# Patient Record
Sex: FEMALE | Race: WHITE | NOT HISPANIC OR LATINO | Employment: OTHER | URBAN - METROPOLITAN AREA
[De-identification: names, ages, dates, MRNs, and addresses within clinical notes are randomized per-mention and may not be internally consistent; named-entity substitution may affect disease eponyms.]

---

## 2017-01-03 ENCOUNTER — APPOINTMENT (OUTPATIENT)
Dept: GENERAL RADIOLOGY | Facility: HOSPITAL | Age: 77
End: 2017-01-03

## 2017-01-03 ENCOUNTER — TELEPHONE (OUTPATIENT)
Dept: ONCOLOGY | Facility: CLINIC | Age: 77
End: 2017-01-03

## 2017-01-03 ENCOUNTER — HOSPITAL ENCOUNTER (OUTPATIENT)
Facility: HOSPITAL | Age: 77
Setting detail: OBSERVATION
LOS: 1 days | Discharge: HOME HEALTH | End: 2017-01-09
Attending: EMERGENCY MEDICINE | Admitting: INTERNAL MEDICINE

## 2017-01-03 DIAGNOSIS — I48.91 ATRIAL FIBRILLATION AND FLUTTER (HCC): ICD-10-CM

## 2017-01-03 DIAGNOSIS — R62.7 FAILURE TO THRIVE IN ADULT: Primary | ICD-10-CM

## 2017-01-03 DIAGNOSIS — D47.2 SMOLDERING MULTIPLE MYELOMA (SMM): ICD-10-CM

## 2017-01-03 DIAGNOSIS — Z74.09 IMPAIRED MOBILITY AND ADLS: ICD-10-CM

## 2017-01-03 DIAGNOSIS — D47.2 SMOLDERING MULTIPLE MYELOMA (SMM): Primary | ICD-10-CM

## 2017-01-03 DIAGNOSIS — I48.92 ATRIAL FIBRILLATION AND FLUTTER (HCC): ICD-10-CM

## 2017-01-03 DIAGNOSIS — Z74.09 IMPAIRED FUNCTIONAL MOBILITY, BALANCE, GAIT, AND ENDURANCE: ICD-10-CM

## 2017-01-03 DIAGNOSIS — Z78.9 IMPAIRED MOBILITY AND ADLS: ICD-10-CM

## 2017-01-03 DIAGNOSIS — D47.2 MONOCLONAL GAMMOPATHY OF UNDETERMINED SIGNIFICANCE: ICD-10-CM

## 2017-01-03 PROBLEM — R73.9 HYPERGLYCEMIA: Status: ACTIVE | Noted: 2017-01-03

## 2017-01-03 PROBLEM — E86.0 DEHYDRATION: Status: ACTIVE | Noted: 2017-01-03

## 2017-01-03 LAB
ALBUMIN SERPL-MCNC: 3.8 G/DL (ref 3.2–4.8)
ALBUMIN/GLOB SERPL: 1 G/DL (ref 1.5–2.5)
ALP SERPL-CCNC: 76 U/L (ref 25–100)
ALT SERPL W P-5'-P-CCNC: 19 U/L (ref 7–40)
ANION GAP SERPL CALCULATED.3IONS-SCNC: 6 MMOL/L (ref 3–11)
AST SERPL-CCNC: 24 U/L (ref 0–33)
BACTERIA UR QL AUTO: ABNORMAL /HPF
BASOPHILS # BLD AUTO: 0 10*3/MM3 (ref 0–0.2)
BASOPHILS NFR BLD AUTO: 0 % (ref 0–1)
BILIRUB SERPL-MCNC: 1 MG/DL (ref 0.3–1.2)
BILIRUB UR QL STRIP: ABNORMAL
BUN BLD-MCNC: 22 MG/DL (ref 9–23)
BUN/CREAT SERPL: 31.4 (ref 7–25)
CALCIUM SPEC-SCNC: 10 MG/DL (ref 8.7–10.4)
CHLORIDE SERPL-SCNC: 96 MMOL/L (ref 99–109)
CLARITY UR: ABNORMAL
CO2 SERPL-SCNC: 32 MMOL/L (ref 20–31)
COLOR UR: YELLOW
CREAT BLD-MCNC: 0.7 MG/DL (ref 0.6–1.3)
DEPRECATED RDW RBC AUTO: 47.9 FL (ref 37–54)
EOSINOPHIL # BLD AUTO: 0 10*3/MM3 (ref 0.1–0.3)
EOSINOPHIL NFR BLD AUTO: 0 % (ref 0–3)
ERYTHROCYTE [DISTWIDTH] IN BLOOD BY AUTOMATED COUNT: 13.3 % (ref 11.3–14.5)
GFR SERPL CREATININE-BSD FRML MDRD: 81 ML/MIN/1.73
GLOBULIN UR ELPH-MCNC: 3.8 GM/DL
GLUCOSE BLD-MCNC: 148 MG/DL (ref 70–100)
GLUCOSE BLDC GLUCOMTR-MCNC: 149 MG/DL (ref 70–130)
GLUCOSE UR STRIP-MCNC: NEGATIVE MG/DL
HCT VFR BLD AUTO: 42.9 % (ref 34.5–44)
HGB BLD-MCNC: 15.1 G/DL (ref 11.5–15.5)
HGB UR QL STRIP.AUTO: NEGATIVE
HOLD SPECIMEN: NORMAL
HOLD SPECIMEN: NORMAL
HYALINE CASTS UR QL AUTO: ABNORMAL /LPF
IMM GRANULOCYTES # BLD: 0.01 10*3/MM3 (ref 0–0.03)
IMM GRANULOCYTES NFR BLD: 0.1 % (ref 0–0.6)
KETONES UR QL STRIP: NEGATIVE
LEUKOCYTE ESTERASE UR QL STRIP.AUTO: NEGATIVE
LYMPHOCYTES # BLD AUTO: 0.77 10*3/MM3 (ref 0.6–4.8)
LYMPHOCYTES NFR BLD AUTO: 10.3 % (ref 24–44)
MAGNESIUM SERPL-MCNC: 1.8 MG/DL (ref 1.3–2.7)
MCH RBC QN AUTO: 34.7 PG (ref 27–31)
MCHC RBC AUTO-ENTMCNC: 35.2 G/DL (ref 32–36)
MCV RBC AUTO: 98.6 FL (ref 80–99)
MONOCYTES # BLD AUTO: 0.09 10*3/MM3 (ref 0–1)
MONOCYTES NFR BLD AUTO: 1.2 % (ref 0–12)
NEUTROPHILS # BLD AUTO: 6.6 10*3/MM3 (ref 1.5–8.3)
NEUTROPHILS NFR BLD AUTO: 88.4 % (ref 41–71)
NITRITE UR QL STRIP: NEGATIVE
PH UR STRIP.AUTO: 6.5 [PH] (ref 5–8)
PLATELET # BLD AUTO: 267 10*3/MM3 (ref 150–450)
PMV BLD AUTO: 10.9 FL (ref 6–12)
POTASSIUM BLD-SCNC: 3.9 MMOL/L (ref 3.5–5.5)
PROT SERPL-MCNC: 7.6 G/DL (ref 5.7–8.2)
PROT UR QL STRIP: ABNORMAL
RBC # BLD AUTO: 4.35 10*6/MM3 (ref 3.89–5.14)
RBC # UR: ABNORMAL /HPF
REF LAB TEST METHOD: ABNORMAL
SODIUM BLD-SCNC: 134 MMOL/L (ref 132–146)
SP GR UR STRIP: 1.02 (ref 1–1.03)
SQUAMOUS #/AREA URNS HPF: ABNORMAL /HPF
TROPONIN I SERPL-MCNC: 0.02 NG/ML (ref 0–0.07)
TROPONIN I SERPL-MCNC: 0.02 NG/ML (ref 0–0.07)
UROBILINOGEN UR QL STRIP: ABNORMAL
WBC NRBC COR # BLD: 7.47 10*3/MM3 (ref 3.5–10.8)
WBC UR QL AUTO: ABNORMAL /HPF
WHOLE BLOOD HOLD SPECIMEN: NORMAL
WHOLE BLOOD HOLD SPECIMEN: NORMAL

## 2017-01-03 PROCEDURE — 96360 HYDRATION IV INFUSION INIT: CPT

## 2017-01-03 PROCEDURE — 84484 ASSAY OF TROPONIN QUANT: CPT

## 2017-01-03 PROCEDURE — 93005 ELECTROCARDIOGRAM TRACING: CPT

## 2017-01-03 PROCEDURE — G0378 HOSPITAL OBSERVATION PER HR: HCPCS

## 2017-01-03 PROCEDURE — 99220 PR INITIAL OBSERVATION CARE/DAY 70 MINUTES: CPT | Performed by: INTERNAL MEDICINE

## 2017-01-03 PROCEDURE — 83735 ASSAY OF MAGNESIUM: CPT | Performed by: EMERGENCY MEDICINE

## 2017-01-03 PROCEDURE — 85025 COMPLETE CBC W/AUTO DIFF WBC: CPT | Performed by: EMERGENCY MEDICINE

## 2017-01-03 PROCEDURE — 82962 GLUCOSE BLOOD TEST: CPT

## 2017-01-03 PROCEDURE — 80053 COMPREHEN METABOLIC PANEL: CPT | Performed by: EMERGENCY MEDICINE

## 2017-01-03 PROCEDURE — 81001 URINALYSIS AUTO W/SCOPE: CPT | Performed by: EMERGENCY MEDICINE

## 2017-01-03 PROCEDURE — 99284 EMERGENCY DEPT VISIT MOD MDM: CPT

## 2017-01-03 PROCEDURE — 96361 HYDRATE IV INFUSION ADD-ON: CPT

## 2017-01-03 PROCEDURE — 71010 HC CHEST PA OR AP: CPT

## 2017-01-03 RX ORDER — SODIUM CHLORIDE 9 MG/ML
100 INJECTION, SOLUTION INTRAVENOUS ONCE
Status: COMPLETED | OUTPATIENT
Start: 2017-01-03 | End: 2017-01-03

## 2017-01-03 RX ORDER — SODIUM CHLORIDE 0.9 % (FLUSH) 0.9 %
10 SYRINGE (ML) INJECTION AS NEEDED
Status: DISCONTINUED | OUTPATIENT
Start: 2017-01-03 | End: 2017-01-09 | Stop reason: HOSPADM

## 2017-01-03 RX ORDER — DIPHENHYDRAMINE HCL 50 MG
50 CAPSULE ORAL NIGHTLY PRN
COMMUNITY
End: 2017-03-24 | Stop reason: HOSPADM

## 2017-01-03 RX ORDER — DIPHENHYDRAMINE HCL 50 MG
50 CAPSULE ORAL ONCE
Status: COMPLETED | OUTPATIENT
Start: 2017-01-03 | End: 2017-01-04

## 2017-01-03 RX ORDER — DULOXETIN HYDROCHLORIDE 30 MG/1
30 CAPSULE, DELAYED RELEASE ORAL DAILY
Status: DISCONTINUED | OUTPATIENT
Start: 2017-01-04 | End: 2017-01-04 | Stop reason: SDUPTHER

## 2017-01-03 RX ADMIN — SODIUM CHLORIDE 100 ML/HR: 9 INJECTION, SOLUTION INTRAVENOUS at 20:49

## 2017-01-03 NOTE — IP AVS SNAPSHOT
AFTER VISIT SUMMARY             Sowmya Beckett           About your hospitalization     You were admitted on:  January 3, 2017 You last received care in the:  [address] GYN       Procedures & Surgeries         Medications    If you or your caregiver advised us that you are currently taking a medication and that medication is marked below as “Resume”, this simply indicates that we have reviewed those medications to make sure our new therapy recommendations do not interfere.  If you have concerns about medications other than those new ones which we are prescribing today, please consult the physician who prescribed them (or your primary physician).  Our review of your home medications is not meant to indicate that we are directing their use.             Your Medications      START taking these medications     ondansetron 4 MG tablet   Take 1 tablet by mouth Every 6 (Six) Hours As Needed for nausea or vomiting.   Commonly known as:  LOYD             CHANGE how you take these medications     fish oil 1000 MG capsule capsule   Take  by mouth daily with breakfast.   What changed:  Another medication with the same name was removed. Continue taking this medication, and follow the directions you see here.           polyethylene glycol packet   Take 17 g by mouth 2 (Two) Times a Day.   According to our records, you may have been taking this medication differently.   Commonly known as:  MIRALAX   What changed:    - when to take this  - reasons to take this             CONTINUE taking these medications     aspirin 325 MG tablet   Take 325 mg by mouth daily.   Last time this was given:  1/9/2017  9:21 AM           BIOTIN PO   Take  by mouth daily.           cholecalciferol 1000 UNITS tablet   Take 2,000 Units by mouth daily.   Commonly known as:  VITAMIN D3           diphenhydrAMINE 50 MG capsule   Take 50 mg by mouth Every Night.   Last time this was given:  1/9/2017  1:10 AM   Commonly known as:   BENADRYL           DULoxetine 30 MG capsule   Take 30 mg by mouth Daily.   Last time this was given:  1/9/2017  9:21 AM   Commonly known as:  CYMBALTA           gabapentin 300 MG capsule   Take 300 mg by mouth 3 (three) times a day.   Last time this was given:  1/9/2017  5:53 AM   Commonly known as:  NEURONTIN           metoclopramide 10 MG tablet   Take 1 tablet by mouth 4 (Four) Times a Day Before Meals & at Bedtime for 30 days.   Last time this was given:  1/9/2017 11:59 AM   Commonly known as:  REGLAN           metoprolol tartrate 25 MG tablet   TAKE ONE-HALF TABLET BY MOUTH ONCE DAILY   Last time this was given:  1/9/2017  9:21 AM   Commonly known as:  LOPRESSOR           Red Yeast Rice 600 MG capsule   Take  by mouth daily.           Zinc 50 MG capsule   Take  by mouth daily.             STOP taking these medications     dexamethasone 4 MG tablet   Commonly known as:  DECADRON                Where to Get Your Medications      These medications were sent to [facility] [address] [place] [address] [phone] [place] [id number] [address] [address]     Phone:  [phone]     ondansetron 4 MG tablet                  Your Medications      Your Medication List           Morning Noon Evening Bedtime As Needed    aspirin 325 MG tablet   Take 325 mg by mouth daily.                                   BIOTIN PO   Take  by mouth daily.                                   cholecalciferol 1000 UNITS tablet   Take 2,000 Units by mouth daily.   Commonly known as:  VITAMIN D3                                   diphenhydrAMINE 50 MG capsule   Take 50 mg by mouth Every Night.   Commonly known as:  BENADRYL                                   DULoxetine 30 MG capsule   Take 30 mg by mouth Daily.   Commonly known as:  CYMBALTA                                   fish oil 1000 MG capsule capsule   Take  by mouth daily with breakfast.                                   gabapentin 300 MG capsule    Take 300 mg by mouth 3 (three) times a day.   Commonly known as:  NEURONTIN                                         metoclopramide 10 MG tablet   Take 1 tablet by mouth 4 (Four) Times a Day Before Meals & at Bedtime for 30 days.   Commonly known as:  REGLAN                                            metoprolol tartrate 25 MG tablet   TAKE ONE-HALF TABLET BY MOUTH ONCE DAILY   Commonly known as:  LOPRESSOR                                   ondansetron 4 MG tablet   Take 1 tablet by mouth Every 6 (Six) Hours As Needed for nausea or vomiting.   Commonly known as:  ZOFRAN                                   polyethylene glycol packet   Take 17 g by mouth 2 (Two) Times a Day.   Commonly known as:  MIRALAX                                      Red Yeast Rice 600 MG capsule   Take  by mouth daily.                                   Zinc 50 MG capsule   Take  by mouth daily.                                            Instructions for After Discharge        Activity Instructions     Activity as Tolerated                 Diet Instructions     Advance Diet As Tolerated                 Discharge References/Attachments     FAILURE TO THRIVE, ADULT (ENGLISH)    FALL PREVENTION AND HOME SAFETY, EASY-TO-READ (ENGLISH)    ONDANSETRON TABLETS (ENGLISH)       Follow-ups for After Discharge        Follow-up Information     Follow up with Velia Vogel MD .    Specialty:  Family Medicine    Contact information:    [address] [facility]  [address]  [place] [id number] [phone]          Follow up with Messi Pantoja MD .    Specialty:  Hematology and Oncology    Contact information:    [address] [facility] [address]  [facility] [id number] [phone]          Follow up with [facility] .    Specialty:  Home Health Services    Contact information:    [address] [facility] [address] [phone]      Referrals and Follow-ups to Schedule     Additional Follow-Up    As directed    pcp   Follow Up  Details:  1 week       Follow-Up    As directed    1/11/17- PET scan  1/12/17- Dr. Pantoja   Follow Up Details:  1/11/17 and 1/12/17             Scheduled Appointments     Jan 11, 2017 12:45 PM EST   [place] pet injection with A[facility]LEY [facility] PET ADMIN RM1   [facility]    [address] [facility] [address]   [phone]            Jan 11, 2017  1:45 PM EST   NM ash[place] pet whole body with ASHLEY [facility] PETCT 1   [facility] PET ([place])    [address] [facility] [address] [facility] [address]   [phone]            Jan 12, 2017  9:45 AM EST   FOLLOW UP with Messi Pantoja MD   [facility] HEMATOLOGY  AND ONCOLOGY ([place])    [address]  [facility] [address] [phone]            Mar 21, 2017 11:00 AM EDT   New Patient with Dave Swenson MD, FAAN   [facility] NEUROLOGY (--)    [address]  [facility] [address] [phone]           Bring insurance card and photo ID to appointment.            Aug 14, 2017  1:30 PM EDT   Follow Up with Alvarado Willoughby MD   [facility] CARDIOLOGY (--)    [address]  [facility] [address] [phone]           Arrive 15 minutes prior to appointment.            Additional instructions:      [facility] for therapy              Pathable Signup     Our records indicate that you have an active [facility] Pathable account.    You can view your After Visit Summary by going to [employer]Xand[employer] and logging in with your Pathable username and password.  If you don't have a Pathable username and password but a parent or guardian has access to your record, the parent or guardian should login with their own Pathable username and password and access your record to view the After Visit Summary.    If you have questions, you can email [email or web address] or call [phone] to talk to our Pathable staff.   Remember, MyChart is NOT to be used for urgent needs.  For medical emergencies, dial 911.           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    Your diagnoses also included:  Monoclonal Gammopathy Of Undetermined Significance, Smoldering Multiple Myeloma (Smm), Abnormal Heart Rhythm, Dehydration, Hyperglycemia      Care Providers     Provider Service Role Specialty    Damaris Zabala II, DO Medicine Attending Provider Hospitalist    Messi Pantoja MD Oncology Consulting Physician  Hematology and Oncology    Alvarado [place] MD Gastroenterology Consulting Physician  Gastroenterology     Kathy Grajeda MD Oncology Consulting Physician  Hematology and Oncology       Your Allergies  Date Reviewed: 1/3/2017    Allergen Reactions    Ambien (Zolpidem Tartrate) Not Noted         Morphine And Related Not Noted      Pending Labs     Order Current Status    Green Top (No Gel) Collected (01/03/17 1806)    [place] Draw In process      Patient Belongings Returned     Document Return of Belongings Flowsheet     Were the patient bedside belongings sent home?   Yes   Belongings Retrieved from Security & Sent Home   N/A    Belongings Sent to Safe   --   Medications Retrieved from Pharmacy & Sent Home   N/A              More Information      Failure to Thrive, Adult  Failure to thrive is a group of symptoms that affect elderly adults. These symptoms include loss of appetite and weight loss. People who have this condition may do fewer and fewer activities over time. They may lose interest in being with friends or they may not want to eat or drink. This condition is not a normal part of aging.  CAUSES  This condition may be caused by:  · A disease, such dementia, diabetes, cancer, or lung disease.  · A health problem, such as a vitamin deficiency or a heart problem.  · A disorder, such as depression.  · A disability.  · Medicines.  · Mistreatment or  neglect.  In some cases, the cause may not be known.  SYMPTOMS  Symptoms of this condition include:  · Loss of more than 5% of your body weight.  · Being more tired than normal after an activity.  · Having trouble getting up after sitting.  · Loss of appetite.  · Not getting out of bed.  · Not wanting to do usual activities.  · Depression.  · Getting infections often.  · Bedsores.  · Taking a long time to recover after an injury or a surgery.  · Weakness.  DIAGNOSIS  This condition may be diagnosed with a physical exam. Your health care provider will ask questions about your health, behavior, and mood, such as:  · Has your activity changed?  · Do you seem sad?  · Are your eating habits different?  Tests may also be done. They may include:  · Blood tests.  · Urine tests.  · Imaging tests, such as X-rays, a CT scan, or MRI.  · Hearing tests.  · Vision tests.  · Tests to check thinking ability (cognitive tests).  · Activity tests to see if you can do tasks such as bathing and dressing and to see if you can move around safely.  You may be referred to a specialist.  TREATMENT  Treatment for this condition depends on the cause. It may involve:  · Treating the cause.  · Talk therapy or medicine to treat depression.  · Improving diet, such as by eating more often or taking nutritional supplements.  · Changing or stopping a medicine.  · Physical therapy.  It often takes a team of health care providers to find the right treatment.  HOME CARE INSTRUCTIONS  · Take over-the-counter and prescription medicines only as told by your health care provider.  · Eat a healthy, well-balanced diet. Make sure to get enough calories in each meal.  · Be physically active. Include strength training as part of your exercise routine. A physical therapist can help to set up an exercise program that fits you.  · Make sure that you are safe at home.  · Make sure that you have a plan for what to do if you become unable to make decisions for  yourself.  SEEK MEDICAL CARE IF:  · You are not able to eat well.  · You are not able to move around.  · You feel very sad or hopeless.  SEEK IMMEDIATE MEDICAL CARE IF:  · You have thoughts of ending your life.  · You cannot eat or drink.  · You do not get out of bed.  · Staying at home is no longer safe.  · You have a fever.     This information is not intended to replace advice given to you by your health care provider. Make sure you discuss any questions you have with your health care provider.     Document Released: 03/11/2013 Document Revised: 09/07/2016 Document Reviewed: 03/14/2016  [employer] Interactive Patient Education ©2016 [employer]          Fall Prevention in the Home   Falls can cause injuries. They can happen to people of all ages. There are many things you can do to make your home safe and to help prevent falls.   WHAT CAN I DO ON THE OUTSIDE OF MY HOME?  · Regularly fix the edges of walkways and driveways and fix any cracks.  · Remove anything that might make you trip as you walk through a door, such as a raised step or threshold.  · Trim any bushes or trees on the path to your home.  · Use bright outdoor lighting.  · Clear any walking paths of anything that might make someone trip, such as rocks or tools.  · Regularly check to see if handrails are loose or broken. Make sure that both sides of any steps have handrails.  · Any raised decks and porches should have guardrails on the edges.  · Have any leaves, snow, or ice cleared regularly.  · Use sand or salt on walking paths during winter.  · Clean up any spills in your garage right away. This includes oil or grease spills.  WHAT CAN I DO IN THE BATHROOM?   · Use night lights.  · Install grab bars by the toilet and in the tub and shower. Do not use towel bars as grab bars.  · Use non-skid mats or decals in the tub or shower.  · If you need to sit down in the shower, use a plastic, non-slip stool.  · Keep the floor dry. Clean up any water that  spills on the floor as soon as it happens.  · Remove soap buildup in the tub or shower regularly.  · Attach bath mats securely with double-sided non-slip rug tape.  · Do not have throw rugs and other things on the floor that can make you trip.  WHAT CAN I DO IN THE BEDROOM?  · Use night lights.  · Make sure that you have a light by your bed that is easy to reach.  · Do not use any sheets or blankets that are too big for your bed. They should not hang down onto the floor.  · Have a firm chair that has side arms. You can use this for support while you get dressed.  · Do not have throw rugs and other things on the floor that can make you trip.  WHAT CAN I DO IN THE KITCHEN?  · Clean up any spills right away.  · Avoid walking on wet floors.  · Keep items that you use a lot in easy-to-reach places.  · If you need to reach something above you, use a strong step stool that has a grab bar.  · Keep electrical cords out of the way.  · Do not use floor polish or wax that makes floors slippery. If you must use wax, use non-skid floor wax.  · Do not have throw rugs and other things on the floor that can make you trip.  WHAT CAN I DO WITH MY STAIRS?  · Do not leave any items on the stairs.  · Make sure that there are handrails on both sides of the stairs and use them. Fix handrails that are broken or loose. Make sure that handrails are as long as the stairways.  · Check any carpeting to make sure that it is firmly attached to the stairs. Fix any carpet that is loose or worn.  · Avoid having throw rugs at the top or bottom of the stairs. If you do have throw rugs, attach them to the floor with carpet tape.  · Make sure that you have a light switch at the top of the stairs and the bottom of the stairs. If you do not have them, ask someone to add them for you.  WHAT ELSE CAN I DO TO HELP PREVENT FALLS?  · Wear shoes that:    Do not have high heels.    Have rubber bottoms.    Are comfortable and fit you well.    Are closed at the  toe. Do not wear sandals.  · If you use a stepladder:    Make sure that it is fully opened. Do not climb a closed stepladder.    Make sure that both sides of the stepladder are locked into place.    Ask someone to hold it for you, if possible.  · Clearly rivera and make sure that you can see:    Any grab bars or handrails.    First and last steps.    Where the edge of each step is.  · Use tools that help you move around (mobility aids) if they are needed. These include:    Canes.    Walkers.    Scooters.    Crutches.  · Turn on the lights when you go into a dark area. Replace any light bulbs as soon as they burn out.  · Set up your furniture so you have a clear path. Avoid moving your furniture around.  · If any of your floors are uneven, fix them.  · If there are any pets around you, be aware of where they are.  · Review your medicines with your doctor. Some medicines can make you feel dizzy. This can increase your chance of falling.  Ask your doctor what other things that you can do to help prevent falls.     This information is not intended to replace advice given to you by your health care provider. Make sure you discuss any questions you have with your health care provider.     Document Released: 10/14/2010 Document Revised: 05/03/2016 Document Reviewed: 01/22/2016  [employer] Interactive Patient Education ©2016 [employer]          Ondansetron tablets  What is this medicine?  ONDANSETRON (on DAN se brissa) is used to treat nausea and vomiting caused by chemotherapy. It is also used to prevent or treat nausea and vomiting after surgery.  This medicine may be used for other purposes; ask your health care provider or pharmacist if you have questions.  What should I tell my health care provider before I take this medicine?  They need to know if you have any of these conditions:  -heart disease  -history of irregular heartbeat  -liver disease  -low levels of magnesium or potassium in the blood  -an unusual or allergic  reaction to ondansetron, granisetron, other medicines, foods, dyes, or preservatives  -pregnant or trying to get pregnant  -breast-feeding  How should I use this medicine?  Take this medicine by mouth with a glass of water. Follow the directions on your prescription label. Take your doses at regular intervals. Do not take your medicine more often than directed.  Talk to your pediatrician regarding the use of this medicine in children. Special care may be needed.  Overdosage: If you think you have taken too much of this medicine contact a poison control center or emergency room at once.  NOTE: This medicine is only for you. Do not share this medicine with others.  What if I miss a dose?  If you miss a dose, take it as soon as you can. If it is almost time for your next dose, take only that dose. Do not take double or extra doses.  What may interact with this medicine?  Do not take this medicine with any of the following medications:  -apomorphine  -certain medicines for fungal infections like fluconazole, itraconazole, ketoconazole, posaconazole, voriconazole  -cisapride  -dofetilide  -dronedarone  -pimozide  -thioridazine  -ziprasidone  This medicine may also interact with the following medications:  -carbamazepine  -certain medicines for depression, anxiety, or psychotic disturbances  -fentanyl  -linezolid  -MAOIs like Carbex, Eldepryl, Marplan, Nardil, and Parnate  -methylene blue (injected into a vein)  -other medicines that prolong the QT interval (cause an abnormal heart rhythm)  -phenytoin  -rifampicin  -tramadol  This list may not describe all possible interactions. Give your health care provider a list of all the medicines, herbs, non-prescription drugs, or dietary supplements you use. Also tell them if you smoke, drink alcohol, or use illegal drugs. Some items may interact with your medicine.  What should I watch for while using this medicine?  Check with your doctor or health care professional right away  if you have any sign of an allergic reaction.  What side effects may I notice from receiving this medicine?  Side effects that you should report to your doctor or health care professional as soon as possible:  -allergic reactions like skin rash, itching or hives, swelling of the face, lips or tongue  -breathing problems  -confusion  -dizziness  -fast or irregular heartbeat  -feeling faint or lightheaded, falls  -fever and chills  -loss of balance or coordination  -seizures  -sweating  -swelling of the hands or feet  -tightness in the chest  -tremors  -unusually weak or tired  Side effects that usually do not require medical attention (report to your doctor or health care professional if they continue or are bothersome):  -constipation or diarrhea  -headache  This list may not describe all possible side effects. Call your doctor for medical advice about side effects. You may report side effects to FDA at 0-183-FDA-7727.  Where should I keep my medicine?  Keep out of the reach of children.  Store between 2 and 30 degrees C (36 and 86 degrees F). Throw away any unused medicine after the expiration date.  NOTE: This sheet is a summary. It may not cover all possible information. If you have questions about this medicine, talk to your doctor, pharmacist, or health care provider.     © 2016, Elsevier/Gold Standard. (2014-09-24 16:27:45)            SYMPTOMS OF A STROKE    Call 911 or have someone take you to the Emergency Department if you have any of the following:    · Sudden numbness or weakness of your face, arm or leg especially on one side of the body  · Sudden confusion, diffiiculty speaking or trouble understanding   · Changes in your vision or loss of sight in one eye  · Sudden severe headache with no known cause  · sudden dizziness, trouble walking, loss of balance or coordination    It is important to seek emergency care right away if you have further stroke symptoms. If you get emergency help quickly, the  powerful clot-dissolving medicines can reduce the disabilities caused by a stroke.     For more information:    American Stroke Association  6-824-7-STROKE  www.strokeassociation.org           IF YOU SMOKE OR USE TOBACCO PLEASE READ THE FOLLOWING:    Why is smoking bad for me?  Smoking increases the risk of heart disease, lung disease, vascular disease, stroke, and cancer.     If you smoke, STOP!    If you would like more information on quitting smoking, please visit the [employer] website: [email or web address]   This link will provide additional resources including the QUIT line and the Beat the Pack support groups.     For more information:    American Cancer Society  (623) 676-8463    American Heart Association  1-409.363.4878               YOU ARE THE MOST IMPORTANT FACTOR IN YOUR RECOVERY.     Follow all instructions carefully.     I have reviewed my discharge instructions with my nurse, including the following information, if applicable:     Information about my illness and diagnosis   Follow up appointments (including lab draws)   Wound Care   Equipment Needs   Medications (new and continuing) along with side effects   Preventative information such as vaccines and smoking cessations   Diet   Pain   I know when to contact my Doctor's office or seek emergency care      I want my nurse to describe the side effects of my medications: YES NO   If the answer is no, I understand the side effects of my medications: YES NO   My nurse described the side effects of my medications in a way that I could understand: YES NO   I have taken my personal belongings and my own medications with me at discharge: YES NO            I have received this information and my questions have been answered. I have discussed any concerns I see with this plan with the nurse or physician. I understand these instructions.    Signature of Patient or Responsible Person:  _____________________________________    Date: _________________  Time: __________________    Signature of Healthcare Provider: _______________________________________  Date: _________________  Time: __________________

## 2017-01-03 NOTE — TELEPHONE ENCOUNTER
Patient is in bed most of the day.  Had defecation in the hospital that made her feel much better.  Bone marrow biopsy shows smoldering myeloma.  Prior bone survey was negative and she's not anemic or hypercalcemic or having elevated creatinine.  Called patient and told her that she does not have an indication for treatment but has already started dexamethasone 20 mg daily to try to stimulate her appetite and perhaps decrease the clone of immunoglobulin monoclonal protein that was actually lower this month than it was 2 months ago.  Whether this is causing some neuropathy and delayed gastric emptying is my hypothesis.  Nonetheless she still failing to thrive and will probably come back to the emergency room.  In the meantime I'm contacting my  in [place] and I have ordered a PET scan to finish staging for myeloma thoroughly as well as checking an ionized calcium as her total calcium was at the upper end of normal at 10.4.

## 2017-01-04 LAB
ALBUMIN SERPL-MCNC: 3.2 G/DL (ref 3.2–4.8)
ALBUMIN/GLOB SERPL: 0.9 G/DL (ref 1.5–2.5)
ALP SERPL-CCNC: 57 U/L (ref 25–100)
ALT SERPL W P-5'-P-CCNC: 15 U/L (ref 7–40)
ANION GAP SERPL CALCULATED.3IONS-SCNC: 9 MMOL/L (ref 3–11)
AST SERPL-CCNC: 18 U/L (ref 0–33)
BASOPHILS # BLD AUTO: 0 10*3/MM3 (ref 0–0.2)
BASOPHILS NFR BLD AUTO: 0 % (ref 0–1)
BILIRUB SERPL-MCNC: 1.3 MG/DL (ref 0.3–1.2)
BUN BLD-MCNC: 23 MG/DL (ref 9–23)
BUN/CREAT SERPL: 38.3 (ref 7–25)
CA-I SERPL ISE-MCNC: 1.18 MMOL/L (ref 1.12–1.32)
CALCIUM SPEC-SCNC: 8.9 MG/DL (ref 8.7–10.4)
CHLORIDE SERPL-SCNC: 98 MMOL/L (ref 99–109)
CO2 SERPL-SCNC: 26 MMOL/L (ref 20–31)
CREAT BLD-MCNC: 0.6 MG/DL (ref 0.6–1.3)
DEPRECATED RDW RBC AUTO: 48.6 FL (ref 37–54)
EOSINOPHIL # BLD AUTO: 0 10*3/MM3 (ref 0.1–0.3)
EOSINOPHIL NFR BLD AUTO: 0 % (ref 0–3)
ERYTHROCYTE [DISTWIDTH] IN BLOOD BY AUTOMATED COUNT: 13.5 % (ref 11.3–14.5)
GFR SERPL CREATININE-BSD FRML MDRD: 97 ML/MIN/1.73
GLOBULIN UR ELPH-MCNC: 3.4 GM/DL
GLUCOSE BLD-MCNC: 111 MG/DL (ref 70–100)
GLUCOSE BLDC GLUCOMTR-MCNC: 100 MG/DL (ref 70–130)
GLUCOSE BLDC GLUCOMTR-MCNC: 128 MG/DL (ref 70–130)
GLUCOSE BLDC GLUCOMTR-MCNC: 169 MG/DL (ref 70–130)
GLUCOSE BLDC GLUCOMTR-MCNC: 97 MG/DL (ref 70–130)
HCT VFR BLD AUTO: 37.9 % (ref 34.5–44)
HGB BLD-MCNC: 13.5 G/DL (ref 11.5–15.5)
IMM GRANULOCYTES # BLD: 0.02 10*3/MM3 (ref 0–0.03)
IMM GRANULOCYTES NFR BLD: 0.2 % (ref 0–0.6)
LYMPHOCYTES # BLD AUTO: 1.82 10*3/MM3 (ref 0.6–4.8)
LYMPHOCYTES NFR BLD AUTO: 22.7 % (ref 24–44)
MCH RBC QN AUTO: 35.1 PG (ref 27–31)
MCHC RBC AUTO-ENTMCNC: 35.6 G/DL (ref 32–36)
MCV RBC AUTO: 98.4 FL (ref 80–99)
MONOCYTES # BLD AUTO: 0.77 10*3/MM3 (ref 0–1)
MONOCYTES NFR BLD AUTO: 9.6 % (ref 0–12)
NEUTROPHILS # BLD AUTO: 5.42 10*3/MM3 (ref 1.5–8.3)
NEUTROPHILS NFR BLD AUTO: 67.5 % (ref 41–71)
PLATELET # BLD AUTO: 245 10*3/MM3 (ref 150–450)
PMV BLD AUTO: 11.4 FL (ref 6–12)
POTASSIUM BLD-SCNC: 3.8 MMOL/L (ref 3.5–5.5)
PROT SERPL-MCNC: 6.6 G/DL (ref 5.7–8.2)
RBC # BLD AUTO: 3.85 10*6/MM3 (ref 3.89–5.14)
SODIUM BLD-SCNC: 133 MMOL/L (ref 132–146)
WBC NRBC COR # BLD: 8.03 10*3/MM3 (ref 3.5–10.8)

## 2017-01-04 PROCEDURE — 99233 SBSQ HOSP IP/OBS HIGH 50: CPT | Performed by: INTERNAL MEDICINE

## 2017-01-04 PROCEDURE — 82330 ASSAY OF CALCIUM: CPT | Performed by: NURSE PRACTITIONER

## 2017-01-04 PROCEDURE — 99225 PR SBSQ OBSERVATION CARE/DAY 25 MINUTES: CPT | Performed by: INTERNAL MEDICINE

## 2017-01-04 PROCEDURE — 96361 HYDRATE IV INFUSION ADD-ON: CPT

## 2017-01-04 PROCEDURE — G0378 HOSPITAL OBSERVATION PER HR: HCPCS

## 2017-01-04 PROCEDURE — 97162 PT EVAL MOD COMPLEX 30 MIN: CPT

## 2017-01-04 PROCEDURE — 25010000002 ENOXAPARIN PER 10 MG: Performed by: NURSE PRACTITIONER

## 2017-01-04 PROCEDURE — G8978 MOBILITY CURRENT STATUS: HCPCS

## 2017-01-04 PROCEDURE — 83036 HEMOGLOBIN GLYCOSYLATED A1C: CPT | Performed by: NURSE PRACTITIONER

## 2017-01-04 PROCEDURE — 63710000001 DIPHENHYDRAMINE PER 50 MG: Performed by: PHYSICIAN ASSISTANT

## 2017-01-04 PROCEDURE — G8979 MOBILITY GOAL STATUS: HCPCS

## 2017-01-04 PROCEDURE — 63710000001 DEXAMETHASONE PER 0.25 MG: Performed by: NURSE PRACTITIONER

## 2017-01-04 PROCEDURE — 96372 THER/PROPH/DIAG INJ SC/IM: CPT

## 2017-01-04 PROCEDURE — 82962 GLUCOSE BLOOD TEST: CPT

## 2017-01-04 PROCEDURE — 80053 COMPREHEN METABOLIC PANEL: CPT | Performed by: NURSE PRACTITIONER

## 2017-01-04 PROCEDURE — 85025 COMPLETE CBC W/AUTO DIFF WBC: CPT | Performed by: NURSE PRACTITIONER

## 2017-01-04 RX ORDER — GABAPENTIN 300 MG/1
300 CAPSULE ORAL EVERY 8 HOURS SCHEDULED
Status: DISCONTINUED | OUTPATIENT
Start: 2017-01-04 | End: 2017-01-09 | Stop reason: HOSPADM

## 2017-01-04 RX ORDER — ONDANSETRON 2 MG/ML
4 INJECTION INTRAMUSCULAR; INTRAVENOUS EVERY 6 HOURS PRN
Status: DISCONTINUED | OUTPATIENT
Start: 2017-01-04 | End: 2017-01-09 | Stop reason: HOSPADM

## 2017-01-04 RX ORDER — ASPIRIN 325 MG
325 TABLET ORAL DAILY
Status: DISCONTINUED | OUTPATIENT
Start: 2017-01-04 | End: 2017-01-09 | Stop reason: HOSPADM

## 2017-01-04 RX ORDER — ACETAMINOPHEN 325 MG/1
650 TABLET ORAL EVERY 4 HOURS PRN
Status: DISCONTINUED | OUTPATIENT
Start: 2017-01-04 | End: 2017-01-09 | Stop reason: HOSPADM

## 2017-01-04 RX ORDER — SODIUM CHLORIDE 9 MG/ML
100 INJECTION, SOLUTION INTRAVENOUS CONTINUOUS
Status: DISCONTINUED | OUTPATIENT
Start: 2017-01-04 | End: 2017-01-09 | Stop reason: HOSPADM

## 2017-01-04 RX ORDER — DEXTROSE MONOHYDRATE 25 G/50ML
25 INJECTION, SOLUTION INTRAVENOUS AS NEEDED
Status: DISCONTINUED | OUTPATIENT
Start: 2017-01-04 | End: 2017-01-09 | Stop reason: HOSPADM

## 2017-01-04 RX ORDER — NICOTINE POLACRILEX 4 MG
15 LOZENGE BUCCAL AS NEEDED
Status: DISCONTINUED | OUTPATIENT
Start: 2017-01-04 | End: 2017-01-09 | Stop reason: HOSPADM

## 2017-01-04 RX ORDER — SODIUM CHLORIDE 0.9 % (FLUSH) 0.9 %
1-10 SYRINGE (ML) INJECTION AS NEEDED
Status: DISCONTINUED | OUTPATIENT
Start: 2017-01-04 | End: 2017-01-09 | Stop reason: HOSPADM

## 2017-01-04 RX ORDER — DULOXETIN HYDROCHLORIDE 30 MG/1
30 CAPSULE, DELAYED RELEASE ORAL DAILY
Status: DISCONTINUED | OUTPATIENT
Start: 2017-01-04 | End: 2017-01-09 | Stop reason: HOSPADM

## 2017-01-04 RX ORDER — METOCLOPRAMIDE 10 MG/1
10 TABLET ORAL
Status: DISCONTINUED | OUTPATIENT
Start: 2017-01-04 | End: 2017-01-09 | Stop reason: HOSPADM

## 2017-01-04 RX ORDER — DEXAMETHASONE 4 MG/1
20 TABLET ORAL
Status: COMPLETED | OUTPATIENT
Start: 2017-01-04 | End: 2017-01-05

## 2017-01-04 RX ORDER — ONDANSETRON 4 MG/1
4 TABLET, FILM COATED ORAL EVERY 6 HOURS PRN
Status: DISCONTINUED | OUTPATIENT
Start: 2017-01-04 | End: 2017-01-09 | Stop reason: HOSPADM

## 2017-01-04 RX ADMIN — ASPIRIN 325 MG ORAL TABLET 325 MG: 325 PILL ORAL at 08:50

## 2017-01-04 RX ADMIN — ENOXAPARIN SODIUM 40 MG: 40 INJECTION SUBCUTANEOUS at 04:40

## 2017-01-04 RX ADMIN — METOCLOPRAMIDE HYDROCHLORIDE 10 MG: 10 TABLET ORAL at 21:29

## 2017-01-04 RX ADMIN — DIPHENHYDRAMINE HYDROCHLORIDE 50 MG: 50 CAPSULE ORAL at 00:10

## 2017-01-04 RX ADMIN — GABAPENTIN 300 MG: 300 CAPSULE ORAL at 21:29

## 2017-01-04 RX ADMIN — METOCLOPRAMIDE HYDROCHLORIDE 10 MG: 10 TABLET ORAL at 08:50

## 2017-01-04 RX ADMIN — GABAPENTIN 300 MG: 300 CAPSULE ORAL at 13:15

## 2017-01-04 RX ADMIN — DULOXETINE HYDROCHLORIDE 30 MG: 30 CAPSULE, DELAYED RELEASE ORAL at 00:10

## 2017-01-04 RX ADMIN — GABAPENTIN 300 MG: 300 CAPSULE ORAL at 04:41

## 2017-01-04 RX ADMIN — SODIUM CHLORIDE 100 ML/HR: 9 INJECTION, SOLUTION INTRAVENOUS at 20:25

## 2017-01-04 RX ADMIN — SODIUM CHLORIDE 100 ML/HR: 9 INJECTION, SOLUTION INTRAVENOUS at 00:09

## 2017-01-04 RX ADMIN — METOPROLOL TARTRATE 25 MG: 25 TABLET ORAL at 08:50

## 2017-01-04 RX ADMIN — METOCLOPRAMIDE HYDROCHLORIDE 10 MG: 10 TABLET ORAL at 13:15

## 2017-01-04 RX ADMIN — METOCLOPRAMIDE HYDROCHLORIDE 10 MG: 10 TABLET ORAL at 16:52

## 2017-01-04 RX ADMIN — SODIUM CHLORIDE 100 ML/HR: 9 INJECTION, SOLUTION INTRAVENOUS at 08:51

## 2017-01-04 RX ADMIN — DEXAMETHASONE 20 MG: 4 TABLET ORAL at 08:49

## 2017-01-04 RX ADMIN — Medication 5 MG: at 21:30

## 2017-01-04 RX ADMIN — DULOXETINE HYDROCHLORIDE 30 MG: 30 CAPSULE, DELAYED RELEASE ORAL at 08:50

## 2017-01-04 NOTE — CONSULTS
Gastroenterology Consult Note    Referring Provider: Lela  Reason for Consultation: Anorexia    Patient Care Team:  Velia Vogel MD as PCP - General  Velia Vogel MD as PCP - Family Medicine  Dave Swenson MD, FAAN as Consulting Physician (Sleep Medicine)    Chief complaint poor appetite      History of present illness:   The patient is a 76-year-old female with a past medical history of atrial fibrillation postablation, melanoma and monoclonal gammopathy of unclear significance who presents to [facility] due to failure to thrive. She tells me that for 6 months she has had some nausea and anorexia and has had about a 38-pound weight loss. She has had a colonoscopy and upper endoscopy in the not too distant past. She has had gastric emptying study which showed at least an element of gastroparesis. She is here for evaluation and we are asked to evaluate in case alternative feeding is needed.     ALLERGIES:   1. MORPHINE.  2. AMBIEN.            Allergies   Allergen Reactions   • Ambien [Zolpidem Tartrate]    • Morphine And Related      [unfilled]  Past Medical History   Diagnosis Date   • Arrhythmia    • Atrial fibrillation and flutter 8/8/2016   • Cancer      melanoma in 1971   • CHF (congestive heart failure)      Past Surgical History   Procedure Laterality Date   • Colon surgery     • Hysterectomy     • Appendectomy     • Hemorrhoidectomy     • Cholecystectomy       Family History   Problem Relation Age of Onset   • Cancer Mother    • Cancer Father    • Cancer Brother      Social History     Social History   • Marital status:      Spouse name: N/A   • Number of children: N/A   • Years of education: N/A     Occupational History   • Not on file.     Social History Main Topics   • Smoking status: Former Smoker   • Smokeless tobacco: Not on file   • Alcohol use Yes   • Drug use: No   • Sexual activity: Defer     Other Topics Concern   • Not on file     Social History Narrative      ------    Vital Signs   Temp:  [98 °F (36.7 °C)-98.7 °F (37.1 °C)] 98.3 °F (36.8 °C)  Heart Rate:  [74-99] 82  Resp:  [16] 16  BP: (115-168)/(70-92) 129/78    Physical Examination:  General Appearance:  Alert, cooperative, in no acute distress  Head: Normocephalic, without obvious abnormality, atraumatic  Eyes: Conjunctivae and sclerae normal, no icterus, no pallor, corneas clear, PERRLA  Ears: Ears appear intact with no abnormalities noted  Throat: No oral lesions, no thrush, oral mucosa moist  Neck: No adenopathy, supple, trachea midline, no thyromegaly, no carotid bruit, no JVD  Lungs: Clear to auscultation,respirations regular, even and unlabored  Heart: Regular rhythm and normal rate, normal S1 and S2, no murmur, no gallop, no rub, no click  Chest Wall: No abnormalities observed  Abdomen:  Normal bowel sounds, no masses, no organomegaly, soft non-tender, non-distended, no guarding, no rebound tenderness  Extremities: Moves all extremities well, no edema, no cyanosis, no redness  Skin: No bleeding, bruising or rash  Lymph nodes: No palpable adenopathy  Neurologic: Cranial nerves 2 - 12 grossly intact, no focal neurological deficits    Review of Systems:  Per H an P    Results for orders placed or performed during the hospital encounter of 01/03/17   Comprehensive Metabolic Panel   Result Value Ref Range    Glucose 148 (H) 70 - 100 mg/dL    BUN 22 9 - 23 mg/dL    Creatinine 0.70 0.60 - 1.30 mg/dL    Sodium 134 132 - 146 mmol/L    Potassium 3.9 3.5 - 5.5 mmol/L    Chloride 96 (L) 99 - 109 mmol/L    CO2 32.0 (H) 20.0 - 31.0 mmol/L    Calcium 10.0 8.7 - 10.4 mg/dL    Total Protein 7.6 5.7 - 8.2 g/dL    Albumin 3.80 3.20 - 4.80 g/dL    ALT (SGPT) 19 7 - 40 U/L    AST (SGOT) 24 0 - 33 U/L    Alkaline Phosphatase 76 25 - 100 U/L    Total Bilirubin 1.0 0.3 - 1.2 mg/dL    eGFR Non African Amer 81 >60 mL/min/1.73    Globulin 3.8 gm/dL    A/G Ratio 1.0 (L) 1.5 - 2.5 g/dL    BUN/Creatinine Ratio 31.4 (H) 7.0 - 25.0     Anion Gap 6.0 3.0 - 11.0 mmol/L   Magnesium   Result Value Ref Range    Magnesium 1.8 1.3 - 2.7 mg/dL   Urinalysis With / Culture If Indicated   Result Value Ref Range    Color, UA Yellow Yellow, Straw    Appearance, UA Cloudy (A) Clear    pH, UA 6.5 5.0 - 8.0    Specific Gravity, UA 1.022 1.001 - 1.030    Glucose, UA Negative Negative    Ketones, UA Negative Negative    Bilirubin, UA Small (1+) (A) Negative    Blood, UA Negative Negative    Protein,  mg/dL (2+) (A) Negative    Leuk Esterase, UA Negative Negative    Nitrite, UA Negative Negative    Urobilinogen, UA 1.0 E.U./dL 0.2 - 1.0 E.U./dL   CBC Auto Differential   Result Value Ref Range    WBC 7.47 3.50 - 10.80 10*3/mm3    RBC 4.35 3.89 - 5.14 10*6/mm3    Hemoglobin 15.1 11.5 - 15.5 g/dL    Hematocrit 42.9 34.5 - 44.0 %    MCV 98.6 80.0 - 99.0 fL    MCH 34.7 (H) 27.0 - 31.0 pg    MCHC 35.2 32.0 - 36.0 g/dL    RDW 13.3 11.3 - 14.5 %    RDW-SD 47.9 37.0 - 54.0 fl    MPV 10.9 6.0 - 12.0 fL    Platelets 267 150 - 450 10*3/mm3    Neutrophil % 88.4 (H) 41.0 - 71.0 %    Lymphocyte % 10.3 (L) 24.0 - 44.0 %    Monocyte % 1.2 0.0 - 12.0 %    Eosinophil % 0.0 0.0 - 3.0 %    Basophil % 0.0 0.0 - 1.0 %    Immature Grans % 0.1 0.0 - 0.6 %    Neutrophils, Absolute 6.60 1.50 - 8.30 10*3/mm3    Lymphocytes, Absolute 0.77 0.60 - 4.80 10*3/mm3    Monocytes, Absolute 0.09 0.00 - 1.00 10*3/mm3    Eosinophils, Absolute 0.00 (L) 0.10 - 0.30 10*3/mm3    Basophils, Absolute 0.00 0.00 - 0.20 10*3/mm3    Immature Grans, Absolute 0.01 0.00 - 0.03 10*3/mm3   Urinalysis, Microscopic Only   Result Value Ref Range    RBC, UA 0-2 None Seen, 0-2 /HPF    WBC, UA 0-2 (A) None Seen /HPF    Bacteria, UA None Seen None Seen, Trace /HPF    Squamous Epithelial Cells, UA 0-2 None Seen, 0-2 /HPF    Hyaline Casts, UA 0-6 0 - 6 /LPF    Methodology Automated Microscopy    Calcium, Ionized   Result Value Ref Range    Ionized Calcium 1.18 1.12 - 1.32 mmol/L   Comprehensive Metabolic Panel   Result  Value Ref Range    Glucose 111 (H) 70 - 100 mg/dL    BUN 23 9 - 23 mg/dL    Creatinine 0.60 0.60 - 1.30 mg/dL    Sodium 133 132 - 146 mmol/L    Potassium 3.8 3.5 - 5.5 mmol/L    Chloride 98 (L) 99 - 109 mmol/L    CO2 26.0 20.0 - 31.0 mmol/L    Calcium 8.9 8.7 - 10.4 mg/dL    Total Protein 6.6 5.7 - 8.2 g/dL    Albumin 3.20 3.20 - 4.80 g/dL    ALT (SGPT) 15 7 - 40 U/L    AST (SGOT) 18 0 - 33 U/L    Alkaline Phosphatase 57 25 - 100 U/L    Total Bilirubin 1.3 (H) 0.3 - 1.2 mg/dL    eGFR Non African Amer 97 >60 mL/min/1.73    Globulin 3.4 gm/dL    A/G Ratio 0.9 (L) 1.5 - 2.5 g/dL    BUN/Creatinine Ratio 38.3 (H) 7.0 - 25.0    Anion Gap 9.0 3.0 - 11.0 mmol/L   CBC Auto Differential   Result Value Ref Range    WBC 8.03 3.50 - 10.80 10*3/mm3    RBC 3.85 (L) 3.89 - 5.14 10*6/mm3    Hemoglobin 13.5 11.5 - 15.5 g/dL    Hematocrit 37.9 34.5 - 44.0 %    MCV 98.4 80.0 - 99.0 fL    MCH 35.1 (H) 27.0 - 31.0 pg    MCHC 35.6 32.0 - 36.0 g/dL    RDW 13.5 11.3 - 14.5 %    RDW-SD 48.6 37.0 - 54.0 fl    MPV 11.4 6.0 - 12.0 fL    Platelets 245 150 - 450 10*3/mm3    Neutrophil % 67.5 41.0 - 71.0 %    Lymphocyte % 22.7 (L) 24.0 - 44.0 %    Monocyte % 9.6 0.0 - 12.0 %    Eosinophil % 0.0 0.0 - 3.0 %    Basophil % 0.0 0.0 - 1.0 %    Immature Grans % 0.2 0.0 - 0.6 %    Neutrophils, Absolute 5.42 1.50 - 8.30 10*3/mm3    Lymphocytes, Absolute 1.82 0.60 - 4.80 10*3/mm3    Monocytes, Absolute 0.77 0.00 - 1.00 10*3/mm3    Eosinophils, Absolute 0.00 (L) 0.10 - 0.30 10*3/mm3    Basophils, Absolute 0.00 0.00 - 0.20 10*3/mm3    Immature Grans, Absolute 0.02 0.00 - 0.03 10*3/mm3   POC Troponin, Rapid   Result Value Ref Range    Troponin I 0.02 0.00 - 0.07 ng/mL   POC Glucose Fingerstick   Result Value Ref Range    Glucose 149 (H) 70 - 130 mg/dL   POC Troponin, Rapid   Result Value Ref Range    Troponin I 0.02 0.00 - 0.07 ng/mL   POC Glucose Fingerstick   Result Value Ref Range    Glucose 97 70 - 130 mg/dL   POC Glucose Fingerstick   Result Value Ref  Range    Glucose 100 70 - 130 mg/dL   Light Blue Top   Result Value Ref Range    Extra Tube hold for add-on    Green Top (Gel)   Result Value Ref Range    Extra Tube Hold for add-ons.    Lavender Top   Result Value Ref Range    Extra Tube hold for add-on    Gold Top - SST   Result Value Ref Range    Extra Tube Hold for add-ons.        Results Review:        Assessment/Plan     ASSESSMENT AND PLAN:   1. Anorexia, poor p.o. intake and weight loss. Presently she has a large amount of food in front of her and seems to be eating reasonably well. I would suggest calorie counts. She would not like to have a feeding tube at this point in time. In addition, her gastroparesis makes placement of a PEG tube a little bit more complicated in that if she does not empty her stomach well it may be problematic and she might need a jejunal extension or a jejunal feeding tube. Again, at this point she prefers not to have anything from that standpoint. She has had some abdominal surgery in the past too, which always brings about the possibility of some adhesions. We will be around in case things deteriorate and it is thought that she would definitively need a feeding tube and she would agree to that at that point. I talked to her about eating more frequent smaller meals. She is eating a salad at the moment, and roughage may not empty quite as quickly if she has an element of gastroparesis. Liquids will empty quicker if she is able to tolerate those. She has had multiple surgeries including a hysterectomy for benign disease, appendectomy, cholecystectomy and what sounds like a sigmoid resection in the past. In addition, she had a lobectomy in the 1970s for benign disease as well. We will treat conservatively at the moment.  2. Family history of colon cancer. She apparently has 2 brothers who had colon cancer, but she has had regular colonoscopies which were negative.        Principal Problem:    Failure to thrive in adult  Active  Problems:    Atrial fibrillation and flutter    Monoclonal gammopathy of undetermined significance    Smoldering multiple myeloma (SMM)    Dehydration    Hyperglycemia          I discussed the patients findings and my recommendations with the patient    Alvarado Jefferson MD  01/04/17  12:43 PM

## 2017-01-04 NOTE — ED PROVIDER NOTES
Subjective   Patient is a 76 y.o. female presenting with fatigue.   History provided by:  Patient   used: No    Fatigue   Location:  Generalized  Quality:  Failure to thrive, dehydrated weak fatigue  Severity:  Severe  Onset quality:  Gradual  Duration:  4 months  Timing:  Constant  Progression:  Worsening  Chronicity:  Chronic  Context:  Gradual decline and failure to thrive over the past several months, unintentional 40 pound weight loss.  Being evaluated by Dr. Pantoja for monoclonal gammopathy and failure to thrive  Associated symptoms: fatigue and nausea    Associated symptoms: no abdominal pain, no chest pain, no congestion, no cough, no diarrhea, no ear pain, no fever, no headaches, no myalgias, no rash, no rhinorrhea, no shortness of breath, no sore throat, no vomiting and no wheezing     Review of Systems   Constitutional: Positive for fatigue. Negative for fever.   HENT: Negative for congestion, ear pain, rhinorrhea and sore throat.    Respiratory: Negative for cough, shortness of breath and wheezing.    Cardiovascular: Negative for chest pain.   Gastrointestinal: Positive for nausea. Negative for abdominal pain, diarrhea and vomiting.   Musculoskeletal: Negative for myalgias.   Skin: Negative for rash.   Neurological: Negative for headaches.   All other systems reviewed and are negative.    76-year-old female presents to emergency department for evaluation of dehydration weakness fatigue, failure to thrive and unintentional weight loss.  Referred to the emergency department by Dr. Pantoja.  Currently being evaluated by Dr. Pantoja for monoclonal gammopathy.  She was hospitalized here approximately 2 weeks ago, treated for dehydration and failure to thrive, with mild gastroparesis noted per recent endoscopy.  Patient's daughter states she has been taking Decadron 20 mg daily for the past 5 days since being discharged, but this has not helped her appetite or her energy level.    She denies  headache vision changes photophobia unilateral weakness paresis paresthesias no cough chest congestion sputum or hemoptysis.  No back pain chest pain arm neck jaw shoulder pain.  She does have a history of atrial fibrillation that is rate controlled.  She has a history of melanoma, prior CHF, has currently being evaluated for a monoclonal gammopathy per Dr. Pantoja  Past Medical History   Diagnosis Date   • Arrhythmia    • Atrial fibrillation and flutter 8/8/2016   • Cancer      melanoma in 1971   • CHF (congestive heart failure)        Allergies   Allergen Reactions   • Ambien [Zolpidem Tartrate]    • Morphine And Related        Past Surgical History   Procedure Laterality Date   • Colon surgery     • Hysterectomy     • Appendectomy     • Hemorrhoidectomy     • Cholecystectomy         Family History   Problem Relation Age of Onset   • Cancer Mother    • Cancer Father    • Cancer Brother        Social History     Social History   • Marital status:      Spouse name: N/A   • Number of children: N/A   • Years of education: N/A     Social History Main Topics   • Smoking status: Former Smoker   • Smokeless tobacco: None   • Alcohol use Yes   • Drug use: No   • Sexual activity: Defer     Other Topics Concern   • None     Social History Narrative           Objective   Physical Exam   Constitutional: She is oriented to person, place, and time. She appears well-developed. No distress.   Awake alert pleasant, slightly depressed affect, does not appear to be toxic.  Blood pressure supine 145/89 heart rate 78 with occasional irregularity noted per cardiac monitor.  Respirations 16 clear nonlabor, pulse oximetry is 97% on room air   HENT:   Head: Normocephalic and atraumatic.   Right Ear: External ear normal.   Left Ear: External ear normal.   Nose: Nose normal.   Mouth/Throat: Oropharynx is clear and moist. No oropharyngeal exudate.   Eyes: Conjunctivae and EOM are normal. Pupils are equal, round, and reactive to light.  Right eye exhibits no discharge. Left eye exhibits no discharge. No scleral icterus.   Neck: Normal range of motion. Neck supple. No JVD present. No tracheal deviation present. No thyromegaly present.   Cardiovascular: Normal rate, normal heart sounds and intact distal pulses.  Exam reveals no gallop and no friction rub.    No murmur heard.  Pulmonary/Chest: Effort normal and breath sounds normal. No stridor. No respiratory distress. She has no wheezes. She has no rales. She exhibits no tenderness.   Abdominal: Soft. Bowel sounds are normal. She exhibits no distension and no mass. There is no tenderness. There is no rebound and no guarding. No hernia.   Musculoskeletal: Normal range of motion. She exhibits no edema, tenderness or deformity.   Lymphadenopathy:     She has no cervical adenopathy.   Neurological: She is alert and oriented to person, place, and time. She has normal reflexes. She displays normal reflexes. No cranial nerve deficit. She exhibits normal muscle tone. Coordination normal.   Skin: Skin is warm and dry. No rash noted. She is not diaphoretic. No erythema. No pallor.   Moderate tenting of skin noted,   Psychiatric: She has a normal mood and affect. Her behavior is normal. Judgment and thought content normal.   Nursing note and vitals reviewed.      Procedures          Course of Care      Lab Results (last 24 hours)     Procedure Component Value Units Date/Time    CBC & Differential [[id number]] Collected:  01/03/17 1806    Specimen:  Blood Updated:  01/03/17 1822    Narrative:       The following orders were created for panel order CBC & Differential.  Procedure                               Abnormality         Status                     ---------                               -----------         ------                     CBC Auto Differential[[id number]]         Abnormal            Final result                 Please view results for these tests on the individual orders.    Comprehensive Metabolic  Panel [[id number]]  (Abnormal) Collected:  01/03/17 1806    Specimen:  Blood Updated:  01/03/17 1848     Glucose 148 (H) mg/dL      BUN 22 mg/dL      Creatinine 0.70 mg/dL      Sodium 134 mmol/L      Potassium 3.9 mmol/L      Chloride 96 (L) mmol/L      CO2 32.0 (H) mmol/L      Calcium 10.0 mg/dL      Total Protein 7.6 g/dL      Albumin 3.80 g/dL      ALT (SGPT) 19 U/L      AST (SGOT) 24 U/L      Alkaline Phosphatase 76 U/L      Total Bilirubin 1.0 mg/dL      eGFR Non African Amer 81 mL/min/1.73      Globulin 3.8 gm/dL      A/G Ratio 1.0 (L) g/dL      BUN/Creatinine Ratio 31.4 (H)      Anion Gap 6.0 mmol/L     Narrative:       [employer] Guidelines    Stage                           Description                             GFR                      1                               Normal or High                          90+  2                               Mild decrease                            60-89  3                               Moderate decrease                   30-59  4                               Severe decrease                       15-29  5                               Kidney failure                             <15    Magnesium [[id number]]  (Normal) Collected:  01/03/17 1806    Specimen:  Blood Updated:  01/03/17 1848     Magnesium 1.8 mg/dL     CBC Auto Differential [[id number]]  (Abnormal) Collected:  01/03/17 1806    Specimen:  Blood Updated:  01/03/17 1822     WBC 7.47 10*3/mm3      RBC 4.35 10*6/mm3      Hemoglobin 15.1 g/dL      Hematocrit 42.9 %      MCV 98.6 fL      MCH 34.7 (H) pg      MCHC 35.2 g/dL      RDW 13.3 %      RDW-SD 47.9 fl      MPV 10.9 fL      Platelets 267 10*3/mm3      Neutrophil % 88.4 (H) %      Lymphocyte % 10.3 (L) %      Monocyte % 1.2 %      Eosinophil % 0.0 %      Basophil % 0.0 %      Immature Grans % 0.1 %      Neutrophils, Absolute 6.60 10*3/mm3      Lymphocytes, Absolute 0.77 10*3/mm3      Monocytes, Absolute 0.09 10*3/mm3      Eosinophils, Absolute 0.00 (L)  10*3/mm3      Basophils, Absolute 0.00 10*3/mm3      Immature Grans, Absolute 0.01 10*3/mm3     POC Troponin, Rapid [[id number]]  (Normal) Collected:  01/03/17 1811    Specimen:  Blood Updated:  01/03/17 1825     Troponin I 0.02 ng/mL       Serial Number: [id number]    : [id number]       POC Glucose Fingerstick [[id number]]  (Abnormal) Collected:  01/03/17 2016    Specimen:  Blood Updated:  01/03/17 2018     Glucose 149 (H) mg/dL     Narrative:       Meter: [id number] : [id number] Rashaunsteve Koos    POC Troponin, Rapid [[id number]]  (Normal) Collected:  01/03/17 2032    Specimen:  Blood Updated:  01/03/17 2050     Troponin I 0.02 ng/mL       Serial Number: [id number]    : [id number]       Urinalysis With / Culture If Indicated [[id number]]  (Abnormal) Collected:  01/03/17 2044    Specimen:  Urine from Urine, Clean Catch Updated:  01/03/17 2115     Color, UA Yellow      Appearance, UA Cloudy (A)      pH, UA 6.5      Specific Gravity, UA 1.022      Glucose, UA Negative      Ketones, UA Negative      Bilirubin, UA Small (1+) (A)      Blood, UA Negative      Protein,  mg/dL (2+) (A)      Leuk Esterase, UA Negative      Nitrite, UA Negative      Urobilinogen, UA 1.0 E.U./dL     Urinalysis, Microscopic Only [[id number]]  (Abnormal) Collected:  01/03/17 2044    Specimen:  Urine from Urine, Clean Catch Updated:  01/03/17 2115     RBC, UA 0-2 /HPF      WBC, UA 0-2 (A) /HPF      Bacteria, UA None Seen /HPF      Squamous Epithelial Cells, UA 0-2 /HPF      Hyaline Casts, UA 0-6 /LPF      Methodology Automated Microscopy           Note: In addition to lab results from this visit, the labs listed above may include labs taken at another facility or during a different encounter within the last 24 hours. Please correlate lab times with ED admission and discharge times for further clarification of the services performed during this visit.    XR Chest 1 View   ED Interpretation   Bibasilar interstitial scarring          Vitals:     01/03/17 2100 01/03/17 2111 01/03/17 2200 01/03/17 2213   BP: 147/84  150/90    BP Location:       Patient Position:       Pulse:  74  82   Resp:       Temp:       TempSrc:       SpO2: 96%  97%    Weight:       Height:           Medications   sodium chloride 0.9 % flush 10 mL (not administered)   sodium chloride 0.9 % infusion (100 mL/hr Intravenous New Bag 1/3/17 2049)       ECG/EMG Results (last 24 hours)     Procedure Component Value Units Date/Time    ECG 12 Lead [[id number]] Collected:  01/03/17 2008     Updated:  01/03/17 2010    ECG 12 Lead [[id number]] Collected:  01/03/17 1748     Updated:  01/03/17 2024    Narrative:       Test Reason : Weak/Dizzy/AMS protocol  Blood Pressure : **/** mmHG  Vent. Rate : 091 BPM     Atrial Rate : 091 BPM     P-R Int : 124 ms          QRS Dur : 074 ms      QT Int : 352 ms       P-R-T Axes : 000 069 -62 degrees     QTc Int : 432 ms    Normal sinus rhythm  Low voltage QRS  Nonspecific ST and T wave abnormality  Abnormal ECG  When compared with ECG of 23-APR-2014 10:43,  Nonspecific T wave abnormality, worse in Inferior leads  Confirmed by KOBE BARTLETT ([id number]) on 1/3/2017 8:24:09 PM    Referred By:  ER           Confirmed By:KOBE BARTLETT            ED Course  ED Course   Comment By Time   D/W Dr. Brown, covering for Pantoja - if admit, Pantoja will see tomorrow. Dejuan Ambrose PA-C 01/03 2151   Discussed with Dr. Barnes will admit for rehydration, consult Pantoja in a.m. Dejuan Ambrose PA-C 01/03 2220                  MDM    Final diagnoses:   Failure to thrive in adult   Monoclonal gammopathy of undetermined significance   Smoldering multiple myeloma (SMM)   Atrial fibrillation and flutter            Dejuan Ambrose PA-C  01/03/17 2223

## 2017-01-04 NOTE — H&P
[facility] Medicine Services  HISTORY AND PHYSICAL    Primary Care Physician: Velia Vogel MD    Subjective     Chief Complaint:  fatigue    History of Present Illness    76-year-old female presents to emergency department for evaluation of dehydration weakness fatigue, failure to thrive and unintentional weight loss. Referred to the emergency department by Dr. Pantoja. Currently being evaluated by Dr. Pantoja for monoclonal gammopathy. She was hospitalized here approximately 2 weeks ago, treated for dehydration and failure to thrive, with mild gastroparesis noted per recent endoscopy. Patient's daughter states she has been taking Decadron 20 mg daily for the past 5 days since being discharged, but this has not helped her appetite or her energy level. Her energy levels have not improved, but have progressively declined. She reports that all she wants to do is sleep.      She denies headache vision changes photophobia unilateral weakness paresis paresthesias no cough chest congestion sputum or hemoptysis. No fever/chills.No back pain chest pain arm neck jaw shoulder pain. She does have a history of atrial fibrillation that is rate controlled. She has a history of melanoma, prior CHF, has currently being evaluated for a monoclonal gammopathy per Dr. Pantoja    Review of Systems   Constitutional: Positive for activity change, appetite change, fatigue and unexpected weight change (40 pounds). Negative for chills, diaphoresis and fever.   HENT: Negative.    Eyes: Negative.    Respiratory: Negative.    Cardiovascular: Negative.    Gastrointestinal: Positive for nausea. Negative for abdominal distention, abdominal pain, anal bleeding, blood in stool, constipation, diarrhea, rectal pain and vomiting.   Endocrine: Negative.    Genitourinary: Negative.    Musculoskeletal: Negative.    Skin: Negative.    Allergic/Immunologic: Negative.    Neurological: Positive for weakness (generalized weakness). Negative  "for dizziness, tremors, seizures, syncope, facial asymmetry, speech difficulty, light-headedness, numbness and headaches.   Hematological: Negative.    Psychiatric/Behavioral: Negative.       Otherwise complete ROS reviewed and negative except as mentioned in the HPI.      Past Medical History:   Past Medical History   Diagnosis Date   • Arrhythmia    • Atrial fibrillation and flutter 8/8/2016   • Cancer      melanoma in 1971   • CHF (congestive heart failure)        Past Surgical History:  Past Surgical History   Procedure Laterality Date   • Colon surgery     • Hysterectomy     • Appendectomy     • Hemorrhoidectomy     • Cholecystectomy         Family History:   Family History   Problem Relation Age of Onset   • Cancer Mother    • Cancer Father    • Cancer Brother       Social History:   Social History     Social History   • Marital status:      Spouse name: N/A   • Number of children: N/A   • Years of education: N/A     Occupational History   • Not on file.     Social History Main Topics   • Smoking status: Former Smoker   • Smokeless tobacco: Not on file   • Alcohol use Yes   • Drug use: No   • Sexual activity: Defer     Other Topics Concern   • Not on file     Social History Narrative       Medications:    (Not in a hospital admission)  Allergies:  Allergies   Allergen Reactions   • Ambien [Zolpidem Tartrate]    • Morphine And Related        Objective     Vital Signs:   Visit Vitals   • /92   • Pulse 76   • Temp 98 °F (36.7 °C) (Oral)   • Resp 16   • Ht 63\" (160 cm)   • Wt 112 lb (50.8 kg)   • SpO2 96%   • BMI 19.84 kg/m2     Physical Exam   Constitutional: She is oriented to person, place, and time. No distress.   HENT:   Head: Normocephalic.   Eyes: Pupils are equal, round, and reactive to light.   Neck: Normal range of motion. Neck supple.   Cardiovascular: Normal rate, regular rhythm, normal heart sounds and intact distal pulses.  Exam reveals no gallop and no friction rub.    No murmur " heard.  Pulmonary/Chest: Effort normal and breath sounds normal. No respiratory distress. She has no wheezes. She has no rales. She exhibits no tenderness.   Abdominal: Soft. Bowel sounds are normal. She exhibits no distension. There is no tenderness. There is no rebound and no guarding.   Musculoskeletal: Normal range of motion. She exhibits no edema, tenderness or deformity.   Neurological: She is alert and oriented to person, place, and time. No cranial nerve deficit.   Skin: Skin is warm and dry. No rash noted. She is not diaphoretic. No erythema. No pallor.   Psychiatric: She has a normal mood and affect. Her behavior is normal. Judgment and thought content normal.             Results Reviewed:    Results from last 7 days  Lab Units 01/03/17  1806   WBC 10*3/mm3 7.47   HEMOGLOBIN g/dL 15.1   PLATELETS 10*3/mm3 267       Results from last 7 days  Lab Units 01/03/17  1806   SODIUM mmol/L 134   POTASSIUM mmol/L 3.9   TOTAL CO2 mmol/L 32.0*   CREATININE mg/dL 0.70   GLUCOSE mg/dL 148*   CALCIUM mg/dL 10.0       I have personally reviewed and interpreted the radiology studies and ECG obtained at time of admission.     Assessment / Plan      Assessment & Plan  Principal Problem:    Failure to thrive in adult  Active Problems:    Dehydration    Monoclonal gammopathy of undetermined significance    Smoldering multiple myeloma (SMM)    Atrial fibrillation and flutter    Hyperglycemia      PLAN:    -consult pt/ot in the am  -consult case management for discharge planning  -fall precautions  -IV fluids  -cbc, bmp in the am  -consult Dr. Pantoja in the am  -fsbg ac&hs  -low dose sliding scale insulin      DVT prophylaxis:  Teds, scuds  Code Status:  full     I discussed the patients findings and my recommendations with:  Patient, primary care team    Tania George, ERIC 01/03/17 11:20 PM      Brief Attending Note   I have seen and examined the patient, performing an independent face-to-face diagnostic evaluation The  plan of care reviewed and developed with the advanced practice clinician (APC):   ERIC Randall  Brief Summary Statement/HPI:    77 y/o female w/ hx of MGUS followed by Dr. Pantoja who has been experiencing anorexia, weight loss, generalized weakness and states she feels very dehydrated.  Has been on decadron for appetite boost but pt states she has taken 6 days and has felt no improvement.  Pt was here  2 weeks ago for same.        Attending Physical Exam:   gen; alert, oriented, nad  Abd; soft, +bs, ntnd, no r/g  Ext; no cce, 2+ pulses, thin  Skin; cdi, warm  Neuro; grossly intact  Psych; mood and affect appropriate      Brief Assessment/Plan :  77 y/o female w/  1. MGUS w/ secondary FTT;   -cont ivf's overnight, Dr. Pantoja to see in a.m.  Will cont last dose of decadron.   2. Hyperglycemia'  -likely secondary to steroid effect      See above for further detailed assessment and plan developed with APC which I have reviewed and/or edited.    I believe this patient requires  OBSERVATION status, however if further evaluation or treatment plans warrant, status may change.  Based upon current information, I predict patient's care encounter to be less than or equal to 2 midnights.    ERIC Price 01/03/17 11:20 PM

## 2017-01-04 NOTE — PLAN OF CARE
Problem: Patient Care Overview (Adult)  Goal: Plan of Care Review  Outcome: Ongoing (interventions implemented as appropriate)    01/04/17 1406   Outcome Evaluation   Outcome Summary/Follow up Plan PT eval completed. Pt. w/ recent adm to [facility]; patient re-admitted w/ dehydration and progress weakness and subsequent functional decline. Pt. presents w/ evolving sx. including dec strength, balance deficits, and gait instability. Pt. will benefit from skilled IPPT to address deficits and to promote return to PLOF. Recommend HHPT upon D/C to further maximize patient's safety and ind. w/ functional mob.    Coping/Psychosocial Response Interventions   Plan Of Care Reviewed With patient   Patient Care Overview   Progress improving         Problem: Inpatient Physical Therapy  Goal: Bed Mobility Goal LTG- PT  Outcome: Ongoing (interventions implemented as appropriate)    01/04/17 1406   Bed Mobility PT LTG   Bed Mobility PT LTG, Date Established 01/04/17   Bed Mobility PT LTG, Time to Achieve 5 days   Bed Mobility PT LTG, Activity Type all bed mobility   Bed Mobility PT LTG, [place] Level independent   Bed Mobility PT LTG, Outcome goal ongoing       Goal: Transfer Training Goal 1 LTG- PT  Outcome: Ongoing (interventions implemented as appropriate)    01/04/17 1406   Transfer Training PT LTG   Transfer Training PT LTG, Date Established 01/04/17   Transfer Training PT LTG, Time to Achieve 5 days   Transfer Training PT LTG, Activity Type all transfers   Transfer Training PT LTG, [place] Level independent   Transfer Training PT LTG, Outcome goal ongoing       Goal: Gait Training Goal LTG- PT  Outcome: Ongoing (interventions implemented as appropriate)    01/04/17 1406   Gait Training PT LTG   Gait Training Goal PT LTG, Date Established 01/04/17   Gait Training Goal PT LTG, Time to Achieve 5 days   Gait Training Goal PT LTG, [place] Level independent   Gait Training Goal PT LTG, Distance to Achieve 250   Gait Training  Goal PT LTG, Outcome goal ongoing       Goal: Stair Training Goal LTG- PT  Outcome: Ongoing (interventions implemented as appropriate)    01/04/17 1406   Stair Training PT LTG   Stair Training Goal PT LTG, Date Established 01/04/17   Stair Training Goal PT LTG, Time to Achieve 5 days   Stair Training Goal PT LTG, Number of Steps 5  (in order to enter/exit patient's home)   Stair Training Goal PT LTG, [place] Level independent   Stair Training Goal PT LTG, Assist Device 2 handrails   Stair Training Goal PT LTG, Outcome goal ongoing

## 2017-01-04 NOTE — CONSULTS
HEMATOLOGY/ONCOLOGY PROGRESS NOTE     CC: Failure to thrive    SUBJECTIVE: She was readmitted because of the same issue for which she was admitted last time.  She is simply not able to eat.  She's had 2 bone marrow biopsies the last one was done her last admission and has 10-15% plasma cells with an intermediate risk genetic profile on FISH analysis.  Prior bone survey was negative.  She's never been hypercalcemic, anemic, or any evidence of renal insufficiency with this.  Her serum immunoelectrophoresis had 1600 mg of monoclonal immunoglobulin G last visit and that's actually slightly less than it was a couple of months back.  Nonetheless, in part for appetite stimulation and in part on the theory that this monoclonal protein might be contributing to her poor peristalsis, she completed 20 mg a day of dexamethasone ×5 days as of yesterday.  This did not make her hungry and she continues to lose weight.  She's had 3 upper GI endoscopies one of which showed some neuroendocrine type infiltration of unknown significance but was not seen repetitively on small bowel biopsy.  She was actually eating soup and salad when I walked in the room today though there was still a significant amount left on her bowl and tray.        Past Medical History, Past Surgical History, Social History, Family History have been reviewed and are without significant changes except as mentioned.      Medications:  The current medication list was reviewed in the EMR    ALLERGIES:   Allergies   Allergen Reactions   • Ambien [Zolpidem Tartrate]    • Morphine And Related        ROS:  A comprehensive 14 point review of systems was performed and was negative except as mentioned.      Objective      Vitals:    01/04/17 0500 01/04/17 0904 01/04/17 0952 01/04/17 1308   BP: 130/72 129/78  135/84   BP Location:       Patient Position:       Pulse: 76 82  64   Resp: 16 16  16   Temp: 98.2 °F (36.8 °C) 98.3 °F (36.8 °C)  97.4 °F (36.3 °C)   TempSrc:  Oral   "Oral   SpO2: 96% 96%  96%   Weight:   112 lb (50.8 kg)    Height:   63\" (160 cm)         ECOG: (3) Capable of limited self-care, confined to bed or chair > 50% of waking hours    General: Frail appearing, in no acute distress  HEENT: sclera anicteric, oropharynx clear  Lymphatics: no cervical, supraclavicular, inguinal, or axillary adenopathy  Cardiovascular: regular rate and rhythm, no murmurs  Lungs: clear to auscultation bilaterally  Abdomen: soft, nontender, nondistended.  No palpable organomegaly  ExtremIties: no lower extremity edema  Skin: no rashes, lesions, bruising, or petechiae  Neuro: Alert and oriented x 3. Moves all extremities.    RECENT LABS:      [unfilled]  Imaging Results (last 72 hours)     Procedure Component Value Units Date/Time    XR Chest 1 View [[id number]] Collected:  01/04/17 0853     Updated:  01/04/17 0854    Narrative:       EXAMINATION: XR CHEST 1 VW- 01/03/2017     INDICATION: Weak/Dizzy/AMS triage protocol      COMPARISON: 09/28/2016     FINDINGS: The heart size is normal. The aortic arch is partially  calcified. The lungs are free of opacities. The osseous structures of  the chest are normal.           Impression:       1. No acute chest pathology.  2. Mild bibasilar atelectasis.     Fax to: [facility]     D:  01/04/2017  E:  01/04/2017                ASSESSMENT & PLAN:    1. Failure to thrive  2. Poor peristalsis  3. Smoldering myeloma    Discussion: While myeloma can infiltrate the bowel, there is no evidence of such on prior bowel biopsies.  The [place] red stain for amyloid was negative on her last bone marrow biopsy and hence I doubt she has amyloidosis causing poor peristalsis.  She needs a PET scan want she gets discharged to be certain there is no active bony disease but the bone survey done in the last couple of months showed no lytic bone disease.  With 10-15% plasma cells in the marrow that by definition is smoldering myeloma and would not require treatment generally unless she became " hypercalcemic, anemic, developed lytic bone disease, developed renal insufficiency.  Even if she had full-blown myeloma, she's not a good candidate for systemic therapies which side effects include worsening neuropathy.  Unless she gets nutrify we have no good options.  I spoke on the phone with Dr. Damon and he will get his colleagues to see her and ultimately summon surgery of their choice to place a feeding jejunostomy tube.  Hopefully that will keep her from vomiting up her nutrients and would give us a way to improve her nitrogen balance and overall performance status.  I suspect a large part of her fatigue is simply malnutrition.  Nutrition is on board as well.  I discussed this complicated conundrum at length with the patient today at bedside as well as with Dr. Damon by phone.        Errors in dictation may reflect use of voice recognition software and not all errors in transcription may have been detected prior to signing.        Visit time was 65 minutes, 55 minutes spent in counseling    Messi Pantoja MD    1/4/2017

## 2017-01-04 NOTE — PROGRESS NOTES
Malnutrition Severity Assessment    Patient Name:  Sowmya Beckett  YOB: 1940  MRN: [id number]  Admit Date:  1/3/2017         Based on patient's weight status patient meets criteria for moderate/severe malnutrition related to chronic illness. Physical assessment not completed to determine fat and muscle wasting. MD please complete physical assessment and indicate in diagnosis as appropriate.      Malnutrition Type: Chronic Illness Malnutrition    Weight Status         Most Recent Value    %UBW  Mod (75-85%)    Weight Loss  Severe (>20% / 1 yr)      Energy Intake Status         Most Recent Value    Energy Intake  -- [Unable to determine from pt. as her intake  waxed and waned at home.]              Electronically signed by:  Giselle Lala RD  01/04/17 9:59 AM

## 2017-01-04 NOTE — PLAN OF CARE
"Problem: Patient Care Overview (Adult)  Goal: Discharge Needs Assessment  Outcome: Ongoing (interventions implemented as appropriate)    01/04/17 0311 01/04/17 1034 01/04/17 1320   Discharge Needs Assessment   Concerns To Be Addressed --  other (see comments)  (PT will evaluate pt to determine d/c needs as pt has become very weak.) --    Readmission Within The Last 30 Days --  previous discharge plan unsuccessful  (Pt was at [facility] less than two weeks prior. Pt stated she felt better during her last hospitalization but then after she went home, she started sleeping more and feeling \"bad\" again.) --    Equipment Needed After Discharge --  other (see comments)  (PT to evaluate for equipment needs.) --    Discharge Disposition home or self-care --  --    Current Health   Anticipated Changes Related to Illness --  other (see comments)  (Pt may benefit from ongoing PT services as she is weak.) --    Living Environment   Transportation Available --  car;family or friend will provide --    Self-Care   Equipment Currently Used at Home --  --  none           "

## 2017-01-04 NOTE — PLAN OF CARE
Problem: Patient Care Overview (Adult)  Goal: Plan of Care Review  Outcome: Ongoing (interventions implemented as appropriate)    01/03/17 2345 01/04/17 0311   Outcome Evaluation   Outcome Summary/Follow up Plan --  IVFs begun. [facility] Dr [facility].   Coping/Psychosocial Response Interventions   Plan Of Care Reviewed With patient --        Goal: Discharge Needs Assessment  Outcome: Ongoing (interventions implemented as appropriate)    Problem: Nutrition, Imbalanced: Inadequate Oral Intake (Adult)  Goal: Identify Related Risk Factors and Signs and Symptoms  Outcome: Ongoing (interventions implemented as appropriate)  Goal: Improved Oral Intake  Outcome: Ongoing (interventions implemented as appropriate)  Goal: Prevent Further Weight Loss  Outcome: Ongoing (interventions implemented as appropriate)

## 2017-01-04 NOTE — PROGRESS NOTES
Acute Care - Physical Therapy Initial Evaluation  [facility]     Patient Name: Sowmya Beckett  : 1940  MRN: [id number]  Today's Date: 2017   Onset of Illness/Injury or Date of Surgery Date: 17  Date of Referral to PT: 17  Referring Physician: ERIC Rios      Admit Date: 1/3/2017     Visit Dx:    ICD-10-CM ICD-9-CM   1. Failure to thrive in adult R62.7 783.7   2. Monoclonal gammopathy of undetermined significance D47.2 273.1   3. Smoldering multiple myeloma (SMM) C90.00 203.00   4. Atrial fibrillation and flutter I48.91 427.31    I48.92 427.32   5. Impaired functional mobility, balance, gait, and endurance Z74.09 V49.89     Patient Active Problem List   Diagnosis   • Atrial fibrillation and flutter   • History of melanoma   • Monoclonal gammopathy of undetermined significance   • Failure to thrive in adult   • Smoldering multiple myeloma (SMM)   • Dehydration   • Hyperglycemia     Past Medical History   Diagnosis Date   • Arrhythmia    • Atrial fibrillation and flutter 2016   • Cancer      melanoma in 1971   • CHF (congestive heart failure)      Past Surgical History   Procedure Laterality Date   • Colon surgery     • Hysterectomy     • Appendectomy     • Hemorrhoidectomy     • Cholecystectomy            PT ASSESSMENT (last 72 hours)      PT Evaluation       17 1320 17 1034    Rehab Evaluation    Document Type evaluation  -MB     Subjective Information agree to therapy;complains of;fatigue  -MB     Patient Effort, Rehab Treatment good  -MB     Symptoms Noted During/After Treatment none  -MB     General Information    Patient Profile Review yes  -MB     Onset of Illness/Injury or Date of Surgery Date 17  -MB     Referring Physician ERIC Rios  -MB     General Observations Pt. supine w/ IV L UE intact, on RA.  Nsg consent for PT.  -MB     Pertinent History Of Current Problem Pt referred to ED by Dr. Pantoja.  Pt. adm w/ dehydration, fatigue, failure to thrive,  unintentional weight loss.  Pt. recently adm approx 2 weeks ago for dehydration, failure to thrive, mild gastroparesis.    -MB     Precautions/Limitations no known precautions/limitations  -MB     Prior Level of Function independent:;all household mobility;community mobility;gait;transfer;bed mobility;ADL's;feeding;grooming;dressing;bathing;home management;cooking;cleaning;driving;shopping;using stairs  -MB     Equipment Currently Used at Home none  -MB none  -GABBY    Plans/Goals Discussed With patient;agreed upon  -MB     Risks Reviewed patient:;LOB;dizziness;increased discomfort;lines disloged  -MB     Benefits Reviewed patient:;improve function;increase independence;increase strength;increase balance;increase knowledge  -MB     Barriers to Rehab none identified  -MB     Living Environment    Lives With significant other  -MB significant other  -GABBY    Living Arrangements house  -MB house  -GABBY    Home Accessibility stairs to enter home;tub/shower is not walk in  -MB     Number of Stairs to Enter Home 5  -MB     Stair Railings at Home outside, present at both sides  -MB     Transportation Available  car;family or friend will provide  -GABBY    Clinical Impression    Date of Referral to PT 01/04/17  -MB     PT Diagnosis Functional decline  -MB     Functional Level At Time Of Evaluation Pt. requires SBA for safe mobility.  -MB     Patient/Family Goals Statement Return to PLOF and home.  -MB     Criteria for Skilled Therapeutic Interventions Met yes;treatment indicated  -MB     Rehab Potential good, to achieve stated therapy goals  -MB     Pain Assessment    Pain Assessment No/denies pain  -MB     Vision Assessment/Intervention    Visual Impairment WFL  -MB     Cognitive Assessment/Intervention    Current Cognitive/Communication Assessment functional  -MB     Orientation Status oriented x 4  -MB     Follows Commands/Answers Questions 100% of the time;able to follow single-step instructions  -MB     Personal Safety  WNL/WFL  -MB     Personal Safety Interventions fall prevention program maintained;gait belt;nonskid shoes/slippers when out of bed  -MB     ROM (Range of Motion)    General ROM no range of motion deficits identified  -MB     MMT (Manual Muscle Testing)    General MMT Assessment lower extremity strength deficits identified  -MB     General MMT Assessment Detail Pt. B LEs functionally 4/5.  -MB     Bed Mobility, Assessment/Treatment    Bed Mobility, Assistive Device bed rails;head of bed elevated  -MB     Bed Mob, Supine to Sit, [place] conditional independence  -MB     Bed Mob, Sit to Supine, [place] conditional independence  -MB     Bed Mobility, Impairments strength decreased  -MB     Bed Mobility, Comment Pt. performs bed mob safely w/ HOB elevated and w/ use of bed rails.  -MB     Transfer Assessment/Treatment    Transfers, Sit-Stand [place] stand by assist  -MB     Transfers, Stand-Sit [place] stand by assist  -MB     Transfers, Sit-Stand-Sit, Assist Device other (see comments)   none  -MB     Toilet Transfer, [place] supervision required  -MB     Toilet Transfer, Assistive Device elevated toilet seat  -MB     Transfer, Safety Issues --   none noted  -MB     Transfer, Impairments strength decreased  -MB     Transfer, Comment Pt. demo good safety awareness w/ transfers.  -MB     Gait Assessment/Treatment    Gait, [place] Level stand by assist  -MB     Gait, Assistive Device other (see comments)   none  -MB     Gait, Distance (Feet) 100  -MB     Gait, Gait Pattern Analysis swing-through gait  -MB     Gait, Gait Deviations vince decreased  -MB     Gait, Safety Issues --   none noted  -MB     Gait, Impairments strength decreased  -MB     Gait, Comment Pt. amb w/ slow vince w/out AD w/ SBA for direction changes.  -MB     Sensory Assessment/Intervention    Light Touch LLE;RLE  -MB     LLE Light Touch WNL  -MB     RLE Light Touch WNL  -MB     Positioning and Restraints    Pre-Treatment  Position in bed  -MB     Post Treatment Position bed  -MB     In Bed notified nsg;supine;call light within reach;encouraged to call for assist  -MB       01/03/17 2347 01/03/17 2340    General Information    Equipment Currently Used at Home none  -KR     Living Environment    Lives With  significant other  -KR    Living Arrangements  house  -KR    Home Accessibility  no concerns  -KR    Stair Railings at Home  none  -KR    Type of Financial/Environmental Concern  none  -KR    Transportation Available  car;family or friend will provide  -KR      User Key  (r) = Recorded By, (t) = Taken By, (c) = Cosigned By    Initials Name Provider Type    RAMAN Choudhary, PT Physical Therapist    CYNDI Costa, RN Registered Nurse    GABBY Mathew, MSW           Physical Therapy Education     Title: PT OT SLP Therapies (Done)     Topic: Physical Therapy (Done)     Point: Mobility training (Done)    Learning Progress Summary    Learner Readiness Method Response Comment Documented by Status   Patient Acceptance E,D VU,NR home safety, benefits of therapy, fall precautions MB 01/04/17 1405 Done               Point: Home exercise program (Done)    Learning Progress Summary    Learner Readiness Method Response Comment Documented by Status   Patient Acceptance E,D VU,NR home safety, benefits of therapy, fall precautions MB 01/04/17 1405 Done               Point: Body mechanics (Done)    Learning Progress Summary    Learner Readiness Method Response Comment Documented by Status   Patient Acceptance E,D VU,NR home safety, benefits of therapy, fall precautions MB 01/04/17 1405 Done               Point: Precautions (Done)    Learning Progress Summary    Learner Readiness Method Response Comment Documented by Status   Patient Acceptance E,D VU,NR home safety, benefits of therapy, fall precautions MB 01/04/17 1405 Done                      User Key     Initials Effective Dates Name Provider Type Javier CAMARGO  03/14/16 -  Naomy Choudhary, PT Physical Therapist PT                PT Recommendation and Plan  Anticipated Discharge Disposition: home with home health  Planned Therapy Interventions: balance training, gait training, bed mobility training, home exercise program, patient/family education, stair training, strengthening, transfer training  PT Frequency: daily  Plan of Care Review  Plan Of Care Reviewed With: patient  Progress: improving  Outcome Summary/Follow up Plan: PT eval completed.  Pt. w/ recent adm to [facility]; patient re-admitted w/ dehydration and progress weakness and subsequent functional decline.  Pt. presents w/ evolving sx. including dec strength, balance deficits, and gait instability.  Pt. will benefit from skilled IPPT to address deficits and to promote return to PLOF.  Recommend HHPT upon D/C to further maximize patient's safety and ind. w/ functional mob.           IP PT Goals       01/04/17 1406          Bed Mobility PT LTG    Bed Mobility PT LTG, Date Established 01/04/17  -MB      Bed Mobility PT LTG, Time to Achieve 5 days  -MB      Bed Mobility PT LTG, Activity Type all bed mobility  -MB      Bed Mobility PT LTG, [place] Level independent  -MB      Bed Mobility PT LTG, Outcome goal ongoing  -MB      Transfer Training PT LTG    Transfer Training PT LTG, Date Established 01/04/17  -MB      Transfer Training PT LTG, Time to Achieve 5 days  -MB      Transfer Training PT LTG, Activity Type all transfers  -MB      Transfer Training PT LTG, [place] Level independent  -MB      Transfer Training PT LTG, Outcome goal ongoing  -MB      Gait Training PT LTG    Gait Training Goal PT LTG, Date Established 01/04/17  -MB      Gait Training Goal PT LTG, Time to Achieve 5 days  -MB      Gait Training Goal PT LTG, [place] Level independent  -MB      Gait Training Goal PT LTG, Distance to Achieve 250  -MB      Gait Training Goal PT LTG, Outcome goal ongoing  -MB      Stair Training PT LTG    Stair  Training Goal PT LTG, Date Established 01/04/17  -MB      Stair Training Goal PT LTG, Time to Achieve 5 days  -MB      Stair Training Goal PT LTG, Number of Steps 5   in order to enter/exit patient's home  -MB      Stair Training Goal PT LTG, [place] Level independent  -MB      Stair Training Goal PT LTG, Assist Device 2 handrails  -MB      Stair Training Goal PT LTG, Outcome goal ongoing  -MB        User Key  (r) = Recorded By, (t) = Taken By, (c) = Cosigned By    Initials Name Provider Type    RAMAN Choudhary PT Physical Therapist                Outcome Measures       01/04/17 1320          How much help from another person do you currently need...    Turning from your back to your side while in flat bed without using bedrails? 4  -MB      Moving from lying on back to sitting on the side of a flat bed without bedrails? 4  -MB      Moving to and from a bed to a chair (including a wheelchair)? 3  -MB      Standing up from a chair using your arms (e.g., wheelchair, bedside chair)? 3  -MB      Climbing 3-5 steps with a railing? 3  -MB      To walk in hospital room? 3  -MB      AM-PAC 6 Clicks Score 20  -MB      Functional Assessment    Outcome Measure Options AM-PAC 6 Clicks Basic Mobility (PT)  -MB        User Key  (r) = Recorded By, (t) = Taken By, (c) = Cosigned By    Initials Name Provider Type    RAMAN Choudhary PT Physical Therapist           Time Calculation:         PT Charges       01/04/17 1413          Time Calculation    Start Time 1320  -MB      PT Received On 01/04/17  -MB      PT Goal Re-Cert Due Date 01/14/17  -MB        User Key  (r) = Recorded By, (t) = Taken By, (c) = Cosigned By    Initials Name Provider Type    RAMAN Choudhary PT Physical Therapist          Therapy Charges for Today     Code Description Service Date Service Provider Modifiers Qty    56259413756 HC PT MOBILITY CURRENT 1/4/2017 Naomy Choudhary PT GP, CJ 1    28775619401 HC PT MOBILITY PROJECTED 1/4/2017  Naomy Choudhary, PT GP, CI 1    00628518799 HC PT EVAL MOD COMPLEXITY 4 1/4/2017 Naomy Choudhary, PT GP 1          PT G-Codes  PT Professional Judgement Used?: Yes  Outcome Measure Options: AM-PAC 6 Clicks Basic Mobility (PT)  Functional Limitation: Mobility: Walking and moving around  Mobility: Walking and Moving Around Current Status (): At least 20 percent but less than 40 percent impaired, limited or restricted  Mobility: Walking and Moving Around Goal Status (): At least 1 percent but less than 20 percent impaired, limited or restricted      Naomy Choudhary, PT  1/4/2017

## 2017-01-04 NOTE — PROGRESS NOTES
"Adult Nutrition  Assessment/PES    Patient Name:  Sowmya Beckett  YOB: 1940  MRN: [id number]  Admit Date:  1/3/2017    Assessment Date:  1/4/2017        Reason for Assessment       01/04/17 0947    Reason for Assessment    Identified At Risk By Screening Criteria MST SCORE 2+    Time Spent (min) 45    Cardiac --   Atrial fib and flutter    Fluid Status Dehydration    Hematological --   monocolonal gammopathy of undetermined  significance.    Oncology Multiple myeloma   smoldering    Other diagnosis adult FTT              Nutrition/Diet History       01/04/17 0954    Nutrition/Diet History    Typical Food/Fluid Intake Pt. stated when she was home she spend alot of time just sleeping. When she was awake for meals, she stated she ate very well.            Anthropometrics       01/04/17 0952    Anthropometrics    Height 160 cm (63\")    Weight 50.8 kg (112 lb)    Ideal Body Weight (IBW)    Ideal Body Weight (IBW), Female 53.12    % Ideal Body Weight 95.84    Usual Body Weight (UBW)    Usual Body Weight 65.8 kg (145 lb)   Noted at last admission pt. weighed 119 lbs-     % Usual Body Weight 77.24    Weight Loss 15 kg (33 lb)    % Weight Loss  23 %    Weight Loss Time Frame 18 months ago.    Body Mass Index (BMI)    BMI (kg/m2) 19.88            Labs/Tests/Procedures/Meds       01/04/17 0954    Labs/Tests/Procedures/Meds    Labs/Tests Review Reviewed                Nutrition Prescription Ordered       01/04/17 0954    Nutrition Prescription PO    Current PO Diet Regular    Common Modifiers Cardiac            Evaluation of Received Nutrient/Fluid Intake       01/04/17 0955    PO Evaluation    Number of Meals 1    % PO Intake 25              Malnutrition Severity Assessment       01/04/17 0955    Malnutrition Severity Assessment    Malnutrition Type Chronic Illness Malnutrition    Weight Status (Chronic)    %UBW Mod (75-85%)    Weight Loss Severe (>20% / 1 yr)    Energy Intake Status (Chronic)    Energy " Intake --   Unable to determine from pt. as her intake  waxed and waned at home.          Problem/Interventions:        Problem 1       01/04/17 0956    Nutrition Diagnoses Problem 1    Problem 1 Inadequate Intake/Infusion    Inadequate Intake Type Oral    Etiology (related to) --   clinical condition    Signs/Symptoms (evidenced by) Unintended Weight Change                    Intervention Goal       01/04/17 0957    Intervention Goal    General Nutrition support treatment    PO Establish PO;Tolerate PO            Nutrition Intervention       01/04/17 0957    Nutrition Intervention    RD/Tech Action Advise alternate selection;Interview for preference;Menu provided;Menu adjusted;Encourage intake;Recommend/ordered;Follow Tx progress            Nutrition Prescription       01/04/17 0957    Nutrition Prescription PO    PO Prescription Begin/change supplement    Supplement Boost Plus    Supplement Frequency 3 times a day    New PO Prescription Ordered? Yes            Education/Evaluation       01/04/17 0957    Education    Education --   MNT reviewed with patient.    Monitor/Evaluation    Monitor Per protocol       Electronically signed by:  Giselle Lala RD  01/04/17 9:58 AM

## 2017-01-04 NOTE — PROGRESS NOTES
"Discharge Planning Assessment  [facility]     Patient Name: Sowmya Beckett  MRN: [id number]  Today's Date: 1/4/2017    Admit Date: 1/3/2017          Discharge Needs Assessment       01/04/17 1034    Living Environment    Lives With significant other    Living Arrangements house    Provides Primary Care For no one    Quality Of Family Relationships supportive    Able to Return to Prior Living Arrangements yes    Discharge Needs Assessment    Concerns To Be Addressed other (see comments)   PT will evaluate pt to determine d/c needs as pt has become very weak.    Readmission Within The Last 30 Days previous discharge plan unsuccessful   Pt was at [facility] less than two weeks prior.  Pt stated she felt better during her last hospitalization but then after she went home, she started sleeping more and feeling \"bad\" again.    Anticipated Changes Related to Illness other (see comments)   Pt may benefit from ongoing PT services as she is weak.    Equipment Currently Used at Home none    Equipment Needed After Discharge other (see comments)   PT to evaluate for equipment needs.    Transportation Available car;family or friend will provide            Discharge Plan       01/04/17 1036    Case Management/Social Work Plan    Plan Awaiting PT evaluation-SW met with pt at bedside to initiate d/c planning    Patient/Family In Agreement With Plan yes    Additional Comments Pt stated she lives in a home with her boyfriend in [place].  Pt stated she does not have to go up many steps to get to her bedroom.  Pt stated she was independent with all ADL's and did not utilize any equipment at home.  Pt denied ever having a need for HH.  In regards to her weakness, pt stated during her last hospitalization, she felt better but after she went home, she started to get weaker again and started sleeping a lot more.  Pt stated she was dehydrated because she started sleeping through meals.  Pt stated she is worried because she does not know what " is wrong with her.  Pt stated her boyfriend is a great help to her and he is independent as well and is not sickly in any way.  Pt stated she would prefer to go home with PT through  but if the recommendation is rehab for her to get stronger, she would think about this.  Pt stated she does not know what equipment she would need at this time.  SW asked a PT order be put in to evaluate pt to help with an appropriate d/c plan.  SW continuing to follow for safe d/c.        Discharge Placement     No information found                Demographic Summary       01/04/17 1031    Referral Information    Admission Type observation    Arrived From home or self-care    Referral Source admission list    Reason For Consult discharge planning    Contact Information    Permission Granted to Share Information With     Behavorial Health Release Obtained no            Functional Status       01/04/17 1032    Functional Status Current    Ambulation 2-->assistive person    Transferring 2-->assistive person    Toileting 0-->independent    Bathing 0-->independent    Dressing 0-->independent    Eating 0-->independent    Communication 0-->understands/communicates without difficulty    Change in Functional Status Since Onset of Current Illness/Injury no    Functional Status Prior    Ambulation 2-->assistive person    Transferring 2-->assistive person    Toileting 0-->independent    Bathing 0-->independent    Dressing 0-->independent    Eating 0-->independent    Communication 0-->understands/communicates without difficulty    Swallowing 0-->swallows foods/liquids without difficulty    Activity Tolerance    Usual Activity Tolerance good    Current Activity Tolerance moderate    Cognitive/Perceptual/Developmental    Current Mental Status/Cognitive Functioning no deficits noted    Recent Changes in Mental Status/Cognitive Functioning no changes    Developmental Stage (Eriksson's Stages of Development) Stage 8 (65 years-death/Late  Adulthood) Integrity vs. Despair            Psychosocial     None            Abuse/Neglect     None            Legal     None            Substance Abuse     None            Patient Forms     None          Sheela Mathew MSW

## 2017-01-04 NOTE — PROGRESS NOTES
"      HOSPITALIST DAILY PROGRESS NOTE    Chief Complaint: nausea, anorexia     Subjective   SUBJECTIVE/OVERNIGHT EVENTS   Patient states that she continues to have anorexia, denies any fevers or chills    Review of Systems:  Gen-no fevers, no chills  CV-no chest pain, no palpitations  Resp-no cough, no dyspnea  GI-no N/V/D, no abd pain    Objective   OBJECTIVE   I have reviewed the vital signs.  Visit Vitals   • /78   • Pulse 82   • Temp 98.3 °F (36.8 °C) (Oral)   • Resp 16   • Ht 63\" (160 cm)   • Wt 112 lb (50.8 kg)   • SpO2 96%   • BMI 19.84 kg/m2       Physical Exam:  Gen-ill appearing woman, no acute distress  CV-RRR, S1 S2 normal, no m/r/g  Resp-CTAB, no wheezes  Abd-soft, NT, ND, +BS  Ext-no edema  Neuro-A&Ox3, no focal deficits  Psych-appropriate mood and affect    Results:  I have reviewed the labs,  and diagnostic studies.      Results from last 7 days  Lab Units 01/04/17  0519 01/03/17  1806 12/29/16  0517   WBC 10*3/mm3 8.03 7.47 7.29   HEMOGLOBIN g/dL 13.5 15.1 12.5   HEMATOCRIT % 37.9 42.9 36.0   PLATELETS 10*3/mm3 245 267 197       Results from last 7 days  Lab Units 01/04/17  0519   SODIUM mmol/L 133   POTASSIUM mmol/L 3.8   CHLORIDE mmol/L 98*   TOTAL CO2 mmol/L 26.0   BUN mg/dL 23   CREATININE mg/dL 0.60   GLUCOSE mg/dL 111*   CALCIUM mg/dL 8.9     Radiology Results:  Imaging Results (last 24 hours)     Procedure Component Value Units Date/Time    XR Chest 1 View [[id number]] Collected:  01/04/17 0853     Updated:  01/04/17 0854    Narrative:       EXAMINATION: XR CHEST 1 VW- 01/03/2017     INDICATION: Weak/Dizzy/AMS triage protocol      COMPARISON: 09/28/2016     FINDINGS: The heart size is normal. The aortic arch is partially  calcified. The lungs are free of opacities. The osseous structures of  the chest are normal.           Impression:       1. No acute chest pathology.  2. Mild bibasilar atelectasis.     Fax to: [facility]     D:  01/04/2017  E:  01/04/2017                I have reviewed the " medications.      Assessment/Plan   ASSESSMENT/PLAN    Principal Problem:    Failure to thrive in adult  Active Problems:    Atrial fibrillation and flutter    Monoclonal gammopathy of undetermined significance    Smoldering multiple myeloma (SMM)    Dehydration    Hyperglycemia    76 yof with hx of MGUS p/w FTT    Plan:  - PT/OT  - Nutrition consult  - Dr Pantoja consulted  - GI consulted    Biju Henry MD  01/04/17  12:51 PM

## 2017-01-05 LAB
GLUCOSE BLDC GLUCOMTR-MCNC: 119 MG/DL (ref 70–130)
GLUCOSE BLDC GLUCOMTR-MCNC: 151 MG/DL (ref 70–130)
GLUCOSE BLDC GLUCOMTR-MCNC: 152 MG/DL (ref 70–130)
GLUCOSE BLDC GLUCOMTR-MCNC: 90 MG/DL (ref 70–130)
HBA1C MFR BLD: 5.6 % (ref 4.8–5.6)

## 2017-01-05 PROCEDURE — 63710000001 DEXAMETHASONE PER 0.25 MG: Performed by: NURSE PRACTITIONER

## 2017-01-05 PROCEDURE — G8987 SELF CARE CURRENT STATUS: HCPCS

## 2017-01-05 PROCEDURE — 97116 GAIT TRAINING THERAPY: CPT

## 2017-01-05 PROCEDURE — 96361 HYDRATE IV INFUSION ADD-ON: CPT

## 2017-01-05 PROCEDURE — 96372 THER/PROPH/DIAG INJ SC/IM: CPT

## 2017-01-05 PROCEDURE — G0378 HOSPITAL OBSERVATION PER HR: HCPCS

## 2017-01-05 PROCEDURE — 97165 OT EVAL LOW COMPLEX 30 MIN: CPT

## 2017-01-05 PROCEDURE — G8988 SELF CARE GOAL STATUS: HCPCS

## 2017-01-05 PROCEDURE — 82962 GLUCOSE BLOOD TEST: CPT

## 2017-01-05 PROCEDURE — 25010000002 ENOXAPARIN PER 10 MG: Performed by: NURSE PRACTITIONER

## 2017-01-05 PROCEDURE — G8989 SELF CARE D/C STATUS: HCPCS

## 2017-01-05 PROCEDURE — 97110 THERAPEUTIC EXERCISES: CPT

## 2017-01-05 PROCEDURE — 99225 PR SBSQ OBSERVATION CARE/DAY 25 MINUTES: CPT | Performed by: INTERNAL MEDICINE

## 2017-01-05 RX ORDER — ALPRAZOLAM 0.5 MG/1
0.5 TABLET ORAL ONCE
Status: COMPLETED | OUTPATIENT
Start: 2017-01-06 | End: 2017-01-05

## 2017-01-05 RX ADMIN — SODIUM CHLORIDE 100 ML/HR: 9 INJECTION, SOLUTION INTRAVENOUS at 05:58

## 2017-01-05 RX ADMIN — ENOXAPARIN SODIUM 40 MG: 40 INJECTION SUBCUTANEOUS at 05:20

## 2017-01-05 RX ADMIN — ALPRAZOLAM 0.5 MG: 0.5 TABLET ORAL at 23:21

## 2017-01-05 RX ADMIN — METOPROLOL TARTRATE 25 MG: 25 TABLET ORAL at 10:13

## 2017-01-05 RX ADMIN — GABAPENTIN 300 MG: 300 CAPSULE ORAL at 05:20

## 2017-01-05 RX ADMIN — GABAPENTIN 300 MG: 300 CAPSULE ORAL at 12:49

## 2017-01-05 RX ADMIN — METOCLOPRAMIDE HYDROCHLORIDE 10 MG: 10 TABLET ORAL at 12:49

## 2017-01-05 RX ADMIN — METOCLOPRAMIDE HYDROCHLORIDE 10 MG: 10 TABLET ORAL at 21:03

## 2017-01-05 RX ADMIN — METOCLOPRAMIDE HYDROCHLORIDE 10 MG: 10 TABLET ORAL at 17:22

## 2017-01-05 RX ADMIN — DEXAMETHASONE 20 MG: 4 TABLET ORAL at 10:13

## 2017-01-05 RX ADMIN — Medication 5 MG: at 21:20

## 2017-01-05 RX ADMIN — DULOXETINE HYDROCHLORIDE 30 MG: 30 CAPSULE, DELAYED RELEASE ORAL at 10:13

## 2017-01-05 RX ADMIN — GABAPENTIN 300 MG: 300 CAPSULE ORAL at 21:03

## 2017-01-05 RX ADMIN — ASPIRIN 325 MG ORAL TABLET 325 MG: 325 PILL ORAL at 10:13

## 2017-01-05 RX ADMIN — METOCLOPRAMIDE HYDROCHLORIDE 10 MG: 10 TABLET ORAL at 10:13

## 2017-01-05 NOTE — PROGRESS NOTES
"      HOSPITALIST DAILY PROGRESS NOTE    Chief Complaint:     Subjective   SUBJECTIVE/OVERNIGHT EVENTS       Review of Systems:  Gen-no fevers, no chills  CV-no chest pain, no palpitations  Resp-no cough, no dyspnea  GI-no N/V/D, no abd pain    Objective   OBJECTIVE   I have reviewed the vital signs.  Visit Vitals   • /74 (BP Location: Right arm, Patient Position: Lying)   • Pulse 59   • Temp 97.7 °F (36.5 °C) (Oral)   • Resp 16   • Ht 63\" (160 cm)   • Wt 112 lb (50.8 kg)   • SpO2 99%   • BMI 19.84 kg/m2       Physical Exam:  Gen-no acute distress  CV-RRR, S1 S2 normal, no m/r/g  Resp-CTAB, no wheezes  Abd-soft, NT, ND, +BS  Ext-no edema  Neuro-A&Ox3, no focal deficits  Psych-appropriate mood    Results:  I have reviewed the labs, culture data, radiology results, and diagnostic studies.      Results from last 7 days  Lab Units 01/04/17  0519 01/03/17  1806   WBC 10*3/mm3 8.03 7.47   HEMOGLOBIN g/dL 13.5 15.1   HEMATOCRIT % 37.9 42.9   PLATELETS 10*3/mm3 245 267       Results from last 7 days  Lab Units 01/04/17  0519   SODIUM mmol/L 133   POTASSIUM mmol/L 3.8   CHLORIDE mmol/L 98*   TOTAL CO2 mmol/L 26.0   BUN mg/dL 23   CREATININE mg/dL 0.60   GLUCOSE mg/dL 111*   CALCIUM mg/dL 8.9       Culture Data:  Cultures:           Radiology Results:  Imaging Results (last 24 hours)     Procedure Component Value Units Date/Time    XR Chest 1 View [[id number]] Collected:  01/04/17 0853     Updated:  01/04/17 1725    Narrative:       EXAMINATION: XR CHEST 1 VW- 01/03/2017     INDICATION: Weak/Dizzy/AMS triage protocol      COMPARISON: 09/28/2016     FINDINGS: The heart size is normal. The aortic arch is partially  calcified. The lungs are free of opacities. The osseous structures of  the chest are normal.           Impression:       1. No acute chest pathology.  2. Mild bibasilar atelectasis.     Fax to: [facility]     D:  01/04/2017  E:  01/04/2017     This report was finalized on 1/4/2017 5:23 PM by Dr. Jaquan Caraballo MD.       "       I have reviewed the medications.      Assessment/Plan   ASSESSMENT/PLAN    Principal Problem:    Failure to thrive in adult  Active Problems:    Atrial fibrillation and flutter    Monoclonal gammopathy of undetermined significance    Smoldering multiple myeloma (SMM)    Dehydration    Hyperglycemia     76 yof with hx of MGUS/smoldering multiple myeloma p/w FTT     Plan:  - Dr Pantoja consulted, plan for possible alternative feeding methods, ?PEG  - PT/OT  - Nutrition consult  - GI consulted and following    Biju Henry MD  01/05/17  3:06 PM

## 2017-01-05 NOTE — PROGRESS NOTES
Acute Care - Physical Therapy Treatment Note  [facility]     Patient Name: Sowmya Beckett  : 1940  MRN: [id number]  Today's Date: 2017  Onset of Illness/Injury or Date of Surgery Date: 17  Date of Referral to PT: 17  Referring Physician: Keira Stubbs    Admit Date: 1/3/2017    Visit Dx:    ICD-10-CM ICD-9-CM   1. Failure to thrive in adult R62.7 783.7   2. Monoclonal gammopathy of undetermined significance D47.2 273.1   3. Smoldering multiple myeloma (SMM) C90.00 203.00   4. Atrial fibrillation and flutter I48.91 427.31    I48.92 427.32   5. Impaired functional mobility, balance, gait, and endurance Z74.09 V49.89   6. Impaired mobility and ADLs Z74.09 799.89     Patient Active Problem List   Diagnosis   • Atrial fibrillation and flutter   • History of melanoma   • Monoclonal gammopathy of undetermined significance   • Failure to thrive in adult   • Smoldering multiple myeloma (SMM)   • Dehydration   • Hyperglycemia               Adult Rehabilitation Note       17 1128          Rehab Assessment/Intervention    Discipline physical therapist  -      Document Type therapy note (daily note)  -LS      Subjective Information agree to therapy;complains of;fatigue  -LS      Patient Effort, Rehab Treatment good  -LS      Precautions/Limitations no known precautions/limitations  -LS      Recorded by [LS] Keira Granda, PT      Pain Assessment    Pain Assessment 0-10  -LS      Pain Score 0  -LS      Post Pain Score 0  -LS      Recorded by [LS] Keira Granda, PT      Cognitive Assessment/Intervention    Current Cognitive/Communication Assessment functional  -LS      Orientation Status oriented x 4  -LS      Follows Commands/Answers Questions able to follow single-step instructions;100% of the time  -LS      Personal Safety good awareness, safety precautions  -LS      Personal Safety Interventions fall prevention program maintained;gait belt;nonskid shoes/slippers when out of bed  -LS       Recorded by [LS] Keira Granda, PT      Bed Mobility, Assessment/Treatment    Bed Mobility, Assistive Device head of bed elevated;bed rails  -LS      Bed Mob, Supine to Sit, [place] conditional independence  -LS      Bed Mob, Sit to Supine, [place] conditional independence  -LS      Recorded by [LS] Keira Granda, PT      Transfer Assessment/Treatment    Transfers, Sit-Stand [place] supervision required;verbal cues required  -LS      Transfers, Stand-Sit [place] supervision required;verbal cues required  -LS      Recorded by [LS] Keira Granda PT      Gait Assessment/Treatment    Gait, [place] Level supervision required;verbal cues required  -LS      Gait, Distance (Feet) 300  -LS      Gait, Gait Deviations vince decreased;forward flexed posture;step length decreased  -LS      Recorded by [LS] Keira Granda, PT      Therapy Exercises    Bilateral Lower Extremities AROM:;10 reps;ankle pumps/circles;LAQ;hip abduction/adduction;hip flexion  -LS      Recorded by [LS] Keira Granda PT      Positioning and Restraints    Pre-Treatment Position in bed  -LS      Post Treatment Position chair  -LS      In Chair notified nsg;reclined;call light within reach;encouraged to call for assist  -LS      Recorded by [LS] Keira Granda, PT        User Key  (r) = Recorded By, (t) = Taken By, (c) = Cosigned By    Initials Name Effective Dates    SHEA Granda, PT 06/19/15 -                 IP PT Goals       01/05/17 1206 01/04/17 1406       Bed Mobility PT LTG    Bed Mobility PT LTG, Date Established  01/04/17  -MB     Bed Mobility PT LTG, Time to Achieve  5 days  -MB     Bed Mobility PT LTG, Activity Type  all bed mobility  -MB     Bed Mobility PT LTG, [place] Level  independent  -MB     Bed Mobility PT LTG, Outcome goal ongoing  -LS goal ongoing  -MB     Transfer Training PT LTG    Transfer Training PT LTG, Date Established  01/04/17  -MB     Transfer Training PT LTG, Time to Achieve  5 days  -MB      Transfer Training PT LTG, Activity Type  all transfers  -MB     Transfer Training PT LTG, [place] Level  independent  -MB     Transfer Training PT LTG, Outcome goal ongoing  -LS goal ongoing  -MB     Gait Training PT LTG    Gait Training Goal PT LTG, Date Established  01/04/17  -MB     Gait Training Goal PT LTG, Time to Achieve  5 days  -MB     Gait Training Goal PT LTG, [place] Level  independent  -MB     Gait Training Goal PT LTG, Distance to Achieve  250  -MB     Gait Training Goal PT LTG, Date Goal Reviewed 01/05/17  -LS      Gait Training Goal PT LTG, Outcome goal partially met   achieved for distance with supervision today  -LS goal ongoing  -MB     Stair Training PT LTG    Stair Training Goal PT LTG, Date Established  01/04/17  -MB     Stair Training Goal PT LTG, Time to Achieve  5 days  -MB     Stair Training Goal PT LTG, Number of Steps  5   in order to enter/exit patient's home  -MB     Stair Training Goal PT LTG, [place] Level  independent  -MB     Stair Training Goal PT LTG, Assist Device  2 handrails  -MB     Stair Training Goal PT LTG, Outcome goal ongoing  -LS goal ongoing  -MB       User Key  (r) = Recorded By, (t) = Taken By, (c) = Cosigned By    Initials Name Provider Type    LS Keira Granda, PT Physical Therapist    RAMAN Choudhary, PT Physical Therapist          Physical Therapy Education     Title: PT OT SLP Therapies (Done)     Topic: Physical Therapy (Done)     Point: Mobility training (Done)    Learning Progress Summary    Learner Readiness Method Response Comment Documented by Status   Patient Acceptance CHRIS,D VU   01/05/17 1206 Done    Acceptance ENA PEREZ,NR home safety, benefits of therapy, fall precautions MB 01/04/17 1405 Done               Point: Home exercise program (Done)    Learning Progress Summary    Learner Readiness Method Response Comment Documented by Status   Patient Acceptance CHRISD VU   01/05/17 1206 Done    Acceptance ENA PEREZ,NR home safety, benefits  of therapy, fall precautions MB 01/04/17 1405 Done               Point: Body mechanics (Done)    Learning Progress Summary    Learner Readiness Method Response Comment Documented by Status   Patient Acceptance E,D VU   01/05/17 1206 Done    Acceptance E,D VU,NR home safety, benefits of therapy, fall precautions MB 01/04/17 1405 Done               Point: Precautions (Done)    Learning Progress Summary    Learner Readiness Method Response Comment Documented by Status   Patient Acceptance E,D VU   01/05/17 1206 Done    Acceptance E,D VU,NR home safety, benefits of therapy, fall precautions MB 01/04/17 1405 Done                      User Key     Initials Effective Dates Name Provider Type Discipline     06/19/15 -  Keira Granda, PT Physical Therapist PT    MB 03/14/16 -  Naomy Choudhary, PT Physical Therapist PT                    PT Recommendation and Plan  Anticipated Discharge Disposition: home with home health  Planned Therapy Interventions: balance training, gait training, bed mobility training, home exercise program, patient/family education, stair training, strengthening, transfer training  PT Frequency: daily  Plan of Care Review  Plan Of Care Reviewed With: patient  Progress: improving  Outcome Summary/Follow up Plan: Pt demonstrated increased indep with gait; able to progress distance in hallway. Gave excellent effort in with seated ther ex (edu performed for HEP). Would benefit from 1-2 more sessions for promoting functional endurance and achieving all established goals. Recommend OP PT upon d/c.           Outcome Measures       01/05/17 1128 01/05/17 1127 01/04/17 1320    How much help from another person do you currently need...    Turning from your back to your side while in flat bed without using bedrails? 4  -LS  4  -MB    Moving from lying on back to sitting on the side of a flat bed without bedrails? 4  -LS  4  -MB    Moving to and from a bed to a chair (including a wheelchair)? 3  -LS  3   -MB    Standing up from a chair using your arms (e.g., wheelchair, bedside chair)? 3  -LS  3  -MB    Climbing 3-5 steps with a railing? 3  -LS  3  -MB    To walk in hospital room? 3  -LS  3  -MB    AM-PAC 6 Clicks Score 20  -LS  20  -MB    How much help from another is currently needed...    Putting on and taking off regular lower body clothing?  4  -MARGIE     Bathing (including washing, rinsing, and drying)  4  -MARGIE     Toileting (which includes using toilet bed pan or urinal)  4  -MARGIE     Putting on and taking off regular upper body clothing  4  -MARGIE     Taking care of personal grooming (such as brushing teeth)  4  -MARGIE     Eating meals  4  -MARGIE     Score  24  -MARGIE     Functional Assessment    Outcome Measure Options AM-PAC 6 Clicks Basic Mobility (PT)  -LS AM-PAC 6 Clicks Daily Activity (OT)  -MARGIE AM-PAC 6 Clicks Basic Mobility (PT)  -MB      User Key  (r) = Recorded By, (t) = Taken By, (c) = Cosigned By    Initials Name Provider Type    MARGIE Mims, OT Occupational Therapist    SHEA Granda, PT Physical Therapist    RAMAN Choudhary, PT Physical Therapist           Time Calculation:         PT Charges       01/05/17 1209          Time Calculation    Start Time 1128  -      PT Received On 01/05/17  -      PT Goal Re-Cert Due Date 01/14/17  -      Time Calculation- PT    Total Timed Code Minutes- PT 23 minute(s)  -        User Key  (r) = Recorded By, (t) = Taken By, (c) = Cosigned By    Initials Name Provider Type    LS Keira Granda, PT Physical Therapist          Therapy Charges for Today     Code Description Service Date Service Provider Modifiers Qty    34503801791 HC GAIT TRAINING EA 15 MIN 1/5/2017 Keira Granda, PT GP 1    21884373328 HC PT THER PROC EA 15 MIN 1/5/2017 Keira Granda, PT GP 1          PT G-Codes  PT Professional Judgement Used?: Yes  Outcome Measure Options: AM-PAC 6 Clicks Basic Mobility (PT)  Functional Limitation: Mobility: Walking and moving around  Mobility: Walking and  Moving Around Current Status (): At least 20 percent but less than 40 percent impaired, limited or restricted  Mobility: Walking and Moving Around Goal Status (): At least 1 percent but less than 20 percent impaired, limited or restricted    Keira Granda, PT  1/5/2017

## 2017-01-05 NOTE — PLAN OF CARE
Problem: Patient Care Overview (Adult)  Goal: Plan of Care Review  Outcome: Outcome(s) achieved Date Met:  01/05/17 01/05/17 0547   Outcome Evaluation   Outcome Summary/Follow up Plan Pt. participated OT eval. Pt. demonstrating indep skills for ADL and mobility indep. Generalized deconditioned. Will allow PT to address. NO further IP OT services needed.   Coping/Psychosocial Response Interventions   Plan Of Care Reviewed With patient

## 2017-01-05 NOTE — PLAN OF CARE
"Problem: Patient Care Overview (Adult)  Goal: Discharge Needs Assessment  Outcome: Ongoing (interventions implemented as appropriate)    01/04/17 0311 01/04/17 1034 01/05/17 1127   Discharge Needs Assessment   Concerns To Be Addressed --  other (see comments)  (PT will evaluate pt to determine d/c needs as pt has become very weak.) --    Readmission Within The Last 30 Days --  previous discharge plan unsuccessful  (Pt was at [facility] less than two weeks prior. Pt stated she felt better during her last hospitalization but then after she went home, she started sleeping more and feeling \"bad\" again.) --    Equipment Needed After Discharge --  other (see comments)  (PT to evaluate for equipment needs.) --    Discharge Disposition home or self-care --  --    Current Health   Anticipated Changes Related to Illness --  other (see comments)  (Pt may benefit from ongoing PT services as she is weak.) --    Living Environment   Transportation Available --  car;family or friend will provide --    Self-Care   Equipment Currently Used at Home --  --  none           "

## 2017-01-05 NOTE — PLAN OF CARE
Problem: Patient Care Overview (Adult)  Goal: Plan of Care Review  Outcome: Ongoing (interventions implemented as appropriate)    01/05/17 1206   Outcome Evaluation   Outcome Summary/Follow up Plan Pt demonstrated increased indep with gait; able to progress distance in hallway. Gave excellent effort in with seated ther ex (edu performed for HEP). Would benefit from 1-2 more sessions for promoting functional endurance and achieving all established goals. Recommend OP PT upon d/c.    Coping/Psychosocial Response Interventions   Plan Of Care Reviewed With patient   Patient Care Overview   Progress improving         Problem: Inpatient Physical Therapy  Goal: Bed Mobility Goal LTG- PT  Outcome: Ongoing (interventions implemented as appropriate)    01/04/17 1406 01/05/17 1206   Bed Mobility PT LTG   Bed Mobility PT LTG, Date Established 01/04/17 --    Bed Mobility PT LTG, Time to Achieve 5 days --    Bed Mobility PT LTG, Activity Type all bed mobility --    Bed Mobility PT LTG, [place] Level independent --    Bed Mobility PT LTG, Outcome --  goal ongoing       Goal: Transfer Training Goal 1 LTG- PT  Outcome: Ongoing (interventions implemented as appropriate)    01/04/17 1406 01/05/17 1206   Transfer Training PT LTG   Transfer Training PT LTG, Date Established 01/04/17 --    Transfer Training PT LTG, Time to Achieve 5 days --    Transfer Training PT LTG, Activity Type all transfers --    Transfer Training PT LTG, [place] Level independent --    Transfer Training PT LTG, Outcome --  goal ongoing       Goal: Gait Training Goal LTG- PT  Outcome: Ongoing (interventions implemented as appropriate)    01/04/17 1406 01/05/17 1206   Gait Training PT LTG   Gait Training Goal PT LTG, Date Established 01/04/17 --    Gait Training Goal PT LTG, Time to Achieve 5 days --    Gait Training Goal PT LTG, [place] Level independent --    Gait Training Goal PT LTG, Distance to Achieve 250 --    Gait Training Goal PT LTG, Date  Goal Reviewed --  01/05/17   Gait Training Goal PT LTG, Outcome --  goal partially met  (achieved for distance with supervision today)       Goal: Stair Training Goal LTG- PT  Outcome: Ongoing (interventions implemented as appropriate)    01/04/17 1406 01/05/17 1206   Stair Training PT LTG   Stair Training Goal PT LTG, Date Established 01/04/17 --    Stair Training Goal PT LTG, Time to Achieve 5 days --    Stair Training Goal PT LTG, Number of Steps 5  (in order to enter/exit patient's home) --    Stair Training Goal PT LTG, [place] Level independent --    Stair Training Goal PT LTG, Assist Device 2 handrails --    Stair Training Goal PT LTG, Outcome --  goal ongoing

## 2017-01-05 NOTE — PLAN OF CARE
Problem: Patient Care Overview (Adult)  Goal: Plan of Care Review  Outcome: Ongoing (interventions implemented as appropriate)    01/04/17 1406 01/04/17 2000 01/05/17 0452   Outcome Evaluation   Outcome Summary/Follow up Plan --  --  Patient resting well most of the night. VSS. Did have one occurance of diarrhea this evening.    Coping/Psychosocial Response Interventions   Plan Of Care Reviewed With --  patient --    Patient Care Overview   Progress improving --  --        Goal: Adult Individualization and Mutuality  Outcome: Ongoing (interventions implemented as appropriate)  Goal: Discharge Needs Assessment  Outcome: Ongoing (interventions implemented as appropriate)    Problem: Nutrition, Imbalanced: Inadequate Oral Intake (Adult)  Goal: Identify Related Risk Factors and Signs and Symptoms  Outcome: Ongoing (interventions implemented as appropriate)  Goal: Improved Oral Intake  Outcome: Ongoing (interventions implemented as appropriate)  Goal: Prevent Further Weight Loss  Outcome: Ongoing (interventions implemented as appropriate)    Problem: Skin Integrity Impairment, Risk/Actual (Adult)  Goal: Identify Related Risk Factors and Signs and Symptoms  Outcome: Ongoing (interventions implemented as appropriate)  Goal: Skin Integrity/Wound Healing  Outcome: Ongoing (interventions implemented as appropriate)

## 2017-01-05 NOTE — THERAPY DISCHARGE NOTE
Acute Care - Occupational Therapy Initial Eval/Discharge  [facility]     Patient Name: Sowmya Beckett  : 1940  MRN: [id number]  Today's Date: 2017  Onset of Illness/Injury or Date of Surgery Date: 17  Date of Referral to OT: 17  Referring Physician: Keira Stubbs      Admit Date: 1/3/2017       ICD-10-CM ICD-9-CM   1. Failure to thrive in adult R62.7 783.7   2. Monoclonal gammopathy of undetermined significance D47.2 273.1   3. Smoldering multiple myeloma (SMM) C90.00 203.00   4. Atrial fibrillation and flutter I48.91 427.31    I48.92 427.32   5. Impaired functional mobility, balance, gait, and endurance Z74.09 V49.89   6. Impaired mobility and ADLs Z74.09 799.89     Patient Active Problem List   Diagnosis   • Atrial fibrillation and flutter   • History of melanoma   • Monoclonal gammopathy of undetermined significance   • Failure to thrive in adult   • Smoldering multiple myeloma (SMM)   • Dehydration   • Hyperglycemia     Past Medical History   Diagnosis Date   • Arrhythmia    • Atrial fibrillation and flutter 2016   • Cancer      melanoma in 1971   • CHF (congestive heart failure)      Past Surgical History   Procedure Laterality Date   • Colon surgery     • Hysterectomy     • Appendectomy     • Hemorrhoidectomy     • Cholecystectomy            OT ASSESSMENT FLOWSHEET (last 72 hours)      OT Evaluation       17 1157 17 1127 17 1320 17 1034 17 1032    Rehab Evaluation    Document Type  evaluation;discharge summary  -MARGIE evaluation  -MB      Subjective Information  agree to therapy;complains of;fatigue  -MARGIE agree to therapy;complains of;fatigue  -MB      Patient Effort, Rehab Treatment  good  -MARGIE good  -MB      Symptoms Noted During/After Treatment  none  -MARGIE none  -MB      General Information    Patient Profile Review  yes  -MARGIE yes  -MB      Onset of Illness/Injury or Date of Surgery Date  17  -MARGIE 17  -MB      Referring Physician   Keira Stubbs  -ERIC Hubbard      General Observations  Pt. sitting up in bed. Alert.  per pt. just went to Bathroom on own.  -MARGIE Pt. supine w/ IV L UE intact, on RA.  Nsg consent for PT.  -MB      Pertinent History Of Current Problem  Pt. referred to ED by Dr. Pantoja.  Pt. admitted with dehydration, fatigue, FTT, unintentional weight loss.  Pt. recently admitted with similar symptoms 2 weeks ago.  R/O multiple myeloma.  -MARGIE Pt referred to ED by Dr. Pantoja.  Pt. adm w/ dehydration, fatigue, failure to thrive, unintentional weight loss.  Pt. recently adm approx 2 weeks ago for dehydration, failure to thrive, mild gastroparesis.    -MB      Precautions/Limitations  no known precautions/limitations  -MARGIE no known precautions/limitations  -MB      Prior Level of Function  independent:;all household mobility;community mobility;ADL's;home management;driving;shopping   Per pt. could do all when wanted, but moslty sleeping.  -MARGIE independent:;all household mobility;community mobility;gait;transfer;bed mobility;ADL's;feeding;grooming;dressing;bathing;home management;cooking;cleaning;driving;shopping;using stairs  -MB      Equipment Currently Used at Home  none  -MARGIE none  -MB none  -GABBY     Plans/Goals Discussed With  patient;agreed upon  -MARGIE patient;agreed upon  -MB      Risks Reviewed  patient:;LOB;increased discomfort;change in vital signs  -MARGIE patient:;LOB;dizziness;increased discomfort;lines disloged  -MB      Benefits Reviewed  patient:;increase knowledge  -MARGIE patient:;improve function;increase independence;increase strength;increase balance;increase knowledge  -MB      Barriers to Rehab  none identified  -MARGIE none identified  -MB      Living Environment    Lives With  significant other   24/7  -MARGIE significant other  -MB significant other  -GABBY     Living Arrangements  house  -MARGIE house  -MB house  -GABBY     Home Accessibility  stairs to enter home;tub/shower is not walk in  -MARGIE stairs to enter  home;tub/shower is not walk in  -MB      Number of Stairs to Enter Home   5  -MB      Stair Railings at Home   outside, present at both sides  -MB      Transportation Available    car;family or friend will provide  -GABBY     Clinical Impression    Date of Referral to OT  01/04/17  -MARGIE       OT Diagnosis  Potential for impaired ADL and mobility indep. due to weakness and fatigue.  Adult FTT, dehydration, hyperglycemia, multiple myeloma.  -MARGIE       Patient/Family Goals Statement  To be able to find reason for illness and regain health.  -MARGIE       Criteria for Skilled Therapeutic Interventions Met  no;treatment indicated   I ADL, but generalized weakness allow PT to address.  -MARGIE       Therapy Frequency  evaluation only  -MARGIE       Anticipated Discharge Disposition home with assist  -MARGIE home with assist  -MARGIE       Functional Level Prior    Ambulation     2-->assistive person  -GABBY    Transferring     2-->assistive person  -GABBY    Toileting     0-->independent  -GABBY    Bathing     0-->independent  -GABBY    Dressing     0-->independent  -GABBY    Eating     0-->independent  -GABBY    Communication     0-->understands/communicates without difficulty  -GABBY    Swallowing     0-->swallows foods/liquids without difficulty  -GABBY    Pain Assessment    Pain Assessment  No/denies pain  -MARGIE No/denies pain  -MB      Vision Assessment/Intervention    Visual Impairment  WFL   had lense sx.  -MARGIE WFL  -MB      Cognitive Assessment/Intervention    Current Cognitive/Communication Assessment  functional  -MARGIE functional  -MB      Orientation Status  oriented x 4  -MARGIE oriented x 4  -MB      Follows Commands/Answers Questions  100% of the time;able to follow single-step instructions  -MARGIE 100% of the time;able to follow single-step instructions  -MB      Personal Safety  WNL/WFL  -MARGIE WNL/WFL  -MB      Personal Safety Interventions  fall prevention program maintained;gait belt;nonskid shoes/slippers when out of bed  -MARGIE fall prevention program  maintained;gait belt;nonskid shoes/slippers when out of bed  -MB      Short/Long Term Memory  --   appears intact with general conversation, not formally asses  -MARGIE       ROM (Range of Motion)    General ROM  no range of motion deficits identified  -MARGIE no range of motion deficits identified  -MB      MMT (Manual Muscle Testing)    General MMT Assessment  no strength deficits identified   UE  -MARGIE lower extremity strength deficits identified  -MB      General MMT Assessment Detail  4+/5   -MARGIE Pt. B LEs functionally 4/5.  -MB      Bed Mobility, Assessment/Treatment    Bed Mobility, Assistive Device  bed rails;head of bed elevated  -MARGIE bed rails;head of bed elevated  -MB      Bed Mob, Supine to Sit, [place]  conditional independence  -MARGIE conditional independence  -MB      Bed Mob, Sit to Supine, [place]  conditional independence  -MARGIE conditional independence  -MB      Bed Mobility, Impairments   strength decreased  -MB      Bed Mobility, Comment  performed safely.  -MARGIE Pt. performs bed mob safely w/ HOB elevated and w/ use of bed rails.  -MB      Transfer Assessment/Treatment    Transfers, Sit-Stand [place]  supervision required  -MARGIE stand by assist  -MB      Transfers, Stand-Sit [place]  supervision required  -MARGIE stand by assist  -MB      Transfers, Sit-Stand-Sit, Assist Device  --   assist with IV pole  -MARGIE other (see comments)   none  -MB      Toilet Transfer, [place]   supervision required  -MB      Toilet Transfer, Assistive Device   elevated toilet seat  -MB      Transfer, Safety Issues   --   none noted  -MB      Transfer, Impairments  --   just appears with exhaustion.  -MARGIE strength decreased  -MB      Transfer, Comment  good safety.  -MARGIE Pt. demo good safety awareness w/ transfers.  -MB      Functional Mobility    Functional Mobility- Ind. Level  supervision required;1 person + 1 person to manage equipment   assist with pole  -MARGIE       Functional Mobility- Device  --   pt. able to  complete full loop of hallway.  -MARGIE       Functional Mobility- Comment  good safety, pt. slight lilt to right.  -MARGIE       Lower Body Dressing Assessment/Training    LB Dressing Assess/Train, Clothing Type  doffing:;donning:;slipper socks  -MARGIE       LB Dressing Assess/Train, Position  sitting  -MARGIE       LB Dressing Assess/Train, [place]  independent  -MARGIE       LB Dressing Assess/Train, Comment  Very flexible no difficulty noted.  -MARGIE       Toileting Assessment/Training    Toileting Assess/Train, Comment  Per pt. getting up to bathroom and back on own.  -MARGIE       Sensory Assessment/Intervention    Light Touch  --   Pt. reporting no numbness or tingling in UE's.  -MARGIE LLE;RLE  -MB      LLE Light Touch   WNL  -MB      RLE Light Touch   WNL  -MB      Positioning and Restraints    Pre-Treatment Position  in bed  -MARGIE in bed  -MB      Post Treatment Position  chair  -MARGIE bed  -MB      In Bed  sitting;call light within reach;with PT  -MARGIE notified nsg;supine;call light within reach;encouraged to call for assist  -MB        01/03/17 6313 01/03/17 9386             General Information    Equipment Currently Used at Home none  -KR        Living Environment    Lives With  significant other  -KR       Living Arrangements  house  -KR       Home Accessibility  no concerns  -KR       Stair Railings at Home  none  -KR       Type of Financial/Environmental Concern  none  -KR       Transportation Available  car;family or friend will provide  -KR       Functional Level Prior    Ambulation 0-->independent  -KR        Transferring 0-->independent  -KR        Toileting 0-->independent  -KR        Bathing 0-->independent  -KR        Dressing 0-->independent  -KR        Eating 0-->independent  -KR        Communication 0-->understands/communicates without difficulty  -KR        Swallowing 0-->swallows foods/liquids without difficulty  -KR          User Key  (r) = Recorded By, (t) = Taken By, (c) = Cosigned By    Initials Name Effective Dates     MARGIE Mims, OT 06/22/15 -     RAMAN Choudhary, PT 03/14/16 -     CYNDI Costa RN 06/16/16 -     GABBY Mathew, MSW 03/14/16 -           Occupational Therapy Education     Title: PT OT SLP Therapies (Done)     Topic: Occupational Therapy (Done)     Point: ADL training (Done)    Description: Instruct learner(s) on proper safety adaptation and remediation techniques during self care or transfers.   Instruct in proper use of assistive devices.    Learning Progress Summary    Learner Readiness Method Response Comment Documented by Status   Patient Acceptance E VU ROLE OT reason for consult  01/05/17 1155 Done                      User Key     Initials Effective Dates Name Provider Type Discipline    MARGIE 06/22/15 -  Marta Mims OT Occupational Therapist OT                OT Recommendation and Plan  Anticipated Discharge Disposition: home with assist  Therapy Frequency: evaluation only  Plan of Care Review  Plan Of Care Reviewed With: patient  Progress: improving  Outcome Summary/Follow up Plan: Pt. participated OT eval.  Pt. demonstrating indep skills for ADL and mobility indep.  Generalized deconditioned.  Will allow PT to address.  NO further IP OT services needed.               Outcome Measures       01/05/17 1127 01/04/17 1320       How much help from another person do you currently need...    Turning from your back to your side while in flat bed without using bedrails?  4  -MB     Moving from lying on back to sitting on the side of a flat bed without bedrails?  4  -MB     Moving to and from a bed to a chair (including a wheelchair)?  3  -MB     Standing up from a chair using your arms (e.g., wheelchair, bedside chair)?  3  -MB     Climbing 3-5 steps with a railing?  3  -MB     To walk in hospital room?  3  -MB     AM-PAC 6 Clicks Score  20  -MB     How much help from another is currently needed...    Putting on and taking off regular lower body clothing? 4  -MARGIE      Bathing (including  washing, rinsing, and drying) 4  -MARGIE      Toileting (which includes using toilet bed pan or urinal) 4  -MARGIE      Putting on and taking off regular upper body clothing 4  -MARGIE      Taking care of personal grooming (such as brushing teeth) 4  -MARGIE      Eating meals 4  -MARGIE      Score 24  -MARGIE      Functional Assessment    Outcome Measure Options AM-PAC 6 Clicks Daily Activity (OT)  -MARGIE AM-PAC 6 Clicks Basic Mobility (PT)  -MB       User Key  (r) = Recorded By, (t) = Taken By, (c) = Cosigned By    Initials Name Provider Type    MARGIE Mims OT Occupational Therapist    RAMAN Choudhary, PT Physical Therapist          Time Calculation:         Time Calculation- OT       01/05/17 1157          Time Calculation- OT    OT Start Time 1127  -MARGIE      OT Received On 01/05/17  -MARGIE        User Key  (r) = Recorded By, (t) = Taken By, (c) = Cosigned By    Initials Name Provider Type    MARGIE Mims OT Occupational Therapist          Therapy Charges for Today     Code Description Service Date Service Provider Modifiers Qty    73894694122  OT SELFCARE CURRENT 1/5/2017 Marta Mims OT GO,  1    [id number]  OT SELFCARE PROJECTED 1/5/2017 Marta Mims OT GO,  1    [id number]  OT SELFCARE DISCHARGE 1/5/2017 Marta Mims OT GO,  1    [id number]  OT EVAL LOW COMPLEXITY 3 1/5/2017 Marta Mims OT GO 1          OT G-codes  OT Professional Judgement Used?: Yes  OT Functional Scales Options: AM-PAC 6 Clicks Daily Activity (OT)  Score: 24  Functional Limitation: Self care  Self Care Current Status (): 0 percent impaired, limited or restricted  Self Care Goal Status (): 0 percent impaired, limited or restricted  Self Care Discharge Status (): 0 percent impaired, limited or restricted    OT Discharge Summary  Anticipated Discharge Disposition: home with assist  Reason for Discharge: At baseline function (for ADL)  Outcomes Achieved: Other (No OT services needed.)    Marta Mims  OT  1/5/2017

## 2017-01-06 LAB
GLUCOSE BLDC GLUCOMTR-MCNC: 127 MG/DL (ref 70–130)
GLUCOSE BLDC GLUCOMTR-MCNC: 78 MG/DL (ref 70–130)

## 2017-01-06 PROCEDURE — G0378 HOSPITAL OBSERVATION PER HR: HCPCS

## 2017-01-06 PROCEDURE — 82962 GLUCOSE BLOOD TEST: CPT

## 2017-01-06 PROCEDURE — 99214 OFFICE O/P EST MOD 30 MIN: CPT | Performed by: INTERNAL MEDICINE

## 2017-01-06 PROCEDURE — 25010000002 ENOXAPARIN PER 10 MG: Performed by: NURSE PRACTITIONER

## 2017-01-06 PROCEDURE — 99226 PR SBSQ OBSERVATION CARE/DAY 35 MINUTES: CPT | Performed by: INTERNAL MEDICINE

## 2017-01-06 PROCEDURE — 97530 THERAPEUTIC ACTIVITIES: CPT

## 2017-01-06 PROCEDURE — 96372 THER/PROPH/DIAG INJ SC/IM: CPT

## 2017-01-06 RX ADMIN — GABAPENTIN 300 MG: 300 CAPSULE ORAL at 21:16

## 2017-01-06 RX ADMIN — ENOXAPARIN SODIUM 40 MG: 40 INJECTION SUBCUTANEOUS at 05:01

## 2017-01-06 RX ADMIN — ASPIRIN 325 MG ORAL TABLET 325 MG: 325 PILL ORAL at 09:04

## 2017-01-06 RX ADMIN — METOCLOPRAMIDE HYDROCHLORIDE 10 MG: 10 TABLET ORAL at 09:04

## 2017-01-06 RX ADMIN — METOCLOPRAMIDE HYDROCHLORIDE 10 MG: 10 TABLET ORAL at 17:52

## 2017-01-06 RX ADMIN — SODIUM CHLORIDE 100 ML/HR: 9 INJECTION, SOLUTION INTRAVENOUS at 11:20

## 2017-01-06 RX ADMIN — SODIUM CHLORIDE 100 ML/HR: 9 INJECTION, SOLUTION INTRAVENOUS at 21:16

## 2017-01-06 RX ADMIN — GABAPENTIN 300 MG: 300 CAPSULE ORAL at 14:12

## 2017-01-06 RX ADMIN — METOPROLOL TARTRATE 25 MG: 25 TABLET ORAL at 09:04

## 2017-01-06 RX ADMIN — METOCLOPRAMIDE HYDROCHLORIDE 10 MG: 10 TABLET ORAL at 21:16

## 2017-01-06 RX ADMIN — ACETAMINOPHEN 650 MG: 325 TABLET, FILM COATED ORAL at 17:56

## 2017-01-06 RX ADMIN — Medication 5 MG: at 22:33

## 2017-01-06 RX ADMIN — SODIUM CHLORIDE 100 ML/HR: 9 INJECTION, SOLUTION INTRAVENOUS at 05:01

## 2017-01-06 RX ADMIN — DULOXETINE HYDROCHLORIDE 30 MG: 30 CAPSULE, DELAYED RELEASE ORAL at 09:03

## 2017-01-06 RX ADMIN — METOCLOPRAMIDE HYDROCHLORIDE 10 MG: 10 TABLET ORAL at 11:19

## 2017-01-06 RX ADMIN — GABAPENTIN 300 MG: 300 CAPSULE ORAL at 05:01

## 2017-01-06 NOTE — PLAN OF CARE
Problem: Patient Care Overview (Adult)  Goal: Plan of Care Review  Outcome: Ongoing (interventions implemented as appropriate)    01/06/17 1510   Outcome Evaluation   Outcome Summary/Follow up Plan This treatment note to serve as discharge summary. Pt appears to be at baseline function; she has achieved PT goals for ambulation, transfers and bed mobility demonstrating independence today. She appears safe to continue mobility training with nsg.   Coping/Psychosocial Response Interventions   Plan Of Care Reviewed With patient   Patient Care Overview   Progress improving         Problem: Inpatient Physical Therapy  Goal: Bed Mobility Goal LTG- PT  Outcome: Ongoing (interventions implemented as appropriate)    01/04/17 1406 01/06/17 1510   Bed Mobility PT LTG   Bed Mobility PT LTG, Date Established 01/04/17 --    Bed Mobility PT LTG, Time to Achieve 5 days --    Bed Mobility PT LTG, Activity Type all bed mobility --    Bed Mobility PT LTG, [place] Level independent --    Bed Mobility PT LTG, Date Goal Reviewed --  01/06/17   Bed Mobility PT LTG, Outcome --  goal met       Goal: Transfer Training Goal 1 LTG- PT  Outcome: Ongoing (interventions implemented as appropriate)    01/04/17 1406 01/06/17 1510   Transfer Training PT LTG   Transfer Training PT LTG, Date Established 01/04/17 --    Transfer Training PT LTG, Time to Achieve 5 days --    Transfer Training PT LTG, Activity Type all transfers --    Transfer Training PT LTG, [place] Level independent --    Transfer Training PT LTG, Date Goal Reviewed --  01/06/17   Transfer Training PT LTG, Outcome --  goal met       Goal: Gait Training Goal LTG- PT  Outcome: Outcome(s) achieved Date Met:  01/06/17 01/04/17 1406 01/06/17 1510   Gait Training PT LTG   Gait Training Goal PT LTG, Date Established 01/04/17 --    Gait Training Goal PT LTG, Time to Achieve 5 days --    Gait Training Goal PT LTG, [place] Level independent --    Gait Training Goal PT LTG,  Distance to Achieve 250 --    Gait Training Goal PT LTG, Date Goal Reviewed --  01/06/17   Gait Training Goal PT LTG, Outcome --  goal met       Goal: Stair Training Goal LTG- PT  Outcome: Outcome(s) achieved Date Met:  01/06/17 01/04/17 1406 01/06/17 1510   Stair Training PT LTG   Stair Training Goal PT LTG, Date Established 01/04/17 --    Stair Training Goal PT LTG, Time to Achieve 5 days --    Stair Training Goal PT LTG, Number of Steps 5  (in order to enter/exit patient's home) --    Stair Training Goal PT LTG, [place] Level independent --    Stair Training Goal PT LTG, Assist Device 2 handrails --    Stair Training Goal PT LTG, Outcome --  other (see comments)  (Pt appears to be at baseline function; not formally assessed)

## 2017-01-06 NOTE — PLAN OF CARE
Problem: Patient Care Overview (Adult)  Goal: Plan of Care Review  Outcome: Ongoing (interventions implemented as appropriate)    01/06/17 1624   Outcome Evaluation   Outcome Summary/Follow up Plan no nausea, pt eating small amounts, vs wnl. no current needs.         Problem: Skin Integrity Impairment, Risk/Actual (Adult)  Goal: Skin Integrity/Wound Healing  Outcome: Ongoing (interventions implemented as appropriate)    Problem: Anxiety (Adult)  Goal: Reduction/Resolution  Outcome: Ongoing (interventions implemented as appropriate)    01/06/17 1624   Anxiety (Adult)   Reduction/Resolution making progress toward outcome

## 2017-01-06 NOTE — PROGRESS NOTES
Gastroenterology Note     LOS: 1 day   Patient Care Team:  Velia Vogel MD as PCP - General  Velia Vogel MD as PCP - Family Medicine  Dave Swenson MD, FAAN as Consulting Physician (Sleep Medicine)      Subjective     Patient Complaints: None, feels that she is eating better.   Patient Denies:  Abdominal pain.         Objective   NAD, Ate 50 % breakfast, 100 percent of lunch    Vital Signs  Temp:  [97.5 °F (36.4 °C)-98.6 °F (37 °C)] 97.5 °F (36.4 °C)  Heart Rate:  [58-66] 58  Resp:  [16-18] 18  BP: (104-123)/(61-73) 104/61    Physical Exam:   General Appearance: Alert, cooperative, in no acute distress  Head: Normocephalic, without obvious abnormality, atraumatic  Eyes: sclerae normal, no icterus,  corneas clear, PERRLA  Lungs: Clear to auscultation,respirations regular, even and unlabored Heart: Regular rhythm and normal rate, normal   Abdomen:  no masses, no organomegaly, soft non-tender, non-distended, no guarding, no rebound tenderness  Extremities:  Moves all extremities well, no edema, no cyanosis, no redness  Neurologic: Cranial nerves 2 - 12 grossly intact, no focal deficits       Results Review:       Results for orders placed or performed during the hospital encounter of 01/03/17   Comprehensive Metabolic Panel   Result Value Ref Range    Glucose 148 (H) 70 - 100 mg/dL    BUN 22 9 - 23 mg/dL    Creatinine 0.70 0.60 - 1.30 mg/dL    Sodium 134 132 - 146 mmol/L    Potassium 3.9 3.5 - 5.5 mmol/L    Chloride 96 (L) 99 - 109 mmol/L    CO2 32.0 (H) 20.0 - 31.0 mmol/L    Calcium 10.0 8.7 - 10.4 mg/dL    Total Protein 7.6 5.7 - 8.2 g/dL    Albumin 3.80 3.20 - 4.80 g/dL    ALT (SGPT) 19 7 - 40 U/L    AST (SGOT) 24 0 - 33 U/L    Alkaline Phosphatase 76 25 - 100 U/L    Total Bilirubin 1.0 0.3 - 1.2 mg/dL    eGFR Non African Amer 81 >60 mL/min/1.73    Globulin 3.8 gm/dL    A/G Ratio 1.0 (L) 1.5 - 2.5 g/dL    BUN/Creatinine Ratio 31.4 (H) 7.0 - 25.0    Anion Gap 6.0 3.0 - 11.0 mmol/L   Magnesium   Result Value Ref  Range    Magnesium 1.8 1.3 - 2.7 mg/dL   Urinalysis With / Culture If Indicated   Result Value Ref Range    Color, UA Yellow Yellow, Straw    Appearance, UA Cloudy (A) Clear    pH, UA 6.5 5.0 - 8.0    Specific Gravity, UA 1.022 1.001 - 1.030    Glucose, UA Negative Negative    Ketones, UA Negative Negative    Bilirubin, UA Small (1+) (A) Negative    Blood, UA Negative Negative    Protein,  mg/dL (2+) (A) Negative    Leuk Esterase, UA Negative Negative    Nitrite, UA Negative Negative    Urobilinogen, UA 1.0 E.U./dL 0.2 - 1.0 E.U./dL   CBC Auto Differential   Result Value Ref Range    WBC 7.47 3.50 - 10.80 10*3/mm3    RBC 4.35 3.89 - 5.14 10*6/mm3    Hemoglobin 15.1 11.5 - 15.5 g/dL    Hematocrit 42.9 34.5 - 44.0 %    MCV 98.6 80.0 - 99.0 fL    MCH 34.7 (H) 27.0 - 31.0 pg    MCHC 35.2 32.0 - 36.0 g/dL    RDW 13.3 11.3 - 14.5 %    RDW-SD 47.9 37.0 - 54.0 fl    MPV 10.9 6.0 - 12.0 fL    Platelets 267 150 - 450 10*3/mm3    Neutrophil % 88.4 (H) 41.0 - 71.0 %    Lymphocyte % 10.3 (L) 24.0 - 44.0 %    Monocyte % 1.2 0.0 - 12.0 %    Eosinophil % 0.0 0.0 - 3.0 %    Basophil % 0.0 0.0 - 1.0 %    Immature Grans % 0.1 0.0 - 0.6 %    Neutrophils, Absolute 6.60 1.50 - 8.30 10*3/mm3    Lymphocytes, Absolute 0.77 0.60 - 4.80 10*3/mm3    Monocytes, Absolute 0.09 0.00 - 1.00 10*3/mm3    Eosinophils, Absolute 0.00 (L) 0.10 - 0.30 10*3/mm3    Basophils, Absolute 0.00 0.00 - 0.20 10*3/mm3    Immature Grans, Absolute 0.01 0.00 - 0.03 10*3/mm3   Urinalysis, Microscopic Only   Result Value Ref Range    RBC, UA 0-2 None Seen, 0-2 /HPF    WBC, UA 0-2 (A) None Seen /HPF    Bacteria, UA None Seen None Seen, Trace /HPF    Squamous Epithelial Cells, UA 0-2 None Seen, 0-2 /HPF    Hyaline Casts, UA 0-6 0 - 6 /LPF    Methodology Automated Microscopy    Calcium, Ionized   Result Value Ref Range    Ionized Calcium 1.18 1.12 - 1.32 mmol/L   Comprehensive Metabolic Panel   Result Value Ref Range    Glucose 111 (H) 70 - 100 mg/dL    BUN 23 9 - 23  mg/dL    Creatinine 0.60 0.60 - 1.30 mg/dL    Sodium 133 132 - 146 mmol/L    Potassium 3.8 3.5 - 5.5 mmol/L    Chloride 98 (L) 99 - 109 mmol/L    CO2 26.0 20.0 - 31.0 mmol/L    Calcium 8.9 8.7 - 10.4 mg/dL    Total Protein 6.6 5.7 - 8.2 g/dL    Albumin 3.20 3.20 - 4.80 g/dL    ALT (SGPT) 15 7 - 40 U/L    AST (SGOT) 18 0 - 33 U/L    Alkaline Phosphatase 57 25 - 100 U/L    Total Bilirubin 1.3 (H) 0.3 - 1.2 mg/dL    eGFR Non African Amer 97 >60 mL/min/1.73    Globulin 3.4 gm/dL    A/G Ratio 0.9 (L) 1.5 - 2.5 g/dL    BUN/Creatinine Ratio 38.3 (H) 7.0 - 25.0    Anion Gap 9.0 3.0 - 11.0 mmol/L   Hemoglobin A1c   Result Value Ref Range    Hemoglobin A1C 5.60 4.80 - 5.60 %   CBC Auto Differential   Result Value Ref Range    WBC 8.03 3.50 - 10.80 10*3/mm3    RBC 3.85 (L) 3.89 - 5.14 10*6/mm3    Hemoglobin 13.5 11.5 - 15.5 g/dL    Hematocrit 37.9 34.5 - 44.0 %    MCV 98.4 80.0 - 99.0 fL    MCH 35.1 (H) 27.0 - 31.0 pg    MCHC 35.6 32.0 - 36.0 g/dL    RDW 13.5 11.3 - 14.5 %    RDW-SD 48.6 37.0 - 54.0 fl    MPV 11.4 6.0 - 12.0 fL    Platelets 245 150 - 450 10*3/mm3    Neutrophil % 67.5 41.0 - 71.0 %    Lymphocyte % 22.7 (L) 24.0 - 44.0 %    Monocyte % 9.6 0.0 - 12.0 %    Eosinophil % 0.0 0.0 - 3.0 %    Basophil % 0.0 0.0 - 1.0 %    Immature Grans % 0.2 0.0 - 0.6 %    Neutrophils, Absolute 5.42 1.50 - 8.30 10*3/mm3    Lymphocytes, Absolute 1.82 0.60 - 4.80 10*3/mm3    Monocytes, Absolute 0.77 0.00 - 1.00 10*3/mm3    Eosinophils, Absolute 0.00 (L) 0.10 - 0.30 10*3/mm3    Basophils, Absolute 0.00 0.00 - 0.20 10*3/mm3    Immature Grans, Absolute 0.02 0.00 - 0.03 10*3/mm3   POC Troponin, Rapid   Result Value Ref Range    Troponin I 0.02 0.00 - 0.07 ng/mL   POC Glucose Fingerstick   Result Value Ref Range    Glucose 149 (H) 70 - 130 mg/dL   POC Troponin, Rapid   Result Value Ref Range    Troponin I 0.02 0.00 - 0.07 ng/mL   POC Glucose Fingerstick   Result Value Ref Range    Glucose 97 70 - 130 mg/dL   POC Glucose Fingerstick   Result  Value Ref Range    Glucose 100 70 - 130 mg/dL   POC Glucose Fingerstick   Result Value Ref Range    Glucose 128 70 - 130 mg/dL   POC Glucose Fingerstick   Result Value Ref Range    Glucose 169 (H) 70 - 130 mg/dL   POC Glucose Fingerstick   Result Value Ref Range    Glucose 90 70 - 130 mg/dL   POC Glucose Fingerstick   Result Value Ref Range    Glucose 119 70 - 130 mg/dL   POC Glucose Fingerstick   Result Value Ref Range    Glucose 152 (H) 70 - 130 mg/dL   POC Glucose Fingerstick   Result Value Ref Range    Glucose 151 (H) 70 - 130 mg/dL   POC Glucose Fingerstick   Result Value Ref Range    Glucose 78 70 - 130 mg/dL   POC Glucose Fingerstick   Result Value Ref Range    Glucose 127 70 - 130 mg/dL   Light Blue Top   Result Value Ref Range    Extra Tube hold for add-on    Green Top (Gel)   Result Value Ref Range    Extra Tube Hold for add-ons.    Lavender Top   Result Value Ref Range    Extra Tube hold for add-on    Gold Top - SST   Result Value Ref Range    Extra Tube Hold for add-ons.            Assessment/Plan    Poor oral intake, I have discussed the situation with Dr. Pantoja.  We are going to closely look at calorie counts this weekend.  If she continues to have inadequate intake, it would likely be best that she have a jejunal feeding tube of some sort.  Occasionally, these can be placed endoscopically.  Often times surgical placement is necessary.  A G-tube with jejunal extension often is more difficult to work with as the jejunal tube often falls back into the stomach.  She had 50% of breakfast and about 100% of lunch, so I am hoping that with adequate coaxing she can maintain a nutritional input.          Alvarado Jefferson MD  01/06/17  5:07 PM

## 2017-01-06 NOTE — PLAN OF CARE
"Problem: Patient Care Overview (Adult)  Goal: Plan of Care Review  Outcome: Ongoing (interventions implemented as appropriate)    01/06/17 0112   Outcome Evaluation   Outcome Summary/Follow up Plan Patient c/o anxiety r/t \"not knowing what is truly wrong\" with her. Xanax given x 1. Hopefully improved PO intake and ambulation.        Goal: Adult Individualization and Mutuality  Outcome: Ongoing (interventions implemented as appropriate)  Goal: Discharge Needs Assessment  Outcome: Ongoing (interventions implemented as appropriate)    Problem: Nutrition, Imbalanced: Inadequate Oral Intake (Adult)  Goal: Identify Related Risk Factors and Signs and Symptoms  Outcome: Ongoing (interventions implemented as appropriate)  Goal: Improved Oral Intake  Outcome: Ongoing (interventions implemented as appropriate)  Goal: Prevent Further Weight Loss  Outcome: Ongoing (interventions implemented as appropriate)    Problem: Skin Integrity Impairment, Risk/Actual (Adult)  Goal: Identify Related Risk Factors and Signs and Symptoms  Outcome: Ongoing (interventions implemented as appropriate)  Goal: Skin Integrity/Wound Healing  Outcome: Ongoing (interventions implemented as appropriate)    Problem: Anxiety (Adult)  Goal: Identify Related Risk Factors and Signs and Symptoms  Outcome: Ongoing (interventions implemented as appropriate)  Goal: Reduction/Resolution  Outcome: Ongoing (interventions implemented as appropriate)      "

## 2017-01-06 NOTE — PROGRESS NOTES
"      HOSPITALIST DAILY PROGRESS NOTE    Chief Complaint: weakness, poor appetite    Subjective   SUBJECTIVE/OVERNIGHT EVENTS   Lying in bed with lights off. Still feels weak. Still poor appetite. No other complaints. No concerns from nursing.    Review of Systems:  Gen-no fevers, no chills  CV-no chest pain, no palpitations  Resp-no cough, no dyspnea  GI-no N/V/D, no abd pain    Objective   OBJECTIVE   I have reviewed the vital signs.  Visit Vitals   • /71   • Pulse 66   • Temp 98.6 °F (37 °C) (Oral)   • Resp 16   • Ht 63\" (160 cm)   • Wt 112 lb (50.8 kg)   • SpO2 98%   • BMI 19.84 kg/m2       Physical Exam:  Gen-no acute distress, frail, weak appearing.  CV-RRR, S1 S2 normal, no m/r/g  Resp-CTAB, no wheezes  Abd-soft, NT, ND, +BS  Ext-no edema  Neuro-A&Ox3, no focal deficits  Psych-appropriate mood    Results:  I have reviewed the labs, culture data, radiology results, and diagnostic studies.      Results from last 7 days  Lab Units 01/04/17  0519 01/03/17  1806   WBC 10*3/mm3 8.03 7.47   HEMOGLOBIN g/dL 13.5 15.1   HEMATOCRIT % 37.9 42.9   PLATELETS 10*3/mm3 245 267       Results from last 7 days  Lab Units 01/04/17  0519   SODIUM mmol/L 133   POTASSIUM mmol/L 3.8   CHLORIDE mmol/L 98*   TOTAL CO2 mmol/L 26.0   BUN mg/dL 23   CREATININE mg/dL 0.60   GLUCOSE mg/dL 111*   CALCIUM mg/dL 8.9       Radiology Results: Personally reviewed most recent CXR with no acute chest disease. Agree with interpretation.    I have reviewed the medications.      Assessment/Plan   ASSESSMENT/PLAN    Principal Problem:    Failure to thrive in adult  Active Problems:    Atrial fibrillation and flutter    Monoclonal gammopathy of undetermined significance    Smoldering multiple myeloma (SMM)    Dehydration    Hyperglycemia    Plan:  --Needs improved PO intake. Dr. Pantoja d/w the patient at length. They have agreed to perform calorie count over the weekend and if adequate intake will plan to continue oral intake and d/c home. If " poor PO intake will need surgical consultation for J-tube placement - no PEG per Dr. Pantoja.  --Needs to ambulate.    Damaris Zabala II, DO  01/06/17  11:48 AM

## 2017-01-06 NOTE — PROGRESS NOTES
Continued Stay Note   Jessica     Patient Name: Sowmya Beckett  MRN: [id number]  Today's Date: 1/6/2017    Admit Date: 1/3/2017          Discharge Plan       01/06/17 0811    Case Management/Social Work Plan    Plan  continuing to follow for any d/c needs.    Patient/Family In Agreement With Plan yes              Discharge Codes     None            RITA Dawson

## 2017-01-06 NOTE — PLAN OF CARE
Problem: Patient Care Overview (Adult)  Goal: Plan of Care Review  Outcome: Ongoing (interventions implemented as appropriate)    01/06/17 1513   Outcome Evaluation   Outcome Summary/Follow up Plan Treatment note to serve as discharge summary. Pt appears to be at baseline function for functional mobility, gait and balance. She has achieved all goals for skilled PT and appears safe to continue mobility with nsg. Pt demonstrated no LOB or concerns with ambulation today.         Problem: Inpatient Physical Therapy  Goal: Bed Mobility Goal LTG- PT  Outcome: Outcome(s) achieved Date Met:  01/06/17 01/04/17 1406 01/06/17 1510   Bed Mobility PT LTG   Bed Mobility PT LTG, Date Established 01/04/17 --    Bed Mobility PT LTG, Time to Achieve 5 days --    Bed Mobility PT LTG, Activity Type all bed mobility --    Bed Mobility PT LTG, [place] Level independent --    Bed Mobility PT LTG, Date Goal Reviewed --  01/06/17   Bed Mobility PT LTG, Outcome --  goal met       Goal: Transfer Training Goal 1 LTG- PT  Outcome: Outcome(s) achieved Date Met:  01/06/17 01/04/17 1406 01/06/17 1510   Transfer Training PT LTG   Transfer Training PT LTG, Date Established 01/04/17 --    Transfer Training PT LTG, Time to Achieve 5 days --    Transfer Training PT LTG, Activity Type all transfers --    Transfer Training PT LTG, [place] Level independent --    Transfer Training PT LTG, Date Goal Reviewed --  01/06/17   Transfer Training PT LTG, Outcome --  goal met       Goal: Gait Training Goal LTG- PT  Outcome: Outcome(s) achieved Date Met:  01/06/17 01/04/17 1406 01/06/17 1510   Gait Training PT LTG   Gait Training Goal PT LTG, Date Established 01/04/17 --    Gait Training Goal PT LTG, Time to Achieve 5 days --    Gait Training Goal PT LTG, [place] Level independent --    Gait Training Goal PT LTG, Distance to Achieve 250 --    Gait Training Goal PT LTG, Date Goal Reviewed --  01/06/17   Gait Training Goal PT LTG, Outcome --   goal met       Goal: Stair Training Goal LTG- PT  Outcome: Outcome(s) achieved Date Met:  01/06/17 01/04/17 1406 01/06/17 1510   Stair Training PT LTG   Stair Training Goal PT LTG, Date Established 01/04/17 --    Stair Training Goal PT LTG, Time to Achieve 5 days --    Stair Training Goal PT LTG, Number of Steps 5  (in order to enter/exit patient's home) --    Stair Training Goal PT LTG, [place] Level independent --    Stair Training Goal PT LTG, Assist Device 2 handrails --    Stair Training Goal PT LTG, Outcome --  other (see comments)  (Pt appears to be at baseline function; not formally assessed)

## 2017-01-06 NOTE — PROGRESS NOTES
HEMATOLOGY/ONCOLOGY PROGRESS NOTE     CC: Aversion to eating    SUBJECTIVE: Has not been doing a calorie count.  Nonetheless the patient states that she's been eating better since in the hospital.  She says she is so fatigued at home but it's hard for her to sit up and eat.  Her blood counts are normal, thyroid function is been normal.  She does have smoldering myeloma with 10-15% plasma cells but no lytic bone disease, anemia, renal insufficiency, or hypercalcemia.  Her monoclonal gammopathy is actually lower this month it was 2 months ago.  I doubt therefore smoldering myeloma has anything to do with her sluggish bowel function.  When Dr. Jefferson saw her she did not want to do the feeding tubes.  She says that she is not nauseated and has been able to keep food down when she eats.  She is passing gas and moving her bowels.  She feels better when she is in the hospital when she goes home she quits eating.  After her last admission she went home on 20 mg a day of dexamethasone under the premise that her monoclonal gammopathy might be causing some autonomic neuropathy... Thereby treatment might improve her bowel function and hopefully make her hungry.  It's too early to know after the single round of that whether her monoclonal gammopathy changed but she certainly did not eat better as she ended up back in the hospital on her fifth day of dexamethasone.        Past Medical History, Past Surgical History, Social History, Family History have been reviewed and are without significant changes except as mentioned.      Medications:  The current medication list was reviewed in the EMR    ALLERGIES:   Allergies   Allergen Reactions   • Ambien [Zolpidem Tartrate]    • Morphine And Related        ROS:  A comprehensive 14 point review of systems was performed and was negative except as mentioned.      Objective      Vitals:    01/05/17 0500 01/05/17 1300 01/05/17 2150 01/06/17 0519   BP: 137/86 113/74 123/73 114/71   BP  Location: Right arm Right arm     Patient Position: Lying Lying     Pulse: 67 59 64 66   Resp: 16 16 16 16   Temp: 98.6 °F (37 °C) 97.7 °F (36.5 °C) 98 °F (36.7 °C) 98.6 °F (37 °C)   TempSrc: Oral Oral  Oral   SpO2: 100% 99% 95% 98%   Weight:       Height:            ECOG: 3    General: well appearing, in no acute distress  HEENT: sclera anicteric, oropharynx clear  Lymphatics: no cervical, supraclavicular, inguinal, or axillary adenopathy  Cardiovascular: regular rate and rhythm, no murmurs  Lungs: clear to auscultation bilaterally  Abdomen: soft, nontender, nondistended.  No palpable organomegaly  ExtremIties: no lower extremity edema  Skin: no rashes, lesions, bruising, or petechiae  Neuro: Alert and oriented x 3. Moves all extremities.    RECENT LABS:      [unfilled]  Imaging Results (last 72 hours)     Procedure Component Value Units Date/Time    XR Chest 1 View [[id number]] Collected:  01/04/17 0853     Updated:  01/04/17 1725    Narrative:       EXAMINATION: XR CHEST 1 VW- 01/03/2017     INDICATION: Weak/Dizzy/AMS triage protocol      COMPARISON: 09/28/2016     FINDINGS: The heart size is normal. The aortic arch is partially  calcified. The lungs are free of opacities. The osseous structures of  the chest are normal.           Impression:       1. No acute chest pathology.  2. Mild bibasilar atelectasis.     Fax to: [facility]     D:  01/04/2017  E:  01/04/2017     This report was finalized on 1/4/2017 5:23 PM by Dr. Jaquan Caraballo MD.             ASSESSMENT & PLAN:  1.  Anorexia  2. aversion to eating  3. Probable smoldering myeloma    Discussion: We have a PET scan scheduled for January 11 and a follow-up with me on January 12.  Even if this shows a lytic bone disease for which I would normally start her on myeloma therapy, I do not think her starvation is due to myeloma.  There is been no evidence of infiltration of her bowel with plasma cells on her prior foregut bowel biopsies.  Her blood counts and chemistries are  essentially normal and I see no reason for overwhelming fatigue other than starvation.  We had a long discussion today and I also discussed with her sister by phone.  Though she has sluggish bowel function, she did not like Reglan during her last admission and anything else we try to stimulate her appetite has either been ineffective or she just does not have the will to eat.  She states that she wants to want to eat but just can't make herself.  After our discussion today she thinks that she can improve her intake and says that she ate most of her food yesterday.  We will prove that by a calorie count over the weekend.  If over the weekend she nutrifies herself adequately then she just needs to continue that at home.  If she does not nutrify herself well over the weekend then I would ask for surgical consultation for jejunal feeding tube placement.  I would not want her to have a PEG as she would have an increased likelihood of emesis with that.  She's had multiple prior abdominal surgeries and that does put her at risk for the jejunal feeding tube and I explained to her my strong desire that she does push herself to eat and she says she is going to do her best.  I also discussed with her sister and they're going to try to encourage her through the weekend.  She is too frail to consider much in the way of systemic therapy for myeloma and I am doubtful that the myeloma itself has anything to do with her anorexia.  Spoken with her nurse and she will put in the order to get the calorie count started.  I will check back Monday.        Errors in dictation may reflect use of voice recognition software and not all errors in transcription may have been detected prior to signing.        Visit time was 40 minutes, 35minutes spent in counseling    Messi Pantoja MD    1/6/2017

## 2017-01-06 NOTE — PROGRESS NOTES
Adult Nutrition  Assessment/PES    Patient Name:  Sowmya Beckett  YOB: 1940  MRN: [id number]  Admit Date:  1/3/2017    Assessment Date:  1/6/2017        Reason for Assessment       01/06/17 1045    Reason for Assessment    Reason For Assessment/Visit calorie count order;follow up protocol    Time Spent (min) 45    Diagnosis --   per notes this admission                  Labs/Tests/Procedures/Meds       01/06/17 1048    Labs/Tests/Procedures/Meds    Labs/Tests Review Reviewed              Estimated/Assessed Needs       01/06/17 1046    Estimated/Assessed Energy Needs    Energy Need Method [place]    Estimated REE  ([place]) 1077    Activity Factors 1.3    Repletion Factors +250 - 500    Estimated Kcal Range  8173-5740 kcals    Estimated/Assessed Protein Needs    Weight Used for Protein Calculation 50.8 kg (112 lb)    Protein (gm/kg) 1.5    1.5 Gm Protein (gm) 76.2            Nutrition Prescription Ordered       01/06/17 1046    Nutrition Prescription PO    Current PO Diet Regular    Supplement Boost Plus    Supplement Frequency 3 times a day    Common Modifiers Cardiac            Evaluation of Received Nutrient/Fluid Intake       01/06/17 1047    PO Evaluation    % PO Intake --   Insufficient p.o. data              Problem/Interventions:        Problem 1       01/06/17 1047    Nutrition Diagnoses Problem 1    Problem 1 Inadequate Intake/Infusion    Inadequate Intake Type Oral    Etiology (related to) --   clinical condition    Signs/Symptoms (evidenced by) Unintended Weight Change                    Intervention Goal       01/06/17 1047    Intervention Goal    General Nutrition support treatment    PO Establish PO            Nutrition Intervention       01/06/17 1047    Nutrition Intervention    RD/Tech Action Advise alternate selection;Interview for preference;Menu provided;Menu adjusted;Follow Tx progress;Supplement provided   Calorie count in progress tomorrow through 01/09/2017               Education/Evaluation       01/06/17 1048    Monitor/Evaluation    Monitor Per protocol            Electronically signed by:  Giselle Lala RD  01/06/17 10:50 AM

## 2017-01-06 NOTE — THERAPY DISCHARGE NOTE
Acute Care - Physical Therapy Treatment Note/Discharge  [facility]     Patient Name: Sowmya Beckett  : 1940  MRN: [id number]  Today's Date: 2017  Onset of Illness/Injury or Date of Surgery Date: 17  Date of Referral to PT: 17  Referring Physician: Keira Stubbs    Admit Date: 1/3/2017    Visit Dx:    ICD-10-CM ICD-9-CM   1. Failure to thrive in adult R62.7 783.7   2. Monoclonal gammopathy of undetermined significance D47.2 273.1   3. Smoldering multiple myeloma (SMM) C90.00 203.00   4. Atrial fibrillation and flutter I48.91 427.31    I48.92 427.32   5. Impaired functional mobility, balance, gait, and endurance Z74.09 V49.89   6. Impaired mobility and ADLs Z74.09 799.89     Patient Active Problem List   Diagnosis   • Atrial fibrillation and flutter   • History of melanoma   • Monoclonal gammopathy of undetermined significance   • Failure to thrive in adult   • Smoldering multiple myeloma (SMM)   • Dehydration   • Hyperglycemia       Physical Therapy Education     Title: PT OT SLP Therapies (Done)     Topic: Physical Therapy (Resolved)     Point: Mobility training (Resolved)    Learning Progress Summary    Learner Readiness Method Response Comment Documented by Status   Patient Acceptance CHRIS TORRES DR 17 1510 Done    Acceptance E,D MELISSA VALDIVIA 17 1206 Done    Acceptance E,D VU,NR home safety, benefits of therapy, fall precautions MB 17 1405 Done               Point: Home exercise program (Resolved)    Learning Progress Summary    Learner Readiness Method Response Comment Documented by Status   Patient Acceptance CHRIS TORRES DR 17 1510 Done    Acceptance E,D MELISSA VALDIVIA 17 1206 Done    Acceptance E,D VU,NR home safety, benefits of therapy, fall precautions MB 17 1405 Done               Point: Body mechanics (Resolved)    Learning Progress Summary    Learner Readiness Method Response Comment Documented by Status   Patient Acceptance CHRIS TORRES DR 17 1510 Done     Acceptance E,D VU  LS 01/05/17 1206 Done    Acceptance E,D VU,NR home safety, benefits of therapy, fall precautions MB 01/04/17 1405 Done               Point: Precautions (Resolved)    Learning Progress Summary    Learner Readiness Method Response Comment Documented by Status   Patient Acceptance E VU   01/06/17 1510 Done    Acceptance E,D VU  LS 01/05/17 1206 Done    Acceptance E,D VU,NR home safety, benefits of therapy, fall precautions MB 01/04/17 1405 Done                      User Key     Initials Effective Dates Name Provider Type Discipline     06/19/15 -  Keira Granda, PT Physical Therapist PT    MB 03/14/16 -  Naomy Choudhary, PT Physical Therapist PT    DR 09/06/16 -  Dave Espino, PT Physical Therapist PT                    IP PT Goals       01/06/17 1510 01/05/17 1206 01/04/17 1406    Bed Mobility PT LTG    Bed Mobility PT LTG, Date Established   01/04/17  -MB    Bed Mobility PT LTG, Time to Achieve   5 days  -MB    Bed Mobility PT LTG, Activity Type   all bed mobility  -MB    Bed Mobility PT LTG, [place] Level   independent  -MB    Bed Mobility PT LTG, Date Goal Reviewed 01/06/17  -DR      Bed Mobility PT LTG, Outcome goal met  -DR goal ongoing  -LS goal ongoing  -MB    Transfer Training PT LTG    Transfer Training PT LTG, Date Established   01/04/17  -MB    Transfer Training PT LTG, Time to Achieve   5 days  -MB    Transfer Training PT LTG, Activity Type   all transfers  -MB    Transfer Training PT LTG, [place] Level   independent  -MB    Transfer Training PT  LTG, Date Goal Reviewed 01/06/17  -DR      Transfer Training PT LTG, Outcome goal met  -DR goal ongoing  -LS goal ongoing  -MB    Gait Training PT LTG    Gait Training Goal PT LTG, Date Established   01/04/17  -MB    Gait Training Goal PT LTG, Time to Achieve   5 days  -MB    Gait Training Goal PT LTG, [place] Level   independent  -MB    Gait Training Goal PT LTG, Distance to Achieve   250  -MB    Gait Training Goal PT  LTG, Date Goal Reviewed 01/06/17  -DR 01/05/17  -LS     Gait Training Goal PT LTG, Outcome goal met  -DR goal partially met   achieved for distance with supervision today  -LS goal ongoing  -MB    Stair Training PT LTG    Stair Training Goal PT LTG, Date Established   01/04/17  -MB    Stair Training Goal PT LTG, Time to Achieve   5 days  -MB    Stair Training Goal PT LTG, Number of Steps   5   in order to enter/exit patient's home  -MB    Stair Training Goal PT LTG, [place] Level   independent  -MB    Stair Training Goal PT LTG, Assist Device   2 handrails  -MB    Stair Training Goal PT LTG, Outcome other (see comments)   Pt appears to be at baseline function; not formally assessed  -DR goal ongoing  -LS goal ongoing  -MB      User Key  (r) = Recorded By, (t) = Taken By, (c) = Cosigned By    Initials Name Provider Type    LS Keira Granda, PT Physical Therapist    RAMAN Choudhary, PT Physical Therapist    DR Dave Espino, PT Physical Therapist              Adult Rehabilitation Note       01/06/17 1455 01/05/17 1128       Rehab Assessment/Intervention    Discipline physical therapist  -DR physical therapist  -LS     Document Type therapy note (daily note);discharge summary  -DR therapy note (daily note)  -LS     Subjective Information agree to therapy;no complaints  - agree to therapy;complains of;fatigue  -LS     Patient Effort, Rehab Treatment good  -DR good  -LS     Precautions/Limitations no known precautions/limitations  - no known precautions/limitations  -LS     Recorded by [DR] Dave Espino, PT [LS] Keira Granda, PT     Vital Signs    Pre Systolic BP Rehab --   no telemetry connected; pt aysmptomatic  -DR      Pre Patient Position Supine  -DR      Intra Patient Position Standing  -DR      Post Patient Position Supine  -DR      Recorded by [] Dave Espino, PT      Pain Assessment    Pain Assessment 0-10  -DR 0-10  -LS     Pain Score 0  -DR 0  -LS     Post Pain Score 0  -DR 0  -LS      Recorded by [DR] Dave Espino, PT [LS] Keira Granda, PT     Cognitive Assessment/Intervention    Current Cognitive/Communication Assessment functional  -DR functional  -LS     Orientation Status oriented x 4  -DR oriented x 4  -LS     Follows Commands/Answers Questions able to follow single-step instructions;100% of the time  -DR able to follow single-step instructions;100% of the time  -LS     Personal Safety WNL/WFL  -DR good awareness, safety precautions  -LS     Personal Safety Interventions fall prevention program maintained;gait belt;nonskid shoes/slippers when out of bed  -DR fall prevention program maintained;gait belt;nonskid shoes/slippers when out of bed  -LS     Recorded by [DR] Dave Espino, PT [LS] Keira Granda, PT     Bed Mobility, Assessment/Treatment    Bed Mobility, Assistive Device head of bed elevated;bed rails  -DR head of bed elevated;bed rails  -LS     Bed Mob, Supine to Sit, [place] independent  -DR conditional independence  -LS     Bed Mob, Sit to Supine, [place] independent  -DR conditional independence  -LS     Recorded by [DR] Dave Espino, PT [LS] Keira Granda, PT     Transfer Assessment/Treatment    Transfers, Sit-Stand [place] independent  -DR supervision required;verbal cues required  -LS     Transfers, Stand-Sit [place] independent  -DR supervision required;verbal cues required  -LS     Transfer, Comment No safety deficits observed  -DR      Recorded by [DR] Dave Espino, PT [LS] Keira Granda, PT     Gait Assessment/Treatment    Gait, [place] Level independent  - supervision required;verbal cues required  -LS     Gait, Distance (Feet) 400  -  -LS     Gait, Gait Pattern Analysis swing-through gait  -DR      Gait, Gait Deviations  vince decreased;forward flexed posture;step length decreased  -LS     Gait, Comment No gait deviations observed during ambulation today.  -      Recorded by [] Dave Espino, PT [LS] Keira Granda, PT      Motor Skills/Interventions    Additional Documentation Balance Skills Training (Group)  -      Recorded by [DR] Dave Espino, PT      Balance Skills Training    Sitting-Level of Assistance Independent  -      Sitting-Balance Support Feet supported  -      Sitting-Balance Activities Trunk control activities  -      Standing-Level of Assistance Independent  -      Static Standing Balance Support No upper extremity supported  -      Standing-Balance Activities Weight Shift A-P;Weight Shift R-L  -DR      Recorded by [DR] Dave Espino, PT      Therapy Exercises    Bilateral Lower Extremities  AROM:;10 reps;ankle pumps/circles;LAQ;hip abduction/adduction;hip flexion  -LS     Recorded by  [LS] Keira Granda PT     Positioning and Restraints    Pre-Treatment Position in bed  -DR in bed  -LS     Post Treatment Position bed  -DR chair  -LS     In Bed notified nsg;supine;call light within reach;encouraged to call for assist  -DR      In Chair  notified nsg;reclined;call light within reach;encouraged to call for assist  -LS     Recorded by [DR] Dave Espino, PT [LS] Keira Granda PT       User Key  (r) = Recorded By, (t) = Taken By, (c) = Cosigned By    Initials Name Effective Dates    LS Keira Granda, PT 06/19/15 -     DR Dave Espino, PT 09/06/16 -           PT Recommendation and Plan  Anticipated Discharge Disposition: home with home health  Planned Therapy Interventions: balance training, gait training, bed mobility training, home exercise program, patient/family education, stair training, strengthening, transfer training  PT Frequency: daily  Plan of Care Review  Plan Of Care Reviewed With: patient  Progress: improving  Outcome Summary/Follow up Plan: Treatment note to serve as discharge summary. Pt appears to be at baseline function for functional mobility, gait and balance. She has achieved all goals for skilled PT and appears safe to continue mobility with nsg. Pt demonstrated no LOB or concerns  with ambulation today.          Outcome Measures       01/06/17 1455 01/05/17 1128 01/05/17 1127    How much help from another person do you currently need...    Turning from your back to your side while in flat bed without using bedrails? 4  -DR 4  -LS     Moving from lying on back to sitting on the side of a flat bed without bedrails? 4  -DR 4  -LS     Moving to and from a bed to a chair (including a wheelchair)? 4  -DR 3  -LS     Standing up from a chair using your arms (e.g., wheelchair, bedside chair)? 4  -DR 3  -LS     Climbing 3-5 steps with a railing? 4  -DR 3  -LS     To walk in hospital room? 4  -DR 3  -LS     AM-PAC 6 Clicks Score 24  -DR 20  -LS     How much help from another is currently needed...    Putting on and taking off regular lower body clothing?   4  -MARGIE    Bathing (including washing, rinsing, and drying)   4  -MARGIE    Toileting (which includes using toilet bed pan or urinal)   4  -MARGIE    Putting on and taking off regular upper body clothing   4  -MARGIE    Taking care of personal grooming (such as brushing teeth)   4  -MARGIE    Eating meals   4  -MARGIE    Score   24  -MARGIE    Functional Assessment    Outcome Measure Options AM-PAC 6 Clicks Basic Mobility (PT)  -DR AM-PAC 6 Clicks Basic Mobility (PT)  -LS AM-PAC 6 Clicks Daily Activity (OT)  -MARGIE      01/04/17 1320          How much help from another person do you currently need...    Turning from your back to your side while in flat bed without using bedrails? 4  -MB      Moving from lying on back to sitting on the side of a flat bed without bedrails? 4  -MB      Moving to and from a bed to a chair (including a wheelchair)? 3  -MB      Standing up from a chair using your arms (e.g., wheelchair, bedside chair)? 3  -MB      Climbing 3-5 steps with a railing? 3  -MB      To walk in hospital room? 3  -MB      AM-PAC 6 Clicks Score 20  -MB      Functional Assessment    Outcome Measure Options AM-PAC 6 Clicks Basic Mobility (PT)  -MB        User Key  (r) = Recorded  By, (t) = Taken By, (c) = Cosigned By    Initials Name Provider Type    MARGIE Mims, OT Occupational Therapist    SHEA Granda, PT Physical Therapist    RAMAN Choudhary, PT Physical Therapist    DR Dave Espino, PT Physical Therapist           Time Calculation:         PT Charges       01/06/17 1516          Time Calculation    Start Time 1455  -DR      PT Received On 01/06/17  -      PT Goal Re-Cert Due Date 01/14/17  -      Time Calculation- PT    Total Timed Code Minutes- PT 23 minute(s)  -        User Key  (r) = Recorded By, (t) = Taken By, (c) = Cosigned By    Initials Name Provider Type    DR Dave Espino, PT Physical Therapist          Therapy Charges for Today     Code Description Service Date Service Provider Modifiers Qty    49230077013 HC PT THERAPEUTIC ACT EA 15 MIN 1/6/2017 Dave Espino, PT GP 2          PT G-Codes  PT Professional Judgement Used?: Yes  Outcome Measure Options: AM-PAC 6 Clicks Basic Mobility (PT)  Functional Limitation: Mobility: Walking and moving around  Mobility: Walking and Moving Around Current Status (): At least 20 percent but less than 40 percent impaired, limited or restricted  Mobility: Walking and Moving Around Goal Status (): At least 1 percent but less than 20 percent impaired, limited or restricted    PT Discharge Summary  Anticipated Discharge Disposition: home with home health  Reason for Discharge: At baseline function  Outcomes Achieved: Able to achieve all goals within established timeline, Other (Pt's ability to ascend and descend stairs not formally assessed, but pt expressed no concerns.)    Dave Espino, PT  1/6/2017

## 2017-01-07 PROCEDURE — 99213 OFFICE O/P EST LOW 20 MIN: CPT | Performed by: INTERNAL MEDICINE

## 2017-01-07 PROCEDURE — 82962 GLUCOSE BLOOD TEST: CPT

## 2017-01-07 PROCEDURE — 96372 THER/PROPH/DIAG INJ SC/IM: CPT

## 2017-01-07 PROCEDURE — 25010000002 ENOXAPARIN PER 10 MG: Performed by: NURSE PRACTITIONER

## 2017-01-07 PROCEDURE — 99225 PR SBSQ OBSERVATION CARE/DAY 25 MINUTES: CPT | Performed by: INTERNAL MEDICINE

## 2017-01-07 PROCEDURE — G0378 HOSPITAL OBSERVATION PER HR: HCPCS

## 2017-01-07 RX ORDER — DIPHENHYDRAMINE HCL 25 MG
25 CAPSULE ORAL NIGHTLY PRN
Status: DISCONTINUED | OUTPATIENT
Start: 2017-01-07 | End: 2017-01-09 | Stop reason: HOSPADM

## 2017-01-07 RX ADMIN — SODIUM CHLORIDE 100 ML/HR: 9 INJECTION, SOLUTION INTRAVENOUS at 06:52

## 2017-01-07 RX ADMIN — DULOXETINE HYDROCHLORIDE 30 MG: 30 CAPSULE, DELAYED RELEASE ORAL at 09:24

## 2017-01-07 RX ADMIN — ENOXAPARIN SODIUM 40 MG: 40 INJECTION SUBCUTANEOUS at 06:15

## 2017-01-07 RX ADMIN — METOCLOPRAMIDE HYDROCHLORIDE 10 MG: 10 TABLET ORAL at 17:04

## 2017-01-07 RX ADMIN — GABAPENTIN 300 MG: 300 CAPSULE ORAL at 06:16

## 2017-01-07 RX ADMIN — METOCLOPRAMIDE HYDROCHLORIDE 10 MG: 10 TABLET ORAL at 21:37

## 2017-01-07 RX ADMIN — METOPROLOL TARTRATE 25 MG: 25 TABLET ORAL at 09:24

## 2017-01-07 RX ADMIN — METOCLOPRAMIDE HYDROCHLORIDE 10 MG: 10 TABLET ORAL at 13:12

## 2017-01-07 RX ADMIN — METOCLOPRAMIDE HYDROCHLORIDE 10 MG: 10 TABLET ORAL at 09:24

## 2017-01-07 RX ADMIN — ASPIRIN 325 MG ORAL TABLET 325 MG: 325 PILL ORAL at 09:24

## 2017-01-07 RX ADMIN — ACETAMINOPHEN 650 MG: 325 TABLET, FILM COATED ORAL at 21:37

## 2017-01-07 RX ADMIN — Medication 5 MG: at 21:37

## 2017-01-07 RX ADMIN — SODIUM CHLORIDE 100 ML/HR: 9 INJECTION, SOLUTION INTRAVENOUS at 17:04

## 2017-01-07 RX ADMIN — GABAPENTIN 300 MG: 300 CAPSULE ORAL at 14:27

## 2017-01-07 RX ADMIN — GABAPENTIN 300 MG: 300 CAPSULE ORAL at 21:37

## 2017-01-07 NOTE — PROGRESS NOTES
"      HOSPITALIST DAILY PROGRESS NOTE    Chief Complaint: weakness    Subjective   SUBJECTIVE/OVERNIGHT EVENTS   Patient sleeping comfortably upon my entering room. Easily awakens. She ate a great deal of her food yesterday. Tells me she has a hard time eating breakfast as she sleeps through it. Thinks she gets ambien at 0200, but I verified with nursing she in fact does not.     Review of Systems:  Gen-no fevers, no chills  CV-no chest pain, no palpitations  Resp-no cough, no dyspnea  GI-no N/V/D, no abd pain    Objective   OBJECTIVE   I have reviewed the vital signs.  Visit Vitals   • /74   • Pulse 68   • Temp 97.9 °F (36.6 °C) (Tympanic)   • Resp 16   • Ht 63\" (160 cm)   • Wt 112 lb (50.8 kg)   • SpO2 99%   • BMI 19.84 kg/m2       Physical Exam:  Gen-no acute distress, looks frail  CV-RRR, S1 S2 normal, no m/r/g  Resp-CTAB, no wheezes  Abd-soft, NT, ND, +BS  Ext-no edema  Neuro-A&Ox3, no focal deficits  Psych-appropriate mood    Results:  I have reviewed the labs, culture data, radiology results, and diagnostic studies.      Results from last 7 days  Lab Units 01/04/17  0519 01/03/17  1806   WBC 10*3/mm3 8.03 7.47   HEMOGLOBIN g/dL 13.5 15.1   HEMATOCRIT % 37.9 42.9   PLATELETS 10*3/mm3 245 267       Results from last 7 days  Lab Units 01/04/17  0519   SODIUM mmol/L 133   POTASSIUM mmol/L 3.8   CHLORIDE mmol/L 98*   TOTAL CO2 mmol/L 26.0   BUN mg/dL 23   CREATININE mg/dL 0.60   GLUCOSE mg/dL 111*   CALCIUM mg/dL 8.9       I have reviewed the medications.      Assessment/Plan   ASSESSMENT/PLAN    Principal Problem:    Failure to thrive in adult  Active Problems:    Atrial fibrillation and flutter    Monoclonal gammopathy of undetermined significance    Smoldering multiple myeloma (SMM)    Dehydration    Hyperglycemia    Plan:  --Calorie count over the weekend. If continues to do well can hopefully d/c home early next week on PO nutrition. Otherwise will need artificial feeding.  --Needs to ambulate. D/W " nursing.    Damaris Zabala, II, DO  01/07/17  11:22 AM

## 2017-01-07 NOTE — PLAN OF CARE
Problem: Patient Care Overview (Adult)  Goal: Plan of Care Review  Outcome: Ongoing (interventions implemented as appropriate)    01/06/17 1510 01/07/17 0528   Outcome Evaluation   Outcome Summary/Follow up Plan --  vs wnl, pt resting quietly, no nausea reported   Patient Care Overview   Progress improving --        Goal: Adult Individualization and Mutuality  Outcome: Ongoing (interventions implemented as appropriate)  Goal: Discharge Needs Assessment  Outcome: Ongoing (interventions implemented as appropriate)    Problem: Nutrition, Imbalanced: Inadequate Oral Intake (Adult)  Goal: Identify Related Risk Factors and Signs and Symptoms  Outcome: Ongoing (interventions implemented as appropriate)  Goal: Improved Oral Intake  Outcome: Ongoing (interventions implemented as appropriate)  Goal: Prevent Further Weight Loss  Outcome: Ongoing (interventions implemented as appropriate)    Problem: Skin Integrity Impairment, Risk/Actual (Adult)  Goal: Identify Related Risk Factors and Signs and Symptoms  Outcome: Ongoing (interventions implemented as appropriate)  Goal: Skin Integrity/Wound Healing  Outcome: Ongoing (interventions implemented as appropriate)    Problem: Anxiety (Adult)  Goal: Identify Related Risk Factors and Signs and Symptoms  Outcome: Ongoing (interventions implemented as appropriate)

## 2017-01-07 NOTE — PROGRESS NOTES
HEMATOLOGY/ONCOLOGY PROGRESS NOTE     CC: weight loss    SUBJECTIVE:   Feels ok today.  About to start on her lunch.  Says she has been eating well for the past day.  Reports frequent bowel movements.  Is frustrated about difficulty sleeping.    Past Medical History, Past Surgical History, Social History, Family History have been reviewed and are without significant changes except as mentioned.      Medications:  The current medication list was reviewed in the EMR    ALLERGIES:   Allergies   Allergen Reactions   • Ambien [Zolpidem Tartrate]    • Morphine And Related        ROS:  A comprehensive 14 point review of systems was performed and was negative except as mentioned.      Objective      Vitals:    17 2150 17 0519 17 1300 17 2200   BP: 123/73 114/71 104/61 127/74   BP Location:       Patient Position:       Pulse: 64 66 58 68   Resp: 16 16 18 16   Temp: 98 °F (36.7 °C) 98.6 °F (37 °C) 97.5 °F (36.4 °C) 97.9 °F (36.6 °C)   TempSrc:  Oral Oral Tympanic   SpO2: 95% 98% 96% 99%   Weight:       Height:            ECO    General:  in no acute distress  HEENT: sclera anicteric, oropharynx clear  Lymphatics: no cervical, supraclavicular, inguinal, or axillary adenopathy  Cardiovascular: regular rate and rhythm, no murmurs  Lungs: clear to auscultation bilaterally  Abdomen: soft, nontender, nondistended.  No palpable organomegaly  ExtremIties: no lower extremity edema  Skin: no rashes, lesions, bruising, or petechiae  Neuro: Alert and oriented x 3. Moves all extremities.    RECENT LABS:  None new          ASSESSMENT & PLAN:  1. Anorexia  2. MGUS/smoldering myeloma    - continue calorie count  - outpatient PET scan next week to look for any evidence of bone lesions that would support a diagnosis of myeloma  - ordered benadryl 25 mg qhs prn for sleep at patient's request.  - encourage OOB and ambulation during the day.                  Kathy Grajeda MD    2017

## 2017-01-07 NOTE — PLAN OF CARE
Problem: Patient Care Overview (Adult)  Goal: Plan of Care Review  Outcome: Ongoing (interventions implemented as appropriate)    01/07/17 1728   Outcome Evaluation   Outcome Summary/Follow up Plan vs wnl, pt slept most of morning, eatting % of meals   Coping/Psychosocial Response Interventions   Plan Of Care Reviewed With patient   Patient Care Overview   Progress improving         Problem: Nutrition, Imbalanced: Inadequate Oral Intake (Adult)  Goal: Improved Oral Intake  Outcome: Ongoing (interventions implemented as appropriate)    01/07/17 1728   Nutrition, Imbalanced: Inadequate Oral Intake (Adult)   Improved Oral Intake making progress toward outcome         Problem: Skin Integrity Impairment, Risk/Actual (Adult)  Goal: Skin Integrity/Wound Healing  Outcome: Ongoing (interventions implemented as appropriate)    01/07/17 1728   Skin Integrity Impairment, Risk/Actual (Adult)   Skin Integrity/Wound Healing making progress toward outcome

## 2017-01-08 PROCEDURE — 82962 GLUCOSE BLOOD TEST: CPT

## 2017-01-08 PROCEDURE — 99225 PR SBSQ OBSERVATION CARE/DAY 25 MINUTES: CPT | Performed by: INTERNAL MEDICINE

## 2017-01-08 PROCEDURE — G0378 HOSPITAL OBSERVATION PER HR: HCPCS

## 2017-01-08 PROCEDURE — 96372 THER/PROPH/DIAG INJ SC/IM: CPT

## 2017-01-08 PROCEDURE — 25010000002 ENOXAPARIN PER 10 MG: Performed by: NURSE PRACTITIONER

## 2017-01-08 RX ADMIN — DULOXETINE HYDROCHLORIDE 30 MG: 30 CAPSULE, DELAYED RELEASE ORAL at 08:59

## 2017-01-08 RX ADMIN — GABAPENTIN 300 MG: 300 CAPSULE ORAL at 05:19

## 2017-01-08 RX ADMIN — ENOXAPARIN SODIUM 40 MG: 40 INJECTION SUBCUTANEOUS at 05:18

## 2017-01-08 RX ADMIN — METOPROLOL TARTRATE 25 MG: 25 TABLET ORAL at 09:00

## 2017-01-08 RX ADMIN — SODIUM CHLORIDE 100 ML/HR: 9 INJECTION, SOLUTION INTRAVENOUS at 21:56

## 2017-01-08 RX ADMIN — GABAPENTIN 300 MG: 300 CAPSULE ORAL at 21:54

## 2017-01-08 RX ADMIN — METOCLOPRAMIDE HYDROCHLORIDE 10 MG: 10 TABLET ORAL at 11:55

## 2017-01-08 RX ADMIN — METOCLOPRAMIDE HYDROCHLORIDE 10 MG: 10 TABLET ORAL at 21:54

## 2017-01-08 RX ADMIN — METOCLOPRAMIDE HYDROCHLORIDE 10 MG: 10 TABLET ORAL at 08:59

## 2017-01-08 RX ADMIN — Medication 5 MG: at 21:54

## 2017-01-08 RX ADMIN — SODIUM CHLORIDE 100 ML/HR: 9 INJECTION, SOLUTION INTRAVENOUS at 17:07

## 2017-01-08 RX ADMIN — METOCLOPRAMIDE HYDROCHLORIDE 10 MG: 10 TABLET ORAL at 17:07

## 2017-01-08 RX ADMIN — SODIUM CHLORIDE 100 ML/HR: 9 INJECTION, SOLUTION INTRAVENOUS at 11:55

## 2017-01-08 RX ADMIN — GABAPENTIN 300 MG: 300 CAPSULE ORAL at 14:12

## 2017-01-08 RX ADMIN — ASPIRIN 325 MG ORAL TABLET 325 MG: 325 PILL ORAL at 08:59

## 2017-01-08 NOTE — PROGRESS NOTES
"      HOSPITALIST DAILY PROGRESS NOTE    Chief Complaint: weakness    Subjective   SUBJECTIVE/OVERNIGHT EVENTS   Up in chair eating breakfast. Doing much better. Tolerating PO intake very well. Has no other complaints. Up walking.     Review of Systems:  Gen-no fevers, no chills  CV-no chest pain, no palpitations  Resp-no cough, no dyspnea  GI-no N/V/D, no abd pain    Objective   OBJECTIVE   I have reviewed the vital signs.  Visit Vitals   • /65   • Pulse 75   • Temp 98.2 °F (36.8 °C) (Oral)   • Resp 16   • Ht 63\" (160 cm)   • Wt 112 lb (50.8 kg)   • SpO2 97%   • BMI 19.84 kg/m2       Physical Exam:  Gen-no acute distress, looks improved today.  CV-RRR, S1 S2 normal, no m/r/g  Resp-CTAB, no wheezes  Abd-soft, NT, ND, +BS  Ext-no edema  Neuro-A&Ox3, no focal deficits  Psych-appropriate mood    Results:  I have reviewed the labs, culture data, radiology results, and diagnostic studies.      Results from last 7 days  Lab Units 01/04/17  0519 01/03/17  1806   WBC 10*3/mm3 8.03 7.47   HEMOGLOBIN g/dL 13.5 15.1   HEMATOCRIT % 37.9 42.9   PLATELETS 10*3/mm3 245 267       Results from last 7 days  Lab Units 01/04/17  0519   SODIUM mmol/L 133   POTASSIUM mmol/L 3.8   CHLORIDE mmol/L 98*   TOTAL CO2 mmol/L 26.0   BUN mg/dL 23   CREATININE mg/dL 0.60   GLUCOSE mg/dL 111*   CALCIUM mg/dL 8.9       I have reviewed the medications.      Assessment/Plan   ASSESSMENT/PLAN    Principal Problem:    Failure to thrive in adult  Active Problems:    Atrial fibrillation and flutter    Monoclonal gammopathy of undetermined significance    Smoldering multiple myeloma (SMM)    Dehydration    Hyperglycemia    Plan:  --Doing much better.  --Continue calorie count over the weekend. Eating most of her tray. Should be able to go home tomorrow if okay with Dr. Pantoja.    Damaris Zabala, II, DO  01/08/17  11:00 AM          "

## 2017-01-08 NOTE — PLAN OF CARE
Problem: Patient Care Overview (Adult)  Goal: Plan of Care Review  Outcome: Ongoing (interventions implemented as appropriate)    01/08/17 1746   Outcome Evaluation   Outcome Summary/Follow up Plan pt up to chair most of day. eatting % of meals, states feels better today   Coping/Psychosocial Response Interventions   Plan Of Care Reviewed With patient   Patient Care Overview   Progress improving         Problem: Nutrition, Imbalanced: Inadequate Oral Intake (Adult)  Goal: Prevent Further Weight Loss  Outcome: Ongoing (interventions implemented as appropriate)    01/08/17 1746   Nutrition, Imbalanced: Inadequate Oral Intake (Adult)   Prevent Further Weight Loss making progress toward outcome         Problem: Anxiety (Adult)  Goal: Reduction/Resolution    01/08/17 1746   Anxiety (Adult)   Reduction/Resolution making progress toward outcome

## 2017-01-08 NOTE — PROGRESS NOTES
Adult Nutrition  Assessment/PES    Patient Name:  Sowmya Beckett  YOB: 1940  MRN: [id number]  Admit Date:  1/3/2017    Assessment Date:  1/8/2017  Comments:  Completed calorie not located. Nursing staff and pt report % intake of meals plus  consumption of Boost Plus supplement. Calorie count in progress and extended.      Reason for Assessment       01/08/17 1518    Reason for Assessment    Reason For Assessment/Visit calorie count order    Identified At Risk By Screening Criteria MST SCORE 2+;weight status    Time Spent (min) 30    Diagnosis --   per notes of this adm              Nutrition/Diet History       01/08/17 1518    Nutrition/Diet History    Reported/Observed By Patient;RN    Appetite Improved    Food Habit/Preferences Uses Supplements    Other pt and RN both report % of intake of meals yesterday and today; pt drinks and likes Boost Plus supplement                     Nutrition Prescription Ordered       01/08/17 1522    Nutrition Prescription PO    Current PO Diet Regular    Supplement Boost Plus    Supplement Frequency 3 times a day                Problem/Interventions:        Problem 1       01/08/17 1526    Nutrition Diagnoses Problem 1    Problem 1 Nutrition Appropriate for Condition at this Time    Inadequate Intake Type Oral    Etiology (related to) MNT for Treatment/Condition    Signs/Symptoms (evidenced by) PO Intake    Percent (%) intake recorded 80 %   pt also taking Boost Plus supplement    Over number of meals 3                    Intervention Goal       01/08/17 1527    Intervention Goal    PO Continue positive trend            Nutrition Intervention       01/08/17 1527    Nutrition Intervention    RD/Tech Action Other (comment)   Advised diet office and catering assoc to extend calorie count and place in pt's room now              Education/Evaluation       01/08/17 1529    Monitor/Evaluation    Monitor Per protocol;PO intake;Weight        Electronically signed  by:  Denisse Driver RD  01/08/17 3:29 PM

## 2017-01-08 NOTE — PLAN OF CARE
Problem: Patient Care Overview (Adult)  Goal: Plan of Care Review  Outcome: Ongoing (interventions implemented as appropriate)    01/07/17 1728 01/08/17 0408   Outcome Evaluation   Outcome Summary/Follow up Plan --  vs wnl, pt ambulated in [place] this shift, resting quietly   Coping/Psychosocial Response Interventions   Plan Of Care Reviewed With patient --        Goal: Adult Individualization and Mutuality  Outcome: Ongoing (interventions implemented as appropriate)  Goal: Discharge Needs Assessment  Outcome: Ongoing (interventions implemented as appropriate)    Problem: Nutrition, Imbalanced: Inadequate Oral Intake (Adult)  Goal: Identify Related Risk Factors and Signs and Symptoms  Outcome: Ongoing (interventions implemented as appropriate)  Goal: Improved Oral Intake  Outcome: Ongoing (interventions implemented as appropriate)  Goal: Prevent Further Weight Loss  Outcome: Ongoing (interventions implemented as appropriate)    Problem: Skin Integrity Impairment, Risk/Actual (Adult)  Goal: Identify Related Risk Factors and Signs and Symptoms  Outcome: Ongoing (interventions implemented as appropriate)  Goal: Skin Integrity/Wound Healing  Outcome: Ongoing (interventions implemented as appropriate)    Problem: Anxiety (Adult)  Goal: Identify Related Risk Factors and Signs and Symptoms  Outcome: Ongoing (interventions implemented as appropriate)  Goal: Reduction/Resolution  Outcome: Ongoing (interventions implemented as appropriate)

## 2017-01-09 VITALS
HEIGHT: 63 IN | RESPIRATION RATE: 20 BRPM | BODY MASS INDEX: 19.84 KG/M2 | WEIGHT: 112 LBS | SYSTOLIC BLOOD PRESSURE: 102 MMHG | DIASTOLIC BLOOD PRESSURE: 60 MMHG | HEART RATE: 63 BPM | OXYGEN SATURATION: 98 % | TEMPERATURE: 98.6 F

## 2017-01-09 PROCEDURE — 25010000002 ENOXAPARIN PER 10 MG: Performed by: NURSE PRACTITIONER

## 2017-01-09 PROCEDURE — 99217 PR OBSERVATION CARE DISCHARGE MANAGEMENT: CPT | Performed by: NURSE PRACTITIONER

## 2017-01-09 PROCEDURE — 96372 THER/PROPH/DIAG INJ SC/IM: CPT

## 2017-01-09 PROCEDURE — G0378 HOSPITAL OBSERVATION PER HR: HCPCS

## 2017-01-09 PROCEDURE — 63710000001 DIPHENHYDRAMINE PER 50 MG: Performed by: INTERNAL MEDICINE

## 2017-01-09 PROCEDURE — 82962 GLUCOSE BLOOD TEST: CPT

## 2017-01-09 RX ORDER — DEXAMETHASONE 4 MG/1
20 TABLET ORAL
Qty: 15 TABLET | Refills: 0 | Status: SHIPPED | OUTPATIENT
Start: 2017-01-09 | End: 2017-01-09 | Stop reason: HOSPADM

## 2017-01-09 RX ORDER — ONDANSETRON 4 MG/1
4 TABLET, FILM COATED ORAL EVERY 6 HOURS PRN
Qty: 20 TABLET | Refills: 0 | Status: SHIPPED | OUTPATIENT
Start: 2017-01-09 | End: 2017-03-21

## 2017-01-09 RX ORDER — GABAPENTIN 300 MG/1
300 CAPSULE ORAL 3 TIMES DAILY
Qty: 90 CAPSULE | Refills: 0 | Status: SHIPPED | OUTPATIENT
Start: 2017-01-09 | End: 2017-03-21

## 2017-01-09 RX ADMIN — ASPIRIN 325 MG ORAL TABLET 325 MG: 325 PILL ORAL at 09:21

## 2017-01-09 RX ADMIN — GABAPENTIN 300 MG: 300 CAPSULE ORAL at 05:53

## 2017-01-09 RX ADMIN — ENOXAPARIN SODIUM 40 MG: 40 INJECTION SUBCUTANEOUS at 05:53

## 2017-01-09 RX ADMIN — METOCLOPRAMIDE HYDROCHLORIDE 10 MG: 10 TABLET ORAL at 08:11

## 2017-01-09 RX ADMIN — METOPROLOL TARTRATE 25 MG: 25 TABLET ORAL at 09:21

## 2017-01-09 RX ADMIN — GABAPENTIN 300 MG: 300 CAPSULE ORAL at 15:19

## 2017-01-09 RX ADMIN — DIPHENHYDRAMINE HYDROCHLORIDE 25 MG: 25 CAPSULE ORAL at 01:10

## 2017-01-09 RX ADMIN — SODIUM CHLORIDE 100 ML/HR: 9 INJECTION, SOLUTION INTRAVENOUS at 02:29

## 2017-01-09 RX ADMIN — DULOXETINE HYDROCHLORIDE 30 MG: 30 CAPSULE, DELAYED RELEASE ORAL at 09:21

## 2017-01-09 RX ADMIN — METOCLOPRAMIDE HYDROCHLORIDE 10 MG: 10 TABLET ORAL at 11:59

## 2017-01-09 NOTE — PLAN OF CARE
Problem: Nutrition, Imbalanced: Inadequate Oral Intake (Adult)  Goal: Improved Oral Intake  Outcome: Outcome(s) achieved Date Met:  01/09/17

## 2017-01-09 NOTE — DISCHARGE SUMMARY
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       Presenting Problem/History of Present Illness  Failure to thrive in adult [R62.7]     Chief Complaint on Day of Discharge:  Fatigue/ anorexia    Hospital Course:    Ms. Beckett is a 76 year female presented to [facility] ED on 1/3/2017 after referred by Dr. Messi Farah for increasing fatigue, failure to thrive and unintentional weight loss/dehydration.  Shouldn't has been undergoing evaluation per Dr. farah for monoclonal clonal gammopathy.  She's been hospitalized approximate 2 weeks ago and treated for dehydration/failure to thrive with recent endoscopy.  She was on 20 mg Decadron for presently 5 days prior to admission that did not help energy levels or improve appetite.  Patient was admitted to hospitalist service for dehydration/failure to thrive.    Patient has undergone calorie count over the weekend and advised to continue adequate nutrition intake.  Patient's intake has significantly improved from admission.  Patient's energy level has improved and states she has more energy, able to walk, with less fatigue.  Patient was evaluated per Dr. Jefferson for J-tube if patient had ongoing anorexia.  He has made turnaround over the weekend and likely can hold off on tube placement for now.  We will continue with calorie  counting and nutrition counseling.    Patient has scheduled PET scans on 1/11/2017 and follow-up with Dr. farah 1/12/2017.  We will hold these appointments and have patient follow-up this time.  All other medical pounds have remained stable and patient is currently ready for discharge.      Procedures Performed         Consults:   Consults     Date and Time Order Name Status Description    1/4/2017 0005 Inpatient Consult to Hematology and Oncology Completed           Pertinent Test Results:  Glucose 111 (H) mg/dL           BUN 23 mg/dL         Creatinine 0.60 mg/dL         Sodium 133 mmol/L         Potassium 3.8 mmol/L         Chloride 98 (L) mmol/L         CO2 26.0 mmol/L         Calcium 8.9 mg/dL         Total Protein 6.6 g/dL         Albumin 3.20 g/dL         ALT (SGPT) 15 U/L         AST (SGOT) 18 U/L         Alkaline Phosphatase 57 U/L         Total Bilirubin 1.3 (H) mg/dL         eGFR Non African Amer 97 mL/min/1.73         Globulin 3.4 gm/dL         A/G Ratio 0.9 (L) g/dL         BUN/Creatinine Ratio 38.3 (H)         Anion Gap 9.0 mmol/L        Narrative:         Specimen: Blood Updated: 01/05/17 1347         Hemoglobin A1C 5.60 %        Narrative:         Specimen: Blood Updated: 01/04/17 0727         WBC 8.03 10*3/mm3         RBC 3.85 (L) 10*6/mm3         Hemoglobin 13.5 g/dL         Hematocrit 37.9 %         MCV 98.4 fL         MCH 35.1 (H) pg         MCHC 35.6 g/dL         RDW 13.5 %         RDW-SD 48.6 fl         MPV 11.4 fL         Platelets 245 10*3/mm3         Neutrophil % 67.5 %         Lymphocyte % 22.7 (L) %         Monocyte % 9.6 %         Eosinophil % 0.0 %         Basophil % 0.0 %         Immature Grans % 0.2 %         Neutrophils, Absolute 5.42 10*3/mm3         Lymphocytes, Absolute 1.82 10*3/mm3         Monocytes, Absolute 0.77 10*3/mm3         Eosinophils, Absolute 0.00 (L) 10*3/mm3         Basophils, Absolute 0.00 10*3/mm3         Immature Grans, Absolute 0.02 10*3/mm3        Calcium,  "Ionized [[id number]] (Normal)       EXAMINATION: XR CHEST 1 VW- 01/03/2017      INDICATION: Weak/Dizzy/AMS triage protocol        COMPARISON: 09/28/2016      FINDINGS: The heart size is normal. The aortic arch is partially  calcified. The lungs are free of opacities. The osseous structures of  the chest are normal.        IMPRESSION:  1. No acute chest pathology.  2. Mild bibasilar atelectasis.      Fax to: [facility]      D: 01/04/2017  E: 01/04/2017      Condition on Discharge:  stable    Physical Exam on Discharge:  Visit Vitals   • /78   • Pulse 77   • Temp 98 °F (36.7 °C) (Oral)   • Resp 16   • Ht 63\" (160 cm)   • Wt 112 lb (50.8 kg)   • SpO2 94%   • BMI 19.84 kg/m2     Physical Exam   Constitutional: She is oriented to person, place, and time.   Frail, NAD   HENT:   Head: Normocephalic and atraumatic.   Eyes: Pupils are equal, round, and reactive to light. No scleral icterus.   Neck: Normal range of motion. No JVD present.   Cardiovascular: Normal rate, regular rhythm, normal heart sounds and intact distal pulses.    No murmur heard.  Pulmonary/Chest: Effort normal and breath sounds normal. No respiratory distress. She has no wheezes.   Abdominal: Soft. Bowel sounds are normal. She exhibits no distension. There is no tenderness.   Musculoskeletal: Normal range of motion. She exhibits no edema.   Neurological: She is alert and oriented to person, place, and time.   Skin: Skin is warm and dry.   Psychiatric: She has a normal mood and affect. Her behavior is normal. Judgment and thought content normal.         Discharge Disposition  Home or Self Care    Discharge Medications   Sowmya Beckett   Home Medication Instructions SHAUNA:[id number]    Printed on:01/09/17 0956   Medication Information                      aspirin 325 MG tablet  Take 325 mg by mouth daily.             BIOTIN PO  Take  by mouth daily.             cholecalciferol (VITAMIN D3) 1000 UNITS tablet  Take 2,000 Units by mouth daily.           "   diphenhydrAMINE (BENADRYL) 50 MG capsule  Take 50 mg by mouth Every Night.             DULoxetine (CYMBALTA) 30 MG capsule  Take 30 mg by mouth Daily.             gabapentin (NEURONTIN) 300 MG capsule  Take 300 mg by mouth 3 (three) times a day.             metoclopramide (REGLAN) 10 MG tablet  Take 1 tablet by mouth 4 (Four) Times a Day Before Meals & at Bedtime for 30 days.             metoprolol tartrate (LOPRESSOR) 25 MG tablet  TAKE ONE-HALF TABLET BY MOUTH ONCE DAILY             Omega-3 Fatty Acids (FISH OIL) 1000 MG capsule capsule  Take  by mouth daily with breakfast.             ondansetron (ZOFRAN) 4 MG tablet  Take 1 tablet by mouth Every 6 (Six) Hours As Needed for nausea or vomiting.             polyethylene glycol (MIRALAX) packet  Take 17 g by mouth 2 (Two) Times a Day.             Red Yeast Rice 600 MG capsule  Take  by mouth daily.             Zinc 50 MG capsule  Take  by mouth daily.                 Discharge Diet:   Diet Instructions     Advance Diet As Tolerated                     Discharge Care Plan / Instructions:    Activity at Discharge:   Activity Instructions     Activity as Tolerated                     Follow-up Appointments  Future Appointments  Date Time Provider Department Center   1/11/2017 12:45 PM ASH[facility] PET ADMIN RM1  [facility]   1/11/2017 1:45 PM ASHLEY [facility] PETCT 1  [facility]   1/12/2017 9:45 AM Messi Panotja MD [facility]   3/21/2017 11:00 AM Dave Swenson MD, FAAN [facility] N [facility] None   8/14/2017 1:30 PM Alvarado Willoughby MD E [facility] None     Referrals and Follow-ups to Schedule     Additional Follow-Up    As directed    pcp   Follow Up Details:  1 week       Follow-Up    As directed    1/11/17- PET scan  1/12/17- Dr. Pantoja   Follow Up Details:  1/11/17 and 1/12/17                 Test Results Pending at Discharge   Order Current Status    Green Top (No Gel) Collected (01/03/17 1806)    [place] Draw In process           Arianna Arenas, APRN 01/09/17  9:48 AM    Time: Discharge 28 min    Please note that portions of this note may have been completed with a voice recognition program. Efforts were made to edit the dictations, but occasionally words are mistranscribed.

## 2017-01-09 NOTE — PLAN OF CARE
Problem: Patient Care Overview (Adult)  Goal: Plan of Care Review  Outcome: Ongoing (interventions implemented as appropriate)    01/08/17 1746 01/09/17 0436   Outcome Evaluation   Outcome Summary/Follow up Plan --  vs wnl, pt needed benadryl to sleep, did not walk this shift   Coping/Psychosocial Response Interventions   Plan Of Care Reviewed With patient --    Patient Care Overview   Progress improving --        Goal: Adult Individualization and Mutuality  Outcome: Ongoing (interventions implemented as appropriate)  Goal: Discharge Needs Assessment  Outcome: Ongoing (interventions implemented as appropriate)    Problem: Nutrition, Imbalanced: Inadequate Oral Intake (Adult)  Goal: Identify Related Risk Factors and Signs and Symptoms  Outcome: Ongoing (interventions implemented as appropriate)  Goal: Improved Oral Intake  Outcome: Ongoing (interventions implemented as appropriate)  Goal: Prevent Further Weight Loss  Outcome: Ongoing (interventions implemented as appropriate)    Problem: Skin Integrity Impairment, Risk/Actual (Adult)  Goal: Identify Related Risk Factors and Signs and Symptoms  Outcome: Ongoing (interventions implemented as appropriate)  Goal: Skin Integrity/Wound Healing  Outcome: Ongoing (interventions implemented as appropriate)    Problem: Anxiety (Adult)  Goal: Identify Related Risk Factors and Signs and Symptoms  Outcome: Ongoing (interventions implemented as appropriate)  Goal: Reduction/Resolution  Outcome: Ongoing (interventions implemented as appropriate)

## 2017-01-09 NOTE — PLAN OF CARE
Problem: Nutrition, Imbalanced: Inadequate Oral Intake (Adult)  Goal: Prevent Further Weight Loss  Outcome: Ongoing (interventions implemented as appropriate)

## 2017-01-09 NOTE — PLAN OF CARE
Problem: Anxiety (Adult)  Goal: Identify Related Risk Factors and Signs and Symptoms  Outcome: Outcome(s) achieved Date Met:  01/09/17

## 2017-01-09 NOTE — PROGRESS NOTES
Continued Stay Note  [facility]     Patient Name: Sowmya Beckett  MRN: [id number]  Today's Date: 1/9/2017    Admit Date: 1/3/2017          Discharge Plan       01/09/17 1222    Case Management/Social Work Plan    Plan Home with [facility] for PT/OT needs    Patient/Family In Agreement With Plan yes    Additional Comments Pt chose [facility] for her PT/OT needs at home as she was weak upon admission.  SW called this referral to Antonette with [facility].  Pt is ok to d/c when medically ready.              Discharge Codes     None        Expected Discharge Date and Time     Expected Discharge Date Expected Discharge Time    Jan 9, 2017             RITA Dawson

## 2017-01-09 NOTE — PLAN OF CARE
Problem: Anxiety (Adult)  Goal: Reduction/Resolution  Outcome: Outcome(s) achieved Date Met:  01/09/17

## 2017-01-09 NOTE — PROGRESS NOTES
Continued Stay Note   [facility]     Patient Name: Sowmya Beckett  MRN: [id number]  Today's Date: 1/9/2017    Admit Date: 1/3/2017          Discharge Plan       01/09/17 0855    Case Management/Social Work Plan    Plan  continuing to follow for any d/c needs.    Patient/Family In Agreement With Plan yes              Discharge Codes     None            RITA Dawson

## 2017-01-09 NOTE — PLAN OF CARE
Problem: Nutrition, Imbalanced: Inadequate Oral Intake (Adult)  Goal: Identify Related Risk Factors and Signs and Symptoms  Outcome: Outcome(s) achieved Date Met:  01/09/17

## 2017-01-09 NOTE — PLAN OF CARE
Problem: Patient Care Overview (Adult)  Goal: Discharge Needs Assessment  Outcome: Outcome(s) achieved Date Met:  01/09/17

## 2017-01-09 NOTE — PROGRESS NOTES
Adult Nutrition  Assessment/PES    Patient Name:  Sowmya Beckett  YOB: 1940  MRN: [id number]  Admit Date:  1/3/2017    Assessment Date:  1/9/2017        Reason for Assessment       01/09/17 1024    Reason for Assessment    Reason For Assessment/Visit calorie count order    Time Spent (min) 30    Diagnosis --   per notes this admission                  Labs/Tests/Procedures/Meds       01/09/17 1033    Labs/Tests/Procedures/Meds    Labs/Tests Review Reviewed                Nutrition Prescription Ordered       01/09/17 1033    Nutrition Prescription PO    Current PO Diet Regular    Supplement Boost Plus    Supplement Frequency 3 times a day            Evaluation of Received Nutrient/Fluid Intake       01/09/17 1033    PO Evaluation    Number of Meals 3    % PO Intake 92              Problem/Interventions:        Problem 1       01/09/17 1033    Nutrition Diagnoses Problem 1    Problem 1 Nutrition Appropriate for Condition at this Time    Inadequate Intake Type Oral    Etiology (related to) --   clinical condition    Percent (%) intake recorded 92 %    Over number of meals 3                    Intervention Goal       01/09/17 1033    Intervention Goal    General Nutrition support treatment    PO Continue positive trend            Nutrition Intervention       01/09/17 1034    Nutrition Intervention    RD/Tech Action Follow Tx progress   Calorie count in progress, noted pending discharge today.              Education/Evaluation       01/09/17 1034    Education    Education Provided education regarding    Provided education regarding Diet rationale   Advised nsg. unable to educate pt regarding nutriton goals as sleepy soundly (attempted twice this am). Left written materials related to high calorie/high protein suggestions along with protein and calorie goals.    Monitor/Evaluation    Monitor Per protocol            Electronically signed by:  Giselle Lala RD  01/09/17 10:36 AM

## 2017-01-10 ENCOUNTER — DOCUMENTATION (OUTPATIENT)
Dept: ONCOLOGY | Facility: CLINIC | Age: 77
End: 2017-01-10

## 2017-01-10 NOTE — PROGRESS NOTES
PET/CT not covered by Medicare per Damaris Beam.  I called and notified patient.  She was also given the number for financial counselor if she would like to still have PET and sign ABN.  The billed amount would be $8311.75, but she would get a self pay discount.  She will let Laureano Rincon know her decision and will follow up with Dr. Pantoja as scheduled Thursday.

## 2017-01-11 ENCOUNTER — APPOINTMENT (OUTPATIENT)
Dept: PET IMAGING | Facility: HOSPITAL | Age: 77
End: 2017-01-11
Attending: INTERNAL MEDICINE

## 2017-01-12 ENCOUNTER — OFFICE VISIT (OUTPATIENT)
Dept: ONCOLOGY | Facility: CLINIC | Age: 77
End: 2017-01-12

## 2017-01-12 VITALS
BODY MASS INDEX: 21.08 KG/M2 | SYSTOLIC BLOOD PRESSURE: 106 MMHG | WEIGHT: 119 LBS | DIASTOLIC BLOOD PRESSURE: 67 MMHG | TEMPERATURE: 97 F | RESPIRATION RATE: 14 BRPM | HEART RATE: 94 BPM

## 2017-01-12 DIAGNOSIS — D47.2 SMOLDERING MULTIPLE MYELOMA (SMM): ICD-10-CM

## 2017-01-12 DIAGNOSIS — D47.2 MONOCLONAL GAMMOPATHY OF UNDETERMINED SIGNIFICANCE: Primary | ICD-10-CM

## 2017-01-12 PROCEDURE — 99215 OFFICE O/P EST HI 40 MIN: CPT | Performed by: INTERNAL MEDICINE

## 2017-01-12 NOTE — PROGRESS NOTES
CHIEF COMPLAINT: Failure to thrive   Problem List:  Oncology/Hematology History    1.)  Smoldering multiple myeloma    A.)The patient serum immunoelectrophoresis revealed a monoclonal IgA kappa #1 at 1500 MG/DL, monoclonal IgA kappa #22 small to quantify. IgA is elevated at 2471 with the upper limits of normal being 422. C-reactive protein was normal, beta-2 microglobulin normal at 2.2, sedimentation rate just slightly elevated at 34 with the upper limits of normal being 30. 24-hour urine immunoelectrophoresis with 321.9 mg/24 hour protein in the urine, monoclonal IgA kappa to small to quantify in the urine. She has no renal failure, her BUN was 16 creatinine of 0.8, she is not anemic, no thrombocytopenia. Core biopsy showed 5-10% kappa restricted plasma cells with a flow cytometry showing a population of CD 56 positive cells. There was no rouleaux. There only rare plasma cells in the clot prepped and the bone marrow was relatively aspicular. Her serum free light chains were 38 with the kappa lambda ratio of 3. Her total calcium was 10 and her ionized calcium was 1.28. Bone survey was negative.    B.)She's had 2 bone marrow biopsies the last one was done her last admission and has 10-15% plasma cells with an intermediate risk genetic profile on FISH analysis.  Her cytogenetics however had inversion 3 which is unusual for myeloma and the statistical significance of which Dr. Sequeira was not sure.  He did review her bone marrow and there was no evidence of myelodysplasia or acute leukemia which is more common with inversion 3.  Prior bone survey was negative. She's never been hypercalcemic, anemic, or any evidence of renal insufficiency with this. Her serum immunoelectrophoresis had 1600 mg of monoclonal immunoglobulin G last visit and that's actually slightly less than it was a couple of months back. Nonetheless, in part for appetite stimulation and in part on the theory that this monoclonal protein might be  contributing to her poor peristalsis, she completed 20 mg a day of dexamethasone ×5 days as of yesterday. This did not make her hungry and she continues to lose weight. She's had 3 upper GI endoscopies one of which showed some neuroendocrine type infiltration of unknown significance but was not seen repetitively on small bowel biopsy.         Smoldering multiple myeloma (SMM)    1/3/2017 Initial Diagnosis    Smoldering multiple myeloma (SMM)       HISTORY OF PRESENT ILLNESS:  The patient is a 76 y.o. female, here for follow up on management of smoldering myeloma given 10-15% plasma cells on last bone marrow biopsy.  Her main issue has been failure to thrive.  She is weak because she has not been eating but when we had her in the hospital twice within the last 30 days she eats while in the hospital.  Now over the last few days she is gone home and been eating.  She still weak and tired.      Past Medical History   Diagnosis Date   • Arrhythmia    • Atrial fibrillation and flutter 8/8/2016   • Cancer      melanoma in 1971   • CHF (congestive heart failure)      Past Surgical History   Procedure Laterality Date   • Colon surgery     • Hysterectomy     • Appendectomy     • Hemorrhoidectomy     • Cholecystectomy         Allergies   Allergen Reactions   • Ambien [Zolpidem Tartrate]    • Morphine And Related        Family History and Social History reviewed and changed as necessary      REVIEW OF SYSTEM:   Review of Systems   Constitutional: Negative for appetite change, chills, diaphoresis, fatigue, fever and unexpected weight change.   HENT:   Negative for mouth sores, sore throat and trouble swallowing.    Eyes: Negative for icterus.   Respiratory: Negative for cough, hemoptysis and shortness of breath.    Cardiovascular: Negative for chest pain, leg swelling and palpitations.   Gastrointestinal: Negative for abdominal distention, abdominal pain, blood in stool, constipation, diarrhea, nausea and vomiting.   Endocrine:  Negative for hot flashes.   Genitourinary: Negative for bladder incontinence, difficulty urinating, dysuria, frequency and hematuria.    Musculoskeletal: Negative for gait problem, neck pain and neck stiffness.   Skin: Negative for rash.   Neurological: Negative for dizziness, gait problem, headaches, light-headedness and numbness.   Hematological: Negative for adenopathy. Does not bruise/bleed easily.   Psychiatric/Behavioral: Negative for depression. The patient is not nervous/anxious.    All other systems reviewed and are negative.       PHYSICAL EXAM    Vitals:    01/12/17 0948   BP: 106/67   Pulse: 94   Resp: 14   Temp: 97 °F (36.1 °C)   TempSrc: Temporal Artery    Weight: 119 lb (54 kg)     Constitutional: Appears well-developed and well-nourished. No distress.   ECOG: (2) Ambulatory and capable of self care, unable to carry out work activity, up and about > 50% or waking hours  HENT:   Head: Normocephalic.   Mouth/Throat: Oropharynx is clear and moist.   Eyes: Conjunctivae are normal. Pupils are equal, round, and reactive to light. No scleral icterus.   Neck: Neck supple. No JVD present. No thyromegaly present.   Cardiovascular: Normal rate, regular rhythm and normal heart sounds.    Pulmonary/Chest: Breath sounds normal. No respiratory distress.   Abdominal: Soft. Exhibits no distension and no mass. There is no hepatosplenomegaly. There is no tenderness. There is no rebound and no guarding.   Musculoskeletal:Exhibits no edema, tenderness or deformity.   Neurological: Alert and oriented to person, place, and time. Exhibits normal muscle tone.   Skin: No ecchymosis, no petechiae and no rash noted. Not diaphoretic. No cyanosis. Nails show no clubbing.   Psychiatric: Normal mood and affect.   Vitals reviewed.          Assessment/Plan     1.  Failure to thrive:  2. Smoldering myeloma    Discussion: We spent 40 of 50 minutes of time in the office today reviewing her results of her bone marrow and workup thus far.   Based on her last marrow showing 10-15% plasma cells she has smoldering myeloma.  Medicare would not let us do a PET scan.  Her  was willing to pay for it but her calcium, hemoglobin, renal function, and alkaline phosphatase are all normal.  Hence for the time being we will continue to just monitor this and if she has a significant rise in her monoclonal proteins which we will check in 3 months then we might need to reconsider another bone marrow biopsy and push harder for the PET scan.  Otherwise we will monitor with a bone survey prior to return as well.  In the meantime we did a calorie count while inpatient and threatened her with the possibility of needing to place a feeding J-tube.  With that she ate and has not been nauseated or vomiting.  She is still weak and tired.       Errors in dictation may reflect use of voice recognition software and not all errors in transcription may have been detected prior to signing.    Messi Pantoja MD    01/12/2017

## 2017-01-12 NOTE — MR AVS SNAPSHOT
Sowmya Beckett   1/12/2017 9:45 AM   Office Visit    Dept Phone:  [phone]   Encounter #:  [id number]    Provider:  Messi Pantoja MD   Department:  [facility] HEMATOLOGY  AND ONCOLOGY                Your Full Care Plan              Your Updated Medication List          This list is accurate as of: 1/12/17 10:48 AM.  Always use your most recent med list.                aspirin 325 MG tablet       BIOTIN PO       cholecalciferol 1000 UNITS tablet   Commonly known as:  VITAMIN D3       diphenhydrAMINE 50 MG capsule   Commonly known as:  BENADRYL       DULoxetine 30 MG capsule   Commonly known as:  CYMBALTA       fish oil 1000 MG capsule capsule       gabapentin 300 MG capsule   Commonly known as:  NEURONTIN   Take 1 capsule by mouth 3 (Three) Times a Day.       metoclopramide 10 MG tablet   Commonly known as:  REGLAN   Take 1 tablet by mouth 4 (Four) Times a Day Before Meals & at Bedtime for 30 days.       metoprolol tartrate 25 MG tablet   Commonly known as:  LOPRESSOR   TAKE ONE-HALF TABLET BY MOUTH ONCE DAILY       ondansetron 4 MG tablet   Commonly known as:  ZOFRAN   Take 1 tablet by mouth Every 6 (Six) Hours As Needed for nausea or vomiting.       polyethylene glycol packet   Commonly known as:  MIRALAX   Take 17 g by mouth 2 (Two) Times a Day.       Red Yeast Rice 600 MG capsule       Zinc 50 MG capsule               Instructions     None    Patient Instructions History      [employer] Signup     Our records indicate that you have an active [email or web address] account.    You can view your After Visit Summary by going to [employer] and logging in with your [employer] username and password.  If you don't have a [employer] username and password but a parent or guardian has access to your record, the parent or guardian should login with their own [employer] username and password and access your record to view the After Visit Summary.    If you have questions,  you can email [email or web address] or call [phone] to talk to our MyChart staff.  Remember, [employer] is NOT to be used for urgent needs.  For medical emergencies, dial 911.               Other Info from Your Visit           Your appointments     Date & Time Provider Appointment Department    Mar 21, 2017 11:00 AM EDT Dave Swenson MD, FAAN New Patient [facility] NEUROLOGY    Bring insurance card and photo ID to appointment.    Apr 13, 2017 11:15 AM EDT Messi Pantoja MD FOLLOW UP [facility] HEMATOLOGY  AND ONCOLOGY    Aug 14, 2017  1:30 PM EDT Alvarado Willoughby MD Follow Up [facility] CARDIOLOGY    Arrive 15 minutes prior to appointment.        [facility] HEMATOLOGY  AND ONCOLOGY  [place]  [address]  [facility] [facility] [place] [facility] [address] [phone] [facility] CARDIOLOGY  [address]  [facility] [place] [facility] [address] [phone] [facility] NEUROLOGY  [address]  [facility] [address] [phone]              Vital Signs     Blood Pressure Pulse Temperature Respirations Weight Body Mass Index    106/67 94 97 °F (36.1 °C) (Temporal Artery ) 14 119 lb (54 kg) 21.08 kg/m2    Smoking Status                   Former Smoker           Problems and Diagnoses Noted     Smoldering multiple myeloma (SMM)      No Longer an Issue     Monoclonal gammopathy of undetermined significance

## 2017-01-13 LAB
GLUCOSE BLDC GLUCOMTR-MCNC: 100 MG/DL (ref 70–130)
GLUCOSE BLDC GLUCOMTR-MCNC: 102 MG/DL (ref 70–130)
GLUCOSE BLDC GLUCOMTR-MCNC: 125 MG/DL (ref 70–130)
GLUCOSE BLDC GLUCOMTR-MCNC: 135 MG/DL (ref 70–130)
GLUCOSE BLDC GLUCOMTR-MCNC: 81 MG/DL (ref 70–130)

## 2017-01-16 LAB
GLUCOSE BLDC GLUCOMTR-MCNC: 112 MG/DL (ref 70–130)
GLUCOSE BLDC GLUCOMTR-MCNC: 115 MG/DL (ref 70–130)
GLUCOSE BLDC GLUCOMTR-MCNC: 145 MG/DL (ref 70–130)
GLUCOSE BLDC GLUCOMTR-MCNC: 79 MG/DL (ref 70–130)
GLUCOSE BLDC GLUCOMTR-MCNC: 84 MG/DL (ref 70–130)
GLUCOSE BLDC GLUCOMTR-MCNC: 85 MG/DL (ref 70–130)
GLUCOSE BLDC GLUCOMTR-MCNC: 93 MG/DL (ref 70–130)
GLUCOSE BLDC GLUCOMTR-MCNC: 95 MG/DL (ref 70–130)

## 2017-02-06 ENCOUNTER — TELEPHONE (OUTPATIENT)
Dept: ONCOLOGY | Facility: CLINIC | Age: 77
End: 2017-02-06

## 2017-02-06 NOTE — TELEPHONE ENCOUNTER
Called patient back, no answer. Left VM to call me back and I would do my best to answer all her questions and clarify information.

## 2017-02-06 NOTE — TELEPHONE ENCOUNTER
----- Message from Paulina Kumar sent at 2/6/2017 11:09 AM EST -----  Regarding: ERIS (PT WAS TOLD BY FAMILY SHE HAD 8MTHS TO LIVE)  Contact: [phone]  PT WAS TOLD BY FAMILY THAT SHE HAS ONLY 8 MONTHS TO LIVE. PT SAID HER SISTER(NURSE) COMES WITH HER TO HER APPTS AND SHE HAS TOLD THE FAMILY THAT SHE HAS 8 MONTHS TO LIVE.    DR SCHULTE DID NOT TELL HER THAT.    SHE WANTS TO TALK TO DR SCHULTE OR SOMEONE WHO KNOWS ABOUT HER TREATMENT.

## 2017-03-21 ENCOUNTER — APPOINTMENT (OUTPATIENT)
Dept: GENERAL RADIOLOGY | Facility: HOSPITAL | Age: 77
End: 2017-03-21

## 2017-03-21 ENCOUNTER — TELEPHONE (OUTPATIENT)
Dept: ONCOLOGY | Facility: CLINIC | Age: 77
End: 2017-03-21

## 2017-03-21 ENCOUNTER — HOSPITAL ENCOUNTER (OUTPATIENT)
Facility: HOSPITAL | Age: 77
Setting detail: OBSERVATION
Discharge: HOME | End: 2017-03-24
Attending: EMERGENCY MEDICINE | Admitting: INTERNAL MEDICINE

## 2017-03-21 ENCOUNTER — APPOINTMENT (OUTPATIENT)
Dept: CT IMAGING | Facility: HOSPITAL | Age: 77
End: 2017-03-21

## 2017-03-21 DIAGNOSIS — S09.90XA HEAD INJURY DUE TO TRAUMA, INITIAL ENCOUNTER: ICD-10-CM

## 2017-03-21 DIAGNOSIS — R42 DIZZINESS: ICD-10-CM

## 2017-03-21 DIAGNOSIS — Z78.9 IMPAIRED MOBILITY AND ADLS: ICD-10-CM

## 2017-03-21 DIAGNOSIS — Z74.09 IMPAIRED FUNCTIONAL MOBILITY, BALANCE, GAIT, AND ENDURANCE: ICD-10-CM

## 2017-03-21 DIAGNOSIS — Z74.09 IMPAIRED MOBILITY AND ADLS: ICD-10-CM

## 2017-03-21 DIAGNOSIS — I95.1 SYNCOPE DUE TO ORTHOSTATIC HYPOTENSION: Primary | ICD-10-CM

## 2017-03-21 PROBLEM — R19.7 DIARRHEA: Status: ACTIVE | Noted: 2017-03-21

## 2017-03-21 LAB
ALBUMIN SERPL-MCNC: 3.6 G/DL (ref 3.2–4.8)
ALBUMIN/GLOB SERPL: 0.9 G/DL (ref 1.5–2.5)
ALP SERPL-CCNC: 88 U/L (ref 25–100)
ALT SERPL W P-5'-P-CCNC: 16 U/L (ref 7–40)
ANION GAP SERPL CALCULATED.3IONS-SCNC: 9 MMOL/L (ref 3–11)
AST SERPL-CCNC: 25 U/L (ref 0–33)
BACTERIA UR QL AUTO: ABNORMAL /HPF
BASOPHILS # BLD AUTO: 0.04 10*3/MM3 (ref 0–0.2)
BASOPHILS NFR BLD AUTO: 0.6 % (ref 0–1)
BILIRUB SERPL-MCNC: 0.7 MG/DL (ref 0.3–1.2)
BILIRUB UR QL STRIP: ABNORMAL
BUN BLD-MCNC: 10 MG/DL (ref 9–23)
BUN/CREAT SERPL: 12.5 (ref 7–25)
CALCIUM SPEC-SCNC: 10.3 MG/DL (ref 8.7–10.4)
CHLORIDE SERPL-SCNC: 101 MMOL/L (ref 99–109)
CLARITY UR: CLEAR
CO2 SERPL-SCNC: 28 MMOL/L (ref 20–31)
COLOR UR: ABNORMAL
CREAT BLD-MCNC: 0.8 MG/DL (ref 0.6–1.3)
CRP SERPL-MCNC: 2.6 MG/DL (ref 0–10)
DEPRECATED RDW RBC AUTO: 52.7 FL (ref 37–54)
EOSINOPHIL # BLD AUTO: 0.11 10*3/MM3 (ref 0.1–0.3)
EOSINOPHIL NFR BLD AUTO: 1.6 % (ref 0–3)
ERYTHROCYTE [DISTWIDTH] IN BLOOD BY AUTOMATED COUNT: 14 % (ref 11.3–14.5)
ERYTHROCYTE [SEDIMENTATION RATE] IN BLOOD: 42 MM/HR (ref 0–30)
GFR SERPL CREATININE-BSD FRML MDRD: 69 ML/MIN/1.73
GLOBULIN UR ELPH-MCNC: 3.8 GM/DL
GLUCOSE BLD-MCNC: 100 MG/DL (ref 70–100)
GLUCOSE UR STRIP-MCNC: NEGATIVE MG/DL
HCT VFR BLD AUTO: 46.3 % (ref 34.5–44)
HGB BLD-MCNC: 15.3 G/DL (ref 11.5–15.5)
HGB UR QL STRIP.AUTO: NEGATIVE
HOLD SPECIMEN: NORMAL
HOLD SPECIMEN: NORMAL
HYALINE CASTS UR QL AUTO: ABNORMAL /LPF
IMM GRANULOCYTES # BLD: 0.01 10*3/MM3 (ref 0–0.03)
IMM GRANULOCYTES NFR BLD: 0.1 % (ref 0–0.6)
KETONES UR QL STRIP: NEGATIVE
LEUKOCYTE ESTERASE UR QL STRIP.AUTO: NEGATIVE
LYMPHOCYTES # BLD AUTO: 2.24 10*3/MM3 (ref 0.6–4.8)
LYMPHOCYTES NFR BLD AUTO: 32.9 % (ref 24–44)
MAGNESIUM SERPL-MCNC: 1.7 MG/DL (ref 1.3–2.7)
MCH RBC QN AUTO: 33.8 PG (ref 27–31)
MCHC RBC AUTO-ENTMCNC: 33 G/DL (ref 32–36)
MCV RBC AUTO: 102.2 FL (ref 80–99)
MONOCYTES # BLD AUTO: 0.58 10*3/MM3 (ref 0–1)
MONOCYTES NFR BLD AUTO: 8.5 % (ref 0–12)
NEUTROPHILS # BLD AUTO: 3.82 10*3/MM3 (ref 1.5–8.3)
NEUTROPHILS NFR BLD AUTO: 56.3 % (ref 41–71)
NITRITE UR QL STRIP: NEGATIVE
NRBC BLD MANUAL-RTO: 0 /100 WBC (ref 0–0)
PH UR STRIP.AUTO: 5.5 [PH] (ref 5–8)
PLATELET # BLD AUTO: 230 10*3/MM3 (ref 150–450)
PMV BLD AUTO: 10.6 FL (ref 6–12)
POTASSIUM BLD-SCNC: 3.7 MMOL/L (ref 3.5–5.5)
PROT SERPL-MCNC: 7.4 G/DL (ref 5.7–8.2)
PROT UR QL STRIP: ABNORMAL
RBC # BLD AUTO: 4.53 10*6/MM3 (ref 3.89–5.14)
RBC # UR: ABNORMAL /HPF
REF LAB TEST METHOD: ABNORMAL
SODIUM BLD-SCNC: 138 MMOL/L (ref 132–146)
SP GR UR STRIP: 1.02 (ref 1–1.03)
SQUAMOUS #/AREA URNS HPF: ABNORMAL /HPF
TROPONIN I SERPL-MCNC: 0 NG/ML (ref 0–0.07)
UROBILINOGEN UR QL STRIP: ABNORMAL
WBC NRBC COR # BLD: 6.8 10*3/MM3 (ref 3.5–10.8)
WBC UR QL AUTO: ABNORMAL /HPF
WHOLE BLOOD HOLD SPECIMEN: NORMAL
WHOLE BLOOD HOLD SPECIMEN: NORMAL

## 2017-03-21 PROCEDURE — 99285 EMERGENCY DEPT VISIT HI MDM: CPT

## 2017-03-21 PROCEDURE — 85025 COMPLETE CBC W/AUTO DIFF WBC: CPT | Performed by: EMERGENCY MEDICINE

## 2017-03-21 PROCEDURE — 96361 HYDRATE IV INFUSION ADD-ON: CPT

## 2017-03-21 PROCEDURE — G0378 HOSPITAL OBSERVATION PER HR: HCPCS

## 2017-03-21 PROCEDURE — 86140 C-REACTIVE PROTEIN: CPT | Performed by: INTERNAL MEDICINE

## 2017-03-21 PROCEDURE — 85652 RBC SED RATE AUTOMATED: CPT | Performed by: INTERNAL MEDICINE

## 2017-03-21 PROCEDURE — 84165 PROTEIN E-PHORESIS SERUM: CPT | Performed by: INTERNAL MEDICINE

## 2017-03-21 PROCEDURE — 99214 OFFICE O/P EST MOD 30 MIN: CPT | Performed by: INTERNAL MEDICINE

## 2017-03-21 PROCEDURE — 93005 ELECTROCARDIOGRAM TRACING: CPT | Performed by: EMERGENCY MEDICINE

## 2017-03-21 PROCEDURE — 96360 HYDRATION IV INFUSION INIT: CPT

## 2017-03-21 PROCEDURE — 70450 CT HEAD/BRAIN W/O DYE: CPT

## 2017-03-21 PROCEDURE — 80053 COMPREHEN METABOLIC PANEL: CPT | Performed by: EMERGENCY MEDICINE

## 2017-03-21 PROCEDURE — 84484 ASSAY OF TROPONIN QUANT: CPT

## 2017-03-21 PROCEDURE — 84155 ASSAY OF PROTEIN SERUM: CPT | Performed by: INTERNAL MEDICINE

## 2017-03-21 PROCEDURE — 82232 ASSAY OF BETA-2 PROTEIN: CPT | Performed by: INTERNAL MEDICINE

## 2017-03-21 PROCEDURE — 83883 ASSAY NEPHELOMETRY NOT SPEC: CPT | Performed by: INTERNAL MEDICINE

## 2017-03-21 PROCEDURE — 87493 C DIFF AMPLIFIED PROBE: CPT | Performed by: INTERNAL MEDICINE

## 2017-03-21 PROCEDURE — 83735 ASSAY OF MAGNESIUM: CPT | Performed by: EMERGENCY MEDICINE

## 2017-03-21 PROCEDURE — 99219 PR INITIAL OBSERVATION CARE/DAY 50 MINUTES: CPT | Performed by: INTERNAL MEDICINE

## 2017-03-21 PROCEDURE — 71020 HC CHEST PA AND LATERAL: CPT

## 2017-03-21 PROCEDURE — 86334 IMMUNOFIX E-PHORESIS SERUM: CPT | Performed by: INTERNAL MEDICINE

## 2017-03-21 PROCEDURE — 36415 COLL VENOUS BLD VENIPUNCTURE: CPT

## 2017-03-21 PROCEDURE — 81001 URINALYSIS AUTO W/SCOPE: CPT | Performed by: EMERGENCY MEDICINE

## 2017-03-21 RX ORDER — POLYETHYLENE GLYCOL 3350 17 G/17G
17 POWDER, FOR SOLUTION ORAL DAILY
COMMUNITY

## 2017-03-21 RX ORDER — ASPIRIN 325 MG
325 TABLET ORAL DAILY
Status: DISCONTINUED | OUTPATIENT
Start: 2017-03-22 | End: 2017-03-24 | Stop reason: HOSPADM

## 2017-03-21 RX ORDER — DULOXETIN HYDROCHLORIDE 60 MG/1
60 CAPSULE, DELAYED RELEASE ORAL DAILY
COMMUNITY

## 2017-03-21 RX ORDER — SODIUM CHLORIDE 9 MG/ML
250 INJECTION, SOLUTION INTRAVENOUS CONTINUOUS
Status: DISCONTINUED | OUTPATIENT
Start: 2017-03-21 | End: 2017-03-22

## 2017-03-21 RX ORDER — SODIUM CHLORIDE 0.9 % (FLUSH) 0.9 %
10 SYRINGE (ML) INJECTION AS NEEDED
Status: DISCONTINUED | OUTPATIENT
Start: 2017-03-21 | End: 2017-03-24 | Stop reason: HOSPADM

## 2017-03-21 RX ORDER — DULOXETIN HYDROCHLORIDE 60 MG/1
60 CAPSULE, DELAYED RELEASE ORAL DAILY
Status: DISCONTINUED | OUTPATIENT
Start: 2017-03-22 | End: 2017-03-24 | Stop reason: HOSPADM

## 2017-03-21 RX ORDER — SODIUM CHLORIDE 0.9 % (FLUSH) 0.9 %
1-10 SYRINGE (ML) INJECTION AS NEEDED
Status: DISCONTINUED | OUTPATIENT
Start: 2017-03-21 | End: 2017-03-24 | Stop reason: HOSPADM

## 2017-03-21 RX ORDER — SODIUM CHLORIDE 9 MG/ML
125 INJECTION, SOLUTION INTRAVENOUS CONTINUOUS
Status: DISCONTINUED | OUTPATIENT
Start: 2017-03-21 | End: 2017-03-22

## 2017-03-21 RX ADMIN — SODIUM CHLORIDE 500 ML: 9 INJECTION, SOLUTION INTRAVENOUS at 13:22

## 2017-03-21 RX ADMIN — SODIUM CHLORIDE 125 ML/HR: 9 INJECTION, SOLUTION INTRAVENOUS at 21:13

## 2017-03-21 RX ADMIN — SODIUM CHLORIDE 250 ML/HR: 9 INJECTION, SOLUTION INTRAVENOUS at 14:23

## 2017-03-21 NOTE — H&P
HOSPITAL MEDICINE HISTORY AND PHYSICAL    PCP: Velia Vogel MD  Specialists:    Chief Complaint: falls and syncope    Subjective     History of Present Illness  Ms Beckett is a 77 yof with a hx of smoldering multiple myeloma followed by , rate controlled afib/aflutteR and ongoing FTT. She presents to [facility] ED accompanied by spouse with concerns for passing out and falling.  states that for the past 7 days patient has had 3 episodes of passing out, these have occured when she has attempted to stand and ambulate. One the the episodes she did had LOC. Patient states that she has continue to have poor appetite, hence decreased PO intake, she does endorse ongoing diarrhea that has been mostly watery, she denies any abdominal pain, no fevers or chills, no nausea or vomiting, no CP or palpitations. On presentation to the ED patient was found to have significant symptomatic, orthostatic hypotension, initial lab work was wnl, she was started on IVF, hospitalist service was then consulted for admission.    Review of Systems   Otherwise complete ROS is negative except as mentioned in the HPI.    Past Medical History:   Past Medical History   Diagnosis Date   • Arrhythmia    • Atrial fibrillation and flutter 8/8/2016   • Cancer      melanoma in 1971   • CHF (congestive heart failure)        Past Surgical History:  Past Surgical History   Procedure Laterality Date   • Colon surgery     • Hysterectomy     • Appendectomy     • Hemorrhoidectomy     • Cholecystectomy         Family History: family history includes Cancer in her brother, father, and mother.     Social History:  reports that she has quit smoking. She does not have any smokeless tobacco history on file. She reports that she drinks alcohol. She reports that she does not use illicit drugs.    Medications:    (Not in a hospital admission)  Allergies   Allergen Reactions   • Ambien [Zolpidem Tartrate]    • Morphine And Related        Objective       Physical Exam  Temp:  [97.5 °F (36.4 °C)] 97.5 °F (36.4 °C)  Heart Rate:  [64-77] 77  Resp:  [18] 18  BP: ()/(54-93) 108/70     Constitutional: no acute distress, awake, alert  Eyes: PERRLA, sclerae anicteric, no conjunctival injection  Neck: supple, no thyromegaly, trachea midline  Respiratory: Clear to auscultation bilaterally, nonlabored respirations   Cardiovascular: RRR, no murmurs, rubs, or gallops, palpable pedal pulses bilaterally  Gastrointestinal: Positive bowel sounds, soft, nontender, nondistended  Musculoskeletal: No bilateral ankle edema, no clubbing or cyanosis to bilateral lower extremities  Psychiatric: oriented x 3, appropriate affect, cooperative  Neurologic: Strength symmetric in all extremities, Cranial Nerves grossly intact to confrontation     Results Reviewed:  I have personally reviewed current lab, radiology, and data and agree.    Results from last 7 days  Lab Units 03/21/17  1240   WBC 10*3/mm3 6.80   HEMOGLOBIN g/dL 15.3   PLATELETS 10*3/mm3 230       Results from last 7 days  Lab Units 03/21/17  1240   SODIUM mmol/L 138   POTASSIUM mmol/L 3.7   TOTAL CO2 mmol/L 28.0   CREATININE mg/dL 0.80   GLUCOSE mg/dL 100   CALCIUM mg/dL 10.3     EKG with normal sinus rythem      Assessment/Plan   Assessment & Plan  Principal Problem:    Orthostatic hypotension  Active Problems:    Atrial fibrillation and flutter    Failure to thrive in adult    Smoldering multiple myeloma (SMM)    Diarrhea    Syncope due to orthostatic hypotension    77 yof with hx of smoldering multiple myeloma, p/w with syncopal episodes, suspected to be sec to hypovolemia for significant diarrhea    Plan:  - continue IVF 125ml/hr of NS,   - repeat orthostatic VS  - send stool studies  - courtesy consult to Dr. Pantoja  - Nutrition consult for FTT  - continue BB for afib  - pt/ot when able  - DVT ppx: lovenox    I discussed the patients findings and my recommendations with patient and spouse    Biju Henry MD    03/21/17   3:57 PM

## 2017-03-21 NOTE — ED PROVIDER NOTES
Subjective   HPI Comments: Sowmya Beckett is a 77 y.o.female with hx of Smoldering multiple myeloma who presents to the ED after a syncopal episodes. Pt states that she has been dizzy after standing for several weeks and that 3 days ago, she felt dizzy upon standing, called for help and prior to her  getting to her, she had a syncopal episode and fell backwards, hitting her head. Her  states that she became incontinent of urine and stool and that after several seconds, she woke up and did not want to seek treatment. She states that since the fall, she has had a headache and recurrent dizziness and today her sister called her heme/onc who advised her to come to the ED for evaluation. In the ED, her  states that she has had recurrent syncopal episodes and dizziness over the past month and pt states she feels weak and fatigued.     She was seen here twice 2 months ago for dehydration and adult failure to thrive and upon discharge was to follow up with Dr. Pantoja and to count her calories/nutrition counseling. Her  states that she has gained 5-6 pounds since that admission.     Neurology - Dr Messi Vasques/onc - Dr. Pantoja  PCP - Dr. Vogel     Patient is a 77 y.o. female presenting with syncope.   History provided by:  Patient and medical records  Syncope   Episode history:  Multiple  Most recent episode:  More than 2 days ago  Timing:  Intermittent  Progression:  Unchanged  Chronicity:  Recurrent  Witnessed: yes    Relieved by:  Nothing  Worsened by:  Posture  Associated symptoms: dizziness, headaches, malaise/fatigue, recent fall, recent injury and weakness    Associated symptoms: no focal weakness, no rectal bleeding, no shortness of breath and no vomiting        Review of Systems   Constitutional: Positive for fatigue and malaise/fatigue.   Respiratory: Negative for shortness of breath.    Cardiovascular: Positive for syncope.   Gastrointestinal: Negative for vomiting.   Genitourinary:  Positive for genital sores.   Neurological: Positive for dizziness, syncope, weakness, light-headedness and headaches. Negative for focal weakness.   All other systems reviewed and are negative.      Past Medical History   Diagnosis Date   • Arrhythmia    • Atrial fibrillation and flutter 8/8/2016   • Cancer      melanoma in 1971   • CHF (congestive heart failure)        Allergies   Allergen Reactions   • Ambien [Zolpidem Tartrate]    • Morphine And Related        Past Surgical History   Procedure Laterality Date   • Colon surgery     • Hysterectomy     • Appendectomy     • Hemorrhoidectomy     • Cholecystectomy         Family History   Problem Relation Age of Onset   • Cancer Mother    • Cancer Father    • Cancer Brother        Social History     Social History   • Marital status:      Spouse name: N/A   • Number of children: N/A   • Years of education: N/A     Social History Main Topics   • Smoking status: Former Smoker   • Smokeless tobacco: None   • Alcohol use Yes   • Drug use: No   • Sexual activity: Defer     Other Topics Concern   • None     Social History Narrative         Objective   Physical Exam   Constitutional: She appears well-developed. No distress.   HENT:   Head: Normocephalic and atraumatic.   Mouth/Throat: Oropharynx is clear and moist.   Eyes: Conjunctivae and EOM are normal. Pupils are equal, round, and reactive to light. No scleral icterus.   3mm PERRL   Neck: Normal range of motion. Neck supple.   Cardiovascular: Normal rate, regular rhythm and normal heart sounds.    Pulmonary/Chest: Effort normal. No respiratory distress. She has rales (minimal dry rales L base clearing with deep inspiration. ).   Abdominal: Soft. There is no tenderness.   Musculoskeletal: Normal range of motion. She exhibits no edema.   Lymphadenopathy:     She has no cervical adenopathy.   Neurological: She is alert.   Slightly slow to answer questions. Oriented to person and place but believes it is April.     Skin: Skin is warm and dry. No rash noted. She is not diaphoretic.   Psychiatric: She has a normal mood and affect. Her behavior is normal.   Nursing note and vitals reviewed.      Procedures         ED Course  ED Course         Recent Results (from the past 24 hour(s))   Comprehensive Metabolic Panel    Collection Time: 03/21/17 12:40 PM   Result Value Ref Range    Glucose 100 70 - 100 mg/dL    BUN 10 9 - 23 mg/dL    Creatinine 0.80 0.60 - 1.30 mg/dL    Sodium 138 132 - 146 mmol/L    Potassium 3.7 3.5 - 5.5 mmol/L    Chloride 101 99 - 109 mmol/L    CO2 28.0 20.0 - 31.0 mmol/L    Calcium 10.3 8.7 - 10.4 mg/dL    Total Protein 7.4 5.7 - 8.2 g/dL    Albumin 3.60 3.20 - 4.80 g/dL    ALT (SGPT) 16 7 - 40 U/L    AST (SGOT) 25 0 - 33 U/L    Alkaline Phosphatase 88 25 - 100 U/L    Total Bilirubin 0.7 0.3 - 1.2 mg/dL    eGFR Non African Amer 69 >60 mL/min/1.73    Globulin 3.8 gm/dL    A/G Ratio 0.9 (L) 1.5 - 2.5 g/dL    BUN/Creatinine Ratio 12.5 7.0 - 25.0    Anion Gap 9.0 3.0 - 11.0 mmol/L   Magnesium    Collection Time: 03/21/17 12:40 PM   Result Value Ref Range    Magnesium 1.7 1.3 - 2.7 mg/dL   CBC Auto Differential    Collection Time: 03/21/17 12:40 PM   Result Value Ref Range    WBC 6.80 3.50 - 10.80 10*3/mm3    RBC 4.53 3.89 - 5.14 10*6/mm3    Hemoglobin 15.3 11.5 - 15.5 g/dL    Hematocrit 46.3 (H) 34.5 - 44.0 %    .2 (H) 80.0 - 99.0 fL    MCH 33.8 (H) 27.0 - 31.0 pg    MCHC 33.0 32.0 - 36.0 g/dL    RDW 14.0 11.3 - 14.5 %    RDW-SD 52.7 37.0 - 54.0 fl    MPV 10.6 6.0 - 12.0 fL    Platelets 230 150 - 450 10*3/mm3    Neutrophil % 56.3 41.0 - 71.0 %    Lymphocyte % 32.9 24.0 - 44.0 %    Monocyte % 8.5 0.0 - 12.0 %    Eosinophil % 1.6 0.0 - 3.0 %    Basophil % 0.6 0.0 - 1.0 %    Immature Grans % 0.1 0.0 - 0.6 %    Neutrophils, Absolute 3.82 1.50 - 8.30 10*3/mm3    Lymphocytes, Absolute 2.24 0.60 - 4.80 10*3/mm3    Monocytes, Absolute 0.58 0.00 - 1.00 10*3/mm3    Eosinophils, Absolute 0.11 0.10 - 0.30 10*3/mm3     Basophils, Absolute 0.04 0.00 - 0.20 10*3/mm3    Immature Grans, Absolute 0.01 0.00 - 0.03 10*3/mm3    nRBC 0.0 0.0 - 0.0 /100 WBC   POC Troponin, Rapid    Collection Time: 03/21/17 12:47 PM   Result Value Ref Range    Troponin I 0.00 0.00 - 0.07 ng/mL     Note: In addition to lab results from this visit, the labs listed above may include labs taken at another facility or during a different encounter within the last 24 hours. Please correlate lab times with ED admission and discharge times for further clarification of the services performed during this visit.    CT Head Without Contrast   Preliminary Result   Age-related findings. There are no acute abnormalities.       D:  03/21/2017   E:  03/21/2017                  XR Chest 2 View   Preliminary Result   Chronic postinflammatory changes in the right lower lobe; no   acute disease.       D:  03/21/2017   E:  03/21/2017                Vitals:    03/21/17 1248 03/21/17 1250 03/21/17 1323 03/21/17 1330   BP: 101/73 (!) 71/54 124/73 118/81   BP Location:   Right arm    Patient Position: Sitting Standing Lying    Pulse: 66 67 64 66   Resp:       Temp:       TempSrc:       SpO2:   100% 99%   Weight:       Height:         Medications   sodium chloride 0.9 % flush 10 mL (not administered)   sodium chloride 0.9 % infusion (not administered)   sodium chloride 0.9 % bolus 500 mL (500 mL Intravenous New Bag 3/21/17 1322)     ECG/EMG Results (last 24 hours)     Procedure Component Value Units Date/Time    ECG 12 Lead [[id number]] Collected:  03/21/17 1214     Updated:  03/21/17 1221                    [facility]  EMR Dragon/Transcription disclaimer:   Much of this encounter note is an electronic transcription/translation of spoken language to printed text. The electronic translation of spoken language may permit erroneous, or at times, nonsensical words or phrases to be inadvertently transcribed; Although I have reviewed the note for such errors, some may still exist.     Final  diagnoses:   Syncope due to orthostatic hypotension   Dizziness   Head injury due to trauma, initial encounter       Documentation assistance provided by birgit Horta.  Information recorded by the scribe was done at my direction and has been verified and validated by me.     Arnulfo Horta  03/21/17 1229       Arnulfo Horta  03/21/17 7182       David Reese MD  03/22/17 6162

## 2017-03-21 NOTE — PROGRESS NOTES
Discharge Planning Assessment  [facility]     Patient Name: Sowmya Beckett  MRN: [id number]  Today's Date: 3/21/2017    Admit Date: 3/21/2017          Discharge Needs Assessment       03/21/17 1537    Living Environment    Lives With spouse    Living Arrangements house    Provides Primary Care For no one    Primary Care Provided By spouse/significant other    Quality Of Family Relationships supportive    Able to Return to Prior Living Arrangements yes    Discharge Needs Assessment    Concerns To Be Addressed no discharge needs identified    Readmission Within The Last 30 Days no previous admission in last 30 days    Outpatient/Agency/Support Group Needs homecare agency (specify level of care)    Community Agency Name(S) [facility] and [facility], not current    Anticipated Changes Related to Illness none    Equipment Needed After Discharge walker, standard    Transportation Available car;family or friend will provide    Discharge Contact Information if Applicable Alvarado Jauregui, spouse, [phone]            Discharge Plan       03/21/17 1540    Case Management/Social Work Plan    Patient/Family In Agreement With Plan yes    Additional Comments Pt lives in [place] with her . She has requested a walker for support as she has had several syncopal episodes recently. Would like Home health if necessary and would accept short term rehab if beneficial- like [facility]. PCP is Velia Vogel        Discharge Placement     No information found                Demographic Summary     None            Functional Status       03/21/17 1536    Functional Status Current    Ambulation 2-->assistive person    Transferring 2-->assistive person    Toileting 2-->assistive person    Eating 0-->independent    Communication 0-->understands/communicates without difficulty    Swallowing (if score 2 or more for any item, consult Rehab Services) 0-->swallows foods/liquids without difficulty    Change in Functional Status Since  Onset of Current Illness/Injury yes    Functional Status Prior    Ambulation 2-->assistive person    Transferring 2-->assistive person    Toileting 0-->independent    Bathing 0-->independent    Dressing 0-->independent    Eating 0-->independent    Communication 0-->understands/communicates without difficulty    Swallowing 0-->swallows foods/liquids without difficulty    IADL    Medications independent    Meal Preparation assistive person    Housekeeping assistive person    Laundry assistive person    Shopping assistive person    Oral Care assistive person    IADL Comments spouse    Activity Tolerance    Current Activity Limitations none    Usual Activity Tolerance good    Current Activity Tolerance moderate    Cognitive/Perceptual/Developmental    Current Mental Status/Cognitive Functioning no deficits noted    Recent Changes in Mental Status/Cognitive Functioning no changes    Employment/Financial    Employment/Finance Comments insurance verified; Pt has prescription coverage            Psychosocial     None            Abuse/Neglect     None            Legal     None            Substance Abuse     None            Patient Forms     None          Chelsie Leach

## 2017-03-21 NOTE — TELEPHONE ENCOUNTER
Spoke with patient's sister yesterday.  Patient fell and was incontinent of bowel and bladder.  I advised her to take her to the ER.  I called back today and patient did not go to the ER.   Ok per Dr. Pantoja to add her to our schedule today.  Appointment given to Itzel.  She called back again stating she was dizzy again and she will go to the ER.

## 2017-03-21 NOTE — PROGRESS NOTES
HEMATOLOGY/ONCOLOGY INPATIENT CONSULT    REASON FOR CONSULT: Syncope and hypotension    HISTORY OF PRESENT ILLNESS; I am asked to see this 77 y.o.  female, referred for syncope and hypotension with failure to thrive.  She actually has gained weight since last I saw her and had been eating well and yet, despite that, she has had several times where she has passed out and fallen though no fractures thus far.  Pertinent oncologic history is as follows:    1.) Smoldering multiple myeloma     A.)The patient serum immunoelectrophoresis revealed a monoclonal IgA kappa #1 at 1500 MG/DL, monoclonal IgA kappa #22 small to quantify. IgA is elevated at 2471 with the upper limits of normal being 422. C-reactive protein was normal, beta-2 microglobulin normal at 2.2, sedimentation rate just slightly elevated at 34 with the upper limits of normal being 30. 24-hour urine immunoelectrophoresis with 321.9 mg/24 hour protein in the urine, monoclonal IgA kappa to small to quantify in the urine. She has no renal failure, her BUN was 16 creatinine of 0.8, she is not anemic, no thrombocytopenia. Core biopsy showed 5-10% kappa restricted plasma cells with a flow cytometry showing a population of CD 56 positive cells. There was no rouleaux. There only rare plasma cells in the clot prepped and the bone marrow was relatively aspicular. Her serum free light chains were 38 with the kappa lambda ratio of 3. Her total calcium was 10 and her ionized calcium was 1.28. Bone survey was negative.     B.)She's had 2 bone marrow biopsies the last one was done her last admission and has 10-15% plasma cells with an intermediate risk genetic profile on FISH analysis. Her cytogenetics however had inversion 3 which is unusual for myeloma and the statistical significance of which Dr. Sequeira was not sure. He did review her bone marrow and there was no evidence of myelodysplasia or acute leukemia which is more common with inversion 3. Prior bone survey was  negative. She's never been hypercalcemic, anemic, or any evidence of renal insufficiency with this. Her serum immunoelectrophoresis had 1600 mg of monoclonal immunoglobulin G last visit and that's actually slightly less than it was a couple of months back. Nonetheless, in part for appetite stimulation and in part on the theory that this monoclonal protein might be contributing to her poor peristalsis, she completed 20 mg a day of dexamethasone ×5 days as of yesterday. This did not make her hungry and she continues to lose weight. She's had 3 upper GI endoscopies one of which showed some neuroendocrine type infiltration of unknown significance but was not seen repetitively on small bowel biopsy.            Past Medical History   Diagnosis Date   • Arrhythmia    • Atrial fibrillation and flutter 8/8/2016   • Cancer      melanoma in 1971   • CHF (congestive heart failure)      Past Surgical History   Procedure Laterality Date   • Colon surgery     • Hysterectomy     • Appendectomy     • Hemorrhoidectomy     • Cholecystectomy         No current facility-administered medications on file prior to encounter.      Current Outpatient Prescriptions on File Prior to Encounter   Medication Sig Dispense Refill   • aspirin 325 MG tablet Take 325 mg by mouth daily.     • BIOTIN PO Take 1 capsule by mouth Daily.     • cholecalciferol (VITAMIN D3) 1000 UNITS tablet Take 2,000 Units by mouth daily.     • diphenhydrAMINE (BENADRYL) 50 MG capsule Take 50 mg by mouth At Night As Needed.     • metoprolol tartrate (LOPRESSOR) 25 MG tablet TAKE ONE-HALF TABLET BY MOUTH ONCE DAILY 45 tablet 3   • Omega-3 Fatty Acids (FISH OIL) 1000 MG capsule capsule Take  by mouth daily with breakfast.     • Red Yeast Rice 600 MG capsule Take  by mouth daily.     • Zinc 50 MG capsule Take  by mouth daily.     • [DISCONTINUED] DULoxetine (CYMBALTA) 30 MG capsule Take 30 mg by mouth Daily.     • [DISCONTINUED] gabapentin (NEURONTIN) 300 MG capsule Take 1  "capsule by mouth 3 (Three) Times a Day. 90 capsule 0   • [DISCONTINUED] ondansetron (ZOFRAN) 4 MG tablet Take 1 tablet by mouth Every 6 (Six) Hours As Needed for nausea or vomiting. 20 tablet 0   • [DISCONTINUED] polyethylene glycol (MIRALAX) packet Take 17 g by mouth 2 (Two) Times a Day. (Patient taking differently: Take 17 g by mouth As Needed.) 850 g 0       Allergies   Allergen Reactions   • Ambien [Zolpidem Tartrate]    • Morphine And Related        Social History     Social History   • Marital status:      Spouse name: N/A   • Number of children: N/A   • Years of education: N/A     Social History Main Topics   • Smoking status: Former Smoker   • Smokeless tobacco: None   • Alcohol use Yes   • Drug use: No   • Sexual activity: Defer     Other Topics Concern   • None     Social History Narrative       Family History   Problem Relation Age of Onset   • Cancer Mother    • Cancer Father    • Cancer Brother          REVIEW OF SYSTEMS:  A 14 point review of systems was performed and is negative except as noted above.      PHYSICAL EXAM:    Visit Vitals   • /74 (Patient Position: Standing)   • Pulse 82   • Temp 97.5 °F (36.4 °C) (Oral)   • Resp 18   • Ht 63\" (160 cm)   • Wt 112 lb (50.8 kg)   • SpO2 100%   • BMI 19.84 kg/m2       ECOG: (3) Capable of limited self-care, confined to bed or chair > 50% of waking hours  General: frail appearing female in no acute distress  HEENT: sclera anicteric, oropharynx clear  Lymphatics: no cervical, supraclavicular, inguinal, or axillary adenopathy  Neck: Supple. No thyromegaly.  Cardiovascular: regular rate and rhythm, no murmurs  Lungs: clear to auscultation bilaterally. No respiratory distress  Abdomen: soft, nontender, nondistended.  No palpable organomegaly  Extremities: no lower extremity edema, cyanosis, or clubbing  Skin: no rashes, lesions, bruising, or petechiae  Neuro: Alert and oriented x3. Moves all extremities.        Results from last 7 days  Lab Units " 03/21/17  1240   WBC 10*3/mm3 6.80   HEMOGLOBIN g/dL 15.3   PLATELETS 10*3/mm3 230 1.       Results from last 7 days  Lab Units 03/21/17  1240   SODIUM mmol/L 138   POTASSIUM mmol/L 3.7   TOTAL CO2 mmol/L 28.0   BUN mg/dL 10   CREATININE mg/dL 0.80   MAGNESIUM mg/dL 1.7   GLUCOSE mg/dL 100   AST (SGOT) U/L 25   ALT (SGPT) U/L 16   BILIRUBIN mg/dL 0.7   ALK PHOS U/L 88     Estimated Creatinine Clearance: 46.5 mL/min (by C-G formula based on Cr of 0.8).      Imaging Results (last 7 days)     Procedure Component Value Units Date/Time    CT Head Without Contrast [[id number]] Collected:  03/21/17 1349     Updated:  03/21/17 1458    Narrative:       EXAMINATION: CT HEAD WITHOUT CONTRAST-03/21/2017:      INDICATION: Weak.         TECHNIQUE: CT scan of the head was performed at 5 mm intervals. No  intravenous contrast was utilized.     COMPARISON: NONE.     FINDINGS: There is central and cortical atrophy. There are  periventricular white matter changes typical of aging. There is no mass,  hemorrhage, midline shift or extra-axial fluid collection.       Impression:       Age-related findings. There are no acute abnormalities.     D:  03/21/2017  E:  03/21/2017           This report was finalized on 3/21/2017 2:56 PM by Dr. Johnnie Sen MD.       XR Chest 2 View [[id number]] Collected:  03/21/17 1347     Updated:  03/21/17 1501    Narrative:       EXAMINATION: XR CHEST 2 VW- 03/21/2017     INDICATION: weak      COMPARISON: 01/03/2017     FINDINGS: There is a small area of postinflammatory scarring in the  right lower lobe. There are no acute findings and there has been no  change since 01/03/2017. Cardiac silhouette is normal.           Impression:       Chronic postinflammatory changes in the right lower lobe; no  acute disease.     D:  03/21/2017  E:  03/21/2017     This report was finalized on 3/21/2017 2:59 PM by Dr. Johnnie Sen MD.                ASSESSMENT & PLAN:    1.  hypotension   2. Syncope   3. Smoldering myeloma      Discussion: To have full-blown myeloma she must have either hypercalcemia, anemia due to myeloma, renal insufficiency due to myeloma, or lytic bone disease.  She has none of these but her bone survey has been negative while at the same time would like to get a PET scan but that has been denied thus far.  She's been in the hospital several times with failure to thrive and she actually has eaten better and gaining some weight but still having syncope.  She is seen cardiology in [place] and I will ask cardiology to see her while here for possible tilt table test and to assess for cardiac sources of syncope.  In the meantime she was also supposed to see neurology but canceled that appointment as she was coming in to see me in the upcoming days.  I will repeat her myeloma panel now and we'll redouble my efforts when she gets out to try to get a PET scan but cannot do so while she is an inpatient.  I doubt even if we were to find lytic bone disease that it would explain her syncope and her blood pressure in the 70s systolic.    Errors in dictation may reflect use of voice recognition software and not all errors in transcription may have been detected prior to signing.    Messi Pantoja MD    3/21/2017

## 2017-03-21 NOTE — TELEPHONE ENCOUNTER
----- Message from Tammy DONN Busby sent at 3/20/2017  3:05 PM EDT -----  Regarding: [facility] PASSING OUT  Contact: [phone]  Patient's sister Itzel called and patient is passing out, getting dizzy, falling she is worried wants to know if she needs to come in? Please advise.

## 2017-03-22 LAB — C DIFF TOX GENS STL QL NAA+PROBE: NOT DETECTED

## 2017-03-22 PROCEDURE — 99226 PR SBSQ OBSERVATION CARE/DAY 35 MINUTES: CPT | Performed by: HOSPITALIST

## 2017-03-22 PROCEDURE — 96361 HYDRATE IV INFUSION ADD-ON: CPT

## 2017-03-22 PROCEDURE — 87209 SMEAR COMPLEX STAIN: CPT | Performed by: INTERNAL MEDICINE

## 2017-03-22 PROCEDURE — 87045 FECES CULTURE AEROBIC BACT: CPT | Performed by: INTERNAL MEDICINE

## 2017-03-22 PROCEDURE — 96372 THER/PROPH/DIAG INJ SC/IM: CPT

## 2017-03-22 PROCEDURE — 99213 OFFICE O/P EST LOW 20 MIN: CPT | Performed by: INTERNAL MEDICINE

## 2017-03-22 PROCEDURE — 87046 STOOL CULTR AEROBIC BACT EA: CPT | Performed by: INTERNAL MEDICINE

## 2017-03-22 PROCEDURE — G8987 SELF CARE CURRENT STATUS: HCPCS | Performed by: OCCUPATIONAL THERAPIST

## 2017-03-22 PROCEDURE — 97161 PT EVAL LOW COMPLEX 20 MIN: CPT

## 2017-03-22 PROCEDURE — 25810000003 DEXTROSE-NACL PER 500 ML: Performed by: HOSPITALIST

## 2017-03-22 PROCEDURE — G8979 MOBILITY GOAL STATUS: HCPCS

## 2017-03-22 PROCEDURE — G8989 SELF CARE D/C STATUS: HCPCS | Performed by: OCCUPATIONAL THERAPIST

## 2017-03-22 PROCEDURE — 97165 OT EVAL LOW COMPLEX 30 MIN: CPT | Performed by: OCCUPATIONAL THERAPIST

## 2017-03-22 PROCEDURE — 99218 PR INITIAL OBSERVATION CARE/DAY 30 MINUTES: CPT | Performed by: INTERNAL MEDICINE

## 2017-03-22 PROCEDURE — G8980 MOBILITY D/C STATUS: HCPCS

## 2017-03-22 PROCEDURE — 87177 OVA AND PARASITES SMEARS: CPT | Performed by: INTERNAL MEDICINE

## 2017-03-22 PROCEDURE — G0378 HOSPITAL OBSERVATION PER HR: HCPCS

## 2017-03-22 PROCEDURE — 99204 OFFICE O/P NEW MOD 45 MIN: CPT | Performed by: PSYCHIATRY & NEUROLOGY

## 2017-03-22 PROCEDURE — G8988 SELF CARE GOAL STATUS: HCPCS | Performed by: OCCUPATIONAL THERAPIST

## 2017-03-22 PROCEDURE — 25010000002 ENOXAPARIN PER 10 MG: Performed by: INTERNAL MEDICINE

## 2017-03-22 PROCEDURE — G8978 MOBILITY CURRENT STATUS: HCPCS

## 2017-03-22 RX ORDER — MIDODRINE HYDROCHLORIDE 5 MG/1
2.5 TABLET ORAL EVERY 8 HOURS SCHEDULED
Status: DISCONTINUED | OUTPATIENT
Start: 2017-03-22 | End: 2017-03-23

## 2017-03-22 RX ORDER — DEXTROSE AND SODIUM CHLORIDE 5; .9 G/100ML; G/100ML
125 INJECTION, SOLUTION INTRAVENOUS CONTINUOUS
Status: DISCONTINUED | OUTPATIENT
Start: 2017-03-22 | End: 2017-03-24 | Stop reason: HOSPADM

## 2017-03-22 RX ORDER — SACCHAROMYCES BOULARDII 250 MG
250 CAPSULE ORAL 2 TIMES DAILY
Status: DISCONTINUED | OUTPATIENT
Start: 2017-03-22 | End: 2017-03-24 | Stop reason: HOSPADM

## 2017-03-22 RX ADMIN — SODIUM CHLORIDE 125 ML/HR: 9 INJECTION, SOLUTION INTRAVENOUS at 14:12

## 2017-03-22 RX ADMIN — SODIUM CHLORIDE 125 ML/HR: 9 INJECTION, SOLUTION INTRAVENOUS at 04:29

## 2017-03-22 RX ADMIN — METOPROLOL TARTRATE 25 MG: 25 TABLET ORAL at 20:23

## 2017-03-22 RX ADMIN — DULOXETINE 60 MG: 60 CAPSULE, DELAYED RELEASE ORAL at 08:40

## 2017-03-22 RX ADMIN — Medication 250 MG: at 17:14

## 2017-03-22 RX ADMIN — ENOXAPARIN SODIUM 40 MG: 40 INJECTION SUBCUTANEOUS at 08:39

## 2017-03-22 RX ADMIN — ASPIRIN 325 MG ORAL TABLET 325 MG: 325 PILL ORAL at 08:40

## 2017-03-22 RX ADMIN — DEXTROSE AND SODIUM CHLORIDE 125 ML/HR: 5; 900 INJECTION, SOLUTION INTRAVENOUS at 17:13

## 2017-03-22 RX ADMIN — Medication 250 MG: at 08:40

## 2017-03-22 RX ADMIN — METOPROLOL TARTRATE 25 MG: 25 TABLET ORAL at 08:40

## 2017-03-22 NOTE — THERAPY DISCHARGE NOTE
Acute Care - Physical Therapy Initial Eval/Discharge  [facility]     Patient Name: Sowmya Beckett  : 1940  MRN: [id number]  Today's Date: 3/22/2017   Onset of Illness/Injury or Date of Surgery Date: 17  Date of Referral to PT: 17  Referring Physician: Carl VARGAS      Admit Date: 3/21/2017    Visit Dx:    ICD-10-CM ICD-9-CM   1. Syncope due to orthostatic hypotension I95.1 458.0   2. Dizziness R42 780.4   3. Head injury due to trauma, initial encounter S09.90XA 959.01   4. Impaired functional mobility, balance, gait, and endurance Z74.09 V49.89   5. Impaired mobility and ADLs Z74.09 799.89     Patient Active Problem List   Diagnosis   • Atrial fibrillation and flutter   • History of melanoma   • Failure to thrive in adult   • Smoldering multiple myeloma (SMM)   • Dehydration   • Hyperglycemia   • Orthostatic hypotension   • Diarrhea   • Syncope due to orthostatic hypotension     Past Medical History:   Diagnosis Date   • Arrhythmia    • Atrial fibrillation and flutter 2016   • Cancer     melanoma in 1971   • CHF (congestive heart failure)      Past Surgical History:   Procedure Laterality Date   • APPENDECTOMY     • CHOLECYSTECTOMY     • COLON SURGERY     • HEMORRHOIDECTOMY     • HYSTERECTOMY            PT ASSESSMENT (last 72 hours)      PT Evaluation       17 0939 17 0938    Rehab Evaluation    Document Type evaluation  -EH evaluation;discharge summary  -ST    Subjective Information no complaints;agree to therapy  -EH no complaints;agree to therapy  -ST    Patient Effort, Rehab Treatment good  -EH good  -ST    Symptoms Noted During/After Treatment none  -EH none  -ST    General Information    Patient Profile Review yes  -EH yes  -ST    Onset of Illness/Injury or Date of Surgery Date 17  -EH 17  -ST    Referring Physician Carl VARGAS  -EH MD Carl  -ST    General Observations pt L sidelying with OT in room;   -EH pt supine upon arrival; IV intact  -ST    Pertinent History  "Of Current Problem Pt   - 77 y.o. w/hx of smoldering mult. myeloma presents to ED w/ c/o passing out & falling 3 times in 7 days when attempting to stand/ambulate.   -ST    Precautions/Limitations hip precautions- left;other (see comments)   monitor BP  -EH fall precautions;other (see comments)   monitor BP  -ST    Prior Level of Function independent:;all household mobility;community mobility;ADL's;home management;driving  - independent:;all household mobility;community mobility;ADL's;home management;driving  -ST    Equipment Currently Used at Home none  -EH none  -ST    Plans/Goals Discussed With patient;agreed upon  -EH patient;agreed upon  -ST    Risks Reviewed patient:;dizziness;nausea/vomiting;increased discomfort;change in vital signs  - patient:;dizziness;nausea/vomiting;increased discomfort;change in vital signs  -ST    Benefits Reviewed patient:;improve function;increase independence;increase strength;increase balance;decrease pain  - patient:;increase independence;improve function;increase strength;increase balance;decrease pain  -ST    Barriers to Rehab none identified  -EH none identified  -ST    Living Environment    Lives With significant other  - significant other  -ST    Living Arrangements house  - house  -ST    Home Accessibility tub/shower is not walk in  - bed and bath on same level;tub/shower is not walk in  -ST    Living Environment Comment lives with her man friend arina assists as needed,  - states that she lives with \"her man friend\" that can assist her if needed however was completely independent PTA; has never had any falls prior to this episode  -ST    Clinical Impression    Date of Referral to PT 03/21/17  -     Rehab Potential good, to achieve stated therapy goals  -     Vital Signs    Pre Systolic BP Rehab 118  -EH     Pre Treatment Diastolic BP 73  -EH     Intra Systolic BP Rehab 102  -EH     Intra Treatment Diastolic BP 75  -EH     Post Systolic BP Rehab 100  -EH  "    Post Treatment Diastolic BP 66  -     Pretreatment Heart Rate (beats/min) 61  -EH     Intratreatment Heart Rate (beats/min) 61  -EH     Posttreatment Heart Rate (beats/min) 62  -EH     Pre SpO2 (%) 99  -     O2 Delivery Pre Treatment room air  -     Post SpO2 (%) --   WNL  -     O2 Delivery Post Treatment room air  -     Pre Patient Position Supine  -EH     Intra Patient Position Sitting  -     Post Patient Position Standing   pt returned to sup after ambulation in room  -     Pain Assessment    Pain Assessment No/denies pain  - No/denies pain  -    Vision Assessment/Intervention    Visual Impairment WFL  -     Cognitive Assessment/Intervention    Current Cognitive/Communication Assessment functional  - functional  -    Orientation Status oriented x 4  - oriented x 4  -ST    Follows Commands/Answers Questions 100% of the time;able to follow single-step instructions;able to follow multi-step instructions  [facility] 100% of the time  -    Personal Safety WNL/WFL   education on orthostatic hypotension safetytechniques  - WNL/WFL  -    Personal Safety Interventions elopement precautions initiated;fall prevention program maintained   Notified NS of pt improvement.  - fall prevention program maintained;gait belt;nonskid shoes/slippers when out of bed  -    ROM (Range of Motion)    General ROM no range of motion deficits identified  - no range of motion deficits identified  -    MMT (Manual Muscle Testing)    General MMT Assessment  no strength deficits identified  -    General MMT Assessment Detail hip flexion 4+/5; remaining 5/5  -     Bed Mobility, Assessment/Treatment    Bed Mob, Supine to Sit, Banks independent  - independent  -ST    Bed Mob, Sit to Supine, Banks independent  - independent  -ST    Transfer Assessment/Treatment    Transfers, Sit-Stand Banks independent  - independent  -ST    Transfers, Stand-Sit [place] independent  -  independent  -ST    Gait Assessment/Treatment    Gait, [place] Level supervision required   progressed to independent  -     Gait, Assistive Device --   none  -EH     Gait, Distance (Feet) 25   pt on spore precautions  -EH     Gait, Gait Pattern Analysis swing-through gait  -EH     Motor Skills/Interventions    Additional Documentation  Balance Skills Training (Group)  -ST    Balance Skills Training    Sitting-Level of Assistance Independent  -EH Independent  -ST    Standing-Level of Assistance Independent  -EH Independent  -ST    Gait Balance-Level of Assistance Independent  - Independent  -ST    Therapy Exercises    Bilateral Upper Extremity --   education on use of arm movement to elevate BP  -EH     Sensory Assessment/Intervention    Light Touch LLE;RLE  -EH LUE;RUE  -ST    LUE Light Touch WNL  -EH WNL  -ST    RUE Light Touch WNL  -EH WNL  -ST    Positioning and Restraints    Pre-Treatment Position in bed  -EH in bed  -ST    Post Treatment Position bed  - bed  -ST    In Bed notified nsg;supine;call light within reach;encouraged to call for assist;exit alarm on  -EH notified nsg;supine;call light within reach;encouraged to call for assist;exit alarm on  -ST      03/21/17 2052 03/21/17 2042    General Information    Equipment Currently Used at Home  none  -RA    Living Environment    Lives With significant other  -RA     Living Arrangements house  -RA     Home Accessibility bed and bath on same level;stairs to enter home  -RA     Number of Stairs to Enter Home 4  -RA     Stair Railings at Home outside, present at both sides  -RA     Type of Financial/Environmental Concern none  -RA     Transportation Available car;family or friend will provide  -RA       03/21/17 1537       Living Environment    Lives With spouse  -RC     Living Arrangements house  -     Transportation Available car;family or friend will provide  -       User Key  (r) = Recorded By, (t) = Taken By, (c) = Cosigned By    Initials Name  Provider Type     Deena Arenas, PT Physical Therapist    ST Anika Ramos, OTR Occupational Therapist    BREANA Leach     VIRGILIO Orr RN Registered Nurse          Physical Therapy Education     Title: PT OT SLP Therapies (Done)     Topic: Physical Therapy (Done)     Point: Mobility training (Done)    Learning Progress Summary    Learner Readiness Method Response Comment Documented by Status   Patient Acceptance E ELIECER TORRES   03/22/17 1054 Done               Point: Precautions (Done)    Learning Progress Summary    Learner Readiness Method Response Comment Documented by Status   Patient Acceptance E ELIECER TORRES   03/22/17 1054 Done                      User Key     Initials Effective Dates Name Provider Type Discipline     06/19/15 -  Deena Arenas PT Physical Therapist PT                PT Recommendation and Plan  Anticipated Discharge Disposition: home with assist  PT Frequency: evaluation only  Plan of Care Review  Plan Of Care Reviewed With: patient  Outcome Summary/Follow up Plan: PT IE completed. Pt demonstrates baseline function with lack of drop > 20 systolic/10 diastolic BP. Pt denied dizziness. Pt appears to be at baseline function with resolution of medical symptoms.              Outcome Measures       03/22/17 0939 03/22/17 0938       How much help from another person do you currently need...    Turning from your back to your side while in flat bed without using bedrails? 4  -EH      Moving from lying on back to sitting on the side of a flat bed without bedrails? 4  -EH      Moving to and from a bed to a chair (including a wheelchair)? 4  -EH      Standing up from a chair using your arms (e.g., wheelchair, bedside chair)? 4  -EH      Climbing 3-5 steps with a railing? 4  -EH      To walk in hospital room? 4  -EH      AM-PAC 6 Clicks Score 24  -EH      How much help from another is currently needed...    Putting on and taking off regular lower body clothing?  4  -ST      Bathing (including washing, rinsing, and drying)  4  -ST     Toileting (which includes using toilet bed pan or urinal)  4  -ST     Putting on and taking off regular upper body clothing  4  -ST     Taking care of personal grooming (such as brushing teeth)  4  -ST     Eating meals  4  -ST     Score  24  -ST     Functional Assessment    Outcome Measure Options AM-PAC 6 Clicks Basic Mobility (PT)  - AM-PAC 6 Clicks Daily Activity (OT)  -ST       User Key  (r) = Recorded By, (t) = Taken By, (c) = Cosigned By    Initials Name Provider Type     Deena Arenas, PT Physical Therapist    ST Anika Ramos, OTR Occupational Therapist           Time Calculation:         PT Charges       03/22/17 1059          Time Calculation    Start Time 0939  -      PT Received On 03/22/17  -      Time Calculation- PT    Total Timed Code Minutes- PT 0 minute(s)  -        User Key  (r) = Recorded By, (t) = Taken By, (c) = Cosigned By    Initials Name Provider Type     Deena Arenas, PT Physical Therapist          Therapy Charges for Today     Code Description Service Date Service Provider Modifiers Qty    78076955746 HC PT EVAL LOW COMPLEXITY 4 3/22/2017 Deena Arenas, PT GP 1    43304841458 HC PT MOBILITY CURRENT 3/22/2017 Deena Arenas, PT GP, CH 1    28424826544 HC PT MOBILITY PROJECTED 3/22/2017 Deena Arenas, PT GP, CH 1    13907086296 HC PT MOBILITY DISCHARGE 3/22/2017 Deena Arenas, PT GP, CH 1          PT G-Codes  Outcome Measure Options: AM-PAC 6 Clicks Basic Mobility (PT)  Score: 24  Functional Limitation: Mobility: Walking and moving around  Mobility: Walking and Moving Around Current Status (): 0 percent impaired, limited or restricted  Mobility: Walking and Moving Around Goal Status (): 0 percent impaired, limited or restricted  Mobility: Walking and Moving Around Discharge Status (): 0 percent impaired, limited or restricted    PT Discharge Summary  Anticipated  Discharge Disposition: home with assist  Reason for Discharge: All goals achieved  Outcomes Achieved: Able to achieve all goals within established timeline    Deena Arenas, PT  3/22/2017

## 2017-03-22 NOTE — CONSULTS
Neurology    Referring Provider: Dr. Pantoja    Reason for Consultation: orthostatic hypotension      Chief complaint: syncope    Subjective .     History of present illness:  Ms. Beckett is a pleasant 77-year-old female with a past medical history significant for atrial fibrillation, CHF, and multiple myeloma who is admitted to the hospitalist service for episodes of syncope.  Per report over the last week or so she has had about 3 episodes of passing out associated with changing in position from lying or sitting to standing up and ambulating.  No abnormal staring or tonic-clonic activity. In the ED on presentation here it was noted that her systolic BP decreased by 20 points from sitting to standing.  Neurology was consulted for further recommendations.    Review of Systems: Positive for syncope.Otherwise complete review of systems was discussed with the patient and found to be negative except for that mentioned in history of present illness or in the initial H&P dated 03/21/2017    History  Past Medical History:   Diagnosis Date   • Arrhythmia    • Atrial fibrillation and flutter 8/8/2016   • Cancer     melanoma in 1971   • CHF (congestive heart failure)    ,   Past Surgical History:   Procedure Laterality Date   • APPENDECTOMY     • CHOLECYSTECTOMY     • COLON SURGERY     • HEMORRHOIDECTOMY     • HYSTERECTOMY     ,   Family History   Problem Relation Age of Onset   • Cancer Mother    • Cancer Father    • Cancer Brother    ,   Social History   Substance Use Topics   • Smoking status: Former Smoker     Types: Cigarettes     Quit date: 1970   • Smokeless tobacco: None   • Alcohol use 3.0 oz/week     5 Shots of liquor per week      Comment: seth   ,   Prescriptions Prior to Admission   Medication Sig Dispense Refill Last Dose   • aspirin 325 MG tablet Take 325 mg by mouth daily.   3/21/2017 at Unknown time   • BIOTIN PO Take 1 capsule by mouth Daily.   3/21/2017 at Unknown time   • cholecalciferol (VITAMIN D3) 1000  "UNITS tablet Take 2,000 Units by mouth daily.   3/21/2017 at Unknown time   • diphenhydrAMINE (BENADRYL) 50 MG capsule Take 50 mg by mouth At Night As Needed.   3/20/2017 at Unknown time   • DULoxetine (CYMBALTA) 60 MG capsule Take 60 mg by mouth Daily.   3/21/2017 at Unknown time   • metoprolol tartrate (LOPRESSOR) 25 MG tablet TAKE ONE-HALF TABLET BY MOUTH ONCE DAILY 45 tablet 3 3/21/2017 at Unknown time   • Omega-3 Fatty Acids (FISH OIL) 1000 MG capsule capsule Take  by mouth daily with breakfast.   3/21/2017 at Unknown time   • Red Yeast Rice 600 MG capsule Take  by mouth daily.   3/21/2017 at Unknown time   • Zinc 50 MG capsule Take  by mouth daily.   3/21/2017 at Unknown time   • polyethylene glycol (MIRALAX) packet Take 17 g by mouth Daily.       and Allergies:  Ambien [zolpidem tartrate] and Morphine and related    Objective     Vital Signs   Blood pressure 119/70, pulse 63, temperature 97.4 °F (36.3 °C), temperature source Oral, resp. rate 16, height 63.5\" (161.3 cm), weight 119 lb 9.6 oz (54.2 kg), SpO2 94 %.    Physical Exam:      Gen: Lying in bed with eyes open. In NAD. Appears stated age   Eyes: PERRL, conjuntivae/lids normal   ENT: External canals normal bilaterally. Oropharynx normal.    Neck: Supple. No thyroid enlargement noted   Respiratory: CTA bilaterally. Respirations unlabored   CV: RRR, S1 and S2 nml. Radial pulses 2+ bilaterally.    Skin: No rashes noted on exposed skin. Normal tugor.   MSK: Normal bulk and tone. Nml ROM     Neurologic:   Mental status: Awake, alert, oriented x4. Follows commands. Speech fluent.    CN: PERRL, EOM intact, sensation intact in upper/mid/lower face bilaterally, facial movements symmetric, hearing intact to finger rub bilaterally, palate elevates symmetrically, tongue movements and SCMs strong bilaterally    Motor: Strength full (5/5) throughout in BUE and BLE    Reflexes: 2+ and symmetric throughout     Sensory: Intact to LT throughout   Coordination: No " dysmetria noted   Gait: Not tested        Results Reviewed:     Labs reviewed             CT head personally reviewed.  Agree with report    Assessment/Plan     1.  Orthostatic hypotension = likely the cause for her syncope.  Differential includes volume depletion, but cannot rule out autonomic neuropathy or paraneoplastic causes in the setting of her multiple myeloma. Per oncology notes, tilt table testing was mentioned. This may be reasonable, but will defer to cardiology on the appropriateness in this setting. Otherwise, recommend conservative treatment and supportive care.  Midodrine started per cardiology.  Agree with this. Recommend lifestyle modification and taking care when changing positions.      No further recommendations at this time.  Okay for discharge from neuro perspective when medically appropriate.      Damaris Moseley MD  03/22/17  5:57 PM

## 2017-03-22 NOTE — THERAPY DISCHARGE NOTE
Acute Care - Occupational Therapy Initial Eval/Discharge  [facility]     Patient Name: Sowmya Beckett  : 1940  MRN: [id number]  Today's Date: 3/22/2017  Onset of Illness/Injury or Date of Surgery Date: 17  Date of Referral to OT: 17  Referring Physician: Carl VARGAS      Admit Date: 3/21/2017       ICD-10-CM ICD-9-CM   1. Syncope due to orthostatic hypotension I95.1 458.0   2. Dizziness R42 780.4   3. Head injury due to trauma, initial encounter S09.90XA 959.01   4. Impaired functional mobility, balance, gait, and endurance Z74.09 V49.89   5. Impaired mobility and ADLs Z74.09 799.89     Patient Active Problem List   Diagnosis   • Atrial fibrillation and flutter   • History of melanoma   • Failure to thrive in adult   • Smoldering multiple myeloma (SMM)   • Dehydration   • Hyperglycemia   • Orthostatic hypotension   • Diarrhea   • Syncope due to orthostatic hypotension     Past Medical History:   Diagnosis Date   • Arrhythmia    • Atrial fibrillation and flutter 2016   • Cancer     melanoma in 1971   • CHF (congestive heart failure)      Past Surgical History:   Procedure Laterality Date   • APPENDECTOMY     • CHOLECYSTECTOMY     • COLON SURGERY     • HEMORRHOIDECTOMY     • HYSTERECTOMY            OT ASSESSMENT FLOWSHEET (last 72 hours)      OT Evaluation       17 1052 17 0939 17 0938 17    Rehab Evaluation    Document Type  evaluation  -EH evaluation;discharge summary  -ST      Subjective Information  no complaints;agree to therapy  -EH no complaints;agree to therapy  -ST      Patient Effort, Rehab Treatment   good  -ST      Symptoms Noted During/After Treatment  none  -EH none  -ST      General Information    Patient Profile Review  yes  -EH yes  -ST      Onset of Illness/Injury or Date of Surgery Date  17  -EH 17  -ST      Referring Physician  Carl VARGAS  -EH MD Carl  -ST      General Observations  pt L sidelying with OT in room;   -EH pt  "supine upon arrival; IV intact  -ST      Pertinent History Of Current Problem  Pt   -EH 77 y.o. w/hx of smoldering mult. myeloma presents to ED w/ c/o passing out & falling 3 times in 7 days when attempting to stand/ambulate.   -ST      Precautions/Limitations   fall precautions;other (see comments)   monitor BP  -ST      Prior Level of Function   independent:;all household mobility;community mobility;ADL's;home management;driving  -ST      Equipment Currently Used at Home   none  -ST  none  -RA    Plans/Goals Discussed With   patient;agreed upon  -ST      Risks Reviewed   patient:;dizziness;nausea/vomiting;increased discomfort;change in vital signs  -ST      Benefits Reviewed   patient:;increase independence;improve function;increase strength;increase balance;decrease pain  -ST      Barriers to Rehab   none identified  -ST      Living Environment    Lives With   significant other  -ST significant other  -RA     Living Arrangements   house  -ST house  -RA     Home Accessibility   bed and bath on same level;tub/shower is not walk in  -ST bed and bath on same level;stairs to enter home  -RA     Number of Stairs to Enter Home    4  -RA     Stair Railings at Home    outside, present at both sides  -RA     Type of Financial/Environmental Concern    none  -RA     Transportation Available    car;family or friend will provide  -RA     Living Environment Comment   states that she lives with \"her man friend\" that can assist her if needed however was completely independent PTA; has never had any falls prior to this episode  -ST      Clinical Impression    Date of Referral to OT   03/22/17  -ST      OT Diagnosis   impaired ADL/IADL performance  -ST      Prognosis   good  -ST      Patient/Family Goals Statement   return home  -ST      Criteria for Skilled Therapeutic Interventions Met   no problems identified which require skilled intervention  -ST      Therapy Frequency   evaluation only  -ST      Anticipated Equipment Needs At " Discharge   other (see comments)   no equipment needed  -ST      Anticipated Discharge Disposition home with assist  -ST  home with assist  -ST      Functional Level Prior    Ambulation     0-->independent  -RA    Transferring     0-->independent  -RA    Toileting     0-->independent  -RA    Bathing     0-->independent  -RA    Dressing     0-->independent  -RA    Eating     0-->independent  -RA    Communication     0-->understands/communicates without difficulty  -RA    Swallowing     0-->swallows foods/liquids without difficulty  -RA    Prior Functional Level Comment     none  -RA    Vital Signs    Pre Systolic BP Rehab  (P)  118  -EH       Pre Treatment Diastolic BP  (P)  73  -EH       Intra Systolic BP Rehab  (P)  102  -EH       Intra Treatment Diastolic BP  (P)  75  -EH       Post Systolic BP Rehab  (P)  100  -EH       Post Treatment Diastolic BP  (P)  66  -EH       Pretreatment Heart Rate (beats/min)  (P)  61  -EH       Intratreatment Heart Rate (beats/min)  (P)  61  -EH       Posttreatment Heart Rate (beats/min)  (P)  62  -EH       Pre SpO2 (%)  (P)  99  -EH       O2 Delivery Pre Treatment  (P)  room air  -EH       Post SpO2 (%)  (P)  --   WNL  -EH       O2 Delivery Post Treatment  (P)  room air  -EH       Pre Patient Position  (P)  Supine  -EH       Intra Patient Position  (P)  Sitting  -EH       Post Patient Position  (P)  Standing   pt returned to sup after ambulation in room  -EH       Pain Assessment    Pain Assessment  (P)  No/denies pain  - No/denies pain  -ST      Vision Assessment/Intervention    Visual Impairment  (P)  WFL  -EH       Cognitive Assessment/Intervention    Current Cognitive/Communication Assessment  (P)  functional  -EH functional  -ST      Orientation Status  (P)  oriented x 4  -EH oriented x 4  -ST      Follows Commands/Answers Questions  (P)  100% of the time;able to follow single-step instructions;able to follow multi-step instructions  - 100% of the time  -ST      Personal  Safety  (P)  WNL/WFL   education on orthostatic hypotension safetytechniques  - WNL/WFL  -      Personal Safety Interventions  (P)  elopement precautions initiated;fall prevention program maintained   Notified [facility] of pt improvement.  - fall prevention program maintained;gait belt;nonskid shoes/slippers when out of bed  -      ROM (Range of Motion)    General ROM  (P)  no range of motion deficits identified  - no range of motion deficits identified  -      MMT (Manual Muscle Testing)    General MMT Assessment   no strength deficits identified  -      General MMT Assessment Detail  (P)  hip flexion 4+/5; remaining 5/5  -       Bed Mobility, Assessment/Treatment    Bed Mob, Supine to Sit, Craig  (P)  independent  - independent  -      Bed Mob, Sit to Supine, Craig  (P)  independent  - independent  -      Transfer Assessment/Treatment    Transfers, Sit-Stand Craig  (P)  independent  -[facility]  -      Transfers, Stand-Sit Craig  (P)  independent  - independent  -      Functional Mobility    Functional Mobility- Ind. Level   independent  -      Functional Mobility-Distance (Feet)   25  -ST      Functional Mobility- Comment   pt on spore precautions during IE therefore completed fxnl mobility in room  -      Lower Body Dressing Assessment/Training    LB Dressing Assess/Train, Comment   demonstrated ability to perform LBD w/ SUA  -      Motor Skills/Interventions    Additional Documentation   Balance Skills Training (Group)  -      Balance Skills Training    Sitting-Level of Assistance   Independent  -      Standing-Level of Assistance   Independent  -      Gait Balance-Level of Assistance   Independent  -      Therapy Exercises    Bilateral Upper Extremity  (P)  --   education on use of arm movement to elevate BP  -       Sensory Assessment/Intervention    Light Touch  (P)  LLE;RLE  - LUE;RUE  -      LUE Light Touch   WNL  -      RUE  Light Touch   WNL  -ST      Positioning and Restraints    Pre-Treatment Position   in bed  -ST      Post Treatment Position   bed  -ST      In Bed   notified nsg;supine;call light within reach;encouraged to call for assist;exit alarm on  -ST        03/21/17 1537 03/21/17 1536             Living Environment    Lives With spouse  -RC        Living Arrangements house  -RC        Transportation Available car;family or friend will provide  -RC        Functional Level Prior    Ambulation  2-->assistive person  -RC       Transferring  2-->assistive person  -RC       Toileting  0-->independent  -RC       Bathing  0-->independent  -RC       Dressing  0-->independent  -RC       Eating  0-->independent  -RC       Communication  0-->understands/communicates without difficulty  -RC       Swallowing  0-->swallows foods/liquids without difficulty  -RC         User Key  (r) = Recorded By, (t) = Taken By, (c) = Cosigned By    Initials Name Effective Dates     Deena Arenas, PT 06/19/15 -      Anika Ramos, OTR 02/20/17 -      Chelsie Leach 05/02/16 -     VIRGILIO Orr, RN 11/29/16 -           Occupational Therapy Education     Title: PT OT SLP Therapies (Done)     Topic: Occupational Therapy (Done)     Point: ADL training (Done)    Description: Instruct learner(s) on proper safety adaptation and remediation techniques during self care or transfers.   Instruct in proper use of assistive devices.    Learning Progress Summary    Learner Readiness Method Response Comment Documented by Status   Patient Acceptance REKHA PEREZ D DU, VU completed education on role of OT/POC  03/22/17 1051 Done               Point: Home exercise program (Done)    Description: Instruct learner(s) on appropriate technique for monitoring, assisting and/or progressing therapeutic exercises/activities.    Learning Progress Summary    Learner Readiness Method Response Comment Documented by Status   Patient Acceptance REKHA PEREZ D DU,MELISSA completed  education on role of OT/POC ST 03/22/17 1051 Done               Point: Precautions (Done)    Description: Instruct learner(s) on prescribed precautions during self-care and functional transfers.    Learning Progress Summary    Learner Readiness Method Response Comment Documented by Status   Patient Acceptance E,REKHA,ENA GONZÁLES,MELISSA completed education on role of OT/POC ST 03/22/17 1051 Done               Point: Body mechanics (Done)    Description: Instruct learner(s) on proper positioning and spine alignment during self-care, functional mobility activities and/or exercises.    Learning Progress Summary    Learner Readiness Method Response Comment Documented by Status   Patient Acceptance E,ENA DA SILVA,MELISSA completed education on role of OT/POC ST 03/22/17 1051 Done                      User Key     Initials Effective Dates Name Provider Type Discipline    ST 02/20/17 -  Anika Ramos OTR Occupational Therapist OT                OT Recommendation and Plan  Anticipated Equipment Needs At Discharge: other (see comments) (no equipment needed)  Anticipated Discharge Disposition: home with assist  Therapy Frequency: evaluation only  Plan of Care Review  Outcome Summary/Follow up Plan: OT IE completed per consult. Pt demonstrates baseline function to complete ADL tasks, bed mobility, transfers and fxnl mobility. No deficits found with strength or coordination. No skilled intervention needed.                Outcome Measures       03/22/17 0938          How much help from another is currently needed...    Putting on and taking off regular lower body clothing? 4  -ST      Bathing (including washing, rinsing, and drying) 4  -ST      Toileting (which includes using toilet bed pan or urinal) 4  -ST      Putting on and taking off regular upper body clothing 4  -ST      Taking care of personal grooming (such as brushing teeth) 4  -ST      Eating meals 4  -ST      Score 24  -ST      Functional Assessment    Outcome Measure Options AM-PAC 6  Clicks Daily Activity (OT)  -ST        User Key  (r) = Recorded By, (t) = Taken By, (c) = Cosigned By    Initials Name Provider Type    CHARANJIT Gates Occupational Therapist          Time Calculation:         Time Calculation- OT       03/22/17 1053          Time Calculation- OT    OT Start Time 0938  -ST      Total Timed Code Minutes- OT 0 minute(s)  -ST      OT Received On 03/22/17  -ST        User Key  (r) = Recorded By, (t) = Taken By, (c) = Cosigned By    Initials Name Provider Type    ST Anika Ramos OTR Occupational Therapist          Therapy Charges for Today     Code Description Service Date Service Provider Modifiers Qty    32957469436 HC OT SELFCARE CURRENT 3/22/2017 CHARANJIT Mercado GO,  1    33065912561 HC OT SELFCARE PROJECTED 3/22/2017 CHARANJIT Mercado GO,  1    68305854147 HC OT SELFCARE DISCHARGE 3/22/2017 Anika Ramos OTBEATRIZ GO,  1    [id number]  OT EVAL LOW COMPLEXITY 4 3/22/2017 Anika Ramos OTR GO 1          OT G-codes  OT Professional Judgement Used?: Yes  OT Functional Scales Options: AM-PAC 6 Clicks Daily Activity (OT)  Score: 24  Functional Limitation: Self care  Self Care Current Status (): 0 percent impaired, limited or restricted  Self Care Goal Status (): 0 percent impaired, limited or restricted  Self Care Discharge Status (): 0 percent impaired, limited or restricted    OT Discharge Summary  Anticipated Discharge Disposition: home with assist  Reason for Discharge: At baseline function  Discharge Destination: Home with assist    CHARANJIT Rojas  3/22/2017

## 2017-03-22 NOTE — PLAN OF CARE
Problem: Patient Care Overview (Adult)  Goal: Plan of Care Review  Outcome: Ongoing (interventions implemented as appropriate)    03/22/17 0449   Coping/Psychosocial Response Interventions   Plan Of Care Reviewed With patient   Patient Care Overview   Progress progress toward functional goals as expected         Problem: Fall Risk (Adult)  Goal: Identify Related Risk Factors and Signs and Symptoms  Outcome: Ongoing (interventions implemented as appropriate)    03/22/17 0449   Fall Risk   Fall Risk: Related Risk Factors age-related changes;history of falls   Fall Risk: Signs and Symptoms presence of risk factors       Goal: Absence of Falls  Outcome: Ongoing (interventions implemented as appropriate)    03/22/17 0449   Fall Risk (Adult)   Absence of Falls making progress toward outcome         Problem: Fluid Volume Deficit (Adult)  Goal: Identify Related Risk Factors and Signs and Symptoms  Outcome: Ongoing (interventions implemented as appropriate)    03/22/17 0449   Fluid Volume Deficit   Fluid Volume Deficit: Related Risk Factors age extremes;vomiting/diarrhea   Signs and Symptoms (Fluid Volume Deficit) abdominal cramping/distention;dizziness;nausea/vomiting, anorexia, diarrhea complaints         03/22/17 0449   Fluid Volume Deficit   Fluid Volume Deficit: Related Risk Factors age extremes;vomiting/diarrhea   Signs and Symptoms (Fluid Volume Deficit) abdominal cramping/distention;dizziness;nausea/vomiting, anorexia, diarrhea complaints       Goal: Fluid/Electrolyte Balance  Outcome: Ongoing (interventions implemented as appropriate)    03/22/17 0449   Fluid Volume Deficit (Adult)   Fluid/Electrolyte Balance making progress toward outcome       Goal: Comfort/Well Being  Outcome: Ongoing (interventions implemented as appropriate)    03/22/17 0449   Fluid Volume Deficit (Adult)   Comfort/Well Being making progress toward outcome

## 2017-03-22 NOTE — PROGRESS NOTES
Hospitalist Daily Progress Note    Date of Admission: 3/21/2017   LOS: 0 days   PCP: Velia Vogel MD    Chief Complaint: syncope    Subjective      Patient reports diarrhea for 3 days.  5 - 12 oz episodes, then 3 - 12 oz episodes, then less today.  Unclear how much volume, but concieveably enough to be volume depleted.    History of Present Illness    Review of Systems  General: no fevers, chills  Respiratory: no cough, dyspnea  Cardiovascular: no chest pain, palpitations  Abdomen: No abdominal pain, nausea, vomiting, or diarrhea  Neurologic: No focal weakness    Objective   Physical Exam:  I have reviewed the vital signs.  Temp:  [97.4 °F (36.3 °C)-98.6 °F (37 °C)] 97.4 °F (36.3 °C)  Heart Rate:  [63-85] 63  Resp:  [16] 16  BP: (103-125)/(70-85) 119/70    Objective  General Appearance:    Alert, cooperative, no distress  Head:    Normocephalic, atraumatic  Eyes:    Sclerae anicteric  Neck:   Supple, no mass  Lungs: Clear to auscultation bilaterally, respirations unlabored  Heart: Regular rate and rhythm, S1 and S2 normal, no murmur, rub or gallop  Abdomen:  Soft, non-tender, bowel sounds active, nondistended  Extremities: No clubbing, cyanosis, or edema to lower extremities  Pulses:  2+ and symmetric in distal lower extremities  Skin: No rashes   Neurologic: Oriented x3, Normal strength to extremities    Results Review:    I have reviewed the labs, radiology results and diagnostic studies.      Results from last 7 days  Lab Units 03/21/17  1240   WBC 10*3/mm3 6.80   HEMOGLOBIN g/dL 15.3   PLATELETS 10*3/mm3 230       Results from last 7 days  Lab Units 03/21/17  1240   SODIUM mmol/L 138   POTASSIUM mmol/L 3.7   TOTAL CO2 mmol/L 28.0   CREATININE mg/dL 0.80   GLUCOSE mg/dL 100       I have reviewed the medications.    ---------------------------------------------------------------------------------------------  Assessment/Plan   Assessment & Plan  Assessment/Problem List    Principal Problem:    Orthostatic  hypotension  Active Problems:    Atrial fibrillation and flutter    Failure to thrive in adult    Smoldering multiple myeloma (SMM)    Diarrhea    Syncope due to orthostatic hypotension    77 year old female who has had diarrhea for 3 days and had syncope and dehydration.     Plan    Syncope  - continue IV fluids  - started on midodine  Diarrhea  - unclear etiology  - c diff negative  - continue probiotics  - improving  Atrial Fibrillation  - tele  Failure to Thrive  - d5 NS  DVT PPx  - Lovenox SC    Start On dextrose fluids today, check electrolytes  Monitor blood pressure on midodine    DVT prophylaxis:  Lovenox 40 mg SC  Discharge Planning: I expect patient to be discharged to home 1 day    Anurag Jacobs MD 03/22/17 2:55 PM

## 2017-03-22 NOTE — PLAN OF CARE
Problem: Patient Care Overview (Adult)  Goal: Plan of Care Review  Outcome: Outcome(s) achieved Date Met:  03/22/17 03/22/17 1052   Coping/Psychosocial Response Interventions   Plan Of Care Reviewed With patient   Outcome Evaluation   Outcome Summary/Follow up Plan PT IE completed. Pt demonstrates baseline function with lack of drop > 20 systolic/10 diastolic BP. Pt denied dizziness. Pt appears to be at baseline function with resolution of medical symptoms.

## 2017-03-22 NOTE — PLAN OF CARE
Problem: Patient Care Overview (Adult)  Goal: Plan of Care Review  Outcome: Ongoing (interventions implemented as appropriate)    03/22/17 1809   Coping/Psychosocial Response Interventions   Plan Of Care Reviewed With patient   Patient Care Overview   Progress progress towards functional goals is fair   Outcome Evaluation   Outcome Summary/Follow up Plan No c/o pain or discomfort. 24 hour urine collection started at 0830 on 3/22/2017. VSS       Goal: Adult Individualization and Mutuality  Outcome: Ongoing (interventions implemented as appropriate)    03/22/17 1809   Individualization   Patient Specific Preferences door shut   Patient Specific Goals get better   Patient Specific Interventions bed alarm, 24 hr urine collection began at 0830 on 3/22/17       Goal: Discharge Needs Assessment  Outcome: Ongoing (interventions implemented as appropriate)    03/21/17 1537 03/21/17 2052 03/22/17 0939   Discharge Needs Assessment   Concerns To Be Addressed no discharge needs identified --  --    Readmission Within The Last 30 Days no previous admission in last 30 days --  --    Community Agency Name(S) [facility] for [facility] and OT, not current --  --    Equipment Needed After Discharge walker, standard --  --    Discharge Disposition --  --  --    Current Health   Outpatient/Agency/Support Group Needs homecare agency (specify level of care) --  --    Anticipated Changes Related to Illness none --  --    Living Environment   Transportation Available --  car;family or friend will provide --    Self-Care   Equipment Currently Used at Home --  --  none     03/22/17 1809   Discharge Needs Assessment   Concerns To Be Addressed --    Readmission Within The Last 30 Days --    Community Agency Name(S) --    Equipment Needed After Discharge --    Discharge Disposition still a patient   Current Health   Outpatient/Agency/Support Group Needs --    Anticipated Changes Related to Illness --    Living Environment   Transportation  Available --    Self-Care   Equipment Currently Used at Home --          Problem: Fall Risk (Adult)  Goal: Identify Related Risk Factors and Signs and Symptoms  Outcome: Ongoing (interventions implemented as appropriate)    03/22/17 0449   Fall Risk   Fall Risk: Related Risk Factors age-related changes;history of falls   Fall Risk: Signs and Symptoms presence of risk factors       Goal: Absence of Falls  Outcome: Ongoing (interventions implemented as appropriate)    03/22/17 1809   Fall Risk (Adult)   Absence of Falls making progress toward outcome         Problem: Fluid Volume Deficit (Adult)  Goal: Identify Related Risk Factors and Signs and Symptoms  Outcome: Ongoing (interventions implemented as appropriate)    03/22/17 0449   Fluid Volume Deficit   Fluid Volume Deficit: Related Risk Factors age extremes;vomiting/diarrhea   Signs and Symptoms (Fluid Volume Deficit) abdominal cramping/distention;dizziness;nausea/vomiting, anorexia, diarrhea complaints       Goal: Fluid/Electrolyte Balance  Outcome: Ongoing (interventions implemented as appropriate)    03/22/17 1809   Fluid Volume Deficit (Adult)   Fluid/Electrolyte Balance making progress toward outcome       Goal: Comfort/Well Being  Outcome: Ongoing (interventions implemented as appropriate)    03/22/17 1809   Fluid Volume Deficit (Adult)   Comfort/Well Being making progress toward outcome

## 2017-03-22 NOTE — CONSULTS
"Adult Nutrition  Assessment/PES    Patient Name:  Sowmya Beckett  YOB: 1940  MRN: [id number]  Admit Date:  3/21/2017    Assessment Date:  3/22/2017        Reason for Assessment       03/22/17 1249    Reason for Assessment    Reason For Assessment/Visit physician consult    Identified At Risk By Screening Criteria MST SCORE 2+    Time Spent (min) 15    Cardiac --   Atrial fib and flutter    Gastrointestinal Diarrhea    Oncology Multiple myeloma   smoldering    Other diagnosis FTT                Anthropometrics       03/22/17 1250    Anthropometrics    Height 161.3 cm (63.5\")    Weight 54.2 kg (119 lb 9.6 oz)    Ideal Body Weight (IBW)    Ideal Body Weight (IBW), Female 54.26    % Ideal Body Weight 100.19    Usual Body Weight (UBW)    Usual Body Weight 50.8 kg (112 lb)   Pt has gained weight since last hospital admission 1/14/2017    % Usual Body Weight 106.79    Body Mass Index (BMI)    BMI (kg/m2) 20.9            Labs/Tests/Procedures/Meds       03/22/17 1253    Labs/Tests/Procedures/Meds    Labs/Tests Review Reviewed                Nutrition Prescription Ordered       03/22/17 1253    Nutrition Prescription PO    Current PO Diet Regular            Evaluation of Received Nutrient/Fluid Intake       03/22/17 1253    PO Evaluation    % PO Intake --   Insufficient p.o. data              Problem/Interventions:        Problem 1       03/22/17 1254    Nutrition Diagnoses Problem 1    Problem 1 No Nutrition Diagnosis at this Time                    Intervention Goal       03/22/17 1254    Intervention Goal    General Nutrition support treatment    PO Establish PO            Nutrition Intervention       03/22/17 1254    Nutrition Intervention    RD/Tech Action Recommend/ordered;Follow Tx progress    assisting pt. with menu selections.    Recommended/Ordered Supplement            Nutrition Prescription       03/22/17 1254    Nutrition Prescription PO    PO Prescription Begin/change " supplement    Supplement Boost Plus   strawberry or vanilla    Supplement Frequency 3 times a day            Education/Evaluation       03/22/17 1052    Education    Education --   MNT discussed with pt.    Monitor/Evaluation    Monitor Per protocol            Electronically signed by:  Giselle Lala RD  03/22/17 12:55 PM

## 2017-03-22 NOTE — PROGRESS NOTES
Continued Stay Note  [facility]     Patient Name: Sowmya Beckett  MRN: [id number]  Today's Date: 3/22/2017    Admit Date: 3/21/2017          Discharge Plan       03/22/17 1535    Case Management/Social Work Plan    Plan Home at ID    Patient/Family In Agreement With Plan yes    Additional Comments I spoke with PT who states the pt does not need any DME or skilled rehab services at this time. I spoke with the pt and she states she does not need any new DME or rehab services after D at this time..              Discharge Codes     None        Expected Discharge Date and Time     Expected Discharge Date Expected Discharge Time    Mar 23, 2017             Gabbi Park RN

## 2017-03-22 NOTE — CONSULTS
[facility] at [facility]  Cardiovascular Consultation Note    Reason for consultation:hypotension and syncope    History of Present Illness: Pleasant lady 77 years old who has a history of atrial arrhythmias and underwent an ablation for A. fib a flutter and SVT by Dr. Dupont in 2012.  Not had problems with palpitations or arrhythmias since that time.  She sees Dr. Reinoso last saw him in September 2016.  His records that shows that she previously been reported to have normal left ventricular function we'll have any records documenting.  She also has been fighting total myeloma.  Patient came in she has episodes where her appetite is poor and she loses weight in the episodes where she does okay and some weight back.  3 days ago she had gotten up to move around after she began upper couple minutes she started getting really lightheaded and dizzy she knew she is given a pass out she I'll refer her  that she did pass out and hit her head on the floor.  She did not feel palpitations chest pain or shortness of breath prior to this.  She states she has a history of getting severely dizzy when she stands up over the last several years but that's not as bad as it used to be.  She denies chest pain shortness of breath strokelike symptoms prior blood per rectum or melena.  Denies any history of exertional angina or dyspnea.  In the emergency room she was found to be orthostatic with a systolic blood pressure dropping to 71.  Her CT of the head did not show any acute process.        Cardiac risk factors: Age greater than 55    Past medical and surgical history, social and family history reviewed in EMR.    REVIEW OF SYSTEMS:     Past Medical History:   Diagnosis Date   • Arrhythmia    • Atrial fibrillation and flutter 8/8/2016   • Cancer     melanoma in 1971   • CHF (congestive heart failure)      Past Surgical History:   Procedure Laterality Date   • APPENDECTOMY     • CHOLECYSTECTOMY     •  COLON SURGERY     • HEMORRHOIDECTOMY     • HYSTERECTOMY       Social History     Social History   • Marital status:      Spouse name: N/A   • Number of children: N/A   • Years of education: N/A     Occupational History   • Not on file.     Social History Main Topics   • Smoking status: Former Smoker     Types: Cigarettes     Quit date: 1970   • Smokeless tobacco: Not on file   • Alcohol use 3.0 oz/week     5 Shots of liquor per week      Comment: bourbon   • Drug use: No   • Sexual activity: Defer     Other Topics Concern   • Not on file     Social History Narrative     Family History   Problem Relation Age of Onset   • Cancer Mother    • Cancer Father    • Cancer Brother        H&P ROS reviewed and pertinent CV ROS as noted in HPI.    Cardiac:  No palpitations no angina no dyspnea  Respiratory:  No dyspnea on exertion no wheezing no hemoptysis  Lower Extremities:  No claudication or edema  GI:  Positive bowel sounds no bright red blood per rectum no melena.  She has had some diarrhea the  Neuro:  No stroke or TIA or neurologic event/radicular pain  Hematology:  She does have multiple myeloma.  She's had no excessive petechiae or ecchymosis      Physical Exam: General  thin white female pleasant and cooperative no acute distress       HEENT: No JVP/no bruit/hair is thin/tongue midline/EOMI       Respiratory:  Equal bilateral symmetrical expansion/clear to auscultation bilaterally       Cardiovascular: Regular rate and rhythm without murmur gallop click/no edema       GI: Positive bowel sounds nontender to palpation       Lower Extremities: No edema       Neuro: Cranial nerves II through XII are normal/moves all 4 extremities       Skin:  Skin is warm and dry/no edema to palpation       Psych: Oriented ×3/pleasant affect           Vital Sign Min/Max for last 24 hours  Temp  Min: 97.4 °F (36.3 °C)  Max: 98.6 °F (37 °C)   BP  Min: 71/54  Max: 130/93   Pulse  Min: 64  Max: 85   Resp  Min: 16  Max: 18   SpO2  Min:  94 %  Max: 100 %   No Data Recorded      Intake/Output Summary (Last 24 hours) at 03/22/17 1146  Last data filed at 03/22/17 0820   Gross per 24 hour   Intake             2100 ml   Output              600 ml   Net             1500 ml             Current Facility-Administered Medications:   •  aspirin tablet 325 mg, 325 mg, Oral, Daily, Biju Henry MD, 325 mg at 03/22/17 0840  •  DULoxetine (CYMBALTA) DR capsule 60 mg, 60 mg, Oral, Daily, Biju Henry MD, 60 mg at 03/22/17 0840  •  enoxaparin (LOVENOX) syringe 40 mg, 40 mg, Subcutaneous, Daily, Biju Henry MD, 40 mg at 03/22/17 0839  •  metoprolol tartrate (LOPRESSOR) tablet 25 mg, 25 mg, Oral, Q12H, Biju Henry MD, 25 mg at 03/22/17 0840  •  saccharomyces boulardii (FLORASTOR) capsule 250 mg, 250 mg, Oral, BID, Trinity Everett MD, 250 mg at 03/22/17 0840  •  sodium chloride 0.9 % flush 1-10 mL, 1-10 mL, Intravenous, PRN, Biju Henry MD  •  sodium chloride 0.9 % flush 10 mL, 10 mL, Intravenous, PRN, David Reese MD  •  sodium chloride 0.9 % infusion, 250 mL/hr, Intravenous, Continuous, David Reese MD, Last Rate: 250 mL/hr at 03/21/17 1423, 250 mL/hr at 03/21/17 1423  •  sodium chloride 0.9 % infusion, 125 mL/hr, Intravenous, Continuous, Biju Henry MD, Last Rate: 125 mL/hr at 03/22/17 1005, 125 mL/hr at 03/22/17 1005    Lab Review:     Results from last 7 days  Lab Units 03/21/17  1240   WBC 10*3/mm3 6.80   HEMOGLOBIN g/dL 15.3   PLATELETS 10*3/mm3 230       Results from last 7 days  Lab Units 03/21/17  1240   SODIUM mmol/L 138   POTASSIUM mmol/L 3.7   TOTAL CO2 mmol/L 28.0   BUN mg/dL 10   CREATININE mg/dL 0.80   MAGNESIUM mg/dL 1.7   GLUCOSE mg/dL 100     Estimated Creatinine Clearance: 49 mL/min (by C-G formula based on Cr of 0.8).        .lipd  Lab Results   Component Value Date    AST 25 03/21/2017    ALT 16 03/21/2017       Radiology Reports:  Imaging Results (last 72 hours)     Procedure Component Value Units Date/Time    CT Head Without  Contrast [[id number]] Collected:  03/21/17 1349     Updated:  03/21/17 1458    Narrative:       EXAMINATION: CT HEAD WITHOUT CONTRAST-03/21/2017:      INDICATION: Weak.         TECHNIQUE: CT scan of the head was performed at 5 mm intervals. No  intravenous contrast was utilized.     COMPARISON: NONE.     FINDINGS: There is central and cortical atrophy. There are  periventricular white matter changes typical of aging. There is no mass,  hemorrhage, midline shift or extra-axial fluid collection.       Impression:       Age-related findings. There are no acute abnormalities.     D:  03/21/2017  E:  03/21/2017           This report was finalized on 3/21/2017 2:56 PM by Dr. Johnnie Sen MD.       XR Chest 2 View [[id number]] Collected:  03/21/17 1347     Updated:  03/21/17 1501    Narrative:       EXAMINATION: XR CHEST 2 VW- 03/21/2017     INDICATION: weak      COMPARISON: 01/03/2017     FINDINGS: There is a small area of postinflammatory scarring in the  right lower lobe. There are no acute findings and there has been no  change since 01/03/2017. Cardiac silhouette is normal.           Impression:       Chronic postinflammatory changes in the right lower lobe; no  acute disease.     D:  03/21/2017  E:  03/21/2017     This report was finalized on 3/21/2017 2:59 PM by Dr. Johnnie Sen MD.             Assessment: 1 syncope with documented orthostasis with a blood pressure yesterday dropping to 71 mmHg.  The patient continues to have mild orthostasis with a sitting blood pressure 120 troponin to 103.  2 history of atrial arrhythmias well controlled since ablation on beta blocker    Plan: 1 start midodrine 5 mg 3 times a day.  From my point of view the patient can be discharged and follow-up with Dr. Reinoso in about 4 weeks      Blane Peters MD  03/22/17  11:46 AM

## 2017-03-22 NOTE — PROGRESS NOTES
HEMATOLOGY/ONCOLOGY PROGRESS NOTE     CC: Syncope and hypotension with failure to thrive    SUBJECTIVE: She ate very little breakfast this morning.  She states her diarrhea has improved.  She did not describe this being voluminous diarrhea.  Her blood pressure has returned to normal with hydration.  She has not been up and around this morning to determine whether she is having any further syncopal symptoms.  Her syncope has been bad enough to have a complete loss of consciousness though when this happened she did not come for medical attention at that junction within the past few weeks.  Myeloma panel has been sent again.  Past oncology history  1.) Smoldering multiple myeloma      A.)The patient serum immunoelectrophoresis revealed a monoclonal IgA kappa #1 at 1500 MG/DL, monoclonal IgA kappa #22 small to quantify. IgA is elevated at 2471 with the upper limits of normal being 422. C-reactive protein was normal, beta-2 microglobulin normal at 2.2, sedimentation rate just slightly elevated at 34 with the upper limits of normal being 30. 24-hour urine immunoelectrophoresis with 321.9 mg/24 hour protein in the urine, monoclonal IgA kappa to small to quantify in the urine. She has no renal failure, her BUN was 16 creatinine of 0.8, she is not anemic, no thrombocytopenia. Core biopsy showed 5-10% kappa restricted plasma cells with a flow cytometry showing a population of CD 56 positive cells. There was no rouleaux. There only rare plasma cells in the clot prepped and the bone marrow was relatively aspicular. Her serum free light chains were 38 with the kappa lambda ratio of 3. Her total calcium was 10 and her ionized calcium was 1.28. Bone survey was negative.      B.)She's had 2 bone marrow biopsies the last one was done her last admission and has 10-15% plasma cells with an intermediate risk genetic profile on FISH analysis. Her cytogenetics however had inversion 3 which is unusual for myeloma and the statistical  significance of which Dr. Sequeira was not sure. He did review her bone marrow and there was no evidence of myelodysplasia or acute leukemia which is more common with inversion 3. Prior bone survey was negative. She's never been hypercalcemic, anemic, or any evidence of renal insufficiency with this. Her serum immunoelectrophoresis had 1600 mg of monoclonal immunoglobulin G last visit and that's actually slightly less than it was a couple of months back. Nonetheless, in part for appetite stimulation and in part on the theory that this monoclonal protein might be contributing to her poor peristalsis, she completed 20 mg a day of dexamethasone ×5 days winter 2016. This did not make her hungry and she continued to lose weight. She's had 3 upper GI endoscopies one of which showed some neuroendocrine type infiltration of unknown significance but was not seen repetitively on subsequent small bowel biopsy.             Past Medical History, Past Surgical History, Social History, Family History have been reviewed and are without significant changes except as mentioned.      Medications:  The current medication list was reviewed in the EMR    ALLERGIES:   Allergies   Allergen Reactions   • Ambien [Zolpidem Tartrate]    • Morphine And Related        ROS:  A comprehensive 14 point review of systems was performed and was negative except as mentioned.      Objective      Vitals:    03/21/17 2017 03/22/17 0430 03/22/17 0505 03/22/17 0813   BP: 109/76 109/76  117/85   BP Location: Right arm   Left arm   Patient Position: Sitting   Lying   Pulse: 75 82  85   Resp: 16 16  16   Temp:  98.6 °F (37 °C)  97.4 °F (36.3 °C)   TempSrc:  Oral  Oral   SpO2:    94%   Weight:   119 lb 9.6 oz (54.3 kg)    Height:            ECOG: (3) Capable of limited self-care, confined to bed or chair > 50% of waking hours    General: Frail appearing, in no acute distress  HEENT: sclera anicteric, oropharynx clear  Lymphatics: no cervical, supraclavicular,  inguinal, or axillary adenopathy  Cardiovascular: regular rate and rhythm, no murmurs  Lungs: clear to auscultation bilaterally  Abdomen: soft, nontender, nondistended.  No palpable organomegaly  ExtremIties: no lower extremity edema  Skin: no rashes, lesions, bruising, or petechiae  Neuro: Alert and oriented x 3. Moves all extremities.    RECENT LABS:        Results from last 7 days  Lab Units 03/21/17  1240   WBC 10*3/mm3 6.80   HEMOGLOBIN g/dL 15.3   PLATELETS 10*3/mm3 230       Results from last 7 days  Lab Units 03/21/17  1240   SODIUM mmol/L 138   POTASSIUM mmol/L 3.7   TOTAL CO2 mmol/L 28.0   BUN mg/dL 10   CREATININE mg/dL 0.80   MAGNESIUM mg/dL 1.7   GLUCOSE mg/dL 100   AST (SGOT) U/L 25   ALT (SGPT) U/L 16   BILIRUBIN mg/dL 0.7   ALK PHOS U/L 88     Estimated Creatinine Clearance: 49 mL/min (by C-G formula based on Cr of 0.8).      Imaging Results (last 72 hours)     Procedure Component Value Units Date/Time    CT Head Without Contrast [[id number]] Collected:  03/21/17 1349     Updated:  03/21/17 1458    Narrative:       EXAMINATION: CT HEAD WITHOUT CONTRAST-03/21/2017:      INDICATION: Weak.         TECHNIQUE: CT scan of the head was performed at 5 mm intervals. No  intravenous contrast was utilized.     COMPARISON: NONE.     FINDINGS: There is central and cortical atrophy. There are  periventricular white matter changes typical of aging. There is no mass,  hemorrhage, midline shift or extra-axial fluid collection.       Impression:       Age-related findings. There are no acute abnormalities.     D:  03/21/2017  E:  03/21/2017           This report was finalized on 3/21/2017 2:56 PM by Dr. Johnnie Sen MD.       XR Chest 2 View [[id number]] Collected:  03/21/17 1347     Updated:  03/21/17 1501    Narrative:       EXAMINATION: XR CHEST 2 VW- 03/21/2017     INDICATION: weak      COMPARISON: 01/03/2017     FINDINGS: There is a small area of postinflammatory scarring in the  right lower lobe. There are no acute  findings and there has been no  change since 01/03/2017. Cardiac silhouette is normal.           Impression:       Chronic postinflammatory changes in the right lower lobe; no  acute disease.     D:  03/21/2017  E:  03/21/2017     This report was finalized on 3/21/2017 2:59 PM by Dr. Johnnie Sen MD.             ASSESSMENT & PLAN:    1. Syncope  2. Hypotension  3. Smoldering myeloma  4. Anorexia  5. Rate controlled atrial fibrillation on beta blockers    Discussion: I discussed the case earlier today with Dr. Willoughby.  While she had diarrhea recently, and she does not describe this as being voluminous and I'm not sure if that was the sole reason for her hypotension.  She sees cardiology in [place].  I wonder if she has autonomic nervous dysfunction causing carotid bulb instability and perhaps sluggish bowel function causing her anorexia.  This may be a consequence of her monoclonal gammopathy.  However, the magnitude of her monoclonal gammopathy is not massive and this would be unusual.  Repeat myeloma panel has been sent.  Technically, unless she has hypercalcemia, anemia, renal dysfunction, or lytic bone disease, greater than 60% plasma cell involvement of the marrow, or rapidly rising monoclonal proteins are light chains, then there is no indication for treatment of the myeloma and she did not respond symptomatically to a trial of steroids last quarter.  Given her genetic abnormalities, the family expressed concern that this might be leukemia.  While I could not with her mild macrocytosis rule out early myelodysplasia, HER-2 bone marrow is did not show a significant increase percentage of blasts nor katlin myelodysplasia but did have 10-15% plasma cells on her last bone marrow last quarter.  The distinction between a benign monoclonal gammopathy and smoldering myeloma revolves around this going from 5-10% plasma cells up to 10-15% plasma cells on serial biopsy ×2.  I've also asked neurology for input as to her  potential causes for failure to thrive and syncope.  I wondered aloud with Dr. Willoughby whether a tilt table test might be prudent given her repetitive falls.      Errors in dictation may reflect use of voice recognition software and not all errors in transcription may have been detected prior to signing.            Messi Pantoja MD    3/22/2017

## 2017-03-23 LAB
ALBUMIN SERPL-MCNC: 3.3 G/DL (ref 2.9–4.4)
ALBUMIN SERPL-MCNC: 3.5 G/DL (ref 2.9–4.4)
ALBUMIN/GLOB SERPL: 0.9 {RATIO} (ref 0.7–1.7)
ALBUMIN/GLOB SERPL: 1 {RATIO} (ref 0.7–1.7)
ALPHA1 GLOB FLD ELPH-MCNC: 0.2 G/DL (ref 0–0.4)
ALPHA1 GLOB FLD ELPH-MCNC: 0.2 G/DL (ref 0–0.4)
ALPHA2 GLOB SERPL ELPH-MCNC: 0.7 G/DL (ref 0.4–1)
ALPHA2 GLOB SERPL ELPH-MCNC: 0.8 G/DL (ref 0.4–1)
ANION GAP SERPL CALCULATED.3IONS-SCNC: 3 MMOL/L (ref 3–11)
B-GLOBULIN SERPL ELPH-MCNC: 1.9 G/DL (ref 0.7–1.3)
B-GLOBULIN SERPL ELPH-MCNC: 2 G/DL (ref 0.7–1.3)
B2 MICROGLOB SERPL-MCNC: 2.2 MG/L (ref 0.6–2.4)
BUN BLD-MCNC: 5 MG/DL (ref 9–23)
BUN/CREAT SERPL: 8.3 (ref 7–25)
CALCIUM SPEC-SCNC: 8.8 MG/DL (ref 8.7–10.4)
CHLORIDE SERPL-SCNC: 106 MMOL/L (ref 99–109)
CO2 SERPL-SCNC: 30 MMOL/L (ref 20–31)
CREAT BLD-MCNC: 0.6 MG/DL (ref 0.6–1.3)
DEPRECATED RDW RBC AUTO: 53.1 FL (ref 37–54)
ERYTHROCYTE [DISTWIDTH] IN BLOOD BY AUTOMATED COUNT: 14.1 % (ref 11.3–14.5)
GAMMA GLOB SERPL ELPH-MCNC: 0.7 G/DL (ref 0.4–1.8)
GAMMA GLOB SERPL ELPH-MCNC: 0.8 G/DL (ref 0.4–1.8)
GFR SERPL CREATININE-BSD FRML MDRD: 97 ML/MIN/1.73
GLOBULIN SER CALC-MCNC: 3.4 G/DL (ref 2.2–3.9)
GLOBULIN SER CALC-MCNC: 3.9 G/DL (ref 2.2–3.9)
GLUCOSE BLD-MCNC: 107 MG/DL (ref 70–100)
HCT VFR BLD AUTO: 42.4 % (ref 34.5–44)
HGB BLD-MCNC: 13.9 G/DL (ref 11.5–15.5)
IGA SERPL-MCNC: 1703 MG/DL (ref 64–422)
IGG SERPL-MCNC: 628 MG/DL (ref 700–1600)
IGM SERPL-MCNC: 30 MG/DL (ref 26–217)
INTERPRETATION SERPL IEP-IMP: ABNORMAL
KAPPA LC SERPL-MCNC: 39.11 MG/L (ref 3.3–19.4)
KAPPA LC/LAMBDA SER: 2.44 {RATIO} (ref 0.26–1.65)
LAMBDA LC FREE SERPL-MCNC: 16.06 MG/L (ref 5.71–26.3)
Lab: ABNORMAL
Lab: ABNORMAL
M-SPIKE: 1.1 G/DL
M-SPIKE: ABNORMAL G/DL
MAGNESIUM SERPL-MCNC: 1.3 MG/DL (ref 1.3–2.7)
MCH RBC QN AUTO: 33.7 PG (ref 27–31)
MCHC RBC AUTO-ENTMCNC: 32.8 G/DL (ref 32–36)
MCV RBC AUTO: 102.7 FL (ref 80–99)
PHOSPHATE SERPL-MCNC: 2.5 MG/DL (ref 2.4–5.1)
PLATELET # BLD AUTO: 234 10*3/MM3 (ref 150–450)
PMV BLD AUTO: 10.6 FL (ref 6–12)
POTASSIUM BLD-SCNC: 3.2 MMOL/L (ref 3.5–5.5)
POTASSIUM BLD-SCNC: 4 MMOL/L (ref 3.5–5.5)
PROT PATTERN SERPL ELPH-IMP: ABNORMAL
PROT SERPL-MCNC: 6.7 G/DL (ref 6–8.5)
PROT SERPL-MCNC: 7.4 G/DL (ref 6–8.5)
RBC # BLD AUTO: 4.13 10*6/MM3 (ref 3.89–5.14)
SODIUM BLD-SCNC: 139 MMOL/L (ref 132–146)
WBC NRBC COR # BLD: 6.64 10*3/MM3 (ref 3.5–10.8)

## 2017-03-23 PROCEDURE — 99214 OFFICE O/P EST MOD 30 MIN: CPT | Performed by: INTERNAL MEDICINE

## 2017-03-23 PROCEDURE — 96366 THER/PROPH/DIAG IV INF ADDON: CPT

## 2017-03-23 PROCEDURE — 25810000003 DEXTROSE-NACL PER 500 ML: Performed by: HOSPITALIST

## 2017-03-23 PROCEDURE — 25010000002 ENOXAPARIN PER 10 MG: Performed by: INTERNAL MEDICINE

## 2017-03-23 PROCEDURE — 99212 OFFICE O/P EST SF 10 MIN: CPT | Performed by: INTERNAL MEDICINE

## 2017-03-23 PROCEDURE — 84100 ASSAY OF PHOSPHORUS: CPT | Performed by: HOSPITALIST

## 2017-03-23 PROCEDURE — 25010000002 MAGNESIUM SULFATE 2 GM/50ML SOLUTION: Performed by: PHYSICIAN ASSISTANT

## 2017-03-23 PROCEDURE — 84156 ASSAY OF PROTEIN URINE: CPT | Performed by: INTERNAL MEDICINE

## 2017-03-23 PROCEDURE — 80048 BASIC METABOLIC PNL TOTAL CA: CPT | Performed by: HOSPITALIST

## 2017-03-23 PROCEDURE — 84166 PROTEIN E-PHORESIS/URINE/CSF: CPT | Performed by: INTERNAL MEDICINE

## 2017-03-23 PROCEDURE — 96365 THER/PROPH/DIAG IV INF INIT: CPT

## 2017-03-23 PROCEDURE — 83735 ASSAY OF MAGNESIUM: CPT | Performed by: HOSPITALIST

## 2017-03-23 PROCEDURE — 96372 THER/PROPH/DIAG INJ SC/IM: CPT

## 2017-03-23 PROCEDURE — 85027 COMPLETE CBC AUTOMATED: CPT | Performed by: HOSPITALIST

## 2017-03-23 PROCEDURE — 84132 ASSAY OF SERUM POTASSIUM: CPT | Performed by: HOSPITALIST

## 2017-03-23 PROCEDURE — G0378 HOSPITAL OBSERVATION PER HR: HCPCS

## 2017-03-23 PROCEDURE — 99225 PR SBSQ OBSERVATION CARE/DAY 25 MINUTES: CPT | Performed by: NURSE PRACTITIONER

## 2017-03-23 RX ORDER — MIDODRINE HYDROCHLORIDE 5 MG/1
5 TABLET ORAL EVERY 8 HOURS SCHEDULED
Status: DISCONTINUED | OUTPATIENT
Start: 2017-03-23 | End: 2017-03-23

## 2017-03-23 RX ORDER — POTASSIUM CHLORIDE 7.45 MG/ML
10 INJECTION INTRAVENOUS
Status: DISCONTINUED | OUTPATIENT
Start: 2017-03-23 | End: 2017-03-24 | Stop reason: HOSPADM

## 2017-03-23 RX ORDER — MIDODRINE HYDROCHLORIDE 5 MG/1
10 TABLET ORAL EVERY 8 HOURS SCHEDULED
Status: DISCONTINUED | OUTPATIENT
Start: 2017-03-23 | End: 2017-03-23

## 2017-03-23 RX ORDER — MAGNESIUM SULFATE HEPTAHYDRATE 40 MG/ML
2 INJECTION, SOLUTION INTRAVENOUS AS NEEDED
Status: DISCONTINUED | OUTPATIENT
Start: 2017-03-23 | End: 2017-03-24 | Stop reason: HOSPADM

## 2017-03-23 RX ORDER — MAGNESIUM SULFATE HEPTAHYDRATE 40 MG/ML
4 INJECTION, SOLUTION INTRAVENOUS AS NEEDED
Status: DISCONTINUED | OUTPATIENT
Start: 2017-03-23 | End: 2017-03-24 | Stop reason: HOSPADM

## 2017-03-23 RX ORDER — MIDODRINE HYDROCHLORIDE 5 MG/1
2.5 TABLET ORAL EVERY 8 HOURS SCHEDULED
Status: DISCONTINUED | OUTPATIENT
Start: 2017-03-23 | End: 2017-03-24 | Stop reason: HOSPADM

## 2017-03-23 RX ORDER — POTASSIUM CHLORIDE 1.5 G/1.77G
40 POWDER, FOR SOLUTION ORAL AS NEEDED
Status: DISCONTINUED | OUTPATIENT
Start: 2017-03-23 | End: 2017-03-23

## 2017-03-23 RX ORDER — POTASSIUM CHLORIDE 750 MG/1
40 CAPSULE, EXTENDED RELEASE ORAL AS NEEDED
Status: DISCONTINUED | OUTPATIENT
Start: 2017-03-23 | End: 2017-03-24 | Stop reason: HOSPADM

## 2017-03-23 RX ADMIN — ASPIRIN 325 MG ORAL TABLET 325 MG: 325 PILL ORAL at 08:26

## 2017-03-23 RX ADMIN — MAGNESIUM SULFATE HEPTAHYDRATE 2 G: 40 INJECTION, SOLUTION INTRAVENOUS at 11:59

## 2017-03-23 RX ADMIN — DULOXETINE 60 MG: 60 CAPSULE, DELAYED RELEASE ORAL at 08:27

## 2017-03-23 RX ADMIN — MAGNESIUM SULFATE HEPTAHYDRATE 2 G: 40 INJECTION, SOLUTION INTRAVENOUS at 15:58

## 2017-03-23 RX ADMIN — ENOXAPARIN SODIUM 40 MG: 40 INJECTION SUBCUTANEOUS at 08:27

## 2017-03-23 RX ADMIN — POTASSIUM CHLORIDE 40 MEQ: 750 CAPSULE, EXTENDED RELEASE ORAL at 17:20

## 2017-03-23 RX ADMIN — POTASSIUM CHLORIDE 40 MEQ: 750 CAPSULE, EXTENDED RELEASE ORAL at 12:00

## 2017-03-23 RX ADMIN — Medication 250 MG: at 08:26

## 2017-03-23 RX ADMIN — METOPROLOL TARTRATE 25 MG: 25 TABLET ORAL at 08:26

## 2017-03-23 RX ADMIN — METOPROLOL TARTRATE 12.5 MG: 25 TABLET, FILM COATED ORAL at 21:48

## 2017-03-23 RX ADMIN — Medication 250 MG: at 17:20

## 2017-03-23 RX ADMIN — MAGNESIUM SULFATE HEPTAHYDRATE 2 G: 40 INJECTION, SOLUTION INTRAVENOUS at 13:58

## 2017-03-23 RX ADMIN — DEXTROSE AND SODIUM CHLORIDE 125 ML/HR: 5; 900 INJECTION, SOLUTION INTRAVENOUS at 08:26

## 2017-03-23 RX ADMIN — MIDODRINE HYDROCHLORIDE 2.5 MG: 5 TABLET ORAL at 21:49

## 2017-03-23 RX ADMIN — DEXTROSE AND SODIUM CHLORIDE 125 ML/HR: 5; 900 INJECTION, SOLUTION INTRAVENOUS at 15:57

## 2017-03-23 RX ADMIN — DEXTROSE AND SODIUM CHLORIDE 125 ML/HR: 5; 900 INJECTION, SOLUTION INTRAVENOUS at 23:42

## 2017-03-23 RX ADMIN — DEXTROSE AND SODIUM CHLORIDE 125 ML/HR: 5; 900 INJECTION, SOLUTION INTRAVENOUS at 01:38

## 2017-03-23 NOTE — PLAN OF CARE
Problem: Patient Care Overview (Adult)  Goal: Plan of Care Review  Outcome: Ongoing (interventions implemented as appropriate)    03/22/17 1809 03/22/17 2000 03/23/17 0447   Coping/Psychosocial Response Interventions   Plan Of Care Reviewed With --  patient --    Patient Care Overview   Progress progress towards functional goals is fair --  --    Outcome Evaluation   Outcome Summary/Follow up Plan --  --  VSS, no c/o pain or discomfort, 24 hr urine completed and sent to lab, no other issues       Goal: Adult Individualization and Mutuality  Outcome: Ongoing (interventions implemented as appropriate)  Goal: Discharge Needs Assessment  Outcome: Ongoing (interventions implemented as appropriate)    Problem: Fall Risk (Adult)  Goal: Identify Related Risk Factors and Signs and Symptoms  Outcome: Outcome(s) achieved Date Met:  03/23/17  Goal: Absence of Falls  Outcome: Ongoing (interventions implemented as appropriate)    Problem: Fluid Volume Deficit (Adult)  Goal: Identify Related Risk Factors and Signs and Symptoms  Outcome: Outcome(s) achieved Date Met:  03/23/17  Goal: Fluid/Electrolyte Balance  Outcome: Ongoing (interventions implemented as appropriate)  Goal: Comfort/Well Being  Outcome: Ongoing (interventions implemented as appropriate)

## 2017-03-23 NOTE — PLAN OF CARE
Problem: Patient Care Overview (Adult)  Goal: Plan of Care Review  Outcome: Ongoing (interventions implemented as appropriate)    03/23/17 1728   Coping/Psychosocial Response Interventions   Plan Of Care Reviewed With patient   Patient Care Overview   Progress improving   Outcome Evaluation   Outcome Summary/Follow up Plan VSS. no complaints of dizziness or pain. Ambulated in hallway. K+ and Mg replaced. Midodrine was held due to SBP being > 120       Goal: Adult Individualization and Mutuality  Outcome: Ongoing (interventions implemented as appropriate)    03/22/17 1809 03/23/17 1728   Individualization   Patient Specific Preferences --  likes to wear lace top under gown, door shut   Patient Specific Goals get better --    Patient Specific Interventions --  orthostatic BPs in AM       Goal: Discharge Needs Assessment  Outcome: Ongoing (interventions implemented as appropriate)    03/21/17 1537 03/21/17 2052 03/22/17 0939   Discharge Needs Assessment   Concerns To Be Addressed no discharge needs identified --  --    Readmission Within The Last 30 Days no previous admission in last 30 days --  --    Community Agency Name(S) [facility] home health for PT and OT, not current --  --    Equipment Needed After Discharge walker, standard --  --    Discharge Disposition --  --  --    Current Health   Outpatient/Agency/Support Group Needs homecare agency (specify level of care) --  --    Anticipated Changes Related to Illness none --  --    Living Environment   Transportation Available --  car;family or friend will provide --    Self-Care   Equipment Currently Used at Home --  --  none     03/22/17 1809   Discharge Needs Assessment   Concerns To Be Addressed --    Readmission Within The Last 30 Days --    Community Agency Name(S) --    Equipment Needed After Discharge --    Discharge Disposition still a patient   Current Health   Outpatient/Agency/Support Group Needs --    Anticipated Changes Related to Illness --    Living  Environment   Transportation Available --    Self-Care   Equipment Currently Used at Home --          Problem: Fall Risk (Adult)  Goal: Absence of Falls  Outcome: Ongoing (interventions implemented as appropriate)    03/23/17 1728   Fall Risk (Adult)   Absence of Falls making progress toward outcome         Problem: Fluid Volume Deficit (Adult)  Goal: Fluid/Electrolyte Balance  Outcome: Ongoing (interventions implemented as appropriate)    03/23/17 1728   Fluid Volume Deficit (Adult)   Fluid/Electrolyte Balance making progress toward outcome       Goal: Comfort/Well Being  Outcome: Ongoing (interventions implemented as appropriate)    03/23/17 1728   Fluid Volume Deficit (Adult)   Comfort/Well Being making progress toward outcome

## 2017-03-23 NOTE — PROGRESS NOTES
[facility]  IP Progress Note      Chief Complaint/Reason for service: Orthostatic hypotension with syncope  SUBJECTIVE:-The patient is sitting in bed awake and alert in no acute distress.  Ages checked orthostatic vitals and she did not feel dizzy or lightheaded when she stood up and her blood pressure only dropped 10 points.  The patient is reluctant to stop her metoprolol because she's done so well on it after all her atrial arrhythmias were ablated    Past medical, surgical, social and family history reviewed in the patient's electronic medical record.         Vital Sign Min/Max for last 24 hours  Temp  Min: 97.4 °F (36.3 °C)  Max: 98.4 °F (36.9 °C)   BP  Min: 114/74  Max: 132/84   Pulse  Min: 63  Max: 78   Resp  Min: 16  Max: 18   No Data Recorded   No Data Recorded      Intake/Output Summary (Last 24 hours) at 03/23/17 1455  Last data filed at 03/23/17 1400   Gross per 24 hour   Intake             2222 ml   Output              750 ml   Net             1472 ml             Current Facility-Administered Medications:   •  aspirin tablet 325 mg, 325 mg, Oral, Daily, Biuj Henry MD, 325 mg at 03/23/17 0826  •  dextrose 5 % and sodium chloride 0.9 % infusion, 125 mL/hr, Intravenous, Continuous, Anurag Jacobs MD, Last Rate: 125 mL/hr at 03/23/17 1428, 125 mL/hr at 03/23/17 1428  •  DULoxetine (CYMBALTA) DR capsule 60 mg, 60 mg, Oral, Daily, Biju Henry MD, 60 mg at 03/23/17 0827  •  enoxaparin (LOVENOX) syringe 40 mg, 40 mg, Subcutaneous, Daily, Biju Henry MD, 40 mg at 03/23/17 0827  •  Magnesium Sulfate 2 gram Bolus, followed by 8 gram infusion (total Mg dose 10 grams)- Mg less than or equal to 1mg/dL, 2 g, Intravenous, PRN **OR** Magnesium Sulfate 6 gram Infusion-Mg 1.1 -1.5 mg/dL, 2 g, Intravenous, PRN, Last Rate: 25 mL/hr at 03/23/17 1435, 2 g at 03/23/17 1435 **OR** magnesium sulfate 4 gram infusion- Mg 1.6-1.9 mg/dL, 4 g, Intravenous, PRN, Casie M Mayne, PA  •   metoprolol tartrate (LOPRESSOR) half tablet 12.5 mg, 12.5 mg, Oral, Q12H, Blane Peters MD  •  midodrine (PROAMATINE) tablet 5 mg, 5 mg, Oral, Q8H, Blane Peters MD  •  potassium chloride (MICRO-K) CR capsule 40 mEq, 40 mEq, Oral, PRN, 40 mEq at 03/23/17 1200 **OR** [DISCONTINUED] potassium chloride (KLOR-CON) packet 40 mEq, 40 mEq, Oral, PRN **OR** potassium chloride 10 mEq in 100 mL IVPB, 10 mEq, Intravenous, Q1H PRN, Casie M Mayne, PA  •  saccharomyces boulardii (FLORASTOR) capsule 250 mg, 250 mg, Oral, BID, Trinity Everett MD, 250 mg at 03/23/17 0826  •  sodium chloride 0.9 % flush 1-10 mL, 1-10 mL, Intravenous, PRN, Biju Henry MD  •  sodium chloride 0.9 % flush 10 mL, 10 mL, Intravenous, PRN, David Reese MD    Physical Exam: General  a thin white female awake and alert pleasant no acute distress        HEENT: No JVP       Respiratory:  Equal bilateral symmetrical expansion/lateral crackles       Cardiovascular: Regular rate and rhythm without murmur gallop or click       GI:        Lower Extremities:        Neuro:        Skin:  No edema to palpation/skin is warm and dry       Psych: Pleasant affect/oriented ×3    Results Review: I reviewed all the patient's vitals looking back in orthostatics and had another orthostatic performed shortness and my examination and her systolic went from 120-110 with no symptoms    Radiology Results:  Imaging Results (last 72 hours)     Procedure Component Value Units Date/Time    CT Head Without Contrast [[id number]] Collected:  03/21/17 1349     Updated:  03/21/17 1458    Narrative:       EXAMINATION: CT HEAD WITHOUT CONTRAST-03/21/2017:      INDICATION: Weak.         TECHNIQUE: CT scan of the head was performed at 5 mm intervals. No  intravenous contrast was utilized.     COMPARISON: NONE.     FINDINGS: There is central and cortical atrophy. There are  periventricular white matter changes typical of aging. There is no mass,  hemorrhage, midline shift or  extra-axial fluid collection.       Impression:       Age-related findings. There are no acute abnormalities.     D:  03/21/2017  E:  03/21/2017           This report was finalized on 3/21/2017 2:56 PM by Dr. Johnnie Sen MD.       XR Chest 2 View [[id number]] Collected:  03/21/17 1347     Updated:  03/21/17 1501    Narrative:       EXAMINATION: XR CHEST 2 VW- 03/21/2017     INDICATION: weak      COMPARISON: 01/03/2017     FINDINGS: There is a small area of postinflammatory scarring in the  right lower lobe. There are no acute findings and there has been no  change since 01/03/2017. Cardiac silhouette is normal.           Impression:       Chronic postinflammatory changes in the right lower lobe; no  acute disease.     D:  03/21/2017  E:  03/21/2017     This report was finalized on 3/21/2017 2:59 PM by Dr. Johnnie Sen MD.             EKG: Sinus rhythm    ECHO:     Tele:  Sinus rhythm    Assessment/Plan: 1 orthostatic hypotension with syncope improved after the addition of midodrine.  I will go ahead and increase her 5 mg 3 times a day since she still drop 10.  Also recommend that she have a diet liberal with sodium.  He stay well hydrated.  Since the patient is not symptomatic now she wants to stay on her metoprolol because of all the problems she had with atrial arrhythmias in the past.  She also may need support hose 10-20 mmHg should she still be symptomatic    Blane Peters MD  03/23/17  2:55 PM

## 2017-03-23 NOTE — PROGRESS NOTES
HEMATOLOGY/ONCOLOGY PROGRESS NOTE     CC: Orthostasis with syncope and falls    SUBJECTIVE: She says that she's feeling better.  She's been eating okay.  She still drops her systolic pressure upon rising but she says she has not fallen though I had received a call from her  earlier today expressing concerns about our intent of letting her go home while she was still falling.  She says she has not fallen further.  She's been seen by cardiology and is reluctant to discontinue or decrease her metoprolol given that it controls her atrial arrhythmias.        Past Medical History, Past Surgical History, Social History, Family History have been reviewed and are without significant changes except as mentioned.      Medications:  The current medication list was reviewed in the EMR    ALLERGIES:   Allergies   Allergen Reactions   • Ambien [Zolpidem Tartrate]    • Morphine And Related        ROS:  A comprehensive 14 point review of systems was performed and was negative except as mentioned.      Objective      Vitals:    03/23/17 1431 03/23/17 1432 03/23/17 1433 03/23/17 1435   BP: 125/81 129/86 114/74 114/74   BP Location: Right arm Right arm Right arm    Patient Position: Lying Sitting Standing    Pulse: 70 68 65 65   Resp:       Temp:       TempSrc:       SpO2:       Weight:       Height:            ECOG: (2) Ambulatory and capable of self care, unable to carry out work activity, up and about > 50% or waking hours    General: well appearing, in no acute distress  HEENT: sclera anicteric, oropharynx clear  Lymphatics: no cervical, supraclavicular, inguinal, or axillary adenopathy  Cardiovascular: regular rate and rhythm, no murmurs  Lungs: clear to auscultation bilaterally  Abdomen: soft, nontender, nondistended.  No palpable organomegaly  ExtremIties: no lower extremity edema  Skin: no rashes, lesions, bruising, or petechiae  Neuro: Alert and oriented x 3. Moves all extremities.    RECENT LABS:        Results from  last 7 days  Lab Units 03/23/17  0718 03/21/17  1240   WBC 10*3/mm3 6.64 6.80   HEMOGLOBIN g/dL 13.9 15.3   PLATELETS 10*3/mm3 234 230       Results from last 7 days  Lab Units 03/23/17  0718 03/21/17  1240   SODIUM mmol/L 139 138   POTASSIUM mmol/L 3.2* 3.7   TOTAL CO2 mmol/L 30.0 28.0   BUN mg/dL 5* 10   CREATININE mg/dL 0.60 0.80   MAGNESIUM mg/dL 1.3 1.7   PHOSPHORUS mg/dL 2.5  --    GLUCOSE mg/dL 107* 100   AST (SGOT) U/L  --  25   ALT (SGPT) U/L  --  16   BILIRUBIN mg/dL  --  0.7   ALK PHOS U/L  --  88     Estimated Creatinine Clearance: 49 mL/min (by C-G formula based on Cr of 0.6).      Imaging Results (last 72 hours)     Procedure Component Value Units Date/Time    CT Head Without Contrast [[id number]] Collected:  03/21/17 1349     Updated:  03/21/17 1458    Narrative:       EXAMINATION: CT HEAD WITHOUT CONTRAST-03/21/2017:      INDICATION: Weak.         TECHNIQUE: CT scan of the head was performed at 5 mm intervals. No  intravenous contrast was utilized.     COMPARISON: NONE.     FINDINGS: There is central and cortical atrophy. There are  periventricular white matter changes typical of aging. There is no mass,  hemorrhage, midline shift or extra-axial fluid collection.       Impression:       Age-related findings. There are no acute abnormalities.     D:  03/21/2017  E:  03/21/2017           This report was finalized on 3/21/2017 2:56 PM by Dr. Johnnie Sen MD.       XR Chest 2 View [[id number]] Collected:  03/21/17 1347     Updated:  03/21/17 1501    Narrative:       EXAMINATION: XR CHEST 2 VW- 03/21/2017     INDICATION: weak      COMPARISON: 01/03/2017     FINDINGS: There is a small area of postinflammatory scarring in the  right lower lobe. There are no acute findings and there has been no  change since 01/03/2017. Cardiac silhouette is normal.           Impression:       Chronic postinflammatory changes in the right lower lobe; no  acute disease.     D:  03/21/2017  E:  03/21/2017     This report was  finalized on 3/21/2017 2:59 PM by Dr. Johnnie Sen MD.             ASSESSMENT & PLAN:    1. Multiple myeloma: Her monoclonal gammopathy in the serum is not rising significantly nor is the light chains.  Her 24-hour urine immunoelectrophoresis is still pending.  I will see her back in the office to check a PET scan just to complete this but thus far she has no indication for treatment of myeloma nor do I think this is the cause for her orthostasis.  2. Orthostatic hypotension with syncope: I suspect this is cardiogenic/vascular.  She is already on middle drain and the dose of that was increased by cardiology.  I discussed this with Dr. Willoughby and he discussed the possibility of sending her to [facility] to the autonomic dysfunction Center.  I also wonder if this is autonomic dysfunction causing some of her eating issues and lack of appetite.  She does have myeloma progression to the point of need for treatment then certainly all of this is going to complicate any therapies I give.  If her PET scan is negative then she has no indication for treatment.        Errors in dictation may reflect use of voice recognition software and not all errors in transcription may have been detected prior to signing.            Messi Pantoja MD    3/23/2017

## 2017-03-23 NOTE — PROGRESS NOTES
"Hospitalist Daily Progress Note    Date of Admission: 3/21/2017   LOS: 0 days   PCP: Velia Vogel MD    Chief Complaint: syncope    Subjective    Diarrhea improved  Tolerating PO  S.O contacted Dr. Pantoja, concerned about patient coming home as she is still unsteady when up in the room.      History of Present Illness    Review of Systems  General: no fevers, chills  Respiratory: no cough, dyspnea  Cardiovascular: no chest pain, palpitations  Abdomen: No abdominal pain, nausea, vomiting, +diarrhea  Neurologic: No focal weakness    Physical Exam:  I have reviewed the vital signs.    Objective:  Vital signs: (most recent): Blood pressure 114/74, pulse 65, temperature 97.4 °F (36.3 °C), temperature source Oral, resp. rate 18, height 63.5\" (161.3 cm), weight 122 lb 6.4 oz (55.5 kg), SpO2 94 %.            General Appearance:    Alert, cooperative, no distress  Head:    Normocephalic, atraumatic  Eyes:    Sclerae anicteric  Neck:   Supple, no mass  Lungs: Clear to auscultation bilaterally, respirations unlabored  Heart: Regular rate and rhythm, S1 and S2 normal, no murmur, rub or gallop  Abdomen:  Soft, non-tender, bowel sounds active, nondistended  Extremities: No clubbing, cyanosis, or edema to lower extremities  Pulses:  2+ and symmetric in distal lower extremities  Skin: No rashes   Neurologic: Oriented x3, Normal strength to extremities  No change in exam from 3.22.17      Results Review:    I have reviewed the labs, radiology results and diagnostic studies.      Results from last 7 days  Lab Units 03/23/17  0718   WBC 10*3/mm3 6.64   HEMOGLOBIN g/dL 13.9   PLATELETS 10*3/mm3 234       Results from last 7 days  Lab Units 03/23/17  0718   SODIUM mmol/L 139   POTASSIUM mmol/L 3.2*   TOTAL CO2 mmol/L 30.0   CREATININE mg/dL 0.60   GLUCOSE mg/dL 107*       I have reviewed the medications.    ---------------------------------------------------------------------------------------------  Assessment/Plan   Assessment & " Plan  Assessment/Problem List    Principal Problem:    Orthostatic hypotension  Active Problems:    Atrial fibrillation and flutter    Failure to thrive in adult    Smoldering multiple myeloma (SMM)    Diarrhea    Syncope due to orthostatic hypotension         Plan    Syncope, likely secondary to orthostasis ? Autonomic neuropathy component   - continue IV fluids  - started on midodrine  - cardiology didn't feel tilt table necessary at this time  - f/u [facility] in 4 weeks  Diarrhea  - unclear etiology  - c diff negative  - continue probiotics  - continues to improve  Atrial Fibrillation  - tele  Multiple Myeloma   - Dr. Pantoja following  - myeloma panel pending  Failure to Thrive  - d5 NS  -dietician following  DVT PPx  - Lovenox SC  -TEDS/SCDS        Discharge Planning: I expect patient to be discharged to home tomorrow    Hannah Anaya, ERIC 03/23/17 3:24 PM

## 2017-03-24 VITALS
BODY MASS INDEX: 21.1 KG/M2 | HEART RATE: 81 BPM | SYSTOLIC BLOOD PRESSURE: 131 MMHG | WEIGHT: 123.6 LBS | TEMPERATURE: 98.4 F | HEIGHT: 64 IN | DIASTOLIC BLOOD PRESSURE: 85 MMHG | OXYGEN SATURATION: 96 % | RESPIRATION RATE: 18 BRPM

## 2017-03-24 PROBLEM — I95.1 ORTHOSTATIC HYPOTENSION: Status: RESOLVED | Noted: 2017-03-21 | Resolved: 2017-03-24

## 2017-03-24 PROBLEM — R19.7 DIARRHEA: Status: RESOLVED | Noted: 2017-03-21 | Resolved: 2017-03-24

## 2017-03-24 LAB
ALBUMIN 24H MFR UR ELPH: 47.3 %
ALPHA1 GLOB 24H MFR UR ELPH: 2.7 %
ALPHA2 GLOB 24H MFR UR ELPH: 10.8 %
ANION GAP SERPL CALCULATED.3IONS-SCNC: 2 MMOL/L (ref 3–11)
B-GLOBULIN MFR UR ELPH: 19.6 %
BACTERIA SPEC AEROBE CULT: NORMAL
BUN BLD-MCNC: <5 MG/DL (ref 9–23)
BUN/CREAT SERPL: ABNORMAL (ref 7–25)
CALCIUM SPEC-SCNC: 8.5 MG/DL (ref 8.7–10.4)
CHLORIDE SERPL-SCNC: 107 MMOL/L (ref 99–109)
CO2 SERPL-SCNC: 27 MMOL/L (ref 20–31)
CREAT BLD-MCNC: 0.5 MG/DL (ref 0.6–1.3)
DEPRECATED RDW RBC AUTO: 53.4 FL (ref 37–54)
ERYTHROCYTE [DISTWIDTH] IN BLOOD BY AUTOMATED COUNT: 14.2 % (ref 11.3–14.5)
GAMMA GLOB 24H MFR UR ELPH: 19.7 %
GFR SERPL CREATININE-BSD FRML MDRD: 119 ML/MIN/1.73
GLUCOSE BLD-MCNC: 105 MG/DL (ref 70–100)
HCT VFR BLD AUTO: 40.6 % (ref 34.5–44)
HGB BLD-MCNC: 13.4 G/DL (ref 11.5–15.5)
Lab: ABNORMAL
M PROTEIN 24H UR ELPH-MRATE: 37.9 MG/24 HR
M PROTEIN MFR UR ELPH: 10.9 %
MAGNESIUM SERPL-MCNC: 1.9 MG/DL (ref 1.3–2.7)
MCH RBC QN AUTO: 33.9 PG (ref 27–31)
MCHC RBC AUTO-ENTMCNC: 33 G/DL (ref 32–36)
MCV RBC AUTO: 102.8 FL (ref 80–99)
PHOSPHATE SERPL-MCNC: 1.9 MG/DL (ref 2.4–5.1)
PLATELET # BLD AUTO: 221 10*3/MM3 (ref 150–450)
PMV BLD AUTO: 10.4 FL (ref 6–12)
POTASSIUM BLD-SCNC: 3.5 MMOL/L (ref 3.5–5.5)
PROT 24H UR-MRATE: 347.6 MG/24 HR (ref 30–150)
PROT UR-MCNC: 126.4 MG/DL
RBC # BLD AUTO: 3.95 10*6/MM3 (ref 3.89–5.14)
SODIUM BLD-SCNC: 136 MMOL/L (ref 132–146)
TSH SERPL DL<=0.05 MIU/L-ACNC: 2.83 MIU/ML (ref 0.35–5.35)
WBC NRBC COR # BLD: 7.16 10*3/MM3 (ref 3.5–10.8)

## 2017-03-24 PROCEDURE — 84443 ASSAY THYROID STIM HORMONE: CPT | Performed by: HOSPITALIST

## 2017-03-24 PROCEDURE — 96372 THER/PROPH/DIAG INJ SC/IM: CPT

## 2017-03-24 PROCEDURE — 25010000002 ENOXAPARIN PER 10 MG: Performed by: INTERNAL MEDICINE

## 2017-03-24 PROCEDURE — 97168 OT RE-EVAL EST PLAN CARE: CPT

## 2017-03-24 PROCEDURE — G0378 HOSPITAL OBSERVATION PER HR: HCPCS

## 2017-03-24 PROCEDURE — 83735 ASSAY OF MAGNESIUM: CPT | Performed by: HOSPITALIST

## 2017-03-24 PROCEDURE — 97164 PT RE-EVAL EST PLAN CARE: CPT

## 2017-03-24 PROCEDURE — 99213 OFFICE O/P EST LOW 20 MIN: CPT | Performed by: INTERNAL MEDICINE

## 2017-03-24 PROCEDURE — 80048 BASIC METABOLIC PNL TOTAL CA: CPT | Performed by: NURSE PRACTITIONER

## 2017-03-24 PROCEDURE — G8989 SELF CARE D/C STATUS: HCPCS

## 2017-03-24 PROCEDURE — G8988 SELF CARE GOAL STATUS: HCPCS

## 2017-03-24 PROCEDURE — G8987 SELF CARE CURRENT STATUS: HCPCS

## 2017-03-24 PROCEDURE — 99217 PR OBSERVATION CARE DISCHARGE MANAGEMENT: CPT | Performed by: NURSE PRACTITIONER

## 2017-03-24 PROCEDURE — 97530 THERAPEUTIC ACTIVITIES: CPT

## 2017-03-24 PROCEDURE — G8980 MOBILITY D/C STATUS: HCPCS

## 2017-03-24 PROCEDURE — 84100 ASSAY OF PHOSPHORUS: CPT | Performed by: HOSPITALIST

## 2017-03-24 PROCEDURE — G8979 MOBILITY GOAL STATUS: HCPCS

## 2017-03-24 PROCEDURE — 85027 COMPLETE CBC AUTOMATED: CPT | Performed by: HOSPITALIST

## 2017-03-24 PROCEDURE — G8978 MOBILITY CURRENT STATUS: HCPCS

## 2017-03-24 RX ORDER — MIDODRINE HYDROCHLORIDE 2.5 MG/1
5 TABLET ORAL EVERY 8 HOURS SCHEDULED
Qty: 90 TABLET | Refills: 0 | Status: SHIPPED | OUTPATIENT
Start: 2017-03-24 | End: 2017-08-10 | Stop reason: HOSPADM

## 2017-03-24 RX ADMIN — MIDODRINE HYDROCHLORIDE 2.5 MG: 5 TABLET ORAL at 05:38

## 2017-03-24 RX ADMIN — METOPROLOL TARTRATE 12.5 MG: 25 TABLET, FILM COATED ORAL at 08:47

## 2017-03-24 RX ADMIN — DULOXETINE 60 MG: 60 CAPSULE, DELAYED RELEASE ORAL at 08:47

## 2017-03-24 RX ADMIN — Medication 250 MG: at 08:46

## 2017-03-24 RX ADMIN — ASPIRIN 325 MG ORAL TABLET 325 MG: 325 PILL ORAL at 08:47

## 2017-03-24 RX ADMIN — ENOXAPARIN SODIUM 40 MG: 40 INJECTION SUBCUTANEOUS at 08:47

## 2017-03-24 NOTE — THERAPY DISCHARGE NOTE
Acute Care - Physical Therapy Re-Eval/Discharge  Psychiatric     Patient Name: Sowmya Beckett  : 1940  MRN: [id number]  Today's Date: 3/24/2017   Onset of Illness/Injury or Date of Surgery Date: 17  Date of Referral to PT: 17  Referring Physician: ERIC Anaya      Admit Date: 3/21/2017    Visit Dx:    ICD-10-CM ICD-9-CM   1. Syncope due to orthostatic hypotension I95.1 458.0   2. Dizziness R42 780.4   3. Head injury due to trauma, initial encounter S09.90XA 959.01   4. Impaired functional mobility, balance, gait, and endurance Z74.09 V49.89   5. Impaired mobility and ADLs Z74.09 799.89     Patient Active Problem List   Diagnosis   • Atrial fibrillation and flutter   • History of melanoma   • Failure to thrive in adult   • Smoldering multiple myeloma (SMM)   • Dehydration   • Hyperglycemia   • Syncope due to orthostatic hypotension     Past Medical History:   Diagnosis Date   • Arrhythmia    • Atrial fibrillation and flutter 2016   • Cancer     melanoma in 1971   • CHF (congestive heart failure)      Past Surgical History:   Procedure Laterality Date   • APPENDECTOMY     • CHOLECYSTECTOMY     • COLON SURGERY     • HEMORRHOIDECTOMY     • HYSTERECTOMY            PT ASSESSMENT (last 72 hours)      PT Evaluation       17 0816 17 0814    Rehab Evaluation    Document Type re-evaluation  -EH re-evaluation;discharge summary  -TB    Subjective Information no complaints;agree to therapy  -EH no complaints;agree to therapy  -TB    Patient Effort, Rehab Treatment excellent  -EH excellent  -TB    Symptoms Noted During/After Treatment none  -EH none  -TB    General Information    Patient Profile Review yes  -EH yes  -TB    Onset of Illness/Injury or Date of Surgery Date 17  -EH 17  -TB    Referring Physician ERIC Anaya  -EH ERIC Anaya  -REKHA    General Observations Pt supine upon entry  -EH Pt rec'vd supine in bed, room air  -TB    Pertinent History Of Current Problem Pt with  no subsequent dizziness or falls, interested in cane  - Refer to IE  -TB    Precautions/Limitations other (see comments)   monitor BP; Fall percautions lifted  - hip precautions- left;fall precautions   Monitor BP  -TB    Prior Level of Function independent:   recent episode of falls causing pt to present to hospitals  - independent:;all household mobility;community mobility;gait;transfer;bed mobility;ADL's;home management;shopping;using stairs  -TB    Equipment Currently Used at Home none  -EH none  -TB    Plans/Goals Discussed With patient;agreed upon  -EH patient;agreed upon  -TB    Risks Reviewed patient:;dizziness;change in vital signs  - patient:;dizziness;change in vital signs  -TB    Benefits Reviewed improve function;increase independence  - patient:;improve function;increase knowledge  -TB    Barriers to Rehab none identified  -EH none identified  -TB    Living Environment    Lives With significant other  - significant other  -TB    Living Arrangements house  - house  -TB    Home Accessibility tub/shower is not walk in  - stairs to enter home;bed and bath on same level;tub/shower is not walk in  -TB    Number of Stairs to Enter Home 4  -EH     Living Environment Comment  Pt able to set in/out of tub/shower safely this date. Education completed for TTB and handout provided for Home Safety  -TB    Clinical Impression    Date of Referral to PT 03/23/17  -     Rehab Potential good, to achieve stated therapy goals  -     Vital Signs    Pre Systolic BP Rehab 125  -EH --   BP stable  -TB    Pre Treatment Diastolic BP 94  -EH     Intra Systolic BP Rehab --   BP stayed steady, dropping <10 points systolic  -     O2 Delivery Post Treatment  room air  -TB    Pre Patient Position Supine  -EH Supine  -TB    Intra Patient Position Standing  -EH Standing  -TB    Post Patient Position  Supine  -TB    Pain Assessment    Pain Assessment No/denies pain  - No/denies pain  -TB    Vision  Assessment/Intervention    Visual Impairment  WFL  -TB    Cognitive Assessment/Intervention    Current Cognitive/Communication Assessment functional  - functional  -TB    Orientation Status oriented x 4  -EH oriented x 4  -TB    Follows Commands/Answers Questions 100% of the time;able to follow single-step instructions  - 100% of the time  -    Personal Safety WNL/WFL  -EH WNL/WFL  -TB    Personal Safety Interventions gait belt;nonskid shoes/slippers when out of bed;supervised activity  - fall prevention program maintained  -    ROM (Range of Motion)    General ROM  no range of motion deficits identified  -    MMT (Manual Muscle Testing)    General MMT Assessment  no strength deficits identified  -TB    General MMT Assessment Detail  WFL for age and ADL  -TB    Bed Mobility, Assessment/Treatment    Bed Mob, Supine to Sit, Onset independent  - independent  -TB    Bed Mob, Sit to Supine, Onset independent  - independent  -TB    Bed Mobility, Comment  Pt up in room ad dada  -TB    Transfer Assessment/Treatment    Transfers, Sit-Stand Onset independent  - independent  -TB    Transfers, Stand-Sit Onset independent  - independent  -TB    Toilet Transfer, Onset  independent  -TB    Bathtub Transfer, Onset  supervision required  -TB    Transfer, Comment  good safety, no LOB  -TB    Gait Assessment/Treatment    Gait, Onset Level independent  -     Gait, Distance (Feet) 600  -     Gait, Gait Pattern Analysis swing-through gait  -     Gait, Gait Deviations vince decreased  -     Stairs Assessment/Treatment    Number of Stairs 4  -     Stairs, Handrail Location right side (ascending)  -     Stairs, Onset Level independent  -     Stairs, Comment no LOB  -     Balance Skills Training    Sitting-Level of Assistance  Independent  -    Standing-Level of Assistance  Independent  -    Gait Balance-Level of Assistance Independent  -      Gait Balance Support No upper extremity supported  -     Gait Balance Activities scanning environment R/L;uneven surface   ramp x 20 ft  -     Therapy Exercises    Bilateral Upper Extremity  AROM:;shoulder extension/flexion;shoulder abduction/adduction;shoulder horizontal abd/add;shoulder ER/IR;elbow flexion/extension  -    Fine Motor Coordination Training    Opposition  Right:;Left:;intact  -TB    Sensory Assessment/Intervention    Light Touch  LUE;RUE  -TB    LUE Light Touch  WNL  -TB    RUE Light Touch  WNL  -TB    Positioning and Restraints    Pre-Treatment Position in bed  - in bed  -TB    Post Treatment Position bed  - bed  -TB    In Bed supine;call light within reach  - supine;call light within reach;encouraged to call for assist  -      03/22/17 0939 03/22/17 0938    Rehab Evaluation    Document Type evaluation  - evaluation;discharge summary  -ST    Subjective Information no complaints;agree to therapy  - no complaints;agree to therapy  -ST    Patient Effort, Rehab Treatment good  -EH good  -ST    Symptoms Noted During/After Treatment none  -EH none  -ST    General Information    Patient Profile Review yes  -EH yes  -ST    Onset of Illness/Injury or Date of Surgery Date 03/21/17  -EH 03/21/17  -ST    Referring Physician Carl VARGAS  - MD Carl  -ST    General Observations pt L sidelying with OT in room;   -EH pt supine upon arrival; IV intact  -ST    Pertinent History Of Current Problem Pt   -EH 77 y.o. w/hx of smoldering mult. myeloma presents to ED w/ c/o passing out & falling 3 times in 7 days when attempting to stand/ambulate.   -ST    Precautions/Limitations hip precautions- left;other (see comments)   monitor BP  -EH fall precautions;other (see comments)   monitor BP  -ST    Prior Level of Function independent:;all household mobility;community mobility;ADL's;home management;driving  - independent:;all household mobility;community mobility;ADL's;home management;driving  -ST     "Equipment Currently Used at Home none  -EH none  -ST    Plans/Goals Discussed With patient;agreed upon  -EH patient;agreed upon  -    Risks Reviewed patient:;dizziness;nausea/vomiting;increased discomfort;change in vital signs  - patient:;dizziness;nausea/vomiting;increased discomfort;change in vital signs  -    Benefits Reviewed patient:;improve function;increase independence;increase strength;increase balance;decrease pain  - patient:;increase independence;improve function;increase strength;increase balance;decrease pain  -ST    Barriers to Rehab none identified  - none identified  -ST    Living Environment    Lives With significant other  - significant other  -    Living Arrangements house  - house  -    Home Accessibility tub/shower is not walk in  - bed and bath on same level;tub/shower is not walk in  -    Living Environment Comment lives with her man friend arina assists as needed,  - states that she lives with \"her man friend\" that can assist her if needed however was completely independent PTA; has never had any falls prior to this episode  -    Clinical Impression    Date of Referral to PT 03/21/17  -     Rehab Potential good, to achieve stated therapy goals  -     Vital Signs    Pre Systolic BP Rehab 118  -EH     Pre Treatment Diastolic BP 73  -EH     Intra Systolic BP Rehab 102  -EH     Intra Treatment Diastolic BP 75  -EH     Post Systolic BP Rehab 100  -EH     Post Treatment Diastolic BP 66  -EH     Pretreatment Heart Rate (beats/min) 61  -EH     Intratreatment Heart Rate (beats/min) 61  -EH     Posttreatment Heart Rate (beats/min) 62  -EH     Pre SpO2 (%) 99  -EH     O2 Delivery Pre Treatment room air  -     Post SpO2 (%) --   WNL  -EH     O2 Delivery Post Treatment room air  -     Pre Patient Position Supine  -EH     Intra Patient Position Sitting  -EH     Post Patient Position Standing   pt returned to sup after ambulation in room  -     Pain Assessment    Pain " Assessment No/denies pain  - No/denies pain  -    Vision Assessment/Intervention    Visual Impairment WFL  -     Cognitive Assessment/Intervention    Current Cognitive/Communication Assessment functional  - functional  -    Orientation Status oriented x 4  - oriented x 4  -ST    Follows Commands/Answers Questions 100% of the time;able to follow single-step instructions;able to follow multi-step instructions  - 100% of the time  -    Personal Safety WNL/WFL   education on orthostatic hypotension safetytechniques  - WNL/WFL  -ST    Personal Safety Interventions elopement precautions initiated;fall prevention program maintained   Notified [facility] of [place] improvement.  - fall prevention program maintained;gait belt;nonskid shoes/slippers when out of bed  -    ROM (Range of Motion)    General ROM no range of motion deficits identified  - no range of motion deficits identified  -    MMT (Manual Muscle Testing)    General MMT Assessment  no strength deficits identified  -    General MMT Assessment Detail hip flexion 4+/5; remaining 5/5  -     Bed Mobility, Assessment/Treatment    Bed Mob, Supine to Sit, [place] independent  - independent  -    Bed Mob, Sit to Supine, [place] independent  - independent  -    Transfer Assessment/Treatment    Transfers, Sit-Stand [place] independent  - independent  -    Transfers, Stand-Sit [place] independent  - independent  -    Gait Assessment/Treatment    Gait, [place] Level supervision required   progressed to independent  -     Gait, Assistive Device --   none  -     Gait, Distance (Feet) 25   pt on spore precautions  -     Gait, Gait Pattern Analysis swing-through gait  -     Motor Skills/Interventions    Additional Documentation  Balance Skills Training (Group)  -    Balance Skills Training    Sitting-Level of Assistance [facility]  - Independent  -    Standing-Level of Assistance Independent  -  Independent  -ST    Gait Balance-Level of Assistance Independent  -EH Independent  -ST    Therapy Exercises    Bilateral Upper Extremity --   education on use of arm movement to elevate BP  -EH     Sensory Assessment/Intervention    Light Touch LLE;RLE  -EH LUE;RUE  -ST    LUE Light Touch WNL  -EH WNL  -ST    RUE Light Touch WNL  -EH WNL  -ST    Positioning and Restraints    Pre-Treatment Position in bed  -EH in bed  -ST    Post Treatment Position bed  -EH bed  -ST    In Bed notified nsg;supine;call light within reach;encouraged to call for assist;exit alarm on  -EH notified nsg;supine;call light within reach;encouraged to call for assist;exit alarm on  -ST      03/21/17 2052 03/21/17 2042    General Information    Equipment Currently Used at Home  none  -RA    Living Environment    Lives With significant other  -RA     Living Arrangements house  -RA     Home Accessibility bed and bath on same level;stairs to enter home  -RA     Number of Stairs to Enter Home 4  -RA     Stair Railings at Home outside, present at both sides  -RA     Type of Financial/Environmental Concern none  -RA     Transportation Available car;family or friend will provide  -RA       03/21/17 1537       Living Environment    Lives With spouse  -RC     Living Arrangements house  -RC     Transportation Available car;family or friend will provide  -       User Key  (r) = Recorded By, (t) = Taken By, (c) = Cosigned By    Initials Name Provider Type    TB Priya Simon, OT Occupational Therapist     Deena Arenas, PT Physical Therapist    ST Anika Ramos, OTR Occupational Therapist    BREANA Leach     VIRGILIO Orr RN Registered Nurse          Physical Therapy Education     Title: PT OT SLP Therapies (Done)     Topic: Physical Therapy (Done)     Point: Mobility training (Done)    Learning Progress Summary    Learner Readiness Method Response Comment Documented by Status   Patient Acceptance E MELISSA,ELIECER  EH  03/24/17 0849 Done    Acceptance E ELIECER TORRES   03/22/17 1054 Done               Point: Precautions (Done)    Learning Progress Summary    Learner Readiness Method Response Comment Documented by Status   Patient Acceptance E ELIECER TORRES   03/24/17 0849 Done    Acceptance ELIECER WILDER   03/22/17 1054 Done                      User Key     Initials Effective Dates Name Provider Type Discipline     06/19/15 -  Deena Arenas, PT Physical Therapist PT                PT Recommendation and Plan  Anticipated Discharge Disposition: home with assist  PT Frequency: evaluation only, other (see comments) (RE EVQAL)  Plan of Care Review  Plan Of Care Reviewed With: patient  Outcome Summary/Follow up Plan: PT ambulates in [place] with PT/OT, up/down ramp, on stairs without LOB. Educated on use of cane- pt does not use cane in [place] this date but wants for future use. Pt expectecd to d/c home safely.              Outcome Measures       03/24/17 0816 03/24/17 0814 03/22/17 0939    How much help from another person do you currently need...    Turning from your back to your side while in flat bed without using bedrails? 4  -EH  4  -EH    Moving from lying on back to sitting on the side of a flat bed without bedrails? 4  -EH  4  -EH    Moving to and from a bed to a chair (including a wheelchair)? 4  -EH  4  -EH    Standing up from a chair using your arms (e.g., wheelchair, bedside chair)? 4  -EH  4  -EH    Climbing 3-5 steps with a railing? 4  -EH  4  -EH    To walk in hospital room? 4  -EH  4  -EH    AM-PAC 6 Clicks Score 24  -EH  24  -EH    How much help from another is currently needed...    Putting on and taking off regular lower body clothing?  4  -TB     Bathing (including washing, rinsing, and drying)  3  -TB     Toileting (which includes using toilet bed pan or urinal)  4  -TB     Putting on and taking off regular upper body clothing  4  -TB     Taking care of personal grooming (such as brushing teeth)  4  -TB     Eating meals  4  -TB      Score  23  -TB     Functional Assessment    Outcome Measure Options AM-PAC 6 Clicks Basic Mobility (PT)  - AM-PAC 6 Clicks Daily Activity (OT)  - AM-PAC 6 Clicks Basic Mobility (PT)  -      03/22/17 0938          How much help from another is currently needed...    Putting on and taking off regular lower body clothing? 4  -ST      Bathing (including washing, rinsing, and drying) 4  -ST      Toileting (which includes using toilet bed pan or urinal) 4  -ST      Putting on and taking off regular upper body clothing 4  -ST      Taking care of personal grooming (such as brushing teeth) 4  -ST      Eating meals 4  -ST      Score 24  -ST      Functional Assessment    Outcome Measure Options AM-PAC 6 Clicks Daily Activity (OT)  -        User Key  (r) = Recorded By, (t) = Taken By, (c) = Cosigned By    Initials Name Provider Type     Priya Simon, OT Occupational Therapist     Deena Arenas, PT Physical Therapist    ST Anika Ramos, OTR Occupational Therapist           Time Calculation:         PT Charges       03/24/17 0854          Time Calculation    Start Time 0816  -      PT Received On 03/24/17  -      Time Calculation- PT    Total Timed Code Minutes- PT 0 minute(s)  -        User Key  (r) = Recorded By, (t) = Taken By, (c) = Cosigned By    Initials Name Provider Type     Deena Arenas, PT Physical Therapist          Therapy Charges for Today     Code Description Service Date Service Provider Modifiers Qty    69211523247 HC PT RE-EVAL ESTABLISHED PLAN 2 3/24/2017 Deena Arenas, PT GP 1    88352586090 HC PT MOBILITY CURRENT 3/24/2017 Deena Arenas, PT GP, [place] 1    78833536211 HC PT MOBILITY PROJECTED 3/24/2017 Deena Arenas, PT GP, [place] 1    56659526435 HC PT MOBILITY DISCHARGE 3/24/2017 Deena Arenas, PT GP, [place] 1          PT G-Codes  Outcome Measure Options: AM-PAC 6 Clicks Basic Mobility (PT)  Score: 24  Functional Limitation: Mobility: Walking and moving  around  Mobility: Walking and Moving Around Current Status (): 0 percent impaired, limited or restricted  Mobility: Walking and Moving Around Goal Status (): 0 percent impaired, limited or restricted  Mobility: Walking and Moving Around Discharge Status (): 0 percent impaired, limited or restricted    PT Discharge Summary  Anticipated Discharge Disposition: home with assist  Reason for Discharge: All goals achieved  Outcomes Achieved: Able to achieve all goals within established timeline    Deena Arenas, PT  3/24/2017

## 2017-03-24 NOTE — THERAPY DISCHARGE NOTE
Acute Care - Occupational Therapy Initial Eval/Discharge  [facility]     Patient Name: Sowmya Beckett  : 1940  MRN: [id number]  Today's Date: 3/24/2017  Onset of Illness/Injury or Date of Surgery Date: 17  Date of Referral to OT: 17  Referring Physician: ERIC Anaya      Admit Date: 3/21/2017       ICD-10-CM ICD-9-CM   1. Syncope due to orthostatic hypotension I95.1 458.0   2. Dizziness R42 780.4   3. Head injury due to trauma, initial encounter S09.90XA 959.01   4. Impaired functional mobility, balance, gait, and endurance Z74.09 V49.89   5. Impaired mobility and ADLs Z74.09 799.89     Patient Active Problem List   Diagnosis   • Atrial fibrillation and flutter   • History of melanoma   • Failure to thrive in adult   • Smoldering multiple myeloma (SMM)   • Dehydration   • Hyperglycemia   • Syncope due to orthostatic hypotension     Past Medical History:   Diagnosis Date   • Arrhythmia    • Atrial fibrillation and flutter 2016   • Cancer     melanoma in 1971   • CHF (congestive heart failure)      Past Surgical History:   Procedure Laterality Date   • APPENDECTOMY     • CHOLECYSTECTOMY     • COLON SURGERY     • HEMORRHOIDECTOMY     • HYSTERECTOMY            OT ASSESSMENT FLOWSHEET (last 72 hours)      OT Evaluation       17 0854 17 0816 17 0814 17 1052 17 0939    Rehab Evaluation    Document Type  re-evaluation  -EH re-evaluation;discharge summary  -TB  evaluation  -EH    Subjective Information  no complaints;agree to therapy  -EH no complaints;agree to therapy  -TB  no complaints;agree to therapy  -EH    Patient Effort, Rehab Treatment  excellent  -EH excellent  -TB  good  -EH    Symptoms Noted During/After Treatment  none  -EH none  -TB  none  -EH    General Information    Patient Profile Review  yes  -EH yes  -TB  yes  -EH    Onset of Illness/Injury or Date of Surgery Date  17  -EH 17  -TB  17  -EH    Referring Physician  ERIC Anaya   -EH [place], APRN  -TB  Carl VARGAS  -    General Observations  Pt supine upon entry  -EH Pt rec'vd supine in bed, room air  -TB  pt L sidelying with OT in room;   -EH    Pertinent History Of Current Problem  Pt with no subsequent dizziness or falls, interested in cane  -EH Refer to IE  -TB  Pt   -EH    Precautions/Limitations  other (see comments)   monitor BP; Fall percautions lifted  -EH hip precautions- left;fall precautions   Monitor BP  -TB  hip precautions- left;other (see comments)   monitor BP  -EH    Prior Level of Function  independent:   recent episode of falls causing pt to present to [facility]  - independent:;all household mobility;community mobility;gait;transfer;bed mobility;ADL's;home management;shopping;using stairs  -TB  independent:;all household mobility;community mobility;ADL's;home management;driving  -EH    Equipment Currently Used at Home  none  -EH none  -TB  none  -EH    Plans/Goals Discussed With  patient;agreed upon  -EH patient;agreed upon  -TB  patient;agreed upon  -    Risks Reviewed  patient:;dizziness;change in vital signs  -EH patient:;dizziness;change in vital signs  -TB  patient:;dizziness;nausea/vomiting;increased discomfort;change in vital signs  -EH    Benefits Reviewed  improve function;increase independence  -EH patient:;improve function;increase knowledge  -TB  patient:;improve function;increase independence;increase strength;increase balance;decrease pain  -EH    Barriers to Rehab  none identified  -EH none identified  -TB  none identified  -EH    Living Environment    Lives With  significant other  -EH significant other  -TB  significant other  -EH    Living Arrangements  house  -EH house  -TB  house  -EH    Home Accessibility  tub/shower is not walk in  -EH stairs to enter home;bed and bath on same level;tub/shower is not walk in  -TB  tub/shower is not walk in  -EH    Number of Stairs to Enter Home  4  -EH       Living Environment Comment   Pt able to set in/out of  tub/shower safely this date. Education completed for TTB and handout provided for Home Safety  -TB  lives with her man friend arina assists as needed,  -    Clinical Impression    Date of Referral to OT   03/23/17  -TB      OT Diagnosis   Impaired transfer and ADL  -TB      Patient/Family Goals Statement   return home today  -TB      Therapy Frequency   evaluation only  -TB      Anticipated Equipment Needs At Discharge   standard cane   Education/handout provided for TTB  -TB      Anticipated Discharge Disposition home with assist  -TB  home with assist  -TB home with assist  -     Vital Signs    Pre Systolic BP Rehab  125  -EH --   BP stable  -TB  118  -EH    Pre Treatment Diastolic BP  94  -EH   73  -EH    Intra Systolic BP Rehab  --   BP stayed steady, dropping <10 points systolic  -EH   102  -EH    Intra Treatment Diastolic BP     75  -EH    Post Systolic BP Rehab     100  -EH    Post Treatment Diastolic BP     66  -EH    Pretreatment Heart Rate (beats/min)     61  -EH    Intratreatment Heart Rate (beats/min)     61  -EH    Posttreatment Heart Rate (beats/min)     62  -EH    Pre SpO2 (%)     99  -EH    O2 Delivery Pre Treatment     room air  -EH    Post SpO2 (%)     --   WNL  -EH    O2 Delivery Post Treatment   room air  -TB  room air  -EH    Pre Patient Position  Supine  -EH Supine  -TB  Supine  -EH    Intra Patient Position  Standing  -EH Standing  -TB  Sitting  -EH    Post Patient Position   Supine  -TB  Standing   pt returned to sup after ambulation in room  -    Pain Assessment    Pain Assessment  No/denies pain  -EH No/denies pain  -TB  No/denies pain  -EH    Vision Assessment/Intervention    Visual Impairment   WFL  -TB  WFL  -EH    Cognitive Assessment/Intervention    Current Cognitive/Communication Assessment  functional  -EH functional  -TB  functional  -EH    Orientation Status  oriented x 4  -EH oriented x 4  -TB  oriented x 4  -EH    Follows Commands/Answers Questions  100% of the time;able to  follow single-step instructions  - 100% of the time  -TB  100% of the time;able to follow single-step instructions;able to follow multi-step instructions  -    Personal Safety  WNL/WFL  -EH WNL/WFL  -TB  WNL/WFL   education on orthostatic hypotension safetytechniques  -    Personal Safety Interventions  gait belt;nonskid shoes/slippers when out of bed;supervised activity  - fall prevention program maintained  -TB  elopement precautions initiated;fall prevention program maintained   Notified NSG of pt improvement.  -    ROM (Range of Motion)    General ROM   no range of motion deficits identified  -TB  no range of motion deficits identified  -    MMT (Manual Muscle Testing)    General MMT Assessment   no strength deficits identified  -TB      General MMT Assessment Detail   WFL for age and ADL  -TB  hip flexion 4+/5; remaining 5/5  -    Bed Mobility, Assessment/Treatment    Bed Mob, Supine to Sit, [place]  independent  -EH independent  -TB  independent  -EH    Bed Mob, Sit to Supine, [place]  independent  -EH independent  -TB  independent  -    Bed Mobility, Comment   Pt up in room ad dada  -TB      Transfer Assessment/Treatment    Transfers, Sit-Stand [place]  independent  -EH independent  -TB  independent  -EH    Transfers, Stand-Sit [place]  independent  -EH independent  -TB  independent  -EH    Toilet Transfer, [place]   independent  -TB      Bathtub Transfer, [place]   supervision required  -TB      Transfer, Comment   good safety, no LOB  -TB      Functional Mobility    Functional Mobility- Ind. Level   independent  -TB      Functional Mobility- Comment   Pt able to walk to therapy gym complete tub transfer and stairs before returning to room - good safety, no LOB, no SOA  -TB      Stairs Assessment/Treatment    Number of Stairs  4  -       Stairs, Handrail Location  right side (ascending)  -       Stairs, [place] Level  independent  -       Stairs,  Comment  no LOB  -       Upper Body Dressing Assessment/Training    UB Dressing Assess/Train, Clothing Type   donning:;hospital gown  -TB      UB Dressing Assess/Train, Position   standing  -TB      UB Dressing Assess/Train, [place]   set up required  -TB      UB Dressing Assess/Train, Comment   assist for tele box  -TB      Lower Body Dressing Assessment/Training    LB Dressing Assess/Train, Clothing Type   donning:;slipper socks  -TB      LB Dressing Assess/Train, [place]   independent  -TB      Toileting Assessment/Training    Toileting Assess/Train, Indepen Level   independent  -      Toileting Assess/Train, Comment   pt up in room/to BR ad dada  -TB      Self-Feeding Assessment/Training    Self-Feeding Assess/Train, [place]   independent  -TB      Balance Skills Training    Sitting-Level of Assistance   Independent  -  Independent  -    Standing-Level of Assistance   Independent  -  Independent  -    Gait Balance-Level of Assistance  Independent  -   Independent  -    Gait Balance Support  No upper extremity supported  -       Gait Balance Activities  scanning environment R/L;uneven surface   ramp x 20 ft  -       Therapy Exercises    Bilateral Upper Extremity   AROM:;shoulder extension/flexion;shoulder abduction/adduction;shoulder horizontal abd/add;shoulder ER/IR;elbow flexion/extension  -TB  --   education on use of arm movement to elevate BP  -    Fine Motor Coordination Training    Opposition   Right:;Left:;intact  -TB      Sensory Assessment/Intervention    Light Touch   LUE;RUE  -TB  LLE;RLE  -EH    LUE Light Touch   WNL  -TB  WNL  -EH    RUE Light Touch   WNL  -TB  WNL  -EH    Positioning and Restraints    Pre-Treatment Position  in bed  -EH in bed  -TB  in bed  -EH    Post Treatment Position  bed  - bed  -TB  bed  -EH    In Bed  supine;call light within reach  - supine;call light within reach;encouraged to call for assist  -TB  notified nsg;supine;call light  within reach;encouraged to call for assist;exit alarm on  -EH      03/22/17 0938 03/21/17 2052 03/21/17 2042 03/21/17 1537 03/21/17 1536    Rehab Evaluation    Document Type evaluation;discharge summary  -ST        Subjective Information no complaints;agree to therapy  -ST        Patient Effort, Rehab Treatment good  -ST        Symptoms Noted During/After Treatment none  -ST        General Information    Patient Profile Review yes  -ST        Onset of Illness/Injury or Date of Surgery Date 03/21/17  -ST        Referring Physician MD Carl  -ST        General Observations pt supine upon arrival; IV intact  -ST        Pertinent History Of Current Problem 77 y.o. w/hx of smoldering mult. myeloma presents to ED w/ c/o passing out & falling 3 times in 7 days when attempting to stand/ambulate.   -ST        Precautions/Limitations fall precautions;other (see comments)   monitor BP  -ST        Prior Level of Function independent:;all household mobility;community mobility;ADL's;home management;driving  -ST        Equipment Currently Used at Home none  -ST  none  -RA      Plans/Goals Discussed With patient;agreed upon  -ST        Risks Reviewed patient:;dizziness;nausea/vomiting;increased discomfort;change in vital signs  -ST        Benefits Reviewed patient:;increase independence;improve function;increase strength;increase balance;decrease pain  -ST        Barriers to Rehab none identified  -ST        Living Environment    Lives With significant other  -ST significant other  -RA  spouse  -RC     Living Arrangements house  -ST house  -RA  house  -RC     Home Accessibility bed and bath on same level;tub/shower is not walk in  -ST bed and bath on same level;stairs to enter home  -RA       Number of Stairs to Enter Home  4  -RA       Stair Railings at Home  outside, present at both sides  -RA       Type of Financial/Environmental Concern  none  -RA       Transportation Available  car;family or friend will provide  -RA   "car;family or friend will provide  -RC     Living Environment Comment states that she lives with \"her man friend\" that can assist her if needed however was completely independent PTA; has never had any falls prior to this episode  -ST        Clinical Impression    Date of Referral to OT 03/22/17  -ST        OT Diagnosis impaired ADL/IADL performance  -ST        Prognosis good  -ST        Patient/Family Goals Statement return home  -ST        Criteria for Skilled Therapeutic Interventions Met no problems identified which require skilled intervention  -ST        Therapy Frequency evaluation only  -ST        Anticipated Equipment Needs At Discharge other (see comments)   no equipment needed  -ST        Anticipated Discharge Disposition home with assist  -ST        Functional Level Prior    Ambulation   0-->independent  -RA  2-->assistive person  -RC    Transferring   0-->independent  -RA  2-->assistive person  -RC    Toileting   0-->independent  -RA  0-->independent  -RC    Bathing   0-->independent  -RA  0-->independent  -RC    Dressing   0-->independent  -RA  0-->independent  -RC    Eating   0-->independent  -RA  0-->independent  -RC    Communication   0-->understands/communicates without difficulty  -RA  0-->understands/communicates without difficulty  -RC    Swallowing   0-->swallows foods/liquids without difficulty  -RA  0-->swallows foods/liquids without difficulty  -RC    Prior Functional Level Comment   none  -RA      Pain Assessment    Pain Assessment No/denies pain  -ST        Cognitive Assessment/Intervention    Current Cognitive/Communication Assessment functional  -ST        Orientation Status oriented x 4  -ST        Follows Commands/Answers Questions 100% of the time  -ST        Personal Safety WNL/WFL  -ST        Personal Safety Interventions fall prevention program maintained;gait belt;nonskid shoes/slippers when out of bed  -ST        ROM (Range of Motion)    General ROM no range of motion deficits " identified  -ST        MMT (Manual Muscle Testing)    General MMT Assessment no strength deficits identified  -ST        Bed Mobility, Assessment/Treatment    Bed Mob, Supine to Sit, [place] independent  -ST        Bed Mob, Sit to Supine, [place] independent  -ST        Transfer Assessment/Treatment    Transfers, Sit-Stand [place] independent  -ST        Transfers, Stand-Sit [place] independent  -ST        Functional Mobility    Functional Mobility- Ind. Level independent  -ST        Functional Mobility-Distance (Feet) 25  -ST        Functional Mobility- Comment pt on spore precautions during IE therefore completed fxnl mobility in room  -ST        Lower Body Dressing Assessment/Training    LB Dressing Assess/Train, Comment demonstrated ability to perform LBD w/ SUA  -ST        Motor Skills/Interventions    Additional Documentation Balance Skills Training (Group)  -ST        Balance Skills Training    Sitting-Level of Assistance Independent  -ST        Standing-Level of Assistance Independent  -ST        Gait Balance-Level of Assistance Independent  -ST        Sensory Assessment/Intervention    Light Touch LUE;RUE  -ST        LUE Light Touch WNL  -ST        RUE Light Touch WNL  -ST        Positioning and Restraints    Pre-Treatment Position in bed  -ST        Post Treatment Position bed  -ST        In Bed notified nsg;supine;call light within reach;encouraged to call for assist;exit alarm on  -ST          User Key  (r) = Recorded By, (t) = Taken By, (c) = Cosigned By    Initials Name Effective Dates    TB Priya Simon, OT 06/22/15 -      Deena Arenas, PT 06/19/15 -     ST Anika Ramos, OTR 02/20/17 -     BREANA Leach 05/02/16 -     VIRGILIO Orr RN 11/29/16 -           Occupational Therapy Education     Title: PT OT SLP Therapies (Done)     Topic: Occupational Therapy (Done)     Point: ADL training (Done)    Description: Instruct learner(s) on proper safety  adaptation and remediation techniques during self care or transfers.   Instruct in proper use of assistive devices.    Learning Progress Summary    Learner Readiness Method Response Comment Documented by Status   Patient Acceptance E,D,H MELISSA,ELIECER Education completed for home safety and BR equipment/TTB with handouts provided. TB 03/24/17 0852 Done    Acceptance E,REKHA,ENA GONZÁLES,MEILSSA completed education on role of OT/POC ST 03/22/17 1051 Done               Point: Home exercise program (Done)    Description: Instruct learner(s) on appropriate technique for monitoring, assisting and/or progressing therapeutic exercises/activities.    Learning Progress Summary    Learner Readiness Method Response Comment Documented by Status   Patient Acceptance E,REKHA,ENA GONZÁLES,MELISSA completed education on role of OT/POC ST 03/22/17 1051 Done               Point: Precautions (Done)    Description: Instruct learner(s) on prescribed precautions during self-care and functional transfers.    Learning Progress Summary    Learner Readiness Method Response Comment Documented by Status   Patient Acceptance E,REKHA,ENA GONZÁLES,MELISSA completed education on role of OT/POC ST 03/22/17 1051 Done               Point: Body mechanics (Done)    Description: Instruct learner(s) on proper positioning and spine alignment during self-care, functional mobility activities and/or exercises.    Learning Progress Summary    Learner Readiness Method Response Comment Documented by Status   Patient Acceptance E,ENA DA SILVA,MELISSA completed education on role of OT/POC ST 03/22/17 1051 Done                      User Key     Initials Effective Dates Name Provider Type Discipline    TB 06/22/15 -  Priya Simon, OT Occupational Therapist OT    ST 02/20/17 -  Anika Ramos OTR Occupational Therapist OT                OT Recommendation and Plan  Anticipated Equipment Needs At Discharge: standard cane (Education/handout provided for TTB)  Anticipated Discharge Disposition: home with assist  Therapy  Frequency: evaluation only  Plan of Care Review  Plan Of Care Reviewed With: patient  Outcome Summary/Follow up Plan: OT re-eval completed per consult orders.  Pt able to ambulate in hallway, complete stairs and step in/out of tub/shower with good safety, no LOB, no dizziness/stable BP.  Pt educated on use of TTB should the need arise.  No DME recommended at this time.  No skilled needs indicated. OT to d/c at this time.           OT Goals       03/24/17 0855          Transfer Training OT STG    Transfer Training OT STG, Time to Achieve by discharge  -TB      Transfer Training OT STG, Activity Type tub  -TB      Transfer Training OT STG, [place] Level supervision required;verbal cues required  -TB      Transfer Training OT STG, Outcome goal met  -TB      Patient Education OT STG    Patient Education OT STG, Time to Achieve by discharge  -TB      Patient Education OT STG, Education Type home safety;adaptive equipment mgmt  -TB      Patient Education OT STG, Education Understanding verbalizes understanding  -TB      Patient Education OT STG Outcome goal met  -TB        User Key  (r) = Recorded By, (t) = Taken By, (c) = Cosigned By    Initials Name Provider Type    TB Priya Simon, OT Occupational Therapist                Outcome Measures       03/24/17 0816 03/24/17 0814 03/22/17 0939    How much help from another person do you currently need...    Turning from your back to your side while in flat bed without using bedrails? 4  -EH  4  -EH    Moving from lying on back to sitting on the side of a flat bed without bedrails? 4  -EH  4  -EH    Moving to and from a bed to a chair (including a wheelchair)? 4  -EH  4  -EH    Standing up from a chair using your arms (e.g., wheelchair, bedside chair)? 4  -EH  4  -EH    Climbing 3-5 steps with a railing? 4  -EH  4  -EH    To walk in hospital room? 4  -EH  4  -EH    AM-PAC 6 Clicks Score 24  -EH  24  -EH    How much help from another is currently needed...     Putting on and taking off regular lower body clothing?  4  -TB     Bathing (including washing, rinsing, and drying)  3  -TB     Toileting (which includes using toilet bed pan or urinal)  4  -TB     Putting on and taking off regular upper body clothing  4  -TB     Taking care of personal grooming (such as brushing teeth)  4  -TB     Eating meals  4  -TB     Score  23  -TB     Functional Assessment    Outcome Measure Options AM-PAC 6 Clicks Basic Mobility (PT)  - AM-PAC 6 Clicks Daily Activity (OT)  - AM-PAC 6 Clicks Basic Mobility (PT)  -      03/22/17 0938          How much help from another is currently needed...    Putting on and taking off regular lower body clothing? 4  -ST      Bathing (including washing, rinsing, and drying) 4  -ST      Toileting (which includes using toilet bed pan or urinal) 4  -ST      Putting on and taking off regular upper body clothing 4  -ST      Taking care of personal grooming (such as brushing teeth) 4  -ST      Eating meals 4  -ST      Score 24  -ST      Functional Assessment    Outcome Measure Options AM-PAC 6 Clicks Daily Activity (OT)  -ST        User Key  (r) = Recorded By, (t) = Taken By, (c) = Cosigned By    Initials Name Provider Type    TB Priya Simon OT Occupational Therapist     Deena Arenas, PT Physical Therapist     Anika Ramos, OTR Occupational Therapist          Time Calculation:         Time Calculation- OT       03/24/17 0854          Time Calculation- OT    OT Start Time 0814  -TB      Total Timed Code Minutes- OT 10 minute(s)  -TB      OT Received On 03/24/17  -TB        User Key  (r) = Recorded By, (t) = Taken By, (c) = Cosigned By    Initials Name Provider Type    TB Priya Simon OT Occupational Therapist          Therapy Charges for Today     Code Description Service Date Service Provider Modifiers Qty    [id number]  OT RE-EVAL 2 3/24/2017 Priya Simon OT GO 1    [id number]  OT THERAPEUTIC ACT EA 15 MIN  3/24/2017 Priya Sandy Simon, OT GO 1    [id number] HC OT SELFCARE CURRENT 3/24/2017 Priya Sandy Simon, OT GO, CI 1    [id number] HC OT SELFCARE PROJECTED 3/24/2017 Priya Sandy Simon, OT GO, CI 1    [id number] HC OT SELFCARE DISCHARGE 3/24/2017 Priya Sandy Simon OT GO, CI 1          OT G-codes  OT Professional Judgement Used?: Yes  OT Functional Scales Options: AM-PAC 6 Clicks Daily Activity (OT)  Score: 23  Functional Limitation: Self care  Self Care Current Status (): At least 1 percent but less than 20 percent impaired, limited or restricted  Self Care Goal Status (): At least 1 percent but less than 20 percent impaired, limited or restricted  Self Care Discharge Status (): At least 1 percent but less than 20 percent impaired, limited or restricted    OT Discharge Summary  Anticipated Discharge Disposition: home with assist  Reason for Discharge: Discharge from facility  Outcomes Achieved: Able to achieve all goals within established timeline  Discharge Destination: Home with assist    Priya Simon OT  3/24/2017

## 2017-03-24 NOTE — DISCHARGE SUMMARY
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  Patient states she's very ready to go home.  Patient understands that she needs to take admitted drain 3 times a day to keep her blood pressure up.    Hospital Course  Ms. Beckett is a 77-year-old female with history of smoldering multiple myeloma followed by Dr. Pantoja, rate controlled A. fib/atrial flutter and ongoing FTT.  Patient presented to the [facility] ED on 3/21/17 accompanied by spouse with concerns for passing out and falling.  Patient's  states that for the past 7 days patient has had 3 syncopal episodes that occurred when she attempted to stand and walk.  Only one episode did she have a positive loss of consciousness.  Patient is not eating or drinking well, but she does have ongoing diarrhea that has been watery.  Patient's labs upon admission in the ED were within normal limits, but patient had significant symptomatic orthostatic hypotension, so patient was started on IVF and admitted to the hospitalist service for further evaluation and treatment.    Cardiology was consulted and ultimately started the patient on Midodrine which  she responded to well.  A positive tilt table test was discussed, but ultimately cardiology felt it was not necessary.  The possibility of sending the patient to [facility] to the autonomic dysfunction Center was also broached.  Patient has follow-up with Dr. Reinoso on April 14.  Hemoptic, Dr. Pantoja, was consulted for patient's smoldering multiple myeloma and it is felt that at this point there is no indication for treatment and he has not filled this was a cause of her orthostasis.  Patient has an appointment with Dr. Pantoja on April 13, which she will need to keep.  Patient will be discharged with a prescription for Midrin 5 mg TID that she has got to continue to take.  Patient is ready for discharge.      Procedures Performed   None    Consults:   Consults     Date and Time Order Name Status Description    3/21/2017 2027 Inpatient Consult to Hematology and Oncology      3/21/2017 1804 Inpatient Consult to Neurology Completed     3/21/2017 1804 Inpatient Consult to Cardiology Completed         Pertinent Test Results:  Imaging Results (most recent)     Procedure Component Value Units Date/Time    CT Head Without Contrast [[id number]] Collected:  03/21/17 1349     Updated:  03/21/17 4624    Narrative:       EXAMINATION: CT HEAD WITHOUT CONTRAST-03/21/2017:      INDICATION: Weak.         TECHNIQUE: CT scan of the head was performed at 5 mm intervals. No  intravenous contrast was utilized.     COMPARISON: NONE.     FINDINGS: There is central and cortical atrophy. There are  periventricular white matter changes typical of aging. There is no mass,  hemorrhage, midline shift or extra-axial fluid collection.       Impression:       Age-related findings. There are no acute abnormalities.     D:  03/21/2017  E:  03/21/2017           This report was finalized on 3/21/2017 2:56 PM by Dr. Johnnie Sen MD.       XR Chest 2 View [[id number]] Collected:  03/21/17 1347     Updated:  03/21/17 3041    Narrative:       EXAMINATION:  XR CHEST 2 VW- 03/21/2017     INDICATION: weak      COMPARISON: 01/03/2017     FINDINGS: There is a small area of postinflammatory scarring in the  right lower lobe. There are no acute findings and there has been no  change since 01/03/2017. Cardiac silhouette is normal.           Impression:       Chronic postinflammatory changes in the right lower lobe; no  acute disease.     D:  03/21/2017  E:  03/21/2017     This report was finalized on 3/21/2017 2:59 PM by Dr. Johnnie Sen MD.           Lab Results (most recent)     Procedure Component Value Units Date/Time    CBC & Differential [[id number]] Collected:  03/21/17 1240    Specimen:  Blood Updated:  03/21/17 1257    Narrative:       The following orders were created for panel order CBC & Differential.  Procedure                               Abnormality         Status                     ---------                               -----------         ------                     CBC Auto Differential[[id number]]         Abnormal            Final result                 Please view results for these tests on the individual orders.    CBC Auto Differential [[id number]]  (Abnormal) Collected:  03/21/17 1240    Specimen:  Blood Updated:  03/21/17 1257     WBC 6.80 10*3/mm3      RBC 4.53 10*6/mm3      Hemoglobin 15.3 g/dL      Hematocrit 46.3 (H) %      .2 (H) fL      MCH 33.8 (H) pg      MCHC 33.0 g/dL      RDW 14.0 %      RDW-SD 52.7 fl      MPV 10.6 fL      Platelets 230 10*3/mm3      Neutrophil % 56.3 %      Lymphocyte % 32.9 %      Monocyte % 8.5 %      Eosinophil % 1.6 %      Basophil % 0.6 %      Immature Grans % 0.1 %      Neutrophils, Absolute 3.82 10*3/mm3      Lymphocytes, Absolute 2.24 10*3/mm3      Monocytes, Absolute 0.58 10*3/mm3      Eosinophils, Absolute 0.11 10*3/mm3      Basophils, Absolute 0.04 10*3/mm3      Immature Grans, Absolute 0.01 10*3/mm3      nRBC 0.0 /100 WBC     POC Troponin, Rapid [[id number]]  (Normal) Collected:  03/21/17 1247    Specimen:   Blood Updated:  03/21/17 1305     Troponin I 0.00 ng/mL       Serial Number: [id number]    : [id number]       Comprehensive Metabolic Panel [[id number]]  (Abnormal) Collected:  03/21/17 1240    Specimen:  Blood Updated:  03/21/17 1311     Glucose 100 mg/dL      BUN 10 mg/dL      Creatinine 0.80 mg/dL      Sodium 138 mmol/L      Potassium 3.7 mmol/L      Chloride 101 mmol/L      CO2 28.0 mmol/L      Calcium 10.3 mg/dL      Total Protein 7.4 g/dL      Albumin 3.60 g/dL      ALT (SGPT) 16 U/L      AST (SGOT) 25 U/L      Alkaline Phosphatase 88 U/L      Total Bilirubin 0.7 mg/dL      eGFR Non African Amer 69 mL/min/1.73      Globulin 3.8 gm/dL      A/G Ratio 0.9 (L) g/dL      BUN/Creatinine Ratio 12.5     Anion Gap 9.0 mmol/L     Narrative:       National Kidney Foundation Guidelines    Stage                           Description                             GFR                      1                               Normal or High                          90+  2                               Mild decrease                            60-89  3                               Moderate decrease                   30-59  4                               Severe decrease                       15-29  5                               Kidney failure                             <15    Magnesium [[id number]]  (Normal) Collected:  03/21/17 1240    Specimen:  Blood Updated:  03/21/17 1311     Magnesium 1.7 mg/dL     Urinalysis With / Culture If Indicated [[id number]]  (Abnormal) Collected:  03/21/17 1348    Specimen:  Urine from Urine, Catheter Updated:  03/21/17 1415     Color, UA Dark Yellow (A)     Appearance, UA Clear     pH, UA 5.5     Specific Gravity, UA 1.017     Glucose, UA Negative     Ketones, UA Negative     Bilirubin, UA Small (1+) (A)     Blood, UA Negative     Protein, UA >=300 mg/dL (3+) (A)     Leuk Esterase, UA Negative     Nitrite, UA Negative     Urobilinogen, UA 0.2 E.U./dL    Urinalysis, Microscopic Only [[id number]]   (Abnormal) Collected:  03/21/17 1348    Specimen:  Urine from Urine, Catheter Updated:  03/21/17 1415     RBC, UA 0-2 /HPF      WBC, UA 0-2 (A) /HPF      Bacteria, UA None Seen /HPF      Squamous Epithelial Cells, UA None Seen /HPF      Hyaline Casts, UA None Seen /LPF      Methodology Automated Microscopy    Sedimentation Rate [[id number]]  (Abnormal) Collected:  03/21/17 2040    Specimen:  Blood Updated:  03/21/17 2102     Sed Rate 42 (H) mm/hr     C-reactive Protein [[id number]]  (Normal) Collected:  03/21/17 2040    Specimen:  Blood Updated:  03/21/17 2109     C-Reactive Protein 2.60 mg/dL     Clostridium Difficile Toxin, PCR [[id number]]  (Normal) Collected:  03/21/17 2200    Specimen:  Stool from Per Rectum Updated:  03/22/17 0932     C. Difficile Toxins by PCR Not Detected    Ova & Parasite Examination [[id number]] Collected:  03/22/17 1152    Specimen:  Stool from Per Rectum Updated:  03/22/17 1216    CBC (No Diff) [[id number]]  (Abnormal) Collected:  03/23/17 0718    Specimen:  Blood Updated:  03/23/17 0741     WBC 6.64 10*3/mm3      RBC 4.13 10*6/mm3      Hemoglobin 13.9 g/dL      Hematocrit 42.4 %      .7 (H) fL      MCH 33.7 (H) pg      MCHC 32.8 g/dL      RDW 14.1 %      RDW-SD 53.1 fl      MPV 10.6 fL      Platelets 234 10*3/mm3     Protein Electrophoresis, 24 Hr Urine [[id number]] Collected:  03/23/17 0730    Specimen:  Urine from Urine, Clean Catch Updated:  03/23/17 0742    Magnesium [[id number]]  (Normal) Collected:  03/23/17 0718    Specimen:  Blood Updated:  03/23/17 0759     Magnesium 1.3 mg/dL     Phosphorus [[id number]]  (Normal) Collected:  03/23/17 0718    Specimen:  Blood Updated:  03/23/17 0759     Phosphorus 2.5 mg/dL     Basic Metabolic Panel [[id number]]  (Abnormal) Collected:  03/23/17 0718    Specimen:  Blood Updated:  03/23/17 0759     Glucose 107 (H) mg/dL      BUN 5 (L) mg/dL      Creatinine 0.60 mg/dL      Sodium 139 mmol/L      Potassium 3.2 (L) mmol/L      Chloride 106 mmol/L      CO2 30.0  mmol/L      Calcium 8.8 mg/dL      eGFR Non African Amer 97 mL/min/1.73      BUN/Creatinine Ratio 8.3     Anion Gap 3.0 mmol/L     Narrative:       National Kidney Foundation Guidelines    Stage                           Description                             GFR                      1                               Normal or High                          90+  2                               Mild decrease                            60-89  3                               Moderate decrease                   30-59  4                               Severe decrease                       15-29  5                               Kidney failure                             <15    Stool Culture [[id number]] Collected:  03/22/17 1152    Specimen:  Stool from Per Rectum Updated:  03/23/17 0954     Stool Culture Culture in progress    Narrative:         Not cultured for Yersinia.    Not cultured for Yersinia.    Protein Elec + Interp, Serum [[id number]]  (Abnormal) Collected:  03/21/17 2040    Specimen:  Blood Updated:  03/23/17 1652     Total Protein 7.4 g/dL      Albumin 3.5 g/dL      Alpha-1-Globulin 0.2 g/dL      Alpha-2-Globulin 0.8 g/dL      Beta Globulin 2.0 (H) g/dL      Gamma Globulin 0.8 g/dL      M-Berto 1.1 (H) g/dL      Globulin 3.9 g/dL      A/G Ratio 0.9     Please note Comment      Protein electrophoresis scan will follow via computer, mail, or   delivery.        SPE Interpretation Comment      The SPE pattern demonstrates a single peak (M-spike) in the beta  region which may represent monoclonal protein. This peak may also  be caused by the presence of fibrinogen or circulating immune  complexes. If clinically indicated, the presence of a monoclonal  gammopathy may be confirmed by immunofixation, as well as  evaluation of the urine for the presence of Bence-Bryant protein.       Narrative:       Performed at:   - [address]  [id number]  : Kyle Orta PhD,  Phone:  [phone]    Immunoglobulin Free LT Chains Blood [[id number]]  (Abnormal) Collected:  03/21/17 2040    Specimen:  Blood Updated:  03/23/17 1652     Free Light Chain, Kappa 39.11 (H) mg/L      Free Lambda Light Chains 16.06 mg/L      Kappa/Lambda Ratio 2.44 (H)    Narrative:       Performed at:  [address] [place] [address]  [id number]  : Kyle Orta PhD, Phone:  [phone]    Beta 2 Microglobulin, Serum [[id number]] Collected:  03/21/17 2040    Specimen:  Blood Updated:  03/23/17 1652     Beta-2 2.2 mg/L     Narrative:       Performed at:  [address] [place] [address]  [id number]  : Kyle Orta PhD, Phone:  [phone]    MIGUEL + PE [[id number]]  (Abnormal) Collected:  03/21/17 2040    Specimen:  Blood Updated:  03/23/17 1711     IgG 628 (L) mg/dL      IgA 1703 (H) mg/dL       Results confirmed on  dilution.        IgM 30 mg/dL      Total Protein 6.7 g/dL      Albumin 3.3 g/dL      Alpha-1-Globulin 0.2 g/dL      Alpha-2-Globulin 0.7 g/dL      Beta Globulin 1.9 (H) g/dL      Gamma Globulin 0.7 g/dL      M-Berto Comment: g/dL       Monoclonal IgA kappa #1 = 1.0 g/dl  Due to the small quntity of monoclonal IgA kappa #2, unable to  quantitate the M-spike.        Globulin 3.4 g/dL      A/G Ratio 1.0     Immunofixation Reflex, Serum Comment:      MIGUEL shows monoclonal IgA kappa (monomer-dimer).  Lambda also appears asymmetrical.        Please note Comment      Protein electrophoresis scan will follow via computer, mail, or   delivery.       Narrative:       Performed at:  [address] [place] [address]  [id number]  : Kyle Orta PhD, Phone:  [phone]    Potassium [[id number]]  (Normal) Collected:  03/23/17 2102    Specimen:  Blood Updated:  03/23/17 2155     Potassium 4.0 mmol/L     CBC (No Diff) [[id number]]  (Abnormal) Collected:  03/24/17 0623    Specimen:  Blood Updated:  03/24/17 0641     WBC 7.16  "10*3/mm3      RBC 3.95 10*6/mm3      Hemoglobin 13.4 g/dL      Hematocrit 40.6 %      .8 (H) fL      MCH 33.9 (H) pg      MCHC 33.0 g/dL      RDW 14.2 %      RDW-SD 53.4 fl      MPV 10.4 fL      Platelets 221 10*3/mm3     Magnesium [[id number]]  (Normal) Collected:  03/24/17 0623    Specimen:  Blood Updated:  03/24/17 0715     Magnesium 1.9 mg/dL     Phosphorus [[id number]]  (Abnormal) Collected:  03/24/17 0623    Specimen:  Blood Updated:  03/24/17 0715     Phosphorus 1.9 (L) mg/dL     TSH [[id number]]  (Normal) Collected:  03/24/17 0623    Specimen:  Blood Updated:  03/24/17 0715     TSH 2.828 mIU/mL     Basic Metabolic Panel [[id number]]  (Abnormal) Collected:  03/24/17 0623    Specimen:  Blood Updated:  03/24/17 0716     Glucose 105 (H) mg/dL      BUN <5 (L) mg/dL      Creatinine 0.50 (L) mg/dL      Sodium 136 mmol/L      Potassium 3.5 mmol/L      Chloride 107 mmol/L      CO2 27.0 mmol/L      Calcium 8.5 (L) mg/dL      eGFR Non African Amer 119 mL/min/1.73      BUN/Creatinine Ratio --      Unable to calculate Bun/Crea Ratio.        Anion Gap 2.0 (L) mmol/L     Narrative:       National Kidney Foundation Guidelines    Stage                           Description                             GFR                      1                               Normal or High                          90+  2                               Mild decrease                            60-89  3                               Moderate decrease                   30-59  4                               Severe decrease                       15-29  5                               Kidney failure                             <15        Condition on Discharge: Stable    Physical Exam on Discharge:/80 (BP Location: Right arm, Patient Position: Lying)  Pulse 80  Temp 98.1 °F (36.7 °C) (Oral)   Resp 18  Ht 63.5\" (161.3 cm)  Wt 123 lb 9.6 oz (56.1 kg)  SpO2 96%  BMI 21.55 kg/m2  Physical Exam  General: Well-developed well-nourished female in " no acute distress    Head: Normocephalic atraumatic    Eyes: PERRLA, EOMI, nonicteric, conjunctiva normal    Neck: Supple, nontender without lymphadenopathy, JVD, nuchal rigidity.  Trachea midline    Cardiovascular: RRR  no M/R/G      Respiratory: Nonlabored, symmetrical chest expansion, clear to auscultation bilaterally    Abdomen: Soft, nontender, nondistended, with positive bowel sounds in all 4 quadrants     Extremities: Full range of motion in upper and lower extremities bilaterally negative for edema/cyanosis/clubbing. +2 DP pulses bilaterally    Skin: Pink/warm/dry.  No rash or lesions noted    Neuro: Alert and oriented to person place time and situation, speech is clear, follows all commands, recent and remote memory intact    Psych: Patient is pleasant and cooperative.  Normal affect.  Negative suicidal ideation or homicidal ideation.    Discharge Disposition  Home or Self Care    Discharge Medications   Sowmya Beckett   Home Medication Instructions SHAUNA:[id number]    Printed on:03/24/17 0810   Medication Information                      aspirin 325 MG tablet  Take 325 mg by mouth daily.             BIOTIN PO  Take 1 capsule by mouth Daily.             cholecalciferol (VITAMIN D3) 1000 UNITS tablet  Take 2,000 Units by mouth daily.             DULoxetine (CYMBALTA) 60 MG capsule  Take 60 mg by mouth Daily.             metoprolol tartrate (LOPRESSOR) 25 MG tablet  TAKE ONE-HALF TABLET BY MOUTH ONCE DAILY             midodrine (PROAMATINE) 2.5 MG tablet  Take 2 tablets by mouth Every 8 (Eight) Hours.             Omega-3 Fatty Acids (FISH OIL) 1000 MG capsule capsule  Take  by mouth daily with breakfast.             polyethylene glycol (MIRALAX) packet  Take 17 g by mouth Daily.             Red Yeast Rice 600 MG capsule  Take  by mouth daily.             Zinc 50 MG capsule  Take  by mouth daily.               Discharge Diet:   Diet Instructions     Diet: Regular; Thin Liquids, No Restrictions        Discharge Diet:  Regular   Fluid Consistency:  Thin Liquids, No Restrictions   Increase salt intake               Discharge Care Plan / Instructions:  Activity at Discharge:   Activity Instructions     Activity as Tolerated                   Follow-up Appointments  Future Appointments  Date Time Provider Department Center   4/13/2017 11:15 AM Messi Pantoja MD [facility]   8/14/2017 1:30 PM Alvarado Willoughby MD [facility] None     Additional Instructions for the Follow-ups that You Need to Schedule     Additional Discharge Follow-Up (Specify Provider)    As directed    To:  Dr Pantoja   Follow Up:  3 Weeks   Follow Up Details:  Keep current appointment       Discharge Follow-Up With Specified Provider    As directed    To:  Dr Willoughby   Follow Up:  3 Weeks   Follow Up Details:  Keep current appointment       Discharge Follow-up with PCP    As directed    Follow Up Details:  Follow-up with Velia Vogel MD sometime next week; please schedule appointment prior to patient's discharge               Test Results Pending at Discharge   Order Current Status    Ova & Parasite Examination In process    Protein Electrophoresis, 24 Hr Urine In process    Stool Culture Preliminary result        ERIC Lawson 03/24/17 8:10 AM    Time: Discharge 45 min    Please note that portions of this note may have been completed with a voice recognition program. Efforts were made to edit the dictations, but occasionally words are mistranscribed.

## 2017-03-24 NOTE — PLAN OF CARE
Problem: Patient Care Overview (Adult)  Goal: Plan of Care Review  Outcome: Outcome(s) achieved Date Met:  03/24/17 03/24/17 0855   Coping/Psychosocial Response Interventions   Plan Of Care Reviewed With patient   Outcome Evaluation   Outcome Summary/Follow up Plan OT re-eval completed per consult orders. Pt able to ambulate in hallway, complete stairs and step in/out of tub/shower with good safety, no LOB, no dizziness/stable BP. Pt educated on use of TTB should the need arise. No DME recommended at this time. No skilled needs indicated. OT to d/c at this time.         Problem: Inpatient Occupational Therapy  Goal: Transfer Training Goal 1 STG- OT  Outcome: Outcome(s) achieved Date Met:  03/24/17 03/24/17 0855   Transfer Training OT STG   Transfer Training OT STG, Time to Achieve by discharge   Transfer Training OT STG, Activity Type tub   Transfer Training OT STG, [place] Level supervision required;verbal cues required   Transfer Training OT STG, Outcome goal met       Goal: Patient Education Goal STG- OT  Outcome: Outcome(s) achieved Date Met:  03/24/17 03/24/17 0855   Patient Education OT STG   Patient Education OT STG, Time to Achieve by discharge   Patient Education OT STG, Education Type home safety;adaptive equipment mgmt   Patient Education OT STG, Education Understanding verbalizes understanding   Patient Education OT STG Outcome goal met

## 2017-03-24 NOTE — PLAN OF CARE
Problem: Patient Care Overview (Adult)  Goal: Plan of Care Review  Outcome: Outcome(s) achieved Date Met:  03/24/17 03/24/17 0853   Coping/Psychosocial Response Interventions   Plan Of Care Reviewed With patient   Outcome Evaluation   Outcome Summary/Follow up Plan PT ambulates in [place] with PT/OT, up/down ramp, on stairs without LOB. Educated on use of cane- pt does not use cane in [place] this date but wants for future use. Pt expectecd to d/c home safely.

## 2017-03-24 NOTE — PLAN OF CARE
Problem: Fluid Volume Deficit (Adult)  Goal: Fluid/Electrolyte Balance  Outcome: Ongoing (interventions implemented as appropriate)

## 2017-03-24 NOTE — PROGRESS NOTES
HEMATOLOGY/ONCOLOGY PROGRESS NOTE     CC: Orthostatic syncope    SUBJECTIVE: Simply with more oral intake and hydration she continues to feel better along with the increased dose of midodrine.        Past Medical History, Past Surgical History, Social History, Family History have been reviewed and are without significant changes except as mentioned.      Medications:  The current medication list was reviewed in the EMR    ALLERGIES:   Allergies   Allergen Reactions   • Ambien [Zolpidem Tartrate]    • Morphine And Related        ROS:  A comprehensive 14 point review of systems was performed and was negative except as mentioned.      Objective      Vitals:    03/24/17 0530 03/24/17 0821 03/24/17 0826 03/24/17 0846   BP:  125/94 116/79 125/94   BP Location:       Patient Position:  Lying Sitting Standing   Pulse:  80 102    Resp:   20    Temp:   98.4 °F (36.9 °C)    TempSrc:   Oral    SpO2:       Weight: 123 lb 9.6 oz (56.1 kg)      Height:            ECOG: (1) Restricted in physically strenuous activity, ambulatory and able to do work of light nature    General: well appearing, in no acute distress  HEENT: sclera anicteric, oropharynx clear  Lymphatics: no cervical, supraclavicular, inguinal, or axillary adenopathy  Cardiovascular: regular rate and rhythm, no murmurs  Lungs: clear to auscultation bilaterally  Abdomen: soft, nontender, nondistended.  No palpable organomegaly  ExtremIties: no lower extremity edema  Skin: no rashes, lesions, bruising, or petechiae  Neuro: Alert and oriented x 3. Moves all extremities.    RECENT LABS:        Results from last 7 days  Lab Units 03/24/17  0623 03/23/17  0718 03/21/17  1240   WBC 10*3/mm3 7.16 6.64 6.80   HEMOGLOBIN g/dL 13.4 13.9 15.3   PLATELETS 10*3/mm3 221 234 230       Results from last 7 days  Lab Units 03/24/17  0623 03/23/17  2102 03/23/17  0718 03/21/17  1240   SODIUM mmol/L 136  --  139 138   POTASSIUM mmol/L 3.5 4.0 3.2* 3.7   TOTAL CO2 mmol/L 27.0  --  30.0 28.0    BUN mg/dL <5*  --  5* 10   CREATININE mg/dL 0.50*  --  0.60 0.80   MAGNESIUM mg/dL 1.9  --  1.3 1.7   PHOSPHORUS mg/dL 1.9*  --  2.5  --    GLUCOSE mg/dL 105*  --  107* 100   AST (SGOT) U/L  --   --   --  25   ALT (SGPT) U/L  --   --   --  16   BILIRUBIN mg/dL  --   --   --  0.7   ALK PHOS U/L  --   --   --  88     Estimated Creatinine Clearance: 49 mL/min (by C-G formula based on Cr of 0.5).      Imaging Results (last 72 hours)     Procedure Component Value Units Date/Time    CT Head Without Contrast [[id number]] Collected:  03/21/17 1349     Updated:  03/21/17 1458    Narrative:       EXAMINATION: CT HEAD WITHOUT CONTRAST-03/21/2017:      INDICATION: Weak.         TECHNIQUE: CT scan of the head was performed at 5 mm intervals. No  intravenous contrast was utilized.     COMPARISON: NONE.     FINDINGS: There is central and cortical atrophy. There are  periventricular white matter changes typical of aging. There is no mass,  hemorrhage, midline shift or extra-axial fluid collection.       Impression:       Age-related findings. There are no acute abnormalities.     D:  03/21/2017  E:  03/21/2017           This report was finalized on 3/21/2017 2:56 PM by Dr. Johnnie Sen MD.       XR Chest 2 View [[id number]] Collected:  03/21/17 1347     Updated:  03/21/17 1501    Narrative:       EXAMINATION: XR CHEST 2 VW- 03/21/2017     INDICATION: weak      COMPARISON: 01/03/2017     FINDINGS: There is a small area of postinflammatory scarring in the  right lower lobe. There are no acute findings and there has been no  change since 01/03/2017. Cardiac silhouette is normal.           Impression:       Chronic postinflammatory changes in the right lower lobe; no  acute disease.     D:  03/21/2017  E:  03/21/2017     This report was finalized on 3/21/2017 2:59 PM by Dr. Johnnie Sen MD.             ASSESSMENT & PLAN:    1. Orthostatic hypotension  2. Anorexia  3. Myeloma  4. Atrial arrhythmia    Discussion:   The rest of her myeloma  panel I reviewed on my note yesterday.  My theory is that this is an autonomic dysfunction causing the orthostatic hypotension and anorexia and the metoprolol which controls her atrial arrhythmia complicates that.  She is feeling better on the mid a drain.  There is some protein in the urine of 300 mg/dL total protein on urinalysis. 24-hour urine immunoelectrophoresis was repeated and is still pending significantly.  When I see her back in a couple of weeks I will check a PET scan for completeness sake.  Bone survey to this point has been negative.  She needs to follow-up with Dr. Willoughby.        Errors in dictation may reflect use of voice recognition software and not all errors in transcription may have been detected prior to signing.          Messi Pantoja MD    3/24/2017

## 2017-03-24 NOTE — PROGRESS NOTES
Adult Nutrition  Assessment/PES    Patient Name:  Sowmya Beckett  YOB: 1940  MRN: [id number]  Admit Date:  3/21/2017    Assessment Date:  3/24/2017        Reason for Assessment       03/24/17 1309    Reason for Assessment    Reason For Assessment/Visit follow up protocol    Time Spent (min) 10    Diagnosis --   per notes this admission                  Labs/Tests/Procedures/Meds       03/24/17 1309    Labs/Tests/Procedures/Meds    Labs/Tests Review Reviewed                Nutrition Prescription Ordered       03/24/17 1309    Nutrition Prescription PO    Current PO Diet Regular    Supplement Boost Plus    Supplement Frequency 3 times a day            Evaluation of Received Nutrient/Fluid Intake       03/24/17 1309    PO Evaluation    Number of Meals 1    % PO Intake 100              Problem/Interventions:                  Intervention Goal       03/24/17 1310    Intervention Goal    General Nutrition support treatment    PO Continue positive trend            Nutrition Intervention       03/24/17 1310    Nutrition Intervention    RD/Tech Action Follow Tx progress;Supplement provided   Noted pending discharge.              Education/Evaluation       03/24/17 1310    Education    Education Education offered and refused    Monitor/Evaluation    Monitor Per protocol            Electronically signed by:  Giselle Lala RD  03/24/17 1:10 PM

## 2017-03-27 LAB
O+P SPEC MICRO: NORMAL
O+P STL TRI STN: NORMAL

## 2017-03-28 ENCOUNTER — TELEPHONE (OUTPATIENT)
Dept: ONCOLOGY | Facility: CLINIC | Age: 77
End: 2017-03-28

## 2017-03-28 NOTE — TELEPHONE ENCOUNTER
Per Dr. Pantoja, patient doesn't need any labs or other tests prior to office visit.  Returned call.  No answer.

## 2017-03-28 NOTE — TELEPHONE ENCOUNTER
----- Message from Tammy Busby sent at 3/28/2017  1:09 PM EDT -----  Regarding: [facility]- BLOOD WORK OR TESTS  Contact: [phone]  Patient's  called and wants to know if patient needs anymore blood work or tests before patient is seen next week? She was inpatient last week.

## 2017-04-04 ENCOUNTER — TELEPHONE (OUTPATIENT)
Dept: ONCOLOGY | Facility: CLINIC | Age: 77
End: 2017-04-04

## 2017-04-04 DIAGNOSIS — R53.1 WEAKNESS: ICD-10-CM

## 2017-04-04 DIAGNOSIS — R29.6 MULTIPLE FALLS: ICD-10-CM

## 2017-04-04 DIAGNOSIS — D47.2 SMOLDERING MULTIPLE MYELOMA (SMM): Primary | ICD-10-CM

## 2017-04-04 NOTE — TELEPHONE ENCOUNTER
----- Message from Paulina Kumar sent at 4/4/2017  2:12 PM EDT -----  Regarding: farah  tub transfer bench  Call        Sameer  concerning a  Tub  Transfer bench.    He may need an order for medicare to pay

## 2017-04-13 ENCOUNTER — OFFICE VISIT (OUTPATIENT)
Dept: ONCOLOGY | Facility: CLINIC | Age: 77
End: 2017-04-13

## 2017-04-13 VITALS
BODY MASS INDEX: 19.7 KG/M2 | TEMPERATURE: 97.5 F | HEART RATE: 85 BPM | SYSTOLIC BLOOD PRESSURE: 115 MMHG | WEIGHT: 113 LBS | DIASTOLIC BLOOD PRESSURE: 69 MMHG | RESPIRATION RATE: 12 BRPM

## 2017-04-13 DIAGNOSIS — C90.00 MULTIPLE MYELOMA NOT HAVING ACHIEVED REMISSION (HCC): ICD-10-CM

## 2017-04-13 DIAGNOSIS — D47.2 SMOLDERING MULTIPLE MYELOMA (SMM): ICD-10-CM

## 2017-04-13 DIAGNOSIS — Z85.820 HISTORY OF MELANOMA: ICD-10-CM

## 2017-04-13 DIAGNOSIS — I95.1 SYNCOPE DUE TO ORTHOSTATIC HYPOTENSION: Primary | ICD-10-CM

## 2017-04-13 PROCEDURE — 99214 OFFICE O/P EST MOD 30 MIN: CPT | Performed by: INTERNAL MEDICINE

## 2017-04-13 RX ORDER — MIRTAZAPINE 15 MG/1
15 TABLET, FILM COATED ORAL NIGHTLY
COMMUNITY
Start: 2017-03-29 | End: 2017-08-10 | Stop reason: HOSPADM

## 2017-04-13 NOTE — PROGRESS NOTES
CHIEF COMPLAINT: Syncope and presyncope    Problem List:  Oncology/Hematology History    1.)  multiple myeloma    A.)The patient serum immunoelectrophoresis revealed a monoclonal IgA kappa #1 at 1500 MG/DL, monoclonal IgA kappa #22 small to quantify. IgA is elevated at 2471 with the upper limits of normal being 422. C-reactive protein was normal, beta-2 microglobulin normal at 2.2, sedimentation rate just slightly elevated at 34 with the upper limits of normal being 30. 24-hour urine immunoelectrophoresis with 321.9 mg/24 hour protein in the urine, monoclonal IgA kappa to small to quantify in the urine. She has no renal failure, her BUN was 16 creatinine of 0.8, she is not anemic, no thrombocytopenia. Core biopsy showed 5-10% kappa restricted plasma cells with a flow cytometry showing a population of CD 56 positive cells. There was no rouleaux. There only rare plasma cells in the clot prepped and the bone marrow was relatively aspicular. Her serum free light chains were 38 with the kappa lambda ratio of 3. Her total calcium was 10 and her ionized calcium was 1.28. Bone survey was negative.    B.)She's had 2 bone marrow biopsies the last one was done her last admission and has 10-15% plasma cells with an intermediate risk genetic profile on FISH analysis.  Her cytogenetics however had inversion 3 which is unusual for myeloma and the statistical significance of which Dr. Sequeira was not sure.  He did review her bone marrow and there was no evidence of myelodysplasia or acute leukemia which is more common with inversion 3.  Prior bone survey was negative. She's never been hypercalcemic, anemic, or any evidence of renal insufficiency with this. Her serum immunoelectrophoresis had 1600 mg of monoclonal immunoglobulin G last visit and that's actually slightly less than it was a couple of months back. Nonetheless, in part for appetite stimulation and in part on the theory that this monoclonal protein might be contributing to  her poor peristalsis, she completed 20 mg a day of dexamethasone ×5 days as of yesterday. This did not make her hungry and she continues to lose weight. She's had 3 upper GI endoscopies one of which showed some neuroendocrine type infiltration of unknown significance but was not seen repetitively on small bowel biopsy.           Smoldering multiple myeloma (SMM) (Resolved)    1/3/2017 Initial Diagnosis    Smoldering multiple myeloma (SMM)      Multiple myeloma    4/13/2017 Initial Diagnosis    Multiple myeloma       HISTORY OF PRESENT ILLNESS:  The patient is a 77 y.o. female, here for follow up on management of multiple myeloma reviewed she was in the hospital in the last month and a half.  Upon discharge she's had about 6-8 stools per day.  On Imodium and this persisted.  She tested positive for C. difficile and then on repeat was negative in the hospital.  No fevers or chills.  No syncope since discharged on medical drain.  Still on metoprolol for atrial fibrillation/flutter which is worse just before she has syncopal episodes.  She's had no further syncope since discharge and her weight is holding stable.      Past Medical History:   Diagnosis Date   • Arrhythmia    • Atrial fibrillation and flutter 8/8/2016   • Cancer     melanoma in 1971   • CHF (congestive heart failure)      Past Surgical History:   Procedure Laterality Date   • APPENDECTOMY     • CHOLECYSTECTOMY     • COLON SURGERY     • HEMORRHOIDECTOMY     • HYSTERECTOMY         Allergies   Allergen Reactions   • Ambien [Zolpidem Tartrate]    • Morphine And Related        Family History and Social History reviewed and changed as necessary      REVIEW OF SYSTEM:   Review of Systems   Constitutional: Negative for appetite change, chills, diaphoresis, fatigue, fever and unexpected weight change.   HENT:   Negative for mouth sores, sore throat and trouble swallowing.    Eyes: Negative for icterus.   Respiratory: Negative for cough, hemoptysis and shortness of  breath.    Cardiovascular: Negative for chest pain, leg swelling and palpitations.   Gastrointestinal: Negative for abdominal distention, abdominal pain, blood in stool, constipation, diarrhea, nausea and vomiting.   Endocrine: Negative for hot flashes.   Genitourinary: Negative for bladder incontinence, difficulty urinating, dysuria, frequency and hematuria.    Musculoskeletal: Negative for gait problem, neck pain and neck stiffness.   Skin: Negative for rash.   Neurological: Negative for dizziness, gait problem, headaches, light-headedness and numbness.   Hematological: Negative for adenopathy. Does not bruise/bleed easily.   Psychiatric/Behavioral: Negative for depression. The patient is not nervous/anxious.    All other systems reviewed and are negative.       PHYSICAL EXAM    Vitals:    04/13/17 1117   BP: 115/69   Pulse: 85   Resp: 12   Temp: 97.5 °F (36.4 °C)   TempSrc: Temporal Artery    Weight: 113 lb (51.3 kg)     Constitutional: Appears well-developed and well-nourished. No distress.   ECOG: (0) Fully active, able to carry on all predisease performance without restriction  HENT:   Head: Normocephalic.   Mouth/Throat: Oropharynx is clear and moist.   Eyes: Conjunctivae are normal. Pupils are equal, round, and reactive to light. No scleral icterus.   Neck: Neck supple. No JVD present. No thyromegaly present.   Cardiovascular: Normal rate, regular rhythm and normal heart sounds.    Pulmonary/Chest: Breath sounds normal. No respiratory distress.   Abdominal: Soft. Exhibits no distension and no mass. There is no hepatosplenomegaly. There is no tenderness. There is no rebound and no guarding.   Musculoskeletal:Exhibits no edema, tenderness or deformity.   Neurological: Alert and oriented to person, place, and time. Exhibits normal muscle tone.   Skin: No ecchymosis, no petechiae and no rash noted. Not diaphoretic. No cyanosis. Nails show no clubbing.   Psychiatric: Normal mood and affect.   Vitals  reviewed.      Admission on 03/21/2017, Discharged on 03/24/2017   Component Date Value Ref Range Status   • Glucose 03/21/2017 100  70 - 100 mg/dL Final   • BUN 03/21/2017 10  9 - 23 mg/dL Final   • Creatinine 03/21/2017 0.80  0.60 - 1.30 mg/dL Final   • Sodium 03/21/2017 138  132 - 146 mmol/L Final   • Potassium 03/21/2017 3.7  3.5 - 5.5 mmol/L Final   • Chloride 03/21/2017 101  99 - 109 mmol/L Final   • CO2 03/21/2017 28.0  20.0 - 31.0 mmol/L Final   • Calcium 03/21/2017 10.3  8.7 - 10.4 mg/dL Final   • Total Protein 03/21/2017 7.4  5.7 - 8.2 g/dL Final   • Albumin 03/21/2017 3.60  3.20 - 4.80 g/dL Final   • ALT (SGPT) 03/21/2017 16  7 - 40 U/L Final   • AST (SGOT) 03/21/2017 25  0 - 33 U/L Final   • Alkaline Phosphatase 03/21/2017 88  25 - 100 U/L Final   • Total Bilirubin 03/21/2017 0.7  0.3 - 1.2 mg/dL Final   • eGFR Non African Amer 03/21/2017 69  >60 mL/min/1.73 Final   • Globulin 03/21/2017 3.8  gm/dL Final   • A/G Ratio 03/21/2017 0.9* 1.5 - 2.5 g/dL Final   • BUN/Creatinine Ratio 03/21/2017 12.5  7.0 - 25.0 Final   • Anion Gap 03/21/2017 9.0  3.0 - 11.0 mmol/L Final   • Magnesium 03/21/2017 1.7  1.3 - 2.7 mg/dL Final   • Extra Tube 03/21/2017 hold for add-on   Final    Auto resulted   • Extra Tube 03/21/2017 Hold for add-ons.   Final    Auto resulted.   • Extra Tube 03/21/2017 hold for add-on   Final    Auto resulted   • Extra Tube 03/21/2017 Hold for add-ons.   Final    Auto resulted.   • WBC 03/21/2017 6.80  3.50 - 10.80 10*3/mm3 Final   • RBC 03/21/2017 4.53  3.89 - 5.14 10*6/mm3 Final   • Hemoglobin 03/21/2017 15.3  11.5 - 15.5 g/dL Final   • Hematocrit 03/21/2017 46.3* 34.5 - 44.0 % Final   • MCV 03/21/2017 102.2* 80.0 - 99.0 fL Final   • MCH 03/21/2017 33.8* 27.0 - 31.0 pg Final   • MCHC 03/21/2017 33.0  32.0 - 36.0 g/dL Final   • RDW 03/21/2017 14.0  11.3 - 14.5 % Final   • RDW-SD 03/21/2017 52.7  37.0 - 54.0 fl Final   • MPV 03/21/2017 10.6  6.0 - 12.0 fL Final   • Platelets 03/21/2017 230  150 -  450 10*3/mm3 Final   • Neutrophil % 03/21/2017 56.3  41.0 - 71.0 % Final   • Lymphocyte % 03/21/2017 32.9  24.0 - 44.0 % Final   • Monocyte % 03/21/2017 8.5  0.0 - 12.0 % Final   • Eosinophil % 03/21/2017 1.6  0.0 - 3.0 % Final   • Basophil % 03/21/2017 0.6  0.0 - 1.0 % Final   • Immature Grans % 03/21/2017 0.1  0.0 - 0.6 % Final   • Neutrophils, Absolute 03/21/2017 3.82  1.50 - 8.30 10*3/mm3 Final   • Lymphocytes, Absolute 03/21/2017 2.24  0.60 - 4.80 10*3/mm3 Final   • Monocytes, Absolute 03/21/2017 0.58  0.00 - 1.00 10*3/mm3 Final   • Eosinophils, Absolute 03/21/2017 0.11  0.10 - 0.30 10*3/mm3 Final   • Basophils, Absolute 03/21/2017 0.04  0.00 - 0.20 10*3/mm3 Final   • Immature Grans, Absolute 03/21/2017 0.01  0.00 - 0.03 10*3/mm3 Final   • nRBC 03/21/2017 0.0  0.0 - 0.0 /100 WBC Final   • Color, UA 03/21/2017 Dark Yellow* Yellow, Straw Final   • Appearance, UA 03/21/2017 Clear  Clear Final   • pH, UA 03/21/2017 5.5  5.0 - 8.0 Final   • Specific Gravity, UA 03/21/2017 1.017  1.001 - 1.030 Final   • Glucose, UA 03/21/2017 Negative  Negative Final   • Ketones, UA 03/21/2017 Negative  Negative Final   • Bilirubin, UA 03/21/2017 Small (1+)* Negative Final   • Blood, UA 03/21/2017 Negative  Negative Final   • Protein, UA 03/21/2017 >=300 mg/dL (3+)* Negative Final   • Leuk Esterase, UA 03/21/2017 Negative  Negative Final   • Nitrite, UA 03/21/2017 Negative  Negative Final   • Urobilinogen, UA 03/21/2017 0.2 E.U./dL  0.2 - 1.0 E.U./dL Final   • Troponin I 03/21/2017 0.00  0.00 - 0.07 ng/mL Final    Serial Number: [id number]    : [id number]   • RBC, UA 03/21/2017 0-2  None Seen, 0-2 /HPF Final   • WBC, UA 03/21/2017 0-2* None Seen /HPF Final   • Bacteria, UA 03/21/2017 None Seen  None Seen, Trace /HPF Final   • Squamous Epithelial Cells, UA 03/21/2017 None Seen  None Seen, 0-2 /HPF Final   • Hyaline Casts, UA 03/21/2017 None Seen  0 - 6 /LPF Final   • Methodology 03/21/2017 Automated Microscopy   Final   • Total  Protein 03/21/2017 7.4  6.0 - 8.5 g/dL Final   • Albumin 03/21/2017 3.5  2.9 - 4.4 g/dL Final   • Alpha-1-Globulin 03/21/2017 0.2  0.0 - 0.4 g/dL Final   • Alpha-2-Globulin 03/21/2017 0.8  0.4 - 1.0 g/dL Final   • Beta Globulin 03/21/2017 2.0* 0.7 - 1.3 g/dL Final   • Gamma Globulin 03/21/2017 0.8  0.4 - 1.8 g/dL Final   • M-Berto 03/21/2017 1.1* Not Observed g/dL Final   • Globulin 03/21/2017 3.9  2.2 - 3.9 g/dL Final   • A/G Ratio 03/21/2017 0.9  0.7 - 1.7 Final   • Please note 03/21/2017 Comment   Final    Protein electrophoresis scan will follow via computer, mail, or   delivery.   • SPE Interpretation 03/21/2017 Comment   Final    The SPE pattern demonstrates a single peak (M-spike) in the beta  region which may represent monoclonal protein. This peak may also  be caused by the presence of fibrinogen or circulating immune  complexes. If clinically indicated, the presence of a monoclonal  gammopathy may be confirmed by immunofixation, as well as  evaluation of the urine for the presence of Bence-Bryant protein.   • Total Protein, Urine 03/23/2017 126.4  Not Estab. mg/dL Final   • Protein, 24H Urine 03/23/2017 347.6* 30.0 - 150.0 mg/24 hr Final   • Albumin, U 03/23/2017 47.3  % Final   • Alpha-1-Globulin, U 03/23/2017 2.7  % Final   • Alpha-2-Globulin, U 03/23/2017 10.8  % Final   • Beta Globulin, U 03/23/2017 19.6  % Final   • Gamma Globulin, Urine 03/23/2017 19.7  % Final   • M-Berto, % 03/23/2017 10.9* Not Observed % Final   • M-Berto, mg/24 hr 03/23/2017 37.9* Not Observed mg/24 hr Final   • Please note 03/23/2017 Comment   Final    Protein electrophoresis scan will follow via computer, mail, or   delivery.   • Free Light Chain, Spivey 03/21/2017 39.11* 3.30 - 19.40 mg/L Final   • Free Lambda Light Chains 03/21/2017 16.06  5.71 - 26.30 mg/L Final   • Kappa/Lambda Ratio 03/21/2017 2.44* 0.26 - 1.65 Final   • Beta-2 03/21/2017 2.2  0.6 - 2.4 mg/L Final   • C-Reactive Protein 03/21/2017 2.60  0.00 -  10.00 mg/dL Final   • Sed Rate 03/21/2017 42* 0 - 30 mm/hr Final   • IgG 03/21/2017 628* 700 - 1600 mg/dL Final   • IgA 03/21/2017 1703* 64 - 422 mg/dL Final    Results confirmed on  dilution.   • IgM 03/21/2017 30  26 - 217 mg/dL Final   • Total Protein 03/21/2017 6.7  6.0 - 8.5 g/dL Final   • Albumin 03/21/2017 3.3  2.9 - 4.4 g/dL Final   • Alpha-1-Globulin 03/21/2017 0.2  0.0 - 0.4 g/dL Final   • Alpha-2-Globulin 03/21/2017 0.7  0.4 - 1.0 g/dL Final   • Beta Globulin 03/21/2017 1.9* 0.7 - 1.3 g/dL Final   • Gamma Globulin 03/21/2017 0.7  0.4 - 1.8 g/dL Final   • M-Berto 03/21/2017 Comment:  Not Observed g/dL Final    Monoclonal IgA kappa #1 = 1.0 g/dl  Due to the small quntity of monoclonal IgA kappa #2, unable to  quantitate the M-spike.   • Globulin 03/21/2017 3.4  2.2 - 3.9 g/dL Final   • A/G Ratio 03/21/2017 1.0  0.7 - 1.7 Final   • Immunofixation Reflex, Serum 03/21/2017 Comment:   Final    MIGUEL shows monoclonal IgA kappa (monomer-dimer).  Lambda also appears asymmetrical.   • Please note 03/21/2017 Comment   Final    Protein electrophoresis scan will follow via computer, mail, or   delivery.   • Stool Culture 03/22/2017 No Salmonella, Shigella, Campylobacter, E. coli O157, or Vibrio isolated at 48 hours   Final   • C. Difficile Toxins by PCR 03/21/2017 Not Detected  Negative Final   • Ova + Parasite Exam 03/22/2017 No ova or parasites seen on concentrated smear  No Ova or Parasites Seen Final   • Trichrome Stain 03/22/2017 No ova or parasites seen on trichrome stain   Final   • WBC 03/23/2017 6.64  3.50 - 10.80 10*3/mm3 Final   • RBC 03/23/2017 4.13  3.89 - 5.14 10*6/mm3 Final   • Hemoglobin 03/23/2017 13.9  11.5 - 15.5 g/dL Final   • Hematocrit 03/23/2017 42.4  34.5 - 44.0 % Final   • MCV 03/23/2017 102.7* 80.0 - 99.0 fL Final   • MCH 03/23/2017 33.7* 27.0 - 31.0 pg Final   • MCHC 03/23/2017 32.8  32.0 - 36.0 g/dL Final   • RDW 03/23/2017 14.1  11.3 - 14.5 % Final   • RDW-SD 03/23/2017 53.1  37.0 -  54.0 fl Final   • MPV 03/23/2017 10.6  6.0 - 12.0 fL Final   • Platelets 03/23/2017 234  150 - 450 10*3/mm3 Final   • Glucose 03/23/2017 107* 70 - 100 mg/dL Final   • BUN 03/23/2017 5* 9 - 23 mg/dL Final   • Creatinine 03/23/2017 0.60  0.60 - 1.30 mg/dL Final   • Sodium 03/23/2017 139  132 - 146 mmol/L Final   • Potassium 03/23/2017 3.2* 3.5 - 5.5 mmol/L Final   • Chloride 03/23/2017 106  99 - 109 mmol/L Final   • CO2 03/23/2017 30.0  20.0 - 31.0 mmol/L Final   • Calcium 03/23/2017 8.8  8.7 - 10.4 mg/dL Final   • eGFR Non  Amer 03/23/2017 97  >60 mL/min/1.73 Final   • BUN/Creatinine Ratio 03/23/2017 8.3  7.0 - 25.0 Final   • Anion Gap 03/23/2017 3.0  3.0 - 11.0 mmol/L Final   • Magnesium 03/23/2017 1.3  1.3 - 2.7 mg/dL Final   • Phosphorus 03/23/2017 2.5  2.4 - 5.1 mg/dL Final   • Potassium 03/23/2017 4.0  3.5 - 5.5 mmol/L Final   • Glucose 03/24/2017 105* 70 - 100 mg/dL Final   • BUN 03/24/2017 <5* 9 - 23 mg/dL Final   • Creatinine 03/24/2017 0.50* 0.60 - 1.30 mg/dL Final   • Sodium 03/24/2017 136  132 - 146 mmol/L Final   • Potassium 03/24/2017 3.5  3.5 - 5.5 mmol/L Final   • Chloride 03/24/2017 107  99 - 109 mmol/L Final   • CO2 03/24/2017 27.0  20.0 - 31.0 mmol/L Final   • Calcium 03/24/2017 8.5* 8.7 - 10.4 mg/dL Final   • eGFR Non African Amer 03/24/2017 119  >60 mL/min/1.73 Final   • BUN/Creatinine Ratio 03/24/2017   7.0 - 25.0 Final    Unable to calculate Bun/Crea Ratio.   • Anion Gap 03/24/2017 2.0* 3.0 - 11.0 mmol/L Final   • Magnesium 03/24/2017 1.9  1.3 - 2.7 mg/dL Final   • WBC 03/24/2017 7.16  3.50 - 10.80 10*3/mm3 Final   • RBC 03/24/2017 3.95  3.89 - 5.14 10*6/mm3 Final   • Hemoglobin 03/24/2017 13.4  11.5 - 15.5 g/dL Final   • Hematocrit 03/24/2017 40.6  34.5 - 44.0 % Final   • MCV 03/24/2017 102.8* 80.0 - 99.0 fL Final   • MCH 03/24/2017 33.9* 27.0 - 31.0 pg Final   • MCHC 03/24/2017 33.0  32.0 - 36.0 g/dL Final   • RDW 03/24/2017 14.2  11.3 - 14.5 % Final   • RDW-SD 03/24/2017 53.4  37.0 -  54.0 fl Final   • MPV 03/24/2017 10.4  6.0 - 12.0 fL Final   • Platelets 03/24/2017 221  150 - 450 10*3/mm3 Final   • Phosphorus 03/24/2017 1.9* 2.4 - 5.1 mg/dL Final   • TSH 03/24/2017 2.828  0.350 - 5.350 mIU/mL Final       Assessment/Plan     1.  Multiple myeloma  2. Syncope  3. Autonomic dysfunction  4. Atrial fibrillation flutter  5. Diarrhea    Discussion: I doubt that her modest degree of monoclonal gammopathy is causing the autonomic dysfunction.  They would like second opinion from [facility] and I certainly think that is reasonable and we will try to set that up.  I'm going to get  PET scan for follow-up on her myeloma and we did her myeloma panel while in the hospital without obvious progression.  No treatment for this thus far.  She will follow-up with cardiology regarding her autonomic dysfunction and perhaps [facility] has some expertise in autonomic dysfunction as well.  If her diarrhea which seems to be subsiding does not go away, I would want her to see GI and probably would need to get another Clostridium difficile toxin test on her stool.  We'll see her back in a few weeks to go over the PET.     Errors in dictation may reflect use of voice recognition software and not all errors in transcription may have been detected prior to signing.    Messi Pantoja MD    04/13/2017

## 2017-04-24 ENCOUNTER — HOSPITAL ENCOUNTER (OUTPATIENT)
Dept: PET IMAGING | Facility: HOSPITAL | Age: 77
Discharge: HOME | End: 2017-04-24
Attending: INTERNAL MEDICINE

## 2017-04-24 ENCOUNTER — HOSPITAL ENCOUNTER (OUTPATIENT)
Dept: PET IMAGING | Facility: HOSPITAL | Age: 77
Discharge: HOME | End: 2017-04-24
Attending: INTERNAL MEDICINE | Admitting: INTERNAL MEDICINE

## 2017-04-24 DIAGNOSIS — C90.00 MULTIPLE MYELOMA NOT HAVING ACHIEVED REMISSION (HCC): ICD-10-CM

## 2017-04-24 DIAGNOSIS — Z85.820 HISTORY OF MELANOMA: ICD-10-CM

## 2017-04-24 LAB — GLUCOSE BLDC GLUCOMTR-MCNC: 96 MG/DL (ref 70–130)

## 2017-04-24 PROCEDURE — 0 FLUDEOXYGLUCOSE F18 SOLUTION: Performed by: INTERNAL MEDICINE

## 2017-04-24 PROCEDURE — 78816 PET IMAGE W/CT FULL BODY: CPT

## 2017-04-24 PROCEDURE — 82962 GLUCOSE BLOOD TEST: CPT

## 2017-04-24 PROCEDURE — A9552 F18 FDG: HCPCS | Performed by: INTERNAL MEDICINE

## 2017-04-24 RX ADMIN — FLUDEOXYGLUCOSE F18 1 DOSE: 300 INJECTION INTRAVENOUS at 09:57

## 2017-04-25 ENCOUNTER — TELEPHONE (OUTPATIENT)
Dept: ONCOLOGY | Facility: CLINIC | Age: 77
End: 2017-04-25

## 2017-04-25 NOTE — TELEPHONE ENCOUNTER
----- Message from Paulina Kumar sent at 4/25/2017  2:19 PM EDT -----  Regarding: sofi/ mallorie    why [facility]  Contact: [phone]  Pt would like a rtn call to explain why she needs to go to the [facility].

## 2017-05-04 ENCOUNTER — APPOINTMENT (OUTPATIENT)
Dept: PET IMAGING | Facility: HOSPITAL | Age: 77
End: 2017-05-04
Attending: INTERNAL MEDICINE

## 2017-05-19 ENCOUNTER — TELEPHONE (OUTPATIENT)
Dept: ONCOLOGY | Facility: CLINIC | Age: 77
End: 2017-05-19

## 2017-05-25 ENCOUNTER — LAB (OUTPATIENT)
Dept: LAB | Facility: HOSPITAL | Age: 77
End: 2017-05-25

## 2017-05-25 ENCOUNTER — SPECIALTY PHARMACY (OUTPATIENT)
Dept: ONCOLOGY | Facility: HOSPITAL | Age: 77
End: 2017-05-25

## 2017-05-25 ENCOUNTER — OFFICE VISIT (OUTPATIENT)
Dept: ONCOLOGY | Facility: CLINIC | Age: 77
End: 2017-05-25

## 2017-05-25 VITALS
BODY MASS INDEX: 20.05 KG/M2 | DIASTOLIC BLOOD PRESSURE: 70 MMHG | TEMPERATURE: 97.1 F | SYSTOLIC BLOOD PRESSURE: 126 MMHG | RESPIRATION RATE: 18 BRPM | WEIGHT: 115 LBS | HEART RATE: 88 BPM

## 2017-05-25 DIAGNOSIS — E85.81 AL AMYLOIDOSIS (HCC): Primary | ICD-10-CM

## 2017-05-25 DIAGNOSIS — E85.81 AL AMYLOIDOSIS (HCC): ICD-10-CM

## 2017-05-25 LAB
ALBUMIN SERPL-MCNC: 4.1 G/DL (ref 3.5–5)
ALBUMIN/GLOB SERPL: 1.1 G/DL (ref 1–2)
ALP SERPL-CCNC: 112 U/L (ref 38–126)
ALT SERPL W P-5'-P-CCNC: 28 U/L (ref 13–69)
ANION GAP SERPL CALCULATED.3IONS-SCNC: 15.6 MMOL/L
AST SERPL-CCNC: 36 U/L (ref 15–46)
BASOPHILS # BLD AUTO: 0.05 10*3/MM3 (ref 0–0.2)
BASOPHILS NFR BLD AUTO: 0.9 % (ref 0–2.5)
BILIRUB SERPL-MCNC: 0.6 MG/DL (ref 0.2–1.3)
BUN BLD-MCNC: 13 MG/DL (ref 7–20)
BUN/CREAT SERPL: 16.3 (ref 7.1–23.5)
CALCIUM SPEC-SCNC: 10.2 MG/DL (ref 8.4–10.2)
CHLORIDE SERPL-SCNC: 97 MMOL/L (ref 98–107)
CO2 SERPL-SCNC: 32 MMOL/L (ref 26–30)
CREAT BLD-MCNC: 0.8 MG/DL (ref 0.6–1.3)
DEPRECATED RDW RBC AUTO: 51.6 FL (ref 37–54)
EOSINOPHIL # BLD AUTO: 0.12 10*3/MM3 (ref 0–0.7)
EOSINOPHIL NFR BLD AUTO: 2.1 % (ref 0–7)
ERYTHROCYTE [DISTWIDTH] IN BLOOD BY AUTOMATED COUNT: 14.4 % (ref 11.5–14.5)
GFR SERPL CREATININE-BSD FRML MDRD: 69 ML/MIN/1.73
GLOBULIN UR ELPH-MCNC: 3.8 GM/DL
GLUCOSE BLD-MCNC: 88 MG/DL (ref 74–98)
HCT VFR BLD AUTO: 45.4 % (ref 37–47)
HGB BLD-MCNC: 14.9 G/DL (ref 12–16)
IMM GRANULOCYTES # BLD: 0.01 10*3/MM3 (ref 0–0.06)
IMM GRANULOCYTES NFR BLD: 0.2 % (ref 0–0.6)
LYMPHOCYTES # BLD AUTO: 2.99 10*3/MM3 (ref 0.6–3.4)
LYMPHOCYTES NFR BLD AUTO: 52.9 % (ref 10–50)
MCH RBC QN AUTO: 31.9 PG (ref 27–31)
MCHC RBC AUTO-ENTMCNC: 32.8 G/DL (ref 30–37)
MCV RBC AUTO: 97.2 FL (ref 81–99)
MONOCYTES # BLD AUTO: 0.5 10*3/MM3 (ref 0–0.9)
MONOCYTES NFR BLD AUTO: 8.8 % (ref 0–12)
NEUTROPHILS # BLD AUTO: 1.98 10*3/MM3 (ref 2–6.9)
NEUTROPHILS NFR BLD AUTO: 35.1 % (ref 37–80)
NRBC BLD MANUAL-RTO: 0 /100 WBC (ref 0–0)
PLATELET # BLD AUTO: 236 10*3/MM3 (ref 130–400)
PMV BLD AUTO: 11.2 FL (ref 6–12)
POTASSIUM BLD-SCNC: 4.6 MMOL/L (ref 3.5–5.1)
PROT SERPL-MCNC: 7.9 G/DL (ref 6.3–8.2)
RBC # BLD AUTO: 4.67 10*6/MM3 (ref 4.2–5.4)
SODIUM BLD-SCNC: 140 MMOL/L (ref 137–145)
WBC NRBC COR # BLD: 5.65 10*3/MM3 (ref 4.8–10.8)

## 2017-05-25 PROCEDURE — 99215 OFFICE O/P EST HI 40 MIN: CPT | Performed by: INTERNAL MEDICINE

## 2017-05-25 PROCEDURE — 80053 COMPREHEN METABOLIC PANEL: CPT | Performed by: INTERNAL MEDICINE

## 2017-05-25 PROCEDURE — 85025 COMPLETE CBC W/AUTO DIFF WBC: CPT | Performed by: INTERNAL MEDICINE

## 2017-05-25 PROCEDURE — 36415 COLL VENOUS BLD VENIPUNCTURE: CPT

## 2017-05-25 RX ORDER — ACYCLOVIR 400 MG/1
400 TABLET ORAL 2 TIMES DAILY
Qty: 60 TABLET | Refills: 11 | Status: SHIPPED | OUTPATIENT
Start: 2017-05-25 | End: 2017-08-10 | Stop reason: HOSPADM

## 2017-05-25 RX ORDER — ONDANSETRON HYDROCHLORIDE 8 MG/1
8 TABLET, FILM COATED ORAL 3 TIMES DAILY PRN
Qty: 30 TABLET | Refills: 5 | Status: SHIPPED | OUTPATIENT
Start: 2017-05-25

## 2017-05-25 RX ORDER — DEXAMETHASONE 4 MG/1
20 TABLET ORAL WEEKLY
Qty: 20 TABLET | Refills: 5 | Status: SHIPPED | OUTPATIENT
Start: 2017-05-25 | End: 2017-08-10 | Stop reason: HOSPADM

## 2017-05-25 RX ORDER — SULFAMETHOXAZOLE AND TRIMETHOPRIM 800; 160 MG/1; MG/1
1 TABLET ORAL 3 TIMES WEEKLY
Qty: 12 TABLET | Refills: 11 | Status: SHIPPED | OUTPATIENT
Start: 2017-05-26 | End: 2017-08-10 | Stop reason: HOSPADM

## 2017-05-25 RX ORDER — ONDANSETRON 8 MG/1
8 TABLET, ORALLY DISINTEGRATING ORAL EVERY 6 HOURS PRN
Status: CANCELLED | OUTPATIENT
Start: 2017-05-25

## 2017-05-25 RX ORDER — BUDESONIDE 3 MG/1
3 CAPSULE, COATED PELLETS ORAL EVERY MORNING
COMMUNITY
Start: 2017-05-15 | End: 2017-08-10 | Stop reason: HOSPADM

## 2017-05-26 ENCOUNTER — TELEPHONE (OUTPATIENT)
Dept: ONCOLOGY | Facility: CLINIC | Age: 77
End: 2017-05-26

## 2017-05-26 DIAGNOSIS — R53.81 DEBILITY: ICD-10-CM

## 2017-05-26 DIAGNOSIS — R62.7 FAILURE TO THRIVE IN ADULT: ICD-10-CM

## 2017-05-26 DIAGNOSIS — E85.81 AL AMYLOIDOSIS (HCC): Primary | ICD-10-CM

## 2017-05-26 DIAGNOSIS — R53.1 WEAKNESS GENERALIZED: ICD-10-CM

## 2017-05-26 PROBLEM — G99.0 PERIPHERAL AUTONOMIC NEUROPATHY DUE TO UNDERLYING DISEASE: Status: ACTIVE | Noted: 2017-03-21

## 2017-05-26 RX ORDER — MELPHALAN USP, 2 MG 2 MG/1
10 TABLET ORAL DAILY
Qty: 20 TABLET | Refills: 3 | Status: SHIPPED | OUTPATIENT
Start: 2017-05-26 | End: 2017-08-10 | Stop reason: HOSPADM

## 2017-05-30 ENCOUNTER — TELEPHONE (OUTPATIENT)
Dept: ONCOLOGY | Facility: HOSPITAL | Age: 77
End: 2017-05-30

## 2017-06-02 ENCOUNTER — EDUCATION (OUTPATIENT)
Dept: ONCOLOGY | Facility: HOSPITAL | Age: 77
End: 2017-06-02

## 2017-06-02 NOTE — PLAN OF CARE
Oral Chemotherapy Teaching      Patient Name/:  Sowmya Beckett   1940  Oral Chemotherapy Regimen:  Melphalan 10mg PO daily Days 1-4 every 28 days + dexamethasone 20mg PO weekly  Date Started Medication: 6/3/17    Initial Teaching Follow Up Comments     Safety     Storage instructions (away from children; away from heat/cold, sunlight, or moisture), handling - use of gloves (caregivers), washing hands after touching pills, managing waste     “How are you storing your medications?”, reminders on storage, proper handling (caregivers using gloves, washing hands, away from children, managing waste, etc.), disposal of medication with D/C or dosage change    Discussed proper storage of medication in cool, dry, safe environment.  Discussed proper handling with hand washing (she handles his own medication).  Advised if any one else handles med to wear disposal gloves. Discussed it is okay to share a toilet area but when someone cleans they need to wear gloves.  Discussed wearing gloves when cleaning up bodily fluids (stool, emesis, etc.).     Adherence      patient and/or caregiver on how to take medication, take with/without food, assess their adherence potential, stress importance of adherence, ways to manage adherence (pill boxes, phone reminders, calendars), what to do if miss a dose   “How are you taking your medication?” “How are you remembering to take your medication?”, “How many doses have you missed?”, determine reasons for non-adherence (not remembering, side effects, etc), ways to improve, overadherence? Remind patient of ways to improve/maintain adherence   Stressed importance of adherence.  Discussed taking melphalan on an empty stomach with no food 2 hours before or 2 hours after food.  Discussed what to do if she misses a pill (>12 hours, skip dose).  Mailed patient a personalized calendar to track her medications and supportive medications.     Side Effects/Adverse Reactions       patient on potential side effects, s/s, ways to manage, when to call MD/seek help     Determine if patient experiencing side effects, ways to manage  Discussed most common ADRs: nausea, hair loss, mouth sores, and myelosuppression.  Discussed using preventive mouth sore rinse with salt, baking soda and water.  Discussed infection risk and notifying MD if fever >100.4.     Miscellaneous     Food interactions, DDIs, financial issues Determine if patient started any new medications since being placed on oral chemo (analyze for DDI) No DDI to discuss.     Additional Notes:  Mailed patient with PO chemo booklet, melphalan education sheets, personalized calendar, and my business card.  I will F/U with patient on Day 3 of treatment.

## 2017-06-05 ENCOUNTER — EDUCATION (OUTPATIENT)
Dept: ONCOLOGY | Facility: HOSPITAL | Age: 77
End: 2017-06-05

## 2017-06-05 NOTE — PLAN OF CARE
Oral Chemotherapy Teaching      Patient Name/:  Sowmya Beckett   1940  Oral Chemotherapy Regimen:  Melphalan 10mg PO daily Days 1-4 every 28 days + Dex 20mg weekly  Date Started Medication: Cycle 1: 6/3/17 (Today Day 3)    Initial Teaching Follow Up Comments     Safety     Storage instructions (away from children; away from heat/cold, sunlight, or moisture), handling - use of gloves (caregivers), washing hands after touching pills, managing waste     “How are you storing your medications?”, reminders on storage, proper handling (caregivers using gloves, washing hands, away from children, managing waste, etc.), disposal of medication with D/C or dosage change         Adherence      patient and/or caregiver on how to take medication, take with/without food, assess their adherence potential, stress importance of adherence, ways to manage adherence (pill boxes, phone reminders, calendars), what to do if miss a dose   “How are you taking your medication?” “How are you remembering to take your medication?”, “How many doses have you missed?”, determine reasons for non-adherence (not remembering, side effects, etc), ways to improve, overadherence? Remind patient of ways to improve/maintain adherence   Patient is taking medication on an empty stomach.  She has not missed the first three days.  She is taking all her supportive meds appropriately (dex, bactrim, acyclovir, aspirin).     Side Effects/Adverse Reactions      patient on potential side effects, s/s, ways to manage, when to call MD/seek help     Determine if patient experiencing side effects, ways to manage  Patient reports no side effects as of today: no nausea, mouth sores, SOB.     Miscellaneous     Food interactions, DDIs, financial issues Determine if patient started any new medications since being placed on oral chemo (analyze for DDI)      Additional Notes:  I will F/U with patient after the start of her next cycle.  Advised patient and   to call me with any questions/concerns.

## 2017-06-07 ENCOUNTER — EDUCATION (OUTPATIENT)
Dept: ONCOLOGY | Facility: HOSPITAL | Age: 77
End: 2017-06-07

## 2017-06-07 ENCOUNTER — TELEPHONE (OUTPATIENT)
Dept: ONCOLOGY | Facility: CLINIC | Age: 77
End: 2017-06-07

## 2017-06-07 NOTE — TELEPHONE ENCOUNTER
----- Message from Tammy DONN Busby sent at 6/5/2017  3:30 PM EDT -----  Regarding: [facility]  Contact: [phone]  Patient has one more treatment left the last of 5 she is doing okay. Also would like home health to come in and check her blood counts. Her ankles were swelling but seem to be better.

## 2017-06-07 NOTE — TELEPHONE ENCOUNTER
Home health referral was sent yesterday.  Verified this morning they received the referral.  They will contact patient sometime in the next 24-48 hours regarding referral.  Patient notified.

## 2017-06-09 NOTE — PLAN OF CARE
Oral Chemotherapy Teaching      Patient Name/:  Sowmya Beckett   1940  Oral Chemotherapy Regimen:  Melphalan 10mg PO daily Days 1-4 every 28 days  Date Started Medication: Cycle 1: 6/3/17 (Today Day 6)    Initial Teaching Follow Up Comments     Safety     Storage instructions (away from children; away from heat/cold, sunlight, or moisture), handling - use of gloves (caregivers), washing hands after touching pills, managing waste     “How are you storing your medications?”, reminders on storage, proper handling (caregivers using gloves, washing hands, away from children, managing waste, etc.), disposal of medication with D/C or dosage change         Adherence      patient and/or caregiver on how to take medication, take with/without food, assess their adherence potential, stress importance of adherence, ways to manage adherence (pill boxes, phone reminders, calendars), what to do if miss a dose   “How are you taking your medication?” “How are you remembering to take your medication?”, “How many doses have you missed?”, determine reasons for non-adherence (not remembering, side effects, etc), ways to improve, overadherence? Remind patient of ways to improve/maintain adherence   Patient completed her 4 days of treatment.  Reviewed all the supportive medications and when to take.  Patient has not received the calendar yet in the mail.  I will remail calendar and medication information.  Discussed when to start cycle 2 on melphalan.     Side Effects/Adverse Reactions      patient on potential side effects, s/s, ways to manage, when to call MD/seek help     Determine if patient experiencing side effects, ways to manage  Patient has been having dizziness but likely due to low BP issues.  Patient has not cold sores to date.  No other side effects to discuss.     Miscellaneous     Food interactions, DDIs, financial issues Determine if patient started any new medications since being placed on oral chemo  (analyze for DDI)      Additional Notes:  I will F/U with patient at the start of cycle 2.  I will remail medication information and a calender to patient.  Advised Sameer to call me with any additional concerns.

## 2017-06-12 ENCOUNTER — TELEPHONE (OUTPATIENT)
Dept: ONCOLOGY | Facility: CLINIC | Age: 77
End: 2017-06-12

## 2017-06-12 NOTE — TELEPHONE ENCOUNTER
Alvarado called and reported that he had turned into patient cancer policy and the policy contacted [facility] which wasn't the right information and wanted to know how to get the correct diagnosis myeloma to [place] for her cancer policy.  Alvarado is to contact medical records or insurance company.

## 2017-06-12 NOTE — TELEPHONE ENCOUNTER
----- Message from Sherrell Gresham sent at 6/12/2017 10:39 AM EDT -----  Regarding: [facility] - QUESTIONS ABOUT CANCER DIAGNOSIS  Contact: [phone]  TOY VERGARA FOR PATIENT CALLED AND NEEDS TO SPEAK TO SOMEONE REGARDING CANCER DIAGNOSIS AND DETAILS BECAUSE THE [facility] HAS SAID PATIENT DOESN'T HAVE CANCER.     PLEASE CALL HIM BACK [phone]

## 2017-06-15 ENCOUNTER — LAB REQUISITION (OUTPATIENT)
Dept: LAB | Facility: HOSPITAL | Age: 77
End: 2017-06-15

## 2017-06-15 DIAGNOSIS — E85.89 OTHER AMYLOIDOSIS (HCC): ICD-10-CM

## 2017-06-15 DIAGNOSIS — R53.1 WEAKNESS: ICD-10-CM

## 2017-06-15 LAB
BASOPHILS # BLD AUTO: 0.04 10*3/MM3 (ref 0–0.2)
BASOPHILS NFR BLD AUTO: 0.8 % (ref 0–2.5)
DEPRECATED RDW RBC AUTO: 50.5 FL (ref 37–54)
EOSINOPHIL # BLD AUTO: 0.11 10*3/MM3 (ref 0–0.7)
EOSINOPHIL NFR BLD AUTO: 2.1 % (ref 0–7)
ERYTHROCYTE [DISTWIDTH] IN BLOOD BY AUTOMATED COUNT: 14.3 % (ref 11.5–14.5)
HCT VFR BLD AUTO: 38.6 % (ref 37–47)
HGB BLD-MCNC: 12.9 G/DL (ref 12–16)
IMM GRANULOCYTES # BLD: 0.03 10*3/MM3 (ref 0–0.06)
IMM GRANULOCYTES NFR BLD: 0.6 % (ref 0–0.6)
LYMPHOCYTES # BLD AUTO: 1.98 10*3/MM3 (ref 0.6–3.4)
LYMPHOCYTES NFR BLD AUTO: 37.4 % (ref 10–50)
MCH RBC QN AUTO: 32.7 PG (ref 27–31)
MCHC RBC AUTO-ENTMCNC: 33.4 G/DL (ref 30–37)
MCV RBC AUTO: 98 FL (ref 81–99)
MONOCYTES # BLD AUTO: 0.49 10*3/MM3 (ref 0–0.9)
MONOCYTES NFR BLD AUTO: 9.2 % (ref 0–12)
NEUTROPHILS # BLD AUTO: 2.65 10*3/MM3 (ref 2–6.9)
NEUTROPHILS NFR BLD AUTO: 49.9 % (ref 37–80)
NRBC BLD MANUAL-RTO: 0 /100 WBC (ref 0–0)
PLATELET # BLD AUTO: 242 10*3/MM3 (ref 130–400)
PMV BLD AUTO: 10.9 FL (ref 6–12)
RBC # BLD AUTO: 3.94 10*6/MM3 (ref 4.2–5.4)
WBC NRBC COR # BLD: 5.3 10*3/MM3 (ref 4.8–10.8)

## 2017-06-15 PROCEDURE — 85025 COMPLETE CBC W/AUTO DIFF WBC: CPT | Performed by: INTERNAL MEDICINE

## 2017-06-22 ENCOUNTER — OFFICE VISIT (OUTPATIENT)
Dept: ONCOLOGY | Facility: CLINIC | Age: 77
End: 2017-06-22

## 2017-06-22 VITALS
SYSTOLIC BLOOD PRESSURE: 104 MMHG | BODY MASS INDEX: 20.05 KG/M2 | DIASTOLIC BLOOD PRESSURE: 63 MMHG | HEART RATE: 88 BPM | WEIGHT: 115 LBS | RESPIRATION RATE: 18 BRPM | TEMPERATURE: 97.2 F

## 2017-06-22 DIAGNOSIS — E85.81 AL AMYLOIDOSIS (HCC): Primary | ICD-10-CM

## 2017-06-22 PROCEDURE — 99214 OFFICE O/P EST MOD 30 MIN: CPT | Performed by: INTERNAL MEDICINE

## 2017-06-22 NOTE — PROGRESS NOTES
CHIEF COMPLAINT: Management of amyloidosis    Problem List:  Oncology/Hematology History      1. AL AMyloidosis:    A.)The patient serum immunoelectrophoresis revealed a monoclonal IgA kappa #1 at 1500 MG/DL, monoclonal IgA kappa #22 small to quantify. IgA is elevated at 2471 with the upper limits of normal being 422. C-reactive protein was normal, beta-2 microglobulin normal at 2.2, sedimentation rate just slightly elevated at 34 with the upper limits of normal being 30. 24-hour urine immunoelectrophoresis with 321.9 mg/24 hour protein in the urine, monoclonal IgA kappa to small to quantify in the urine. She has no renal failure, her BUN was 16 creatinine of 0.8, she is not anemic, no thrombocytopenia. Core biopsy showed 5-10% kappa restricted plasma cells with a flow cytometry showing a population of CD 56 positive cells. There was no rouleaux. There only rare plasma cells in the clot prepped and the bone marrow was relatively aspicular. Her serum free light chains were 38 with the kappa lambda ratio of 3. Her total calcium was 10 and her ionized calcium was 1.28. Bone survey was negative.    B.)She's had 2 bone marrow biopsies the last one was done her last admission and has 10-15% plasma cells with an intermediate risk genetic profile on FISH analysis.  Her cytogenetics however had inversion 3 which is unusual for myeloma and the statistical significance of which Dr. Sequeira was not sure.  He did review her bone marrow and there was no evidence of myelodysplasia or acute leukemia which is more common with inversion 3.  Prior bone survey was negative. She's never been hypercalcemic, anemic, or any evidence of renal insufficiency with this. Her serum immunoelectrophoresis had 1600 mg of monoclonal immunoglobulin G last visit and that's actually slightly less than it was a couple of months back. Nonetheless, in part for appetite stimulation and in part on the theory that this monoclonal protein might be contributing  to her poor peristalsis, she completed 20 mg a day of dexamethasone ×5 days as of yesterday. This did not make her hungry and she continues to lose weight. She's had 3 upper GI endoscopies one of which showed some neuroendocrine type infiltration of unknown significance but was not seen repetitively on small bowel biopsy.     C.) sent to [facility] end of April through early May 2017.   ·  Bone marrow biopsy showed 5% plasma cells negative for Congo red but with liquid chromic photography tandem mass spectroscopy on peptides micro-dissected from the marrow were consistent with AL kappa type amyloid.  · Colon biopsy showed colitis with mild amyloid around submucosal vessels.  Felt to be consistent with autonomic diarrhea for which she was placed on Imodium 2 tablets in the morning and 2 tablets at dinner time with the addition of 2 at lunch and 2 at bedtime in ensuing weeks if not effective.  Prescription for tincture of opium was given by [facility] but could not be filled in [place].  Thoughts of clonidine were mentioned but hesitant in light of her age.  Entocort ×3 months was prescribed.  · Lip biopsy showed 2 minute foci of amyloid.  · Subcutaneous fat aspirate was positive for amyloid deposition.  · 5/5/17 testing at [facility] showed a small urine kappa and IgA kappa monoclonal population and a serum monoclonal population of 1300 mg IgA kappa, normal immunoglobulin G and M, and beta-2 microglobulin of 4.42.  Celiac panel was negative.  Neuro immunologic panel was negative.  Stool cultures were all negative.  Normal homocystine, methylmalonic acid, calcium, and zinc.  Chest and scapular abnormalities showing demineralization but no lytic disease correlated with PET scan on 4/24/17.  · Neurologic and cardiac workup at [facility] showed autonomic neuropathy consistent with amyloidosis with severe cardio vagal, mild to moderate adrenergic, and patchy post ganglionic pseudo-motor impairment consistent with autonomic neuropathy of  amyloid and for which midrange 2.5 mg by mouth 3 times a day was prescribed.  Her pro BNP was 823 with normal being less than 239.  Troponin T was less than 0.01.  Her ejection fraction was 64% and no mention of bright spots on the echocardiogram.  She has a known history of paroxysmal atrial fibrillation.  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  Concomitantly dexamethasone 20 mg weekly.  If that fails then CyBorD was recommended.  I spoke with Dr. Bowden by phone and he did not think she was eligible for the amyloid chelation trial.        Smoldering multiple myeloma (SMM) (Resolved)    1/3/2017 Initial Diagnosis    Smoldering multiple myeloma (SMM)      AL amyloidosis    5/25/2017 Initial Diagnosis    AL kappa Amyloidosis         HISTORY OF PRESENT ILLNESS:  The patient is a 77 y.o. female, here for follow up on management of Amyloidosis.  She is taken her melphalan ×4 days and had no complications.  As of Kristen 15 her count was still essentially normal.  Her diarrhea is controlled with tincture of opium much better than the Lomotil and she has plenty of supplies of that.  Overall feeling better.  No fainting episodes.  Tolerating the dexamethasone without complications.      Past Medical History:   Diagnosis Date   • Arrhythmia    • Atrial fibrillation and flutter 8/8/2016   • Cancer     melanoma in 1971   • CHF (congestive heart failure)      Past Surgical History:   Procedure Laterality Date   • APPENDECTOMY     • CHOLECYSTECTOMY     • COLON SURGERY     • HEMORRHOIDECTOMY     • HYSTERECTOMY         Allergies   Allergen Reactions   • Ambien [Zolpidem Tartrate]    • Morphine And Related        Family History and Social History reviewed and changed as necessary      REVIEW OF SYSTEM:   Review of Systems   Constitutional: Negative for  appetite change, chills, diaphoresis, fatigue, fever and unexpected weight change.   HENT:   Negative for mouth sores, sore throat and trouble swallowing.    Eyes: Negative for icterus.   Respiratory: Negative for cough, hemoptysis and shortness of breath.    Cardiovascular: Negative for chest pain, leg swelling and palpitations.   Gastrointestinal: Negative for abdominal distention, abdominal pain, blood in stool, constipation, diarrhea, nausea and vomiting.   Endocrine: Negative for hot flashes.   Genitourinary: Negative for bladder incontinence, difficulty urinating, dysuria, frequency and hematuria.    Musculoskeletal: Negative for gait problem, neck pain and neck stiffness.   Skin: Negative for rash.   Neurological: Negative for dizziness, gait problem, headaches, light-headedness and numbness.   Hematological: Negative for adenopathy. Does not bruise/bleed easily.   Psychiatric/Behavioral: Negative for depression. The patient is not nervous/anxious.    All other systems reviewed and are negative.       PHYSICAL EXAM    Vitals:    06/22/17 0820   BP: 104/63   Pulse: 88   Resp: 18   Temp: 97.2 °F (36.2 °C)   TempSrc: Temporal Artery    Weight: 115 lb (52.2 kg)     Constitutional: Appears well-developed and well-nourished. No distress.   ECOG: (0) Fully active, able to carry on all predisease performance without restriction  HENT:   Head: Normocephalic.   Mouth/Throat: Oropharynx is clear and moist.   Eyes: Conjunctivae are normal. Pupils are equal, round, and reactive to light. No scleral icterus.   Neck: Neck supple. No JVD present. No thyromegaly present.   Cardiovascular: Normal rate, regular rhythm and normal heart sounds.    Pulmonary/Chest: Breath sounds normal. No respiratory distress.   Abdominal: Soft. Exhibits no distension and no mass. There is no hepatosplenomegaly. There is no tenderness. There is no rebound and no guarding.   Musculoskeletal:Exhibits no edema, tenderness or deformity.    Neurological: Alert and oriented to person, place, and time. Exhibits normal muscle tone.   Skin: No ecchymosis, no petechiae and no rash noted. Not diaphoretic. No cyanosis. Nails show no clubbing.   Psychiatric: Normal mood and affect.   Vitals reviewed.      Lab Requisition on 06/15/2017   Component Date Value Ref Range Status   • WBC 06/15/2017 5.30  4.80 - 10.80 10*3/mm3 Final   • RBC 06/15/2017 3.94* 4.20 - 5.40 10*6/mm3 Final   • Hemoglobin 06/15/2017 12.9  12.0 - 16.0 g/dL Final   • Hematocrit 06/15/2017 38.6  37.0 - 47.0 % Final   • MCV 06/15/2017 98.0  81.0 - 99.0 fL Final   • MCH 06/15/2017 32.7* 27.0 - 31.0 pg Final   • MCHC 06/15/2017 33.4  30.0 - 37.0 g/dL Final   • RDW 06/15/2017 14.3  11.5 - 14.5 % Final   • RDW-SD 06/15/2017 50.5  37.0 - 54.0 fl Final   • MPV 06/15/2017 10.9  6.0 - 12.0 fL Final   • Platelets 06/15/2017 242  130 - 400 10*3/mm3 Final   • Neutrophil % 06/15/2017 49.9  37.0 - 80.0 % Final   • Lymphocyte % 06/15/2017 37.4  10.0 - 50.0 % Final   • Monocyte % 06/15/2017 9.2  0.0 - 12.0 % Final   • Eosinophil % 06/15/2017 2.1  0.0 - 7.0 % Final   • Basophil % 06/15/2017 0.8  0.0 - 2.5 % Final   • Immature Grans % 06/15/2017 0.6  0.0 - 0.6 % Final   • Neutrophils, Absolute 06/15/2017 2.65  2.00 - 6.90 10*3/mm3 Final   • Lymphocytes, Absolute 06/15/2017 1.98  0.60 - 3.40 10*3/mm3 Final   • Monocytes, Absolute 06/15/2017 0.49  0.00 - 0.90 10*3/mm3 Final   • Eosinophils, Absolute 06/15/2017 0.11  0.00 - 0.70 10*3/mm3 Final   • Basophils, Absolute 06/15/2017 0.04  0.00 - 0.20 10*3/mm3 Final   • Immature Grans, Absolute 06/15/2017 0.03  0.00 - 0.06 10*3/mm3 Final   • nRBC 06/15/2017 0.0  0.0 - 0.0 /100 WBC Final   Lab on 05/25/2017   Component Date Value Ref Range Status   • Glucose 05/25/2017 88  74 - 98 mg/dL Final   • BUN 05/25/2017 13  7 - 20 mg/dL Final   • Creatinine 05/25/2017 0.80  0.60 - 1.30 mg/dL Final   • Sodium 05/25/2017 140  137 - 145 mmol/L Final   • Potassium 05/25/2017 4.6   3.5 - 5.1 mmol/L Final   • Chloride 05/25/2017 97* 98 - 107 mmol/L Final   • CO2 05/25/2017 32.0* 26.0 - 30.0 mmol/L Final   • Calcium 05/25/2017 10.2  8.4 - 10.2 mg/dL Final   • Total Protein 05/25/2017 7.9  6.3 - 8.2 g/dL Final   • Albumin 05/25/2017 4.10  3.50 - 5.00 g/dL Final   • ALT (SGPT) 05/25/2017 28  13 - 69 U/L Final   • AST (SGOT) 05/25/2017 36  15 - 46 U/L Final   • Alkaline Phosphatase 05/25/2017 112  38 - 126 U/L Final   • Total Bilirubin 05/25/2017 0.6  0.2 - 1.3 mg/dL Final   • eGFR Non African Amer 05/25/2017 69  >60 mL/min/1.73 Final   • Globulin 05/25/2017 3.8  gm/dL Final   • A/G Ratio 05/25/2017 1.1  1.0 - 2.0 g/dL Final   • BUN/Creatinine Ratio 05/25/2017 16.3  7.1 - 23.5 Final   • Anion Gap 05/25/2017 15.6  mmol/L Final   • WBC 05/25/2017 5.65  4.80 - 10.80 10*3/mm3 Final   • RBC 05/25/2017 4.67  4.20 - 5.40 10*6/mm3 Final   • Hemoglobin 05/25/2017 14.9  12.0 - 16.0 g/dL Final   • Hematocrit 05/25/2017 45.4  37.0 - 47.0 % Final   • MCV 05/25/2017 97.2  81.0 - 99.0 fL Final   • MCH 05/25/2017 31.9* 27.0 - 31.0 pg Final   • MCHC 05/25/2017 32.8  30.0 - 37.0 g/dL Final   • RDW 05/25/2017 14.4  11.5 - 14.5 % Final   • RDW-SD 05/25/2017 51.6  37.0 - 54.0 fl Final   • MPV 05/25/2017 11.2  6.0 - 12.0 fL Final   • Platelets 05/25/2017 236  130 - 400 10*3/mm3 Final   • Neutrophil % 05/25/2017 35.1* 37.0 - 80.0 % Final   • Lymphocyte % 05/25/2017 52.9* 10.0 - 50.0 % Final   • Monocyte % 05/25/2017 8.8  0.0 - 12.0 % Final   • Eosinophil % 05/25/2017 2.1  0.0 - 7.0 % Final   • Basophil % 05/25/2017 0.9  0.0 - 2.5 % Final   • Immature Grans % 05/25/2017 0.2  0.0 - 0.6 % Final   • Neutrophils, Absolute 05/25/2017 1.98* 2.00 - 6.90 10*3/mm3 Final   • Lymphocytes, Absolute 05/25/2017 2.99  0.60 - 3.40 10*3/mm3 Final   • Monocytes, Absolute 05/25/2017 0.50  0.00 - 0.90 10*3/mm3 Final   • Eosinophils, Absolute 05/25/2017 0.12  0.00 - 0.70 10*3/mm3 Final   • Basophils, Absolute 05/25/2017 0.05  0.00 - 0.20  10*3/mm3 Final   • Immature Grans, Absolute 05/25/2017 0.01  0.00 - 0.06 10*3/mm3 Final   • nRBC 05/25/2017 0.0  0.0 - 0.0 /100 WBC Final       Assessment/Plan     1. A.l. Amyloidosis  2. Autonomic neuropathy  3. Diarrhea    Discussion: Her diarrhea is controlled with tincture of opium and we will keep her supplied with that.  We will continue on with her melphalan and all steroids.Per the recommendation of Dr. Bowden at [facility] we she is on melphalan 2 mg tablet 5 tablets daily ×4 days every 28 days and adjust the doses downward if her counts decline.  She is on dexamethasone 20 mg weekly. We will monitor her myeloma panel every 3 months and if her M proteins do not respond and/or her clinical symptoms do not improve then we can try Velcade, Cytoxan, and dexamethasone combination. We will monitor her pro-BNP which is elevated but there is no evidence of decreased ejection fraction. She does have occasional urinary incontinence and I have asked her to check with her gynecologist for possible prolapse but this could also be part of her autonomic dysfunction. We will follow her PET scan serially along with the myeloma panel.I would not with her blood pressure issues use clonidine. She will continue her midodrine 2.5 mg 3 times a day. Our oral chemotherapy team has procured and educated the patient on this. We will check her blood counts every 2 weeks and see her back in a month to see how she's doing.  Continue tincture of opium (not paregoric) 50 mg per 5 ML which comes to 6 mg or 0.6 mL's every 6 hours as needed for diarrhea. Zofran has been E prescribed as well.She understands that this is not a curative process. She will also continue her budesonide for her diarrhea through August at least.         Messi Pantoja MD    06/22/2017

## 2017-06-30 ENCOUNTER — EDUCATION (OUTPATIENT)
Dept: ONCOLOGY | Facility: HOSPITAL | Age: 77
End: 2017-06-30

## 2017-06-30 ENCOUNTER — TELEPHONE (OUTPATIENT)
Dept: ONCOLOGY | Facility: CLINIC | Age: 77
End: 2017-06-30

## 2017-06-30 NOTE — TELEPHONE ENCOUNTER
Ok per Dr. Pantoja to order CBC.  HH should contact her cardiologist regarding BP since they manage her autonomic dysfunction and midodrine.  Returned call and left vm zaki Mensah with info.

## 2017-06-30 NOTE — TELEPHONE ENCOUNTER
----- Message from Tammy Busby sent at 6/30/2017  2:22 PM EDT -----  Regarding: [facility]  Contact: [phone]  Makenna with [facility] called patient passed out last night and BP is chronically low and she wants to know if she needs to draw labs? Please call

## 2017-07-06 ENCOUNTER — LAB REQUISITION (OUTPATIENT)
Dept: LAB | Facility: HOSPITAL | Age: 77
End: 2017-07-06

## 2017-07-06 ENCOUNTER — TELEPHONE (OUTPATIENT)
Dept: ONCOLOGY | Facility: CLINIC | Age: 77
End: 2017-07-06

## 2017-07-06 DIAGNOSIS — E85.89 OTHER AMYLOIDOSIS (HCC): ICD-10-CM

## 2017-07-06 LAB
BASOPHILS # BLD AUTO: 0.02 10*3/MM3 (ref 0–0.2)
BASOPHILS NFR BLD AUTO: 0.5 % (ref 0–2.5)
DEPRECATED RDW RBC AUTO: 58.1 FL (ref 37–54)
EOSINOPHIL # BLD AUTO: 0.14 10*3/MM3 (ref 0–0.7)
EOSINOPHIL NFR BLD AUTO: 3.4 % (ref 0–7)
ERYTHROCYTE [DISTWIDTH] IN BLOOD BY AUTOMATED COUNT: 15.8 % (ref 11.5–14.5)
HCT VFR BLD AUTO: 35.1 % (ref 37–47)
HGB BLD-MCNC: 11.6 G/DL (ref 12–16)
IMM GRANULOCYTES # BLD: 0.01 10*3/MM3 (ref 0–0.06)
IMM GRANULOCYTES NFR BLD: 0.2 % (ref 0–0.6)
LYMPHOCYTES # BLD AUTO: 1.86 10*3/MM3 (ref 0.6–3.4)
LYMPHOCYTES NFR BLD AUTO: 45.4 % (ref 10–50)
MCH RBC QN AUTO: 33.1 PG (ref 27–31)
MCHC RBC AUTO-ENTMCNC: 33 G/DL (ref 30–37)
MCV RBC AUTO: 100.3 FL (ref 81–99)
MONOCYTES # BLD AUTO: 0.15 10*3/MM3 (ref 0–0.9)
MONOCYTES NFR BLD AUTO: 3.7 % (ref 0–12)
NEUTROPHILS # BLD AUTO: 1.92 10*3/MM3 (ref 2–6.9)
NEUTROPHILS NFR BLD AUTO: 46.8 % (ref 37–80)
NRBC BLD MANUAL-RTO: 0 /100 WBC (ref 0–0)
PLATELET # BLD AUTO: 174 10*3/MM3 (ref 130–400)
PMV BLD AUTO: 10 FL (ref 6–12)
RBC # BLD AUTO: 3.5 10*6/MM3 (ref 4.2–5.4)
WBC NRBC COR # BLD: 4.1 10*3/MM3 (ref 4.8–10.8)

## 2017-07-06 PROCEDURE — 85025 COMPLETE CBC W/AUTO DIFF WBC: CPT | Performed by: INTERNAL MEDICINE

## 2017-07-06 NOTE — TELEPHONE ENCOUNTER
----- Message from Tammy Busby sent at 7/6/2017  3:59 PM EDT -----  Regarding: [facility]  Contact: [phone]  Makenna with [facility] called and patient has been dischard from home health care.

## 2017-07-10 ENCOUNTER — TELEPHONE (OUTPATIENT)
Dept: ONCOLOGY | Facility: CLINIC | Age: 77
End: 2017-07-10

## 2017-07-10 NOTE — TELEPHONE ENCOUNTER
Patient has remeron and cymbalta on her medication list.  Called patient to verify and she isn't sure what she is taking.  She will look through her prescription bottles and bring them with her to her office visit next week.

## 2017-07-10 NOTE — TELEPHONE ENCOUNTER
----- Message from Paulina Kumar sent at 7/10/2017 11:40 AM EDT -----  Regarding: [facility]  DEPRESSION MEDS  Contact: [phone]  PT WOULD LIKE MEDS FOR DEPRESSION  PLEASE CALL PT TO DISCUSS

## 2017-07-20 ENCOUNTER — LAB (OUTPATIENT)
Dept: LAB | Facility: HOSPITAL | Age: 77
End: 2017-07-20

## 2017-07-20 ENCOUNTER — OFFICE VISIT (OUTPATIENT)
Dept: ONCOLOGY | Facility: CLINIC | Age: 77
End: 2017-07-20

## 2017-07-20 VITALS
BODY MASS INDEX: 19.88 KG/M2 | WEIGHT: 114 LBS | TEMPERATURE: 97.3 F | DIASTOLIC BLOOD PRESSURE: 57 MMHG | RESPIRATION RATE: 13 BRPM | HEART RATE: 101 BPM | SYSTOLIC BLOOD PRESSURE: 96 MMHG

## 2017-07-20 DIAGNOSIS — E85.81 AL AMYLOIDOSIS (HCC): ICD-10-CM

## 2017-07-20 PROBLEM — H61.91 SKIN LESION OF RIGHT EAR: Status: ACTIVE | Noted: 2017-07-20

## 2017-07-20 LAB
ALBUMIN SERPL-MCNC: 4.2 G/DL (ref 3.5–5)
ALBUMIN/GLOB SERPL: 1.1 G/DL (ref 1–2)
ALP SERPL-CCNC: 149 U/L (ref 38–126)
ALT SERPL W P-5'-P-CCNC: 31 U/L (ref 13–69)
ANION GAP SERPL CALCULATED.3IONS-SCNC: 15.4 MMOL/L
AST SERPL-CCNC: 31 U/L (ref 15–46)
BASOPHILS # BLD AUTO: 0.03 10*3/MM3 (ref 0–0.2)
BASOPHILS NFR BLD AUTO: 1.1 % (ref 0–2.5)
BILIRUB SERPL-MCNC: 0.6 MG/DL (ref 0.2–1.3)
BUN BLD-MCNC: 23 MG/DL (ref 7–20)
BUN/CREAT SERPL: 17.7 (ref 7.1–23.5)
CALCIUM SPEC-SCNC: 9.6 MG/DL (ref 8.4–10.2)
CHLORIDE SERPL-SCNC: 100 MMOL/L (ref 98–107)
CO2 SERPL-SCNC: 27 MMOL/L (ref 26–30)
CREAT BLD-MCNC: 1.3 MG/DL (ref 0.6–1.3)
CRP SERPL-MCNC: <0.5 MG/DL (ref 0–1)
DEPRECATED RDW RBC AUTO: 64 FL (ref 37–54)
EOSINOPHIL # BLD AUTO: 0 10*3/MM3 (ref 0–0.7)
EOSINOPHIL NFR BLD AUTO: 0 % (ref 0–7)
ERYTHROCYTE [DISTWIDTH] IN BLOOD BY AUTOMATED COUNT: 17.3 % (ref 11.5–14.5)
ERYTHROCYTE [SEDIMENTATION RATE] IN BLOOD: 40 MM/HR (ref 0–20)
GFR SERPL CREATININE-BSD FRML MDRD: 40 ML/MIN/1.73
GLOBULIN UR ELPH-MCNC: 3.8 GM/DL
GLUCOSE BLD-MCNC: 109 MG/DL (ref 74–98)
HCT VFR BLD AUTO: 35.2 % (ref 37–47)
HGB BLD-MCNC: 11.9 G/DL (ref 12–16)
IMM GRANULOCYTES # BLD: 0.02 10*3/MM3 (ref 0–0.06)
IMM GRANULOCYTES NFR BLD: 0.7 % (ref 0–0.6)
LYMPHOCYTES # BLD AUTO: 0.77 10*3/MM3 (ref 0.6–3.4)
LYMPHOCYTES NFR BLD AUTO: 28.3 % (ref 10–50)
MCH RBC QN AUTO: 34.7 PG (ref 27–31)
MCHC RBC AUTO-ENTMCNC: 33.8 G/DL (ref 30–37)
MCV RBC AUTO: 102.6 FL (ref 81–99)
MONOCYTES # BLD AUTO: 0.28 10*3/MM3 (ref 0–0.9)
MONOCYTES NFR BLD AUTO: 10.3 % (ref 0–12)
NEUTROPHILS # BLD AUTO: 1.62 10*3/MM3 (ref 2–6.9)
NEUTROPHILS NFR BLD AUTO: 59.6 % (ref 37–80)
NRBC BLD MANUAL-RTO: 0.7 /100 WBC (ref 0–0)
NT-PROBNP SERPL-MCNC: 568 PG/ML (ref 0–450)
PLATELET # BLD AUTO: 206 10*3/MM3 (ref 130–400)
PMV BLD AUTO: 9.8 FL (ref 6–12)
POTASSIUM BLD-SCNC: 4.4 MMOL/L (ref 3.5–5.1)
PROT SERPL-MCNC: 8 G/DL (ref 6.3–8.2)
RBC # BLD AUTO: 3.43 10*6/MM3 (ref 4.2–5.4)
RBC MORPH BLD: NORMAL
SMALL PLATELETS BLD QL SMEAR: ADEQUATE
SODIUM BLD-SCNC: 138 MMOL/L (ref 137–145)
WBC MORPH BLD: NORMAL
WBC NRBC COR # BLD: 2.72 10*3/MM3 (ref 4.8–10.8)

## 2017-07-20 PROCEDURE — 85651 RBC SED RATE NONAUTOMATED: CPT | Performed by: NURSE PRACTITIONER

## 2017-07-20 PROCEDURE — 83880 ASSAY OF NATRIURETIC PEPTIDE: CPT | Performed by: NURSE PRACTITIONER

## 2017-07-20 PROCEDURE — 80053 COMPREHEN METABOLIC PANEL: CPT | Performed by: NURSE PRACTITIONER

## 2017-07-20 PROCEDURE — 84155 ASSAY OF PROTEIN SERUM: CPT | Performed by: NURSE PRACTITIONER

## 2017-07-20 PROCEDURE — 36415 COLL VENOUS BLD VENIPUNCTURE: CPT

## 2017-07-20 PROCEDURE — 86334 IMMUNOFIX E-PHORESIS SERUM: CPT | Performed by: NURSE PRACTITIONER

## 2017-07-20 PROCEDURE — 82330 ASSAY OF CALCIUM: CPT | Performed by: NURSE PRACTITIONER

## 2017-07-20 PROCEDURE — 85025 COMPLETE CBC W/AUTO DIFF WBC: CPT | Performed by: NURSE PRACTITIONER

## 2017-07-20 PROCEDURE — 84165 PROTEIN E-PHORESIS SERUM: CPT | Performed by: NURSE PRACTITIONER

## 2017-07-20 PROCEDURE — 82232 ASSAY OF BETA-2 PROTEIN: CPT | Performed by: NURSE PRACTITIONER

## 2017-07-20 PROCEDURE — 86140 C-REACTIVE PROTEIN: CPT | Performed by: NURSE PRACTITIONER

## 2017-07-20 PROCEDURE — 85007 BL SMEAR W/DIFF WBC COUNT: CPT | Performed by: NURSE PRACTITIONER

## 2017-07-20 PROCEDURE — 99213 OFFICE O/P EST LOW 20 MIN: CPT | Performed by: NURSE PRACTITIONER

## 2017-07-20 PROCEDURE — 83883 ASSAY NEPHELOMETRY NOT SPEC: CPT | Performed by: NURSE PRACTITIONER

## 2017-07-20 RX ORDER — MORPHINE 10 MG/ML
TINCTURE ORAL
Refills: 0 | Status: ON HOLD | COMMUNITY
Start: 2017-06-05 | End: 2017-08-07

## 2017-07-20 RX ORDER — GABAPENTIN 300 MG/1
CAPSULE ORAL
Status: CANCELLED | OUTPATIENT
Start: 2017-07-20

## 2017-07-20 RX ORDER — MORPHINE 10 MG/ML
6 TINCTURE ORAL EVERY 6 HOURS PRN
Qty: 500 ML | Refills: 0 | Status: ON HOLD | OUTPATIENT
Start: 2017-07-20 | End: 2017-08-10

## 2017-07-20 RX ORDER — GABAPENTIN 300 MG/1
300 CAPSULE ORAL 3 TIMES DAILY
Status: ON HOLD | COMMUNITY
Start: 2017-07-17 | End: 2017-08-10

## 2017-07-20 NOTE — PROGRESS NOTES
CHIEF COMPLAINT: Management of amyloidosis    Problem List:  Oncology/Hematology History      1. AL AMyloidosis:    A.)The patient serum immunoelectrophoresis revealed a monoclonal IgA kappa #1 at 1500 MG/DL, monoclonal IgA kappa #22 small to quantify. IgA is elevated at 2471 with the upper limits of normal being 422. C-reactive protein was normal, beta-2 microglobulin normal at 2.2, sedimentation rate just slightly elevated at 34 with the upper limits of normal being 30. 24-hour urine immunoelectrophoresis with 321.9 mg/24 hour protein in the urine, monoclonal IgA kappa to small to quantify in the urine. She has no renal failure, her BUN was 16 creatinine of 0.8, she is not anemic, no thrombocytopenia. Core biopsy showed 5-10% kappa restricted plasma cells with a flow cytometry showing a population of CD 56 positive cells. There was no rouleaux. There only rare plasma cells in the clot prepped and the bone marrow was relatively aspicular. Her serum free light chains were 38 with the kappa lambda ratio of 3. Her total calcium was 10 and her ionized calcium was 1.28. Bone survey was negative.    B.)She's had 2 bone marrow biopsies the last one was done her last admission and has 10-15% plasma cells with an intermediate risk genetic profile on FISH analysis.  Her cytogenetics however had inversion 3 which is unusual for myeloma and the statistical significance of which Dr. Sequeira was not sure.  He did review her bone marrow and there was no evidence of myelodysplasia or acute leukemia which is more common with inversion 3.  Prior bone survey was negative. She's never been hypercalcemic, anemic, or any evidence of renal insufficiency with this. Her serum immunoelectrophoresis had 1600 mg of monoclonal immunoglobulin G last visit and that's actually slightly less than it was a couple of months back. Nonetheless, in part for appetite stimulation and in part on the theory that this monoclonal protein might be contributing  to her poor peristalsis, she completed 20 mg a day of dexamethasone ×5 days as of yesterday. This did not make her hungry and she continues to lose weight. She's had 3 upper GI endoscopies one of which showed some neuroendocrine type infiltration of unknown significance but was not seen repetitively on small bowel biopsy.     C.) sent to [facility] end of April through early May 2017.   ·  Bone marrow biopsy showed 5% plasma cells negative for Congo red but with liquid chromic photography tandem mass spectroscopy on peptides micro-dissected from the marrow were consistent with AL kappa type amyloid.  · Colon biopsy showed colitis with mild amyloid around submucosal vessels.  Felt to be consistent with autonomic diarrhea for which she was placed on Imodium 2 tablets in the morning and 2 tablets at dinner time with the addition of 2 at lunch and 2 at bedtime in ensuing weeks if not effective.  Prescription for tincture of opium was given by [facility] but could not be filled in [place].  Thoughts of clonidine were mentioned but hesitant in light of her age.  Entocort ×3 months was prescribed.  · Lip biopsy showed 2 minute foci of amyloid.  · Subcutaneous fat aspirate was positive for amyloid deposition.  · 5/5/17 testing at [facility] showed a small urine kappa and IgA kappa monoclonal population and a serum monoclonal population of 1300 mg IgA kappa, normal immunoglobulin G and M, and beta-2 microglobulin of 4.42.  Celiac panel was negative.  Neuro immunologic panel was negative.  Stool cultures were all negative.  Normal homocystine, methylmalonic acid, calcium, and zinc.  Chest and scapular abnormalities showing demineralization but no lytic disease correlated with PET scan on 4/24/17.  · Neurologic and cardiac workup at [facility] showed autonomic neuropathy consistent with amyloidosis with severe cardio vagal, mild to moderate adrenergic, and patchy post ganglionic pseudo-motor impairment consistent with autonomic neuropathy of  amyloid and for which midrange 2.5 mg by mouth 3 times a day was prescribed.  Her pro BNP was 823 with normal being less than 239.  Troponin T was less than 0.01.  Her ejection fraction was 64% and no mention of bright spots on the echocardiogram.  She has a known history of paroxysmal atrial fibrillation.  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  Concomitantly dexamethasone 20 mg weekly.  If that fails then CyBorD was recommended.  I spoke with Dr. Bowden by phone and he did not think she was eligible for the amyloid chelation trial.        Smoldering multiple myeloma (SMM) (Resolved)    1/3/2017 Initial Diagnosis     Smoldering multiple myeloma (SMM)         AL amyloidosis     Initial Diagnosis     AL kappa Amyloidosis         4/24/2017 Imaging     Whole body PET/CT negative            HISTORY OF PRESENT ILLNESS:  The patient is a 77 y.o. female, here for follow up on management of Amyloidosis.  She is now on her second cycle of melphalan ×4 days out of 28 day cycle and had no significant complications.  Her CBC is stable.  Her diarrhea is partially controlled with tincture of opium and she does need a refill of that along with a refill on her gabapentin that she takes for neuropathy.  Overall feeling just fair.  No fainting episodes.  Tolerating the dexamethasone without complications.  She states that at times she has considered not continuing with treatment.  Her appetite is good on some days and poor on others, overall her weight is stable.  No fevers or chills.      Past Medical History:   Diagnosis Date   • Arrhythmia    • Atrial fibrillation and flutter 8/8/2016   • Cancer     melanoma in 1971   • CHF (congestive heart failure)      Past Surgical History:   Procedure Laterality Date   • APPENDECTOMY     • CHOLECYSTECTOMY     •  COLON SURGERY     • HEMORRHOIDECTOMY     • HYSTERECTOMY         Allergies   Allergen Reactions   • Ambien [Zolpidem Tartrate]    • Morphine And Related        Family History and Social History reviewed and changed as necessary      REVIEW OF SYSTEM:   Review of Systems   Constitutional: Negative for appetite change, chills, diaphoresis, fever and unexpected weight change.   HENT:   Negative for mouth sores, sore throat and trouble swallowing.    Eyes: Negative for icterus.   Respiratory: Negative for cough, hemoptysis and shortness of breath.    Cardiovascular: Negative for chest pain, leg swelling and palpitations.   Gastrointestinal: Negative for abdominal distention, abdominal pain, blood in stool, constipation, diarrhea, nausea and vomiting.   Endocrine: Negative for hot flashes.   Genitourinary: Negative for bladder incontinence, difficulty urinating, dysuria, frequency and hematuria.    Musculoskeletal: Negative for gait problem, neck pain and neck stiffness.   Skin: Negative for rash.   Neurological: Negative for dizziness, gait problem, headaches, light-headedness and numbness.   Hematological: Negative for adenopathy. Does not bruise/bleed easily.   Psychiatric/Behavioral: Negative for depression. The patient is not nervous/anxious.    All other systems reviewed and are negative.       PHYSICAL EXAM    Vitals:    07/20/17 0906   BP: 96/57   Pulse: 101   Resp: 13   Temp: 97.3 °F (36.3 °C)   TempSrc: Temporal Artery    Weight: 114 lb (51.7 kg)     Constitutional: Petite elderly female. No distress.   ECOG: (0) Fully active, able to carry on all predisease performance without restriction  HENT:   Head: Normocephalic.   Mouth/Throat: Oropharynx is clear and moist.   Eyes: Conjunctivae are normal. Pupils are equal, round, and reactive to light. No scleral icterus.   Neck: Neck supple. No JVD present. No thyromegaly present.   Cardiovascular: Normal rate, regular rhythm and normal heart sounds.     Pulmonary/Chest: Breath sounds normal. No respiratory distress.   Abdominal: Soft. Exhibits no distension.   Musculoskeletal:Exhibits no edema, tenderness or deformity.   Neurological: Alert and oriented to person, place, and time. Exhibits normal muscle tone.   Skin: No ecchymosis, no petechiae and no rash noted. Not diaphoretic. No cyanosis. Nails show no clubbing.   Psychiatric: Normal mood and somewhat flat affect.   Vitals reviewed.      Lab Requisition on 07/06/2017   Component Date Value Ref Range Status   • WBC 07/06/2017 4.10* 4.80 - 10.80 10*3/mm3 Final   • RBC 07/06/2017 3.50* 4.20 - 5.40 10*6/mm3 Final   • Hemoglobin 07/06/2017 11.6* 12.0 - 16.0 g/dL Final   • Hematocrit 07/06/2017 35.1* 37.0 - 47.0 % Final   • MCV 07/06/2017 100.3* 81.0 - 99.0 fL Final   • MCH 07/06/2017 33.1* 27.0 - 31.0 pg Final   • MCHC 07/06/2017 33.0  30.0 - 37.0 g/dL Final   • RDW 07/06/2017 15.8* 11.5 - 14.5 % Final   • RDW-SD 07/06/2017 58.1* 37.0 - 54.0 fl Final   • MPV 07/06/2017 10.0  6.0 - 12.0 fL Final   • Platelets 07/06/2017 174  130 - 400 10*3/mm3 Final   • Neutrophil % 07/06/2017 46.8  37.0 - 80.0 % Final   • Lymphocyte % 07/06/2017 45.4  10.0 - 50.0 % Final   • Monocyte % 07/06/2017 3.7  0.0 - 12.0 % Final   • Eosinophil % 07/06/2017 3.4  0.0 - 7.0 % Final   • Basophil % 07/06/2017 0.5  0.0 - 2.5 % Final   • Immature Grans % 07/06/2017 0.2  0.0 - 0.6 % Final   • Neutrophils, Absolute 07/06/2017 1.92* 2.00 - 6.90 10*3/mm3 Final   • Lymphocytes, Absolute 07/06/2017 1.86  0.60 - 3.40 10*3/mm3 Final   • Monocytes, Absolute 07/06/2017 0.15  0.00 - 0.90 10*3/mm3 Final   • Eosinophils, Absolute 07/06/2017 0.14  0.00 - 0.70 10*3/mm3 Final   • Basophils, Absolute 07/06/2017 0.02  0.00 - 0.20 10*3/mm3 Final   • Immature Grans, Absolute 07/06/2017 0.01  0.00 - 0.06 10*3/mm3 Final   • nRBC 07/06/2017 0.0  0.0 - 0.0 /100 WBC Final   Lab Requisition on 06/15/2017   Component Date Value Ref Range Status   • WBC 06/15/2017 5.30   4.80 - 10.80 10*3/mm3 Final   • RBC 06/15/2017 3.94* 4.20 - 5.40 10*6/mm3 Final   • Hemoglobin 06/15/2017 12.9  12.0 - 16.0 g/dL Final   • Hematocrit 06/15/2017 38.6  37.0 - 47.0 % Final   • MCV 06/15/2017 98.0  81.0 - 99.0 fL Final   • MCH 06/15/2017 32.7* 27.0 - 31.0 pg Final   • MCHC 06/15/2017 33.4  30.0 - 37.0 g/dL Final   • RDW 06/15/2017 14.3  11.5 - 14.5 % Final   • RDW-SD 06/15/2017 50.5  37.0 - 54.0 fl Final   • MPV 06/15/2017 10.9  6.0 - 12.0 fL Final   • Platelets 06/15/2017 242  130 - 400 10*3/mm3 Final   • Neutrophil % 06/15/2017 49.9  37.0 - 80.0 % Final   • Lymphocyte % 06/15/2017 37.4  10.0 - 50.0 % Final   • Monocyte % 06/15/2017 9.2  0.0 - 12.0 % Final   • Eosinophil % 06/15/2017 2.1  0.0 - 7.0 % Final   • Basophil % 06/15/2017 0.8  0.0 - 2.5 % Final   • Immature Grans % 06/15/2017 0.6  0.0 - 0.6 % Final   • Neutrophils, Absolute 06/15/2017 2.65  2.00 - 6.90 10*3/mm3 Final   • Lymphocytes, Absolute 06/15/2017 1.98  0.60 - 3.40 10*3/mm3 Final   • Monocytes, Absolute 06/15/2017 0.49  0.00 - 0.90 10*3/mm3 Final   • Eosinophils, Absolute 06/15/2017 0.11  0.00 - 0.70 10*3/mm3 Final   • Basophils, Absolute 06/15/2017 0.04  0.00 - 0.20 10*3/mm3 Final   • Immature Grans, Absolute 06/15/2017 0.03  0.00 - 0.06 10*3/mm3 Final   • nRBC 06/15/2017 0.0  0.0 - 0.0 /100 WBC Final       Assessment/Plan     1. A.l. Amyloidosis  2. Autonomic neuropathy  3. Diarrhea    Discussion: Her diarrhea is somewhat controlled with tincture of opium and we will refill that today.  She has only been taking it twice a day and I have encouraged him to use it every 6 hours as needed as prescribed.  We will continue on with her melphalan and all steroids per the recommendation of Dr. Bowden at [facility], she is on melphalan 2 mg tablet 5 tablets daily ×4 days every 28 days and adjust the doses downward if her counts decline, but thus far her counts have remained stable and adequate.  She is on dexamethasone 20 mg weekly. We will  monitor her myeloma panel every 3 months and if her M proteins do not respond and/or her clinical symptoms do not improve then we can try Velcade, Cytoxan, and dexamethasone combination. We will monitor her pro-BNP which is elevated but there is no evidence of decreased ejection fraction. We will follow her PET scan serially along with the myeloma panel prior to return in August.  I would not with her blood pressure issues use clonidine. She will continue her midodrine 2.5 mg 3 times a day. We will check her blood counts every 2 weeks.  Continue tincture of opium (not paregoric) 50 mg per 5 ML which comes to 6 mg or 0.6 mL's every 6 hours as needed for diarrhea. She will also continue her budesonide for her diarrhea through August at least.  Clinically I am not sure that she is feeling much better and she states this pain.  She was questioning today whether or not she should continue treatment.  I encouraged her to try one more cycle and then we will repeat blood work to assess for response.  If she is having a response then that may encourage her to want to press on.  She states understanding and agreement.  If she has had progression then we would consider changing her treatment as stated above.     Addendum: The patient returns to our office after her visit reporting that she had forgotten to show us a place on her ear she was concerned about.  She has a lesion that has scabbed over on the right external pinna that she states has been there for a couple of weeks.  I will get her to dermatology for further evaluation.  I also reminded her to stop by the outpatient laboratory today to have her blood work done as I do not see a recent CMP.  Also, according to [facility] PET/CT would not be covered per Medicare guidelines as previous PET/CT was negative.    Shani Victoria, APRN    07/20/2017

## 2017-07-21 LAB
ALBUMIN SERPL-MCNC: 3.4 G/DL (ref 2.9–4.4)
ALBUMIN SERPL-MCNC: 3.5 G/DL (ref 2.9–4.4)
ALBUMIN/GLOB SERPL: 0.9 {RATIO} (ref 0.7–1.7)
ALBUMIN/GLOB SERPL: 1 {RATIO} (ref 0.7–1.7)
ALPHA1 GLOB FLD ELPH-MCNC: 0.2 G/DL (ref 0–0.4)
ALPHA1 GLOB FLD ELPH-MCNC: 0.2 G/DL (ref 0–0.4)
ALPHA2 GLOB SERPL ELPH-MCNC: 0.9 G/DL (ref 0.4–1)
ALPHA2 GLOB SERPL ELPH-MCNC: 0.9 G/DL (ref 0.4–1)
B-GLOBULIN SERPL ELPH-MCNC: 2 G/DL (ref 0.7–1.3)
B-GLOBULIN SERPL ELPH-MCNC: 2 G/DL (ref 0.7–1.3)
B2 MICROGLOB SERPL-MCNC: NORMAL MG/L
CA-I SERPL ISE-MCNC: NORMAL MG/DL
GAMMA GLOB SERPL ELPH-MCNC: 0.4 G/DL (ref 0.4–1.8)
GAMMA GLOB SERPL ELPH-MCNC: 0.5 G/DL (ref 0.4–1.8)
GLOBULIN SER CALC-MCNC: 3.6 G/DL (ref 2.2–3.9)
GLOBULIN SER CALC-MCNC: 3.7 G/DL (ref 2.2–3.9)
IGA SERPL-MCNC: 1453 MG/DL (ref 64–422)
IGG SERPL-MCNC: 466 MG/DL (ref 700–1600)
IGM SERPL-MCNC: 27 MG/DL (ref 26–217)
INTERPRETATION SERPL IEP-IMP: ABNORMAL
KAPPA LC SERPL-MCNC: NORMAL MG/L
LAMBDA LC FREE SERPL-MCNC: NORMAL MG/L
Lab: ABNORMAL
Lab: ABNORMAL
M-SPIKE: 1.1 G/DL
M-SPIKE: ABNORMAL G/DL
PROT SERPL-MCNC: 7 G/DL (ref 6–8.5)
PROT SERPL-MCNC: 7.2 G/DL (ref 6–8.5)

## 2017-07-26 ENCOUNTER — TELEPHONE (OUTPATIENT)
Dept: ONCOLOGY | Facility: CLINIC | Age: 77
End: 2017-07-26

## 2017-07-26 NOTE — TELEPHONE ENCOUNTER
----- Message from Sherrell Gresham sent at 7/26/2017 11:17 AM EDT -----  Regarding: [facility] - RX REFILL   Contact: [phone]  Alvarado Jauregui called regarding a prescription for OPIUM TINCTURE 10 MG/ ML (QTY 60) .     Pharmacy: [facility], [place], [place] [phone]

## 2017-07-26 NOTE — TELEPHONE ENCOUNTER
Called patient and let her know her script was mailed on Friday.  Dr. Pantoja will be in [place] tomorrow.  I asked her to call us tomorrow afternoon if she does not receive it.

## 2017-07-28 PROCEDURE — 84156 ASSAY OF PROTEIN URINE: CPT | Performed by: NURSE PRACTITIONER

## 2017-07-28 PROCEDURE — 86335 IMMUNFIX E-PHORSIS/URINE/CSF: CPT | Performed by: NURSE PRACTITIONER

## 2017-07-28 PROCEDURE — 81050 URINALYSIS VOLUME MEASURE: CPT | Performed by: NURSE PRACTITIONER

## 2017-07-28 PROCEDURE — 84166 PROTEIN E-PHORESIS/URINE/CSF: CPT | Performed by: NURSE PRACTITIONER

## 2017-07-31 LAB
ALBUMIN 24H MFR UR ELPH: 79.9 %
ALPHA1 GLOB 24H MFR UR ELPH: 0.4 %
ALPHA2 GLOB 24H MFR UR ELPH: 4.3 %
B-GLOBULIN MFR UR ELPH: 8.5 %
GAMMA GLOB 24H MFR UR ELPH: 6.9 %
HIV 1 & 2 AB SER-IMP: ABNORMAL
INTERPRETATION UR IFE-IMP: ABNORMAL
M PROTEIN 24H MFR UR ELPH: ABNORMAL %
PROT 24H UR-MRATE: 1593 MG/24 HR (ref 30–150)
PROT UR-MCNC: 224.3 MG/DL

## 2017-08-04 ENCOUNTER — EDUCATION (OUTPATIENT)
Dept: ONCOLOGY | Facility: HOSPITAL | Age: 77
End: 2017-08-04

## 2017-08-04 NOTE — PLAN OF CARE
Oral Chemotherapy Teaching      Patient Name/:  Sowmya Beckett   1940  Oral Chemotherapy Regimen:  Melphalan 10 mg daily on days 1-4 every 28 days                     Date Started Medication: Cycle 3 started 17 (today is Day 7)    Initial Teaching Follow Up Comments     Safety     Storage instructions (away from children; away from heat/cold, sunlight, or moisture), handling - use of gloves (caregivers), washing hands after touching pills, managing waste     “How are you storing your medications?”, reminders on storage, proper handling (caregivers using gloves, washing hands, away from children, managing waste, etc.), disposal of medication with D/C or dosage change    Patient is storing Melphalan correctly in the refrigerator and in the original container.      Adherence      patient and/or caregiver on how to take medication, take with/without food, assess their adherence potential, stress importance of adherence, ways to manage adherence (pill boxes, phone reminders, calendars), what to do if miss a dose   “How are you taking your medication?” “How are you remembering to take your medication?”, “How many doses have you missed?”, determine reasons for non-adherence (not remembering, side effects, etc), ways to improve, overadherence? Remind patient of ways to improve/maintain adherence   She is taking Melphalan correctly on Days 1-4, last dose on 17.  She uses a calendar to remember how to take her medication. She takes dexamethasone 20 mg once weekly on .      Side Effects/Adverse Reactions      patient on potential side effects, s/s, ways to manage, when to call MD/seek help     Determine if patient experiencing side effects, ways to manage  Patient reports significant diarrhea despite taking loperamide 4 times daily and opium tincture 4 times daily. She has experienced vomiting resolved by Zofran PRN. Her  states she has been dizzy and fallen several times even while  using her walker. Patient reports extreme fatigue and loss of appetite. She mentions not wanting to take Melphalan again due to the significant side effects she has experienced. Sheela will discuss with Dr. Pantoja on Monday.      Miscellaneous     Food interactions, DDIs, financial issues Determine if patient started any new medications since being placed on oral chemo (analyze for DDI) She reports no new medications.      Additional Notes:  Patient is experiencing significant side effects with Melphalan including diarrhea unresolved by loperamide and opium tincture, vomiting, fatigue, loss of appetite, dizziness, and has fallen several times. She mentions not wanting to take Melphalan anymore due to side effects. Dr. Pantoja is out of the office today, Sheela will discuss this with Dr. Pantoja on Monday. Patient is aware to call clinic with questions or concerns.    Tata Sal, PharmD  Pharmacy Resident  8/4/2017  12:12 PM

## 2017-08-05 ENCOUNTER — APPOINTMENT (OUTPATIENT)
Dept: CT IMAGING | Facility: HOSPITAL | Age: 77
End: 2017-08-05

## 2017-08-05 ENCOUNTER — APPOINTMENT (OUTPATIENT)
Dept: MRI IMAGING | Facility: HOSPITAL | Age: 77
End: 2017-08-05

## 2017-08-05 ENCOUNTER — APPOINTMENT (OUTPATIENT)
Dept: GENERAL RADIOLOGY | Facility: HOSPITAL | Age: 77
End: 2017-08-05

## 2017-08-05 ENCOUNTER — HOSPITAL ENCOUNTER (INPATIENT)
Facility: HOSPITAL | Age: 77
LOS: 5 days | Discharge: HOSPICE | End: 2017-08-10
Attending: EMERGENCY MEDICINE | Admitting: INTERNAL MEDICINE

## 2017-08-05 DIAGNOSIS — Z74.09 IMPAIRED MOBILITY AND ADLS: ICD-10-CM

## 2017-08-05 DIAGNOSIS — N28.9 RENAL INSUFFICIENCY: ICD-10-CM

## 2017-08-05 DIAGNOSIS — Z74.09 IMPAIRED FUNCTIONAL MOBILITY, BALANCE, GAIT, AND ENDURANCE: ICD-10-CM

## 2017-08-05 DIAGNOSIS — I63.9 CEREBROVASCULAR ACCIDENT (CVA), UNSPECIFIED MECHANISM (HCC): Primary | ICD-10-CM

## 2017-08-05 DIAGNOSIS — E85.81 AL AMYLOIDOSIS (HCC): ICD-10-CM

## 2017-08-05 DIAGNOSIS — Z78.9 IMPAIRED MOBILITY AND ADLS: ICD-10-CM

## 2017-08-05 DIAGNOSIS — R41.841 COGNITIVE COMMUNICATION DISORDER: ICD-10-CM

## 2017-08-05 DIAGNOSIS — I48.0 PAROXYSMAL ATRIAL FIBRILLATION (HCC): ICD-10-CM

## 2017-08-05 LAB
ALBUMIN SERPL-MCNC: 3.6 G/DL (ref 3.2–4.8)
ALBUMIN/GLOB SERPL: 1.1 G/DL (ref 1.5–2.5)
ALP SERPL-CCNC: 100 U/L (ref 25–100)
ALT SERPL W P-5'-P-CCNC: 12 U/L (ref 7–40)
ALT SERPL W P-5'-P-CCNC: 12 U/L (ref 7–40)
ANION GAP SERPL CALCULATED.3IONS-SCNC: 11 MMOL/L (ref 3–11)
AST SERPL-CCNC: 24 U/L (ref 0–33)
AST SERPL-CCNC: 24 U/L (ref 0–33)
BASOPHILS # BLD AUTO: 0 10*3/MM3 (ref 0–0.2)
BASOPHILS NFR BLD AUTO: 0 % (ref 0–1)
BILIRUB SERPL-MCNC: 0.9 MG/DL (ref 0.3–1.2)
BUN BLD-MCNC: 27 MG/DL (ref 9–23)
BUN BLDA-MCNC: 31 MG/DL (ref 8–26)
BUN/CREAT SERPL: 20.8 (ref 7–25)
CA-I BLDA-SCNC: 1.14 MMOL/L (ref 1.2–1.32)
CALCIUM SPEC-SCNC: 9.5 MG/DL (ref 8.7–10.4)
CHLORIDE BLDA-SCNC: 99 MMOL/L (ref 98–109)
CHLORIDE SERPL-SCNC: 99 MMOL/L (ref 99–109)
CO2 BLDA-SCNC: 24 MMOL/L (ref 24–29)
CO2 SERPL-SCNC: 24 MMOL/L (ref 20–31)
CREAT BLD-MCNC: 1.3 MG/DL (ref 0.6–1.3)
CREAT BLDA-MCNC: 1.4 MG/DL (ref 0.6–1.3)
DEPRECATED RDW RBC AUTO: 69.5 FL (ref 37–54)
EOSINOPHIL # BLD AUTO: 0.01 10*3/MM3 (ref 0–0.3)
EOSINOPHIL NFR BLD AUTO: 0.2 % (ref 0–3)
ERYTHROCYTE [DISTWIDTH] IN BLOOD BY AUTOMATED COUNT: 18.3 % (ref 11.3–14.5)
GFR SERPL CREATININE-BSD FRML MDRD: 40 ML/MIN/1.73
GLOBULIN UR ELPH-MCNC: 3.4 GM/DL
GLUCOSE BLD-MCNC: 100 MG/DL (ref 70–100)
GLUCOSE BLDC GLUCOMTR-MCNC: 100 MG/DL (ref 70–130)
GLUCOSE BLDC GLUCOMTR-MCNC: 105 MG/DL (ref 70–130)
HCT VFR BLD AUTO: 30.8 % (ref 34.5–44)
HCT VFR BLDA CALC: 32 % (ref 38–51)
HGB BLD-MCNC: 10.5 G/DL (ref 11.5–15.5)
HGB BLDA-MCNC: 10.9 G/DL (ref 12–17)
HOLD SPECIMEN: NORMAL
HOLD SPECIMEN: NORMAL
IMM GRANULOCYTES # BLD: 0.02 10*3/MM3 (ref 0–0.03)
IMM GRANULOCYTES NFR BLD: 0.4 % (ref 0–0.6)
LIPASE SERPL-CCNC: 22 U/L (ref 6–51)
LYMPHOCYTES # BLD AUTO: 1 10*3/MM3 (ref 0.6–4.8)
LYMPHOCYTES NFR BLD AUTO: 19.6 % (ref 24–44)
MAGNESIUM SERPL-MCNC: 2 MG/DL (ref 1.3–2.7)
MCH RBC QN AUTO: 35.7 PG (ref 27–31)
MCHC RBC AUTO-ENTMCNC: 34.1 G/DL (ref 32–36)
MCV RBC AUTO: 104.8 FL (ref 80–99)
MONOCYTES # BLD AUTO: 0.26 10*3/MM3 (ref 0–1)
MONOCYTES NFR BLD AUTO: 5.1 % (ref 0–12)
NEUTROPHILS # BLD AUTO: 3.82 10*3/MM3 (ref 1.5–8.3)
NEUTROPHILS NFR BLD AUTO: 74.7 % (ref 41–71)
PLATELET # BLD AUTO: 202 10*3/MM3 (ref 150–450)
PMV BLD AUTO: 10 FL (ref 6–12)
POTASSIUM BLD-SCNC: 4 MMOL/L (ref 3.5–5.5)
POTASSIUM BLDA-SCNC: 4 MMOL/L (ref 3.5–4.9)
PROT SERPL-MCNC: 7 G/DL (ref 5.7–8.2)
RBC # BLD AUTO: 2.94 10*6/MM3 (ref 3.89–5.14)
SODIUM BLD-SCNC: 134 MMOL/L (ref 132–146)
SODIUM BLDA-SCNC: 136 MMOL/L (ref 138–146)
TROPONIN I SERPL-MCNC: 0 NG/ML (ref 0–0.07)
WBC NRBC COR # BLD: 5.11 10*3/MM3 (ref 3.5–10.8)
WHOLE BLOOD HOLD SPECIMEN: NORMAL

## 2017-08-05 PROCEDURE — 84450 TRANSFERASE (AST) (SGOT): CPT | Performed by: EMERGENCY MEDICINE

## 2017-08-05 PROCEDURE — 0042T HC CT CEREBRAL PERFUSION W/WO CONTRAST: CPT

## 2017-08-05 PROCEDURE — 80047 BASIC METABLC PNL IONIZED CA: CPT

## 2017-08-05 PROCEDURE — 70551 MRI BRAIN STEM W/O DYE: CPT

## 2017-08-05 PROCEDURE — 71010 HC CHEST PA OR AP: CPT

## 2017-08-05 PROCEDURE — 3E03317 INTRODUCTION OF OTHER THROMBOLYTIC INTO PERIPHERAL VEIN, PERCUTANEOUS APPROACH: ICD-10-PCS | Performed by: EMERGENCY MEDICINE

## 2017-08-05 PROCEDURE — 93005 ELECTROCARDIOGRAM TRACING: CPT | Performed by: INTERNAL MEDICINE

## 2017-08-05 PROCEDURE — 84484 ASSAY OF TROPONIN QUANT: CPT

## 2017-08-05 PROCEDURE — 99223 1ST HOSP IP/OBS HIGH 75: CPT | Performed by: INTERNAL MEDICINE

## 2017-08-05 PROCEDURE — 85014 HEMATOCRIT: CPT

## 2017-08-05 PROCEDURE — 84460 ALANINE AMINO (ALT) (SGPT): CPT | Performed by: EMERGENCY MEDICINE

## 2017-08-05 PROCEDURE — 93005 ELECTROCARDIOGRAM TRACING: CPT | Performed by: EMERGENCY MEDICINE

## 2017-08-05 PROCEDURE — 85025 COMPLETE CBC W/AUTO DIFF WBC: CPT | Performed by: EMERGENCY MEDICINE

## 2017-08-05 PROCEDURE — 82962 GLUCOSE BLOOD TEST: CPT

## 2017-08-05 PROCEDURE — 99223 1ST HOSP IP/OBS HIGH 75: CPT | Performed by: PSYCHIATRY & NEUROLOGY

## 2017-08-05 PROCEDURE — 0 IOPAMIDOL PER 1 ML: Performed by: EMERGENCY MEDICINE

## 2017-08-05 PROCEDURE — 25010000002 ALTEPLASE PER 1 MG

## 2017-08-05 PROCEDURE — 80053 COMPREHEN METABOLIC PANEL: CPT | Performed by: EMERGENCY MEDICINE

## 2017-08-05 PROCEDURE — 83690 ASSAY OF LIPASE: CPT | Performed by: EMERGENCY MEDICINE

## 2017-08-05 PROCEDURE — 70450 CT HEAD/BRAIN W/O DYE: CPT

## 2017-08-05 PROCEDURE — 83735 ASSAY OF MAGNESIUM: CPT | Performed by: EMERGENCY MEDICINE

## 2017-08-05 PROCEDURE — 99285 EMERGENCY DEPT VISIT HI MDM: CPT

## 2017-08-05 RX ORDER — ACETAMINOPHEN 650 MG/1
650 SUPPOSITORY RECTAL EVERY 4 HOURS PRN
Status: DISCONTINUED | OUTPATIENT
Start: 2017-08-05 | End: 2017-08-10 | Stop reason: HOSPADM

## 2017-08-05 RX ORDER — SODIUM CHLORIDE 0.9 % (FLUSH) 0.9 %
1-10 SYRINGE (ML) INJECTION AS NEEDED
Status: DISCONTINUED | OUTPATIENT
Start: 2017-08-05 | End: 2017-08-10 | Stop reason: HOSPADM

## 2017-08-05 RX ORDER — DOCUSATE SODIUM 100 MG/1
100 CAPSULE, LIQUID FILLED ORAL 2 TIMES DAILY
Status: DISCONTINUED | OUTPATIENT
Start: 2017-08-06 | End: 2017-08-07

## 2017-08-05 RX ORDER — SODIUM CHLORIDE 0.9 % (FLUSH) 0.9 %
10 SYRINGE (ML) INJECTION AS NEEDED
Status: DISCONTINUED | OUTPATIENT
Start: 2017-08-05 | End: 2017-08-07

## 2017-08-05 RX ORDER — SULFAMETHOXAZOLE AND TRIMETHOPRIM 800; 160 MG/1; MG/1
1 TABLET ORAL 3 TIMES WEEKLY
Status: DISCONTINUED | OUTPATIENT
Start: 2017-08-07 | End: 2017-08-10

## 2017-08-05 RX ORDER — ALUMINA, MAGNESIA, AND SIMETHICONE 2400; 2400; 240 MG/30ML; MG/30ML; MG/30ML
7.5 SUSPENSION ORAL EVERY 4 HOURS PRN
Status: DISCONTINUED | OUTPATIENT
Start: 2017-08-05 | End: 2017-08-10 | Stop reason: HOSPADM

## 2017-08-05 RX ORDER — ONDANSETRON 2 MG/ML
4 INJECTION INTRAMUSCULAR; INTRAVENOUS EVERY 6 HOURS PRN
Status: DISCONTINUED | OUTPATIENT
Start: 2017-08-05 | End: 2017-08-10 | Stop reason: HOSPADM

## 2017-08-05 RX ORDER — ACETAMINOPHEN 325 MG/1
650 TABLET ORAL EVERY 4 HOURS PRN
Status: DISCONTINUED | OUTPATIENT
Start: 2017-08-05 | End: 2017-08-10 | Stop reason: HOSPADM

## 2017-08-05 RX ORDER — POLYETHYLENE GLYCOL 3350 17 G/17G
17 POWDER, FOR SOLUTION ORAL DAILY
Status: DISCONTINUED | OUTPATIENT
Start: 2017-08-06 | End: 2017-08-07

## 2017-08-05 RX ORDER — LOPERAMIDE HYDROCHLORIDE 2 MG/1
2 CAPSULE ORAL AS NEEDED
COMMUNITY

## 2017-08-05 RX ORDER — ASPIRIN 300 MG/1
300 SUPPOSITORY RECTAL DAILY
Status: DISCONTINUED | OUTPATIENT
Start: 2017-08-06 | End: 2017-08-10 | Stop reason: HOSPADM

## 2017-08-05 RX ORDER — ASPIRIN 325 MG
325 TABLET ORAL DAILY
Status: DISCONTINUED | OUTPATIENT
Start: 2017-08-06 | End: 2017-08-10 | Stop reason: HOSPADM

## 2017-08-05 RX ORDER — SODIUM CHLORIDE 9 MG/ML
50 INJECTION, SOLUTION INTRAVENOUS CONTINUOUS
Status: DISCONTINUED | OUTPATIENT
Start: 2017-08-05 | End: 2017-08-08

## 2017-08-05 RX ORDER — ATORVASTATIN CALCIUM 40 MG/1
80 TABLET, FILM COATED ORAL NIGHTLY
Status: DISCONTINUED | OUTPATIENT
Start: 2017-08-05 | End: 2017-08-10 | Stop reason: HOSPADM

## 2017-08-05 RX ORDER — ACYCLOVIR 200 MG/1
400 CAPSULE ORAL 2 TIMES DAILY
Status: DISCONTINUED | OUTPATIENT
Start: 2017-08-05 | End: 2017-08-10

## 2017-08-05 RX ADMIN — METOPROLOL TARTRATE 25 MG: 25 TABLET ORAL at 21:02

## 2017-08-05 RX ADMIN — SODIUM CHLORIDE 100 ML/HR: 9 INJECTION, SOLUTION INTRAVENOUS at 20:11

## 2017-08-05 RX ADMIN — ACYCLOVIR 400 MG: 200 CAPSULE ORAL at 23:13

## 2017-08-05 RX ADMIN — WATER 41 MG: 1 INJECTION INTRAMUSCULAR; INTRAVENOUS; SUBCUTANEOUS at 17:56

## 2017-08-05 RX ADMIN — ATORVASTATIN CALCIUM 80 MG: 40 TABLET, FILM COATED ORAL at 21:02

## 2017-08-05 RX ADMIN — IOPAMIDOL 40 ML: 755 INJECTION, SOLUTION INTRAVENOUS at 17:46

## 2017-08-05 NOTE — H&P
INTENSIVIST   INITIAL HOSPITAL VISIT NOTE     Hospital:  LOS: 0 days     Ms. Sowmya Beckett, 77 y.o. female is seen for:  Chief Complaint   Patient presents with   • Stroke     Principal Problem:    Stroke  Active Problems:    Paroxysmal atrial fibrillation    AL amyloidosis         Subjective   S     Ms. Beckett is a 78yo F with a history of AL Amyloidosis on Melphalan who presents with confusion and right sided weakness which was present for 1 hour. Her family provides much of the history as she is unable to at the present time. Her family states that when they arrived at her home, she was confused and unable to speak in clear sentences. They also noticed weakness of her right arm and leg and felt as if she had facial droop as well. They called EMS who brought her to the ED.     In the ED, she was found to have right sided weakness, word salad and right sided neglect. A CT of her head was performed to rule out hemorrhage and she then received TPA.     Currently, the patient is alert. She is disoriented. She thinks that she is in the hospital in [place], [place]. She is not oriented to the date.        PMH: She  has a past medical history of Arrhythmia; Atrial fibrillation and flutter (8/8/2016); Cancer; and CHF (congestive heart failure).   PSxH: She  has a past surgical history that includes Colon surgery; Hysterectomy; Appendectomy; Hemorrhoid surgery; and Cholecystectomy.      Medications:  No current facility-administered medications on file prior to encounter.      Current Outpatient Prescriptions on File Prior to Encounter   Medication Sig   • acyclovir (ZOVIRAX) 400 MG tablet Take 1 tablet by mouth 2 (Two) Times a Day.   • aspirin 325 MG tablet Take 325 mg by mouth daily.   • BIOTIN PO Take 1 capsule by mouth Daily.   • Budesonide (ENTOCORT EC) 3 MG 24 hr capsule    • cholecalciferol (VITAMIN D3) 1000 UNITS tablet Take 2,000 Units by mouth daily.   • dexamethasone (DECADRON) 4 MG tablet Take 5 tablets by mouth  1 (One) Time Per Week.   • DULoxetine (CYMBALTA) 60 MG capsule Take 60 mg by mouth Daily.   • gabapentin (NEURONTIN) 300 MG capsule    • melphalan (ALKERAN) 2 MG chemo tablet Take 5 tablets by mouth Daily. Take on an empty stomach, 2 hours before or 2 hours after a meal, on days 1-4 of 28 day cycle   • metoprolol tartrate (LOPRESSOR) 25 MG tablet TAKE ONE-HALF TABLET BY MOUTH ONCE DAILY   • midodrine (PROAMATINE) 2.5 MG tablet Take 2 tablets by mouth Every 8 (Eight) Hours.   • mirtazapine (REMERON) 15 MG tablet    • Omega-3 Fatty Acids (FISH OIL) 1000 MG capsule capsule Take  by mouth daily with breakfast.   • ondansetron (ZOFRAN) 8 MG tablet Take 1 tablet by mouth 3 (Three) Times a Day As Needed for Nausea or Vomiting.   • opium 10 MG/ML (1%) tincture TAKE 0.6 MILLILITERS BY MOUTH FOUR TIMES DAILY AS DIRECTED   • opium 10 MG/ML (1%) tincture Take 6 mg by mouth Every 6 (Six) Hours As Needed (diarrhea).   • polyethylene glycol (MIRALAX) packet Take 17 g by mouth Daily.   • Red Yeast Rice 600 MG capsule Take  by mouth daily.   • sulfamethoxazole-trimethoprim (BACTRIM DS,SEPTRA DS) 800-160 MG per tablet Take 1 tablet by mouth 3 (Three) Times a Week. Take 1 tablet on Mondays, Wednesdays and Fridays.   • Zinc 50 MG capsule Take  by mouth daily.       Allergies: She is allergic to ambien [zolpidem tartrate] and morphine and related.   FH: Her family history includes Cancer in her brother, father, and mother.   SH: She  reports that she quit smoking about 47 years ago. Her smoking use included Cigarettes. She does not have any smokeless tobacco history on file. She reports that she drinks about 3.0 oz of alcohol per week  She reports that she does not use illicit drugs.     The patient's relevant past medical, surgical and social history were reviewed and updated in Epic as appropriate.     ROS (14):   Review of Systems   Unable to perform ROS: Mental status change       Objective   O     Vitals    Temp  Min: 98.7 °F (37.1  °C)  Max: 98.7 °F (37.1 °C)  BP  Min: 119/69  Max: 119/69  Pulse  Min: 60  Max: 60  Resp  Min: 18  Max: 18  SpO2  Min: 100 %  Max: 100 % No Data Recorded     I/O 24 hrs (7:00AM - 6:59 AM)  Intake/Output     None          Medications (drips):    niCARdipine       Physical Examination    Telemetry: Sinus Rhythm: normal sinus rhythm         Constitutional:  No acute distress.  Confused   HEENT: Normocephalic and atraumatic.   Neck:  Normal range of motion. Neck supple.   No JVD present.   No thyromegaly.    Cardiovascular: Regular rate and rhythm.  No murmurs, rub or gallop.  No peripheral edema.   Respiratory: Normal respiratory effort.  Clear to ascultation.  Clear to percussion.    Abdominal:  Soft. No masses.   Non-tender. No distension.   No hepatosplenomegaly.   MSK: Right arm weakness   Extremities: No digital cyanosis or clubbing.   Skin: Skin is warm and dry.  No rashes, lesions or ulcers noted.    Neurological:   Right sided weakness present with right arm drift noted. No facial asymmetry present.    Psychiatric:  Normal affect. Pleasantly confused.      Lines, Drains & Airways    Active LDAs     Name:   Placement date:   Placement time:   Site:   Days:    Peripheral IV Line - Single Lumen 08/05/17 1726 median cubital vein (antecubital fossa), right 18 gauge  08/05/17    1726      less than 1    Peripheral IV Line - Single Lumen 08/05/17 1726 median cubital vein (antecubital fossa), left 18 gauge  08/05/17    1726      less than 1                Interval: baseline  1a. Level Of Consciousness: 0-->Alert: keenly responsive  1b. LOC Questions: 1-->Answers one question correctly  1c. LOC Commands: 0-->Performs both tasks correctly  2. Best Gaze: 0-->Normal  3. Visual: 0-->No visual loss  4. Facial Palsy: 0-->Normal symmetrical movements  5a. Motor Arm, Left: 0-->No drift: limb holds 90 (or 45) degrees for full 10 secs  5b. Motor Arm, Right: 0-->No drift: limb holds 90 (or 45) degrees for full 10 secs  6a. Motor  Leg, Left: 0-->No drift: leg holds 30 degree position for full 5 secs  6b. Motor Leg, Right: 0-->No drift: leg holds 30 degree position for full 5 secs  7. Limb Ataxia: 0-->Absent  8. Sensory: 0-->Normal: no sensory loss  9. Best Language: 1-->Mild-to-moderate aphasia: some obvious loss of fluency or facility of comprehension, without significant limitation on ideas expressed or form of expression. Reduction of speech and/or comprehension, however, makes conversation. . . (see row details)  10. Dysarthria: 1-->Mild-to-moderate dysarthria: patient slurs at least some words and, at worst, can be understood with some difficulty  11. Extinction and Inattention (formerly Neglect): 1-->Visual, tactile, auditory, spatial, or personal inattention or extinction to bilateral simultaneous stimulation in one of the sensory modalities    Total (NIH Stroke Scale): 4    Results from last 7 days  Lab Units 08/05/17 1749 08/05/17  1733   WBC 10*3/mm3 5.11  --    HEMOGLOBIN g/dL 10.5*  --    HEMOGLOBIN, POC g/dL  --  10.9*   MCV fL 104.8*  --    PLATELETS 10*3/mm3 202  --        Results from last 7 days  Lab Units 08/05/17 1749 08/05/17  1733   SODIUM mmol/L 134  --    POTASSIUM mmol/L 4.0  --    CO2 mmol/L 24.0  --    CREATININE mg/dL 1.30 1.40*   MAGNESIUM mg/dL 2.0  --      Estimated Creatinine Clearance: 28.4 mL/min (by C-G formula based on Cr of 1.3).    Results from last 7 days  Lab Units 08/05/17  1749   ALK PHOS U/L 100   BILIRUBIN mg/dL 0.9   ALT (SGPT) U/L 12  12   AST (SGOT) U/L 24  24                       Images:   08/05/2017      Imaging Results (last 24 hours)     Procedure Component Value Units Date/Time    CT Cerebral Perfusion With & Without Contrast [[id number]] Collected:  08/05/17 1817     Updated:  08/05/17 1817    Narrative:       EXAMINATION: CT CEREBRAL PERFUSION W/WO CONTRAST - 08/05/2017     INDICATION: Evaluate for stroke.      TECHNIQUE: CT cerebral perfusion imaging was performed with  data  acquisition regarding blood volume, blood flow, mean transit time, and  time to drain.      The radiation dose reduction device was turned on for each scan per the  ALARA (As Low as Reasonably Achievable) protocol.     COMPARISON: CT scan of the same day.     FINDINGS: There is no reduction in cerebral flow or asymmetry. There is  some increased blood volume in the left posterior parietal region but  there is no evidence of areas of diminished perfusion with normal blood  volume. Therefore there are no areas of penumbra.       Impression:       There is no evidence of reversible neural ischemia.     DICTATED:     08/05/2017  EDITED:         08/05/2017          XR Chest 1 View [[id number]] Updated:  08/05/17 1821    CT Head Without Contrast Stroke Protocol [[id number]] Collected:  08/05/17 1823     Updated:  08/05/17 1823    Narrative:       EXAMINATION: CT HEAD WO CONTRAST  - 08/05/2017     INDICATION: Stroke protocol.     TECHNIQUE: CT scan of the head was performed at 5 mm intervals.     The radiation dose reduction device was turned on for each scan per the  ALARA (As Low as Reasonably Achievable) protocol.     COMPARISON: 03/21/2017.      FINDINGS: There is no intracranial mass. There is no hemorrhage. There  is no midline shift or extra-axial fluid collection. There is central or  cortical atrophy.       Impression:       Central and cortical atrophy without acute finding.     Time of the exam:  1728 hours  Report sent to ER: 1736 hours     DICTATED:     08/05/2017  EDITED:         08/05/2017               I reviewed the patient's new laboratory and imaging results.  I independently reviewed the patient's new images.    Assessment/Plan   A / P     77 y.o.female, admitted on 8/5/2017 with Stroke [I63.9]:       Nutrition:   NPO Diet  Advance Directives: Conditional Code    Hospital Problem List     * (Principal)Stroke    Paroxysmal atrial fibrillation    AL amyloidosis          Assessment / Plan:  Ms. Beckett  is a 76yo F with stroke symptoms who is now s/p TPA. She has a history of paroxysmal Afib but was taken off anticoagulation due to a history of falls.     1. Stroke  2. Paroxysmal Afib  3. AL Amyloidosis  4. Acute Kidney Injury      - Admit to neuro ICU with NIH stroke scale and begin post-TPA order set.   - Consult Neurology  - Aspirin and statin  - Echo with bubble  - Carotid duplex  - May need to address the issue of anticoagulation again if she continues to have episodes of Afib  - Oncology follows Amyloidosis as an outpatient. Will hold any further Melphalan doses. Continue antibiotic prophylaxis.   - Will start IV NS as she will be NPO.   - Creatinine slightly elevated. Will monitor response with IV hydration.       I discussed the patient's findings and my recommendations with patient, family and nursing staff    Time:   Critical Care Time: was greater than 60 minutes.(excluding time spent on other separately billable procedures or treating other patients).

## 2017-08-05 NOTE — CONSULTS
Referring Provider: Dr Bello  Reason for Consultation: stroke    Patient Care Team:  Velia Vogel MD as PCP - General  Velia Vogel MD as PCP - Family Medicine  Dave Swenson MD, FAAN as Consulting Physician (Sleep Medicine)  Ricki Vieyra MD as Consulting Physician (Cardiology)    Chief complaint right side weakness, garbled speech    Subjective .     History of present illness:  76 yo RH woman with amyloidosis, history of atrial fibrillation, with recent increased weakness, getting chemotherapy for her amyloidosis, was in her usual health's afternoon when about 4:30 while standing in the kitchen she suddenly became less responsive and leaned to the right.  Her boyfriend got her walker and was able to help her sit down.  He called her sister who is sick cardiology RN and she checked her blood pressure which she reports was unremarkable, but notes that her sister's speech was garbled and she could not follow commands.  She has a flaccid right arm but moved her left side well.  She did not note any leg weakness.  After about 10 minutes her speech improved but she remained weak on the right and in the emergency room here was noted to have both right upper extremity weakness and neglect.  Patient was not really aware of these problems and reports she remembers some of what happened.  She denies any headache or vision changes.  She does feel somewhat weak.  She has chronic diarrhea associated with her amyloid treated with tincture of opium.  She has had some falls recently.  She does not have chest pain.  She has a history of A. fib status post ablation ×2, off of anticoagulation for couple of years probably.  She takes aspirin daily.    Review of Systems  Pertinent items are noted in HPI, all other systems reviewed and negative    History  Past Medical History:   Diagnosis Date   • Arrhythmia    • Atrial fibrillation and flutter 8/8/2016   • Cancer     melanoma in 1971   • CHF (congestive heart  "failure)    ,   Past Surgical History:   Procedure Laterality Date   • APPENDECTOMY     • CHOLECYSTECTOMY     • COLON SURGERY     • HEMORRHOIDECTOMY     • HYSTERECTOMY     ,   Family History   Problem Relation Age of Onset   • Cancer Mother    • Cancer Father    • Cancer Brother    ,   Social History   Substance Use Topics   • Smoking status: Former Smoker     Types: Cigarettes     Quit date: 1970   • Smokeless tobacco: None   • Alcohol use 3.0 oz/week     5 Shots of liquor per week      Comment: bourbon   ,   (Not in a hospital admission), Scheduled Meds:    acyclovir 400 mg Oral BID   [START ON 8/6/2017] aspirin 325 mg Oral Daily   Or      [START ON 8/6/2017] aspirin 300 mg Rectal Daily   atorvastatin 80 mg Oral Nightly   [START ON 8/6/2017] docusate sodium 100 mg Oral BID   metoprolol tartrate 25 mg Oral Q12H   [START ON 8/6/2017] polyethylene glycol 17 g Oral Daily   [START ON 8/7/2017] sulfamethoxazole-trimethoprim 1 tablet Oral Once per day on Mon Wed Fri   , Continuous Infusions:    niCARdipine 5-15 mg/hr   sodium chloride 100 mL/hr   , PRN Meds:  •  acetaminophen **OR** acetaminophen  •  aluminum-magnesium hydroxide-simethicone  •  ondansetron  •  sodium chloride  •  sodium chloride and Allergies:  Ambien [zolpidem tartrate] and Morphine and related    Objective     Vital Signs   Blood pressure 116/75, pulse 63, temperature 98.7 °F (37.1 °C), temperature source Oral, resp. rate 18, height 62\" (157.5 cm), weight 111 lb (50.4 kg), SpO2 100 %.    Physical Exam:   Well-developed elderly white woman lying comfortably in the emergency room [facility], awake and alert with fluent speech.  She follows verbal commands well, can name and repeat.  She is oriented to hospital in [place], states that it's July, 2022.  She can name everyone in the room with her and is appropriate.  She is mildly inattentive, fund of knowledge is okay, memory is impaired for events of today.  Pupils are 1.5 mm, optic disks not well " visualized, visual fields full to confrontation, extraocular movements intact, normal facial sensation and movement, hearing intact to voice, palate and shoulders elevate symmetrically, tongue protrudes midline  Motor: Strength is symmetric except for diminished right upper extremity  that 4 minus while arm flexion and extension is 4/5 (TPA running).  No definite asymmetry of tone, coordination commensurate with strength.  Reflexes 2+ left upper extremity 1+ on the right traced absent in lower extremities, no response to plantar stim  She reports symmetric light touch in lower extremities and upper extremities, but has stocking decrease to sensation  Neck is supple, no carotid bruits appreciated  Heart is currently regular, reported A. fib on monitor earlier, no murmurs appreciated  Lungs clear to auscultation abdomen soft, NT, ND with positive bowel sounds  No C/C/E      Results Review:   I reviewed the patient's new clinical results.  I reviewed the patient's new imaging results and agree with the interpretation.  I reviewed the patient's other test results and agree with the interpretation  Head CT personally reviewed, agree unremarkable  CT perfusion shows increased blood flow apparently left posterior parietal, no mismatch  Labs reviewed    Assessment/Plan     Principal Problem:    Stroke  Active Problems:    Paroxysmal atrial fibrillation    AL amyloidosis      Agree with the diagnosis of stroke.  She has improved but still had significant deficit at the time that decision was made to give TPA.  Given her initial garbled speech described by her sister who is a good historian, I suspect cortical ischemia which could indicate embolic source.  Discussed plan for follow-up CT at 24 hours and patient and family.  We'll plan on post-TPA orders set.  Discussed risk of bleeding.  Discussed prognosis.  Answered sister's questions.  We will plan on MRI to establish stroke size and location.    I discussed the  patients findings and my recommendations with patient, family, nursing staff and primary care team    Geetha Irwin MD  08/05/17  7:24 PM

## 2017-08-05 NOTE — ED PROVIDER NOTES
Subjective   HPI Comments: Ms. Sowmya Beckett is a 77 y.o. female who presents to the ED with c/o stroke sx. EMS reports that around 1430 pt had a sudden onset of confusion, loss of memory, right sided weakness, slurred speech, and word salad. Her sx resolved after approximately 10 minutes later except for her weakness. Family reports to EMS that she has been having worsening generalized weakness, and increased falls for the past two weeks. She has a history of AL Amylodisis, and a-fib. She is not on any anticoagulation medications at this time.     Her last known well was 1630 today.     Patient is a 77 y.o. female presenting with neurologic complaint.   History provided by:  Patient and EMS personnel  History limited by: EMS.  Neurologic Problem   The patient's primary symptoms include an altered mental status, focal weakness, memory loss, slurred speech and weakness (Right sided). This is a new problem. The current episode started today. The neurological problem developed suddenly. The last time the patient was known to be well was 8/5/2017 4:30 PM.  The problem has been resolved since onset. There was right-sided focality noted. Associated symptoms include confusion. Past treatments include nothing. (A-fib and cancer)       Review of Systems   Unable to perform ROS: Acuity of condition   Neurological: Positive for focal weakness, speech difficulty and weakness (Right sided).        Memory loss   Psychiatric/Behavioral: Positive for confusion and memory loss.       Past Medical History:   Diagnosis Date   • Arrhythmia    • Atrial fibrillation and flutter 8/8/2016   • Cancer     melanoma in 1971   • CHF (congestive heart failure)        Allergies   Allergen Reactions   • Ambien [Zolpidem Tartrate]    • Morphine And Related        Past Surgical History:   Procedure Laterality Date   • APPENDECTOMY     • CHOLECYSTECTOMY     • COLON SURGERY     • HEMORRHOIDECTOMY     • HYSTERECTOMY         Family History   Problem  Relation Age of Onset   • Cancer Mother    • Cancer Father    • Cancer Brother        Social History     Social History   • Marital status:      Spouse name: N/A   • Number of children: N/A   • Years of education: N/A     Social History Main Topics   • Smoking status: Former Smoker     Types: Cigarettes     Quit date: 1970   • Smokeless tobacco: None   • Alcohol use 3.0 oz/week     5 Shots of liquor per week      Comment: bourbon   • Drug use: No   • Sexual activity: Defer     Other Topics Concern   • None     Social History Narrative         Objective   Physical Exam   Constitutional: She appears well-developed and well-nourished. No distress.   HENT:   Head: Normocephalic and atraumatic.   Nose: Nose normal.   Eyes: Conjunctivae are normal. No scleral icterus.   Myotic pupils but reactive. Pupils moved to both side with mild right sided neglect.   Neck: Normal range of motion. Neck supple.   Cardiovascular: Normal rate, regular rhythm, normal heart sounds and intact distal pulses.    No murmur heard.  Pulmonary/Chest: Effort normal and breath sounds normal. No respiratory distress.   Abdominal: Soft. Bowel sounds are normal. She exhibits no distension. There is no tenderness.   Musculoskeletal: Normal range of motion.   Neurological: She is alert. She has normal reflexes.   Downward babinski's. Excellent 5/5 strength in BLE. Decreased hand  and neglect of right arm but can pull at 5/5 when concentrating on the arm. Hand  with concentration is still 3/5. Intact DTR. Mild dysarthria and intermittent aphasia   Skin: Skin is warm and dry. She is not diaphoretic.   Psychiatric: She has a normal mood and affect. Her behavior is normal.   Nursing note and vitals reviewed.      Critical Care  Performed by: PER CONWAY  Authorized by: PER CONWAY   Total critical care time: 35 minutes  Critical care time was exclusive of separately billable procedures and treating other patients.  Critical care was  necessary to treat or prevent imminent or life-threatening deterioration of the following conditions: CNS failure or compromise.  Critical care was time spent personally by me on the following activities: discussions with consultants, evaluation of patient's response to treatment, obtaining history from patient or surrogate, ordering and review of laboratory studies, pulse oximetry, re-evaluation of patient's condition, ordering and review of radiographic studies, ordering and performing treatments and interventions, examination of patient and review of old charts.  Comments: Immediately followed patient from EMS to CT scanner, Started tPa, consults with stroke neurology and intensivist, and will admit to the ICU.        Thrombolytic Therapy for Stroke  Time: 5:40 PM    Inclusion criteria:  Onset of symptoms < 4.5 hours before beginning treatment. Last known well time < 4.5 hours before beginning treatment. Pt is at least 18 years of age.    Exclusion criteria:  Historical: None.  Clinical: None.  Hematologic: None.  Head CT scan: None.  Relative exclusion criteria: None.  Additional relative exclusion criteria for treatment from 3-4.5 hours from symptom onset: None.    No exclusion criteria present.  Current inclusion criteria have been reviewed and met.    tPA given. Intensive monitoring performed:  blood pressure, cardiac monitoring, O2 sat and neuro exam.  Complications: None.         ED Course  ED Course   Comment By Time   Dr. Bello discussed case with Dr. Irwin, stroke neurology, who recommends based on exam, risk factors, history, and reported medications to proceed with tPA. Aum Griffin 08/05 1747   Patient has had a waxing and waning examination throughout the ED course with initial evaluation the CT scanner and serial re-evaluations.  She continues have right upper extremity greater than lower extremity collapsed with right upper extremity greater than lower extremity weakness and confusion.  The  confusion per EMS as reported by the family is new.  I reviewed her old charts and discussed case with Dr. Irwin.  TPA is ordered and pending. Nicko Bello MD 08/05 1750   Dr. Bello discussed case with Dr. Veliz, intensivist, who will accept the patient. Aum Griffin 08/05 1753   Radiologist interpretation on CT is chronic atrophy but nothing acute. Aum Griffin 08/05 1753   Patient is confused and family is not available.  TPA is given emergently.  I could not complete a contraindication checklist however discussion with the patient and review of the old chart and discussion with neurology reveals no obvious contraindications at the current time Nicko Bello MD 08/05 1756   Patient is reexamined now.  She's had the TPA bolus.  Her weakness in her right side is much better.  Her speech is much better however she has intermittent episodes of dense aphasia, that then improve or resolve.  Her speech than his crisp and rapid.  She still has mild right-sided weakness compared to left but better.  I discussed case with the intensivist who will admit Nicko Bello MD 08/05 1801   Patient's speech is now again significantly improved.  Sister arrived and I discussed the findings as well as TPA treatment.  I discussed TPA with the patient who is now alert and oriented cooperative.  She knows her name and the date now.  She however continues have intermittent episodes of dense aphasia.  She is awaiting ICU transfer. Nicko Bello MD 08/05 1815   Patient had mild oozing from her left IV site.  Her neurologic exam continues to be markedly improved from presentation.  Vitals remained stable.  Awaiting ICU bed. Nicko Bello MD 08/05 1915     Recent Results (from the past 24 hour(s))   POC CHEM 8    Collection Time: 08/05/17  5:33 PM   Result Value Ref Range    Glucose 100 70 - 130 mg/dL    BUN, Arterial 31 (H) 8 - 26 mg/dL    Creatinine 1.40 (H) 0.60 - 1.30 mg/dL    Sodium 136 (L) 138 - 146 mmol/L    Potassium 4.0 3.5 -  4.9 mmol/L    Chloride 99 98 - 109 mmol/L    Total CO2 24 24 - 29 mmol/L    Hemoglobin 10.9 (L) 12.0 - 17.0 g/dL    Hematocrit 32 (L) 38 - 51 %    Ionized Calcium 1.14 (L) 1.20 - 1.32 mmol/L   AST    Collection Time: 08/05/17  5:49 PM   Result Value Ref Range    AST (SGOT) 24 0 - 33 U/L   ALT    Collection Time: 08/05/17  5:49 PM   Result Value Ref Range    ALT (SGPT) 12 7 - 40 U/L   Green Top (Gel)    Collection Time: 08/05/17  5:49 PM   Result Value Ref Range    Extra Tube Hold for add-ons.    Lavender Top    Collection Time: 08/05/17  5:49 PM   Result Value Ref Range    Extra Tube hold for add-on    Gold Top - SST    Collection Time: 08/05/17  5:49 PM   Result Value Ref Range    Extra Tube Hold for add-ons.    CBC Auto Differential    Collection Time: 08/05/17  5:49 PM   Result Value Ref Range    WBC 5.11 3.50 - 10.80 10*3/mm3    RBC 2.94 (L) 3.89 - 5.14 10*6/mm3    Hemoglobin 10.5 (L) 11.5 - 15.5 g/dL    Hematocrit 30.8 (L) 34.5 - 44.0 %    .8 (H) 80.0 - 99.0 fL    MCH 35.7 (H) 27.0 - 31.0 pg    MCHC 34.1 32.0 - 36.0 g/dL    RDW 18.3 (H) 11.3 - 14.5 %    RDW-SD 69.5 (H) 37.0 - 54.0 fl    MPV 10.0 6.0 - 12.0 fL    Platelets 202 150 - 450 10*3/mm3    Neutrophil % 74.7 (H) 41.0 - 71.0 %    Lymphocyte % 19.6 (L) 24.0 - 44.0 %    Monocyte % 5.1 0.0 - 12.0 %    Eosinophil % 0.2 0.0 - 3.0 %    Basophil % 0.0 0.0 - 1.0 %    Immature Grans % 0.4 0.0 - 0.6 %    Neutrophils, Absolute 3.82 1.50 - 8.30 10*3/mm3    Lymphocytes, Absolute 1.00 0.60 - 4.80 10*3/mm3    Monocytes, Absolute 0.26 0.00 - 1.00 10*3/mm3    Eosinophils, Absolute 0.01 0.00 - 0.30 10*3/mm3    Basophils, Absolute 0.00 0.00 - 0.20 10*3/mm3    Immature Grans, Absolute 0.02 0.00 - 0.03 10*3/mm3   Comprehensive Metabolic Panel    Collection Time: 08/05/17  5:49 PM   Result Value Ref Range    Glucose 100 70 - 100 mg/dL    BUN 27 (H) 9 - 23 mg/dL    Creatinine 1.30 0.60 - 1.30 mg/dL    Sodium 134 132 - 146 mmol/L    Potassium 4.0 3.5 - 5.5 mmol/L     Chloride 99 99 - 109 mmol/L    CO2 24.0 20.0 - 31.0 mmol/L    Calcium 9.5 8.7 - 10.4 mg/dL    Total Protein 7.0 5.7 - 8.2 g/dL    Albumin 3.60 3.20 - 4.80 g/dL    ALT (SGPT) 12 7 - 40 U/L    AST (SGOT) 24 0 - 33 U/L    Alkaline Phosphatase 100 25 - 100 U/L    Total Bilirubin 0.9 0.3 - 1.2 mg/dL    eGFR Non African Amer 40 (L) >60 mL/min/1.73    Globulin 3.4 gm/dL    A/G Ratio 1.1 (L) 1.5 - 2.5 g/dL    BUN/Creatinine Ratio 20.8 7.0 - 25.0    Anion Gap 11.0 3.0 - 11.0 mmol/L   Lipase    Collection Time: 08/05/17  5:49 PM   Result Value Ref Range    Lipase 22 6 - 51 U/L   Magnesium    Collection Time: 08/05/17  5:49 PM   Result Value Ref Range    Magnesium 2.0 1.3 - 2.7 mg/dL   POC Troponin, Rapid    Collection Time: 08/05/17  5:49 PM   Result Value Ref Range    Troponin I 0.00 0.00 - 0.07 ng/mL     Note: In addition to lab results from this visit, the labs listed above may include labs taken at another facility or during a different encounter within the last 24 hours. Please correlate lab times with ED admission and discharge times for further clarification of the services performed during this visit.    XR Chest 1 View   Final Result   Abnormal     No acute pulmonary process is demonstrated.      THIS DOCUMENT HAS BEEN ELECTRONICALLY SIGNED BY JOEI MONROE MD      CT Head Without Contrast Stroke Protocol   Preliminary Result   Central and cortical atrophy without acute finding.       Time of the exam:  1728 hours   Report sent to ER: 1736 hours       DICTATED:     08/05/2017   EDITED:         08/05/2017          CT Cerebral Perfusion With & Without Contrast   Preliminary Result   There is no evidence of reversible neural ischemia.       DICTATED:     08/05/2017   EDITED:         08/05/2017              CT Head Without Contrast    (Results Pending)     Vitals:    08/05/17 1835 08/05/17 1840 08/05/17 1844 08/05/17 1845   BP:    116/75   BP Location:       Patient Position:       Pulse: 58 59 63    Resp:       Temp:        TempSrc:       SpO2: 100% 100% 100% 100%   Weight:       Height:         Medications   sodium chloride 0.9 % flush 10 mL (not administered)   sodium chloride 0.9 % flush 1-10 mL (not administered)   aspirin tablet 325 mg (not administered)     Or   aspirin suppository 300 mg (not administered)   acetaminophen (TYLENOL) tablet 650 mg (not administered)     Or   acetaminophen (TYLENOL) suppository 650 mg (not administered)   atorvastatin (LIPITOR) tablet 80 mg (not administered)   niCARdipine (CARDENE) 25 mg/250 mL (0.1 mg/mL) 0.9% NS VTB infusion (not administered)   ondansetron (ZOFRAN) injection 4 mg (not administered)   docusate sodium (COLACE) capsule 100 mg (not administered)   aluminum-magnesium hydroxide-simethicone (MAALOX MAX) 400-400-40 MG/5ML suspension 7.5 mL (not administered)   acyclovir (ZOVIRAX) capsule 400 mg (not administered)   metoprolol tartrate (LOPRESSOR) tablet 25 mg (not administered)   polyethylene glycol (MIRALAX) powder 17 g (not administered)   sulfamethoxazole-trimethoprim (BACTRIM DS,SEPTRA DS) 800-160 MG per tablet 160 mg (not administered)   sodium chloride 0.9 % infusion (not administered)   iopamidol (ISOVUE-370) 76 % injection 100 mL (40 mL Intravenous Given 8/5/17 1746)   alteplase (ACTIVASE) 41 mg in sterile water (preservative free) 41 mL (1 mg/mL) infusion (0 mg Intravenous Stopped 8/5/17 1840)   alteplase (ACTIVASE) 0.09 mg/kg = 4.5 mg in sterile water (preservative free) 4.5 mL bolus (4.5 mg Intravenous Bolus from Bag 8/5/17 1755)     ECG/EMG Results (last 24 hours)     ** No results found for the last 24 hours. **                        [facility]    Final diagnoses:   Cerebrovascular accident (CVA), unspecified mechanism   Paroxysmal atrial fibrillation   AL amyloidosis   Renal insufficiency       Documentation assistance provided by scribprosper BLANCA.  Information recorded by the scribe was done at my direction and has been verified and validated by me.     Gema Blanca  08/05/17  1751       Gema Griffin  08/05/17 1755       Nicko Bello MD  08/05/17 1921

## 2017-08-06 ENCOUNTER — APPOINTMENT (OUTPATIENT)
Dept: CT IMAGING | Facility: HOSPITAL | Age: 77
End: 2017-08-06

## 2017-08-06 ENCOUNTER — APPOINTMENT (OUTPATIENT)
Dept: CARDIOLOGY | Facility: HOSPITAL | Age: 77
End: 2017-08-06
Attending: INTERNAL MEDICINE

## 2017-08-06 LAB
ALBUMIN SERPL-MCNC: 3.3 G/DL (ref 3.2–4.8)
ALBUMIN/GLOB SERPL: 1.1 G/DL (ref 1.5–2.5)
ALP SERPL-CCNC: 92 U/L (ref 25–100)
ALT SERPL W P-5'-P-CCNC: 12 U/L (ref 7–40)
ANION GAP SERPL CALCULATED.3IONS-SCNC: 6 MMOL/L (ref 3–11)
ARTICHOKE IGE QN: 106 MG/DL (ref 0–130)
AST SERPL-CCNC: 19 U/L (ref 0–33)
BH CV ECHO MEAS - AO ROOT AREA (BSA CORRECTED): 1.8
BH CV ECHO MEAS - AO ROOT AREA: 5.5 CM^2
BH CV ECHO MEAS - AO ROOT DIAM: 2.7 CM
BH CV ECHO MEAS - BSA(HAYCOCK): 1.5 M^2
BH CV ECHO MEAS - BSA(HAYCOCK): 1.5 M^2
BH CV ECHO MEAS - BSA: 1.5 M^2
BH CV ECHO MEAS - BSA: 1.5 M^2
BH CV ECHO MEAS - BZI_BMI: 21 KILOGRAMS/M^2
BH CV ECHO MEAS - BZI_BMI: 21 KILOGRAMS/M^2
BH CV ECHO MEAS - BZI_METRIC_HEIGHT: 157.5 CM
BH CV ECHO MEAS - BZI_METRIC_HEIGHT: 157.5 CM
BH CV ECHO MEAS - BZI_METRIC_WEIGHT: 52.2 KG
BH CV ECHO MEAS - BZI_METRIC_WEIGHT: 52.2 KG
BH CV ECHO MEAS - EDV(CUBED): 57.3 ML
BH CV ECHO MEAS - EDV(TEICH): 64.2 ML
BH CV ECHO MEAS - EF(CUBED): 70.6 %
BH CV ECHO MEAS - EF(TEICH): 63 %
BH CV ECHO MEAS - ESV(CUBED): 16.9 ML
BH CV ECHO MEAS - ESV(TEICH): 23.8 ML
BH CV ECHO MEAS - FS: 33.5 %
BH CV ECHO MEAS - IVS/LVPW: 0.95
BH CV ECHO MEAS - IVSD: 1.1 CM
BH CV ECHO MEAS - LA DIMENSION: 3.1 CM
BH CV ECHO MEAS - LA/AO: 1.2
BH CV ECHO MEAS - LV MASS(C)D: 125.4 GRAMS
BH CV ECHO MEAS - LV MASS(C)DI: 83 GRAMS/M^2
BH CV ECHO MEAS - LVIDD: 3.9 CM
BH CV ECHO MEAS - LVIDS: 2.6 CM
BH CV ECHO MEAS - LVPWD: 1.1 CM
BH CV ECHO MEAS - MV DEC SLOPE: 638 CM/SEC^2
BH CV ECHO MEAS - MV DEC TIME: 0.18 SEC
BH CV ECHO MEAS - MV E MAX VEL: 117.5 CM/SEC
BH CV ECHO MEAS - MV P1/2T MAX VEL: 131.4 CM/SEC
BH CV ECHO MEAS - MV P1/2T: 60.3 MSEC
BH CV ECHO MEAS - MVA P1/2T LCG: 1.7 CM^2
BH CV ECHO MEAS - MVA(P1/2T): 3.6 CM^2
BH CV ECHO MEAS - PA ACC SLOPE: 579.2 CM/SEC^2
BH CV ECHO MEAS - PA ACC TIME: 0.13 SEC
BH CV ECHO MEAS - PA PR(ACCEL): 18.8 MMHG
BH CV ECHO MEAS - RV MAX PG: 2 MMHG
BH CV ECHO MEAS - RV V1 MAX: 70.1 CM/SEC
BH CV ECHO MEAS - SI(CUBED): 26.8 ML/M^2
BH CV ECHO MEAS - SI(TEICH): 26.7 ML/M^2
BH CV ECHO MEAS - SV(CUBED): 40.5 ML
BH CV ECHO MEAS - SV(TEICH): 40.4 ML
BH CV ECHO MEAS - TAPSE (>1.6): 1.9 CM2
BH CV XLRA - RV BASE: 3.2 CM
BH CV XLRA - RV LENGTH: 7 CM
BH CV XLRA - RV MID: 2.3 CM
BH CV XLRA - TDI S': 10.8 CM/SEC
BH CV XLRA MEAS LEFT CCA RATIO VEL: 42.8 CM/SEC
BH CV XLRA MEAS LEFT DIST CCA EDV: 13.8 CM/SEC
BH CV XLRA MEAS LEFT DIST CCA PSV: 43.2 CM/SEC
BH CV XLRA MEAS LEFT DIST ICA EDV: 22.8 CM/SEC
BH CV XLRA MEAS LEFT DIST ICA PSV: 77 CM/SEC
BH CV XLRA MEAS LEFT ICA RATIO VEL: 45.9 CM/SEC
BH CV XLRA MEAS LEFT ICA/CCA RATIO: 1.1
BH CV XLRA MEAS LEFT MID ICA EDV: 13.8 CM/SEC
BH CV XLRA MEAS LEFT MID ICA PSV: 46.2 CM/SEC
BH CV XLRA MEAS LEFT PROX CCA EDV: 21 CM/SEC
BH CV XLRA MEAS LEFT PROX CCA PSV: 99.5 CM/SEC
BH CV XLRA MEAS LEFT PROX ECA PSV: 41.5 CM/SEC
BH CV XLRA MEAS LEFT PROX ICA EDV: 13.8 CM/SEC
BH CV XLRA MEAS LEFT PROX ICA PSV: 42.1 CM/SEC
BH CV XLRA MEAS LEFT PROX SCLA PSV: 137.1 CM/SEC
BH CV XLRA MEAS LEFT VERTEBRAL A EDV: 9.7 CM/SEC
BH CV XLRA MEAS LEFT VERTEBRAL A PSV: 34.1 CM/SEC
BH CV XLRA MEAS RIGHT CCA RATIO VEL: 45.2 CM/SEC
BH CV XLRA MEAS RIGHT DIST CCA EDV: 12.2 CM/SEC
BH CV XLRA MEAS RIGHT DIST CCA PSV: 45.6 CM/SEC
BH CV XLRA MEAS RIGHT DIST ICA EDV: 17.7 CM/SEC
BH CV XLRA MEAS RIGHT DIST ICA PSV: 57 CM/SEC
BH CV XLRA MEAS RIGHT ICA RATIO VEL: 77 CM/SEC
BH CV XLRA MEAS RIGHT ICA/CCA RATIO: 1.7
BH CV XLRA MEAS RIGHT MID ICA EDV: 22 CM/SEC
BH CV XLRA MEAS RIGHT MID ICA PSV: 77.4 CM/SEC
BH CV XLRA MEAS RIGHT PROX CCA EDV: 8.2 CM/SEC
BH CV XLRA MEAS RIGHT PROX CCA PSV: 50.9 CM/SEC
BH CV XLRA MEAS RIGHT PROX ECA PSV: 57.4 CM/SEC
BH CV XLRA MEAS RIGHT PROX ICA EDV: 11.4 CM/SEC
BH CV XLRA MEAS RIGHT PROX ICA PSV: 46.8 CM/SEC
BH CV XLRA MEAS RIGHT PROX SCLA PSV: 110.8 CM/SEC
BH CV XLRA MEAS RIGHT VERTEBRAL A EDV: 8.1 CM/SEC
BH CV XLRA MEAS RIGHT VERTEBRAL A PSV: 36.5 CM/SEC
BILIRUB SERPL-MCNC: 1 MG/DL (ref 0.3–1.2)
BUN BLD-MCNC: 26 MG/DL (ref 9–23)
BUN/CREAT SERPL: 26 (ref 7–25)
CALCIUM SPEC-SCNC: 8.9 MG/DL (ref 8.7–10.4)
CHLORIDE SERPL-SCNC: 101 MMOL/L (ref 99–109)
CHOLEST SERPL-MCNC: 186 MG/DL (ref 0–200)
CO2 SERPL-SCNC: 28 MMOL/L (ref 20–31)
CREAT BLD-MCNC: 1 MG/DL (ref 0.6–1.3)
DEPRECATED RDW RBC AUTO: 69.6 FL (ref 37–54)
ERYTHROCYTE [DISTWIDTH] IN BLOOD BY AUTOMATED COUNT: 18.5 % (ref 11.3–14.5)
GFR SERPL CREATININE-BSD FRML MDRD: 54 ML/MIN/1.73
GLOBULIN UR ELPH-MCNC: 3.1 GM/DL
GLUCOSE BLD-MCNC: 76 MG/DL (ref 70–100)
GLUCOSE BLDC GLUCOMTR-MCNC: 92 MG/DL (ref 70–130)
HBA1C MFR BLD: 5.6 % (ref 4.8–5.6)
HCT VFR BLD AUTO: 28.8 % (ref 34.5–44)
HDLC SERPL-MCNC: 57 MG/DL (ref 40–60)
HGB BLD-MCNC: 9.8 G/DL (ref 11.5–15.5)
INR PPP: 1.04
LEFT ARM BP: NORMAL MMHG
LV EF 2D ECHO EST: 70 %
MCH RBC QN AUTO: 35.8 PG (ref 27–31)
MCHC RBC AUTO-ENTMCNC: 34 G/DL (ref 32–36)
MCV RBC AUTO: 105.1 FL (ref 80–99)
PLATELET # BLD AUTO: 198 10*3/MM3 (ref 150–450)
PMV BLD AUTO: 9.8 FL (ref 6–12)
POTASSIUM BLD-SCNC: 4 MMOL/L (ref 3.5–5.5)
PROT SERPL-MCNC: 6.4 G/DL (ref 5.7–8.2)
PROTHROMBIN TIME: 11.4 SECONDS (ref 9.6–11.5)
RBC # BLD AUTO: 2.74 10*6/MM3 (ref 3.89–5.14)
SODIUM BLD-SCNC: 135 MMOL/L (ref 132–146)
TRIGL SERPL-MCNC: 214 MG/DL (ref 0–150)
WBC NRBC COR # BLD: 4.72 10*3/MM3 (ref 3.5–10.8)

## 2017-08-06 PROCEDURE — 70450 CT HEAD/BRAIN W/O DYE: CPT

## 2017-08-06 PROCEDURE — 93880 EXTRACRANIAL BILAT STUDY: CPT | Performed by: INTERNAL MEDICINE

## 2017-08-06 PROCEDURE — 93010 ELECTROCARDIOGRAM REPORT: CPT | Performed by: INTERNAL MEDICINE

## 2017-08-06 PROCEDURE — 25010000002 PROMETHAZINE PER 50 MG: Performed by: PSYCHIATRY & NEUROLOGY

## 2017-08-06 PROCEDURE — 85027 COMPLETE CBC AUTOMATED: CPT | Performed by: INTERNAL MEDICINE

## 2017-08-06 PROCEDURE — 99232 SBSQ HOSP IP/OBS MODERATE 35: CPT | Performed by: PSYCHIATRY & NEUROLOGY

## 2017-08-06 PROCEDURE — 83036 HEMOGLOBIN GLYCOSYLATED A1C: CPT | Performed by: INTERNAL MEDICINE

## 2017-08-06 PROCEDURE — 93005 ELECTROCARDIOGRAM TRACING: CPT | Performed by: NURSE PRACTITIONER

## 2017-08-06 PROCEDURE — 85610 PROTHROMBIN TIME: CPT | Performed by: INTERNAL MEDICINE

## 2017-08-06 PROCEDURE — 25010000002 ONDANSETRON PER 1 MG: Performed by: INTERNAL MEDICINE

## 2017-08-06 PROCEDURE — 25010000002 AMIODARONE IN DEXTROSE 5% 360-4.14 MG/200ML-% SOLUTION: Performed by: INTERNAL MEDICINE

## 2017-08-06 PROCEDURE — 80053 COMPREHEN METABOLIC PANEL: CPT | Performed by: INTERNAL MEDICINE

## 2017-08-06 PROCEDURE — 80061 LIPID PANEL: CPT | Performed by: INTERNAL MEDICINE

## 2017-08-06 PROCEDURE — 93306 TTE W/DOPPLER COMPLETE: CPT | Performed by: INTERNAL MEDICINE

## 2017-08-06 PROCEDURE — 93306 TTE W/DOPPLER COMPLETE: CPT

## 2017-08-06 PROCEDURE — 25010000002 AMIODARONE IN DEXTROSE 5% 150-4.21 MG/100ML-% SOLUTION: Performed by: INTERNAL MEDICINE

## 2017-08-06 PROCEDURE — 99233 SBSQ HOSP IP/OBS HIGH 50: CPT | Performed by: INTERNAL MEDICINE

## 2017-08-06 PROCEDURE — 97165 OT EVAL LOW COMPLEX 30 MIN: CPT

## 2017-08-06 PROCEDURE — 82962 GLUCOSE BLOOD TEST: CPT

## 2017-08-06 PROCEDURE — 97162 PT EVAL MOD COMPLEX 30 MIN: CPT

## 2017-08-06 PROCEDURE — 93880 EXTRACRANIAL BILAT STUDY: CPT

## 2017-08-06 RX ORDER — DILTIAZEM HCL IN NACL,ISO-OSM 125 MG/125
5-15 PLASTIC BAG, INJECTION (ML) INTRAVENOUS
Status: DISCONTINUED | OUTPATIENT
Start: 2017-08-06 | End: 2017-08-08

## 2017-08-06 RX ORDER — PROMETHAZINE HYDROCHLORIDE 12.5 MG/1
12.5 TABLET ORAL EVERY 6 HOURS PRN
Status: DISCONTINUED | OUTPATIENT
Start: 2017-08-06 | End: 2017-08-10 | Stop reason: HOSPADM

## 2017-08-06 RX ORDER — PANTOPRAZOLE SODIUM 40 MG/1
40 TABLET, DELAYED RELEASE ORAL
Status: DISCONTINUED | OUTPATIENT
Start: 2017-08-06 | End: 2017-08-10 | Stop reason: HOSPADM

## 2017-08-06 RX ORDER — PROMETHAZINE HYDROCHLORIDE 25 MG/ML
12.5 INJECTION, SOLUTION INTRAMUSCULAR; INTRAVENOUS EVERY 6 HOURS PRN
Status: DISCONTINUED | OUTPATIENT
Start: 2017-08-06 | End: 2017-08-10 | Stop reason: HOSPADM

## 2017-08-06 RX ADMIN — ACETAMINOPHEN 650 MG: 325 TABLET, FILM COATED ORAL at 19:49

## 2017-08-06 RX ADMIN — DOCUSATE SODIUM 100 MG: 100 CAPSULE, LIQUID FILLED ORAL at 08:45

## 2017-08-06 RX ADMIN — ACYCLOVIR 400 MG: 200 CAPSULE ORAL at 08:44

## 2017-08-06 RX ADMIN — PROMETHAZINE HYDROCHLORIDE 12.5 MG: 25 INJECTION INTRAMUSCULAR; INTRAVENOUS at 23:49

## 2017-08-06 RX ADMIN — PANTOPRAZOLE SODIUM 40 MG: 40 TABLET, DELAYED RELEASE ORAL at 17:52

## 2017-08-06 RX ADMIN — POLYETHYLENE GLYCOL 3350 17 G: 17 POWDER, FOR SOLUTION ORAL at 08:45

## 2017-08-06 RX ADMIN — ONDANSETRON 4 MG: 2 INJECTION INTRAMUSCULAR; INTRAVENOUS at 20:18

## 2017-08-06 RX ADMIN — METOPROLOL TARTRATE 25 MG: 25 TABLET ORAL at 08:45

## 2017-08-06 RX ADMIN — ACYCLOVIR 400 MG: 200 CAPSULE ORAL at 17:52

## 2017-08-06 RX ADMIN — AMIODARONE HYDROCHLORIDE 1 MG/MIN: 1.8 INJECTION, SOLUTION INTRAVENOUS at 11:39

## 2017-08-06 RX ADMIN — ATORVASTATIN CALCIUM 80 MG: 40 TABLET, FILM COATED ORAL at 19:48

## 2017-08-06 RX ADMIN — DILTIAZEM HCL-SODIUM CHLORIDE IV SOLN 125 MG/125ML-0.9% 5 MG/HR: 125-0.9/125 SOLUTION at 16:26

## 2017-08-06 RX ADMIN — AMIODARONE HYDROCHLORIDE 150 MG: 1.5 INJECTION, SOLUTION INTRAVENOUS at 11:32

## 2017-08-06 NOTE — PLAN OF CARE
Problem: Patient Care Overview (Adult)  Goal: Plan of Care Review  Outcome: Ongoing (interventions implemented as appropriate)  Goal: Discharge Needs Assessment  Outcome: Ongoing (interventions implemented as appropriate)    Problem: Stroke (Ischemic) (Adult)  Goal: Signs and Symptoms of Listed Potential Problems Will be Absent or Manageable (Stroke)  Outcome: Ongoing (interventions implemented as appropriate)    Problem: Fall Risk (Adult)  Goal: Identify Related Risk Factors and Signs and Symptoms  Outcome: Ongoing (interventions implemented as appropriate)  Goal: Absence of Falls  Outcome: Ongoing (interventions implemented as appropriate)    Problem: Pressure Ulcer Risk (Norberto Scale) (Adult,Obstetrics,Pediatric)  Goal: Identify Related Risk Factors and Signs and Symptoms  Outcome: Ongoing (interventions implemented as appropriate)  Goal: Skin Integrity  Outcome: Ongoing (interventions implemented as appropriate)

## 2017-08-06 NOTE — THERAPY EVALUATION
Acute Care - Occupational Therapy Initial Evaluation  [facility]     Patient Name: Sowmya Beckett  : 1940  MRN: [id number]  Today's Date: 2017  Onset of Illness/Injury or Date of Surgery Date: 17  Date of Referral to OT: 17  Referring Physician: DO Jose Alfredo    Admit Date: 2017       ICD-10-CM ICD-9-CM   1. Cerebrovascular accident (CVA), unspecified mechanism I63.9 434.91   2. Paroxysmal atrial fibrillation I48.0 427.31   3. 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 Renal insufficiency N28.9 593.9   5. Impaired functional mobility, balance, gait, and endurance Z74.09 V49.89   6. Impaired mobility and ADLs Z74.09 799.89     Patient Active Problem List   Diagnosis   • Atrial fibrillation and flutter   • History of melanoma   • Failure to thrive in adult   • Dehydration   • Hyperglycemia   • Peripheral autonomic neuropathy due to underlying disease   • AL amyloidosis   • Skin lesion of right ear   • Stroke   • Paroxysmal atrial fibrillation   • AL amyloidosis     Past Medical History:   Diagnosis Date   • Arrhythmia    • Atrial fibrillation and flutter 2016   • Cancer     melanoma in 1971   • CHF (congestive heart failure)      Past Surgical History:   Procedure Laterality Date   • APPENDECTOMY     • CHOLECYSTECTOMY     • COLON SURGERY     • HEMORRHOIDECTOMY     • HYSTERECTOMY            OT ASSESSMENT FLOWSHEET (last 72 hours)      OT Evaluation       17 0745 17 0743 17 1900          Rehab Evaluation    Document Type evaluation  -CL evaluation  -LS       Subjective Information agree to therapy;no complaints  -CL agree to therapy;no complaints  -LS       Patient Effort, Rehab Treatment good  -CL good  -LS       Symptoms Noted During/After Treatment none  -CL none  -LS       General Information    Patient Profile Review yes  -CL yes  -LS       Onset of Illness/Injury or Date of Surgery Date 17  -CL 17  -LS       Referring Physician Case, DO  -CL Case, DO  -LS        Pertinent History Of Current Problem Pt presented to ED w/ confusion, R sided weakness, and facial droop. CT and MRI (-) for acute changes. Pt s/p tPA.   -CL To [facility] with confusion and R-sided weakness, facial droop; s/p TPA. Hx paroxysmal afib, amyloidosis. Imaging (-) for acute findings.   -LS       Precautions/Limitations fall precautions  -CL fall precautions  -LS       Prior Level of Function independent:;all household mobility;transfer;ADL's;dressing;bathing;dependent:;driving  -CL independent:;all household mobility;ADL's;bathing;dressing  -LS       Equipment Currently Used at Home cane, quad;commode;shower chair;walker, rolling  -CL cane, quad;commode;shower chair;walker, rolling  -LS walker, standard  -HT      Plans/Goals Discussed With patient;agreed upon  -CL patient;agreed upon  -LS       Risks Reviewed patient:;LOB;nausea/vomiting;dizziness;increased discomfort;change in vital signs;lines disloged  -CL patient:;LOB;dizziness;increased discomfort;change in vital signs  -LS       Benefits Reviewed patient:;improve function;increase independence;increase strength;increase balance;decrease pain;increase knowledge  -CL patient:;improve function;increase independence;increase strength;increase balance;increase knowledge  -LS       Barriers to Rehab none identified  -CL none identified  -LS       Living Environment    Lives With significant other  -CL significant other  -LS significant other  -HT      Living Arrangements house  -CL house  -LS house  -HT      Home Accessibility stairs to enter home  -CL stairs to enter home  -LS other (see comments)  -HT      Number of Stairs to Enter Home 5  -CL 5   with HR  -LS       Stair Railings at Home --   HR outside  -CL  outside, present at both sides  -HT      Type of Financial/Environmental Concern   none  -HT      Transportation Available   car;family or friend will provide  -HT      Living Environment Comment Pt reports her S.O. is available for 24/7 assist, however  pt questionnable historian. Pt could not report whether she had tub shower vs a walk-in shower.   -CL Pt reports her S.O. is available 24/7 for assist; however, pt poor historian today. TBA further.   -LS na  -HT      Clinical Impression    Date of Referral to OT 08/05/17  -CL        OT Diagnosis Decreased independence in ADLs.   -CL        Patient/Family Goals Statement Return to [facility].  -CL        Criteria for Skilled Therapeutic Interventions Met yes;treatment indicated  -CL        Rehab Potential good, to achieve stated therapy goals  -CL        Therapy Frequency daily  -CL        Anticipated Equipment Needs At Discharge --   TBA further  -CL        Anticipated Discharge Disposition home with assist;home with home health  -CL        Functional Level Prior    Ambulation   1-->assistive equipment  -HT      Transferring   0-->independent  -HT      Toileting   0-->independent  -HT      Bathing   0-->independent  -HT      Dressing   0-->independent  -HT      Eating   0-->independent  -HT      Communication   0-->understands/communicates without difficulty  -HT      Swallowing   0-->swallows foods/liquids without difficulty  -HT      Prior Functional Level Comment   used walker at times  -HT      Vital Signs    Pre Systolic BP Rehab 106  -  -LS       Pre Treatment Diastolic BP 64  -CL 64  -LS       Post Systolic BP Rehab 115  -  -LS       Post Treatment Diastolic BP 77  -CL 77  -LS       Pretreatment Heart Rate (beats/min) 62  -CL 61  -LS       Posttreatment Heart Rate (beats/min) 58  -CL 59  -LS       Pre SpO2 (%) 99  -CL 99  -LS       O2 Delivery Pre Treatment room air  -CL room air  -LS       Post SpO2 (%) 98  -CL 98  -LS       O2 Delivery Post Treatment room air  -CL room air  -LS       Pre Patient Position Supine  -CL Supine  -LS       Intra Patient Position Standing  -CL Standing  -LS       Post Patient Position Supine  -CL Supine  -LS       Pain Assessment    Pain Assessment No/denies pain  -CL 0-10   -LS       Pain Score  0  -LS       Post Pain Score  0  -LS       Vision Assessment/Intervention    Visual Tracking Comment Pt tracks appropriately R-L.   -CL        Cognitive Assessment/Intervention    Current Cognitive/Communication Assessment impaired  -CL impaired  -LS       Orientation Status oriented to;person;place;required verbal cueing (specifiy in comments);time  -CL oriented to;person;place;required verbal cueing (specifiy in comments)   cues for month  -LS       Follows Commands/Answers Questions 75% of the time;100% of the time;needs cueing;needs increased time;needs repetition  -CL able to follow single-step instructions;75% of the time;100% of the time;needs cueing;needs increased time;needs repetition  -LS       Personal Safety mild impairment;decreased awareness, need for assist;decreased awareness, need for safety  -CL mild impairment;decreased awareness, need for assist;decreased awareness, need for safety;decreased insight to deficits  -LS       Personal Safety Interventions fall prevention program maintained;gait belt;nonskid shoes/slippers when out of bed  -CL fall prevention program maintained;gait belt;nonskid shoes/slippers when out of bed  -LS       ROM (Range of Motion)    General ROM no range of motion deficits identified  -CL no range of motion deficits identified   BLEs  -LS       MMT (Manual Muscle Testing)    General MMT Assessment  lower extremity strength deficits identified  -LS       General MMT Assessment Detail BUE grossly 4-/5.   -CL        Bed Mobility, Assessment/Treatment    Bed Mobility, Assistive Device head of bed elevated;bed rails  -CL head of bed elevated;bed rails;draw sheet  -LS       Bed Mob, Supine to Sit, [place] minimum assist (75% patient effort);2 person assist required;verbal cues required  -CL minimum assist (75% patient effort);2 person assist required;verbal cues required  -LS       Bed Mob, Sit to Supine, [place] minimum assist (75% patient  effort);verbal cues required  -CL minimum assist (75% patient effort);verbal cues required  -LS       Bed Mobility, Impairments  strength decreased;impaired balance  -LS       Bed Mobility, Comment VCs for sequencing.   -CL VC's for sequencing.   -LS       Transfer Assessment/Treatment    Transfers, Sit-Stand [place] contact guard assist;2 person assist required;verbal cues required  -CL contact guard assist;2 person assist required;verbal cues required  -LS       Transfers, Stand-Sit [place] contact guard assist;2 person assist required;verbal cues required  -CL contact guard assist;2 person assist required;verbal cues required  -LS       Transfers, Sit-Stand-Sit, Assist Device rolling walker  -CL rolling walker  -LS       Transfer, Comment VCs for HP.   -CL VC's for hand placement.   -LS       Functional Mobility    Functional Mobility- Ind. Level minimum assist (75% patient effort);2 person assist required;verbal cues required  -CL        Functional Mobility- Device rolling walker  -CL        Functional Mobility-Distance (Feet) 210  -CL        Functional Mobility- Comment Pt w/ L lateral drifting of RW, assist to maintain midline and VCs for safety and RW management.   -CL        Motor Skills/Interventions    Additional Documentation Balance Skills Training (Group)  -CL Balance Skills Training (Group)  -LS       Balance Skills Training    Sitting-Level of Assistance Contact guard  -CL Contact guard  -LS       Sitting-Balance Support Right upper extremity supported;Left upper extremity supported;Feet supported  -CL Feet supported  -LS       Sitting-Balance Activities Forward lean;Reaching for objects;Trunk control activities  -CL        Standing-Level of Assistance Contact guard  -CL Contact guard  -LS       Static Standing Balance Support assistive device  -CL assistive device  -LS       Standing-Balance Activities Weight Shift A-P;Weight Shift R-L  -CL        Gait Balance-Level of Assistance Minimum  assistance  -CL Minimum assistance  -LS       Gait Balance Support assistive device  -CL assistive device  -LS       Gait Balance Activities scanning environment R/L;side-stepping  -CL scanning environment R/L  -LS       Sensory Assessment/Intervention    Light Touch LUE;RUE  -CL RLE;LLE  -LS       LUE Light Touch WNL  -CL        RUE Light Touch WNL  -CL        LLE Light Touch  WNL  -LS       RLE Light Touch  WNL  -LS       General Therapy Interventions    Planned Therapy Interventions adaptive equipment training;ADL retraining;bed mobility training;energy conservation;home exercise program;transfer training  -CL        Positioning and Restraints    Pre-Treatment Position in bed  -CL in bed  -LS       Post Treatment Position bed  -CL bed  -LS       In Bed notified nsg;fowlers;call light within reach;encouraged to call for assist;exit alarm on;with other staff   Pt undergoing ECHO  -CL notified nsg;supine;call light within reach;encouraged to call for assist;exit alarm on;with other staff   Pt to undergo echo  -LS       Lower Extremity    Lower Ext Manual Muscle Testing Detail  BLEs grossly 4-/5  -LS         User Key  (r) = Recorded By, (t) = Taken By, (c) = Cosigned By    Initials Name Effective Dates    LS Keira Granda, PT 06/19/15 -     CL Mary Swenson OT 06/08/16 -     HT Roseline Rodriguez, RN 06/10/16 -            Occupational Therapy Education     Title: PT OT SLP Therapies (Active)     Topic: Occupational Therapy (Active)     Point: ADL training (Active)    Description: Instruct learner(s) on proper safety adaptation and remediation techniques during self care or transfers.   Instruct in proper use of assistive devices.    Learning Progress Summary    Learner Readiness Method Response Comment Documented by Status   Patient Acceptance E,D NR Pt educated on appropriate safety precautions, t/f techniques, and benefits of therapy. CL 08/06/17 0870 Active    Acceptance E VU  JW 08/06/17 0731 Done    Acceptance  E VU,NR   08/06/17 0040 Done               Point: Home exercise program (Done)    Description: Instruct learner(s) on appropriate technique for monitoring, assisting and/or progressing therapeutic exercises/activities.    Learning Progress Summary    Learner Readiness Method Response Comment Documented by Status   Patient Acceptance E VU   08/06/17 0745 Done    Acceptance E VU,NR   08/06/17 0040 Done               Point: Precautions (Active)    Description: Instruct learner(s) on prescribed precautions during self-care and functional transfers.    Learning Progress Summary    Learner Readiness Method Response Comment Documented by Status   Patient Acceptance E,D NR Pt educated on appropriate safety precautions, t/f techniques, and benefits of therapy.  08/06/17 0825 Active    Acceptance E VU   08/06/17 0745 Done    Acceptance E VU,NR   08/06/17 0040 Done               Point: Body mechanics (Active)    Description: Instruct learner(s) on proper positioning and spine alignment during self-care, functional mobility activities and/or exercises.    Learning Progress Summary    Learner Readiness Method Response Comment Documented by Status   Patient Acceptance E,D NR Pt educated on appropriate safety precautions, t/f techniques, and benefits of therapy.  08/06/17 0825 Active    Acceptance E VU   08/06/17 0745 Done    Acceptance E VU,NR   08/06/17 0040 Done                      User Key     Initials Effective Dates Name Provider Type Discipline     02/14/17 -  Allison Ruvalcaba, RN Registered Nurse Nurse     06/08/16 -  Mary Swenson OT Occupational Therapist OT     06/10/16 -  Roseline Rodriguez RN Registered Nurse Nurse                  OT Recommendation and Plan  Anticipated Equipment Needs At Discharge:  (TBA further)  Anticipated Discharge Disposition: home with assist, home with home health  Planned Therapy Interventions: adaptive equipment training, ADL retraining, bed mobility training,  energy conservation, home exercise program, transfer training  Therapy Frequency: daily  Plan of Care Review  Plan Of Care Reviewed With: patient  Progress: progress toward functional goals as expected  Outcome Summary/Follow up Plan: Pt presents w/ decreased balance and functional independence from baseline. Pt Min Ax2 to ambulate 210 ft w/ RW, VCs for safety throughout. Recommend cont skilled IPOT intervention. Recommend pt DC home w/ assist and HH OT/PT services.           OT Goals       08/06/17 0826          Transfer Training OT LTG    Transfer Training OT LTG, Date Established 08/06/17  -CL      Transfer Training OT LTG, Time to Achieve by discharge  -CL      Transfer Training OT LTG, Activity Type bed to chair /chair to bed;sit to stand/stand to sit;toilet  -CL      Transfer Training OT LTG, [place] Level supervision required  -CL      Transfer Training OT LTG, Additional Goal AAD  -CL      Dynamic Standing Balance OT LTG    Dynamic Standing Balance OT LTG, Date Established 08/06/17  -CL      Dynamic Standing Balance OT LTG, Time to Achieve by discharge  -CL      Dynamic Standing Balance OT LTG, [place] Level supervision required  -CL      Dynamic Standing Balance OT LTG, Additional Goal AAD  -CL      Orientation OT LTG    Orientation OT LTG, Date Established 08/06/17  -CL      Orientation OT LTG, Time to Achieve by discharge  -CL      Orientation OT LTG, Activity Type person;place;time;100% treatment session  -CL      LB Dressing OT LTG    LB Dressing Goal OT LTG, Date Established 08/06/17  -CL      LB Dressing Goal OT LTG, Time to Achieve by discharge  -CL      LB Dressing Goal OT LTG, Activity Type Don socks/pants  -CL      LB Dressing Goal OT LTG, [place] Level contact guard assist  -CL      LB Dressing Goal OT LTG, Additional Goal AAD  -CL        User Key  (r) = Recorded By, (t) = Taken By, (c) = Cosigned By    Initials Name Provider Type    SHAHBAZ Swenson OT Occupational Therapist                 Outcome Measures       08/06/17 0745 08/06/17 0743       How much help from another person do you currently need...    Turning from your back to your side while in flat bed without using bedrails?  3  -LS     Moving from lying on back to sitting on the side of a flat bed without bedrails?  3  -LS     Moving to and from a bed to a chair (including a wheelchair)?  3  -LS     Standing up from a chair using your arms (e.g., wheelchair, bedside chair)?  3  -LS     Climbing 3-5 steps with a railing?  2  -LS     To walk in hospital room?  3  -LS     AM-PAC 6 Clicks Score  17  -LS     How much help from another is currently needed...    Putting on and taking off regular lower body clothing? 2  -CL      Bathing (including washing, rinsing, and drying) 2  -CL      Toileting (which includes using toilet bed pan or urinal) 2  -CL      Putting on and taking off regular upper body clothing 3  -CL      Taking care of personal grooming (such as brushing teeth) 3  -CL      Eating meals 4  -CL      Score 16  -CL      Modified [place] Scale    Modified [place] Scale 3 - Moderate disability.  Requiring some help, but able to walk without assistance.  -CL 3 - Moderate disability.  Requiring some help, but able to walk without assistance.  -LS     Functional Assessment    Outcome Measure Options AM-PAC 6 Clicks Daily Activity (OT)  -CL AM-PAC 6 Clicks Basic Mobility (PT);Modified [place]  -LS       User Key  (r) = Recorded By, (t) = Taken By, (c) = Cosigned By    Initials Name Provider Type    SHEA Granda, PT Physical Therapist    CL Mary Swenson OT Occupational Therapist          Time Calculation:   OT Start Time: 0745    Therapy Charges for Today     Code Description Service Date Service Provider Modifiers Qty    [id number]  OT EVAL LOW COMPLEXITY 4 8/6/2017 Mary Swenson OT GO 1               Mary Swenson OT  8/6/2017

## 2017-08-06 NOTE — PROGRESS NOTES
"Adult Nutrition  Assessment/PES    Patient Name:  Sowmya Beckett  YOB: 1940  MRN: [id number]  Admit Date:  8/5/2017    Assessment Date:  8/6/2017        Reason for Assessment       08/06/17 1828    Reason for Assessment    Reason For Assessment/Visit identified at risk by screening criteria    Time Spent (min) 20    Cardiac PAF;CHF    Gastrointestinal Diarrhea   chr due to medication per hx    Neurological CVA;Other (comment)   periph neuropathy    Oncology Other (comment)   hx melanoma    Other diagnosis AL amyloidosis ; FTT              Nutrition/Diet History       08/06/17 1830    Nutrition/Diet History    Reported/Observed By Patient    Other Wt loss of 39 lbs over 5 mo - eating normally. Rpt uses suppl at home.            Anthropometrics       08/06/17 1831    Anthropometrics    Height 157.5 cm (62\")    Weight 50.3 kg (111 lb)    Ideal Body Weight (IBW)    Ideal Body Weight (IBW), Female 50.83    % Ideal Body Weight 99.26    Usual Body Weight (UBW)    Usual Body Weight 68 kg (150 lb)    % Usual Body Weight 74    Weight Loss 17.7 kg (39 lb)    % Weight Loss  26 %    Weight Loss Time Frame 5 mo    Body Mass Index (BMI)    BMI (kg/m2) 20.34            Labs/Tests/Procedures/Meds       08/06/17 1833    Labs/Tests/Procedures/Meds    Labs/Tests Review Reviewed                Nutrition Prescription Ordered       08/06/17 1832    Nutrition Prescription PO    Current PO Diet Regular    Common Modifiers Cardiac            Evaluation of Received Nutrient/Fluid Intake       08/06/17 1833    PO Evaluation    Number of Meals 42    % PO Intake 3              Problem/Interventions:        Problem 1       08/06/17 1833    Nutrition Diagnoses Problem 1    Problem 1 Unintended Weight Loss    Etiology (related to) --   clinical condition see dx list    Signs/Symptoms (evidenced by) Unintended Weight Change    Unintended Weight Change Loss    Number of Pounds Lost 39     Weight loss time period 5 mo               "      Intervention Goal       08/06/17 1834    Intervention Goal    General Nutrition support treatment    PO Increase intake            Nutrition Intervention       08/06/17 1834    Nutrition Intervention    RD/Tech Action Advise alternate selection;Menu provided;Supplement provided;Follow Tx progress;Care plan reviewd            Nutrition Prescription       08/06/17 1835    Nutrition Prescription PO    PO Prescription Begin/change supplement    Supplement Boost Plus    Supplement Frequency 2 times a day            Education/Evaluation       08/06/17 1835    Monitor/Evaluation    Monitor Per protocol;PO intake;Supplement intake;Pertinent labs;Symptoms        Comments:      Electronically signed by:  Claudette Dubsoe RD  08/06/17 6:36 PM

## 2017-08-06 NOTE — PROGRESS NOTES
Intensive Care Follow-up      LOS: 1 day     Ms. Sowmya Beckett, 77 y.o. female is followed for: Stroke     Subjective - Interval History     Didn't sleep last night  Otherwise, doing well this morning with resolution of her neurologic deficits  Denies recent episodes of chest pain or palpitations    The patient's relevant past medical, surgical and social history were reviewed and updated in Epic as appropriate.     Objective     Infusions:    niCARdipine 5-15 mg/hr    sodium chloride 100 mL/hr Last Rate: 100 mL/hr (08/05/17 2011)     Medications:    acyclovir 400 mg Oral BID   aspirin 325 mg Oral Daily   Or      aspirin 300 mg Rectal Daily   atorvastatin 80 mg Oral Nightly   docusate sodium 100 mg Oral BID   metoprolol tartrate 25 mg Oral Q12H   polyethylene glycol 17 g Oral Daily   [START ON 8/7/2017] sulfamethoxazole-trimethoprim 1 tablet Oral Once per day on Mon Wed Fri     Intake/Output       08/05/17 0700 - 08/06/17 0659    Intake (ml) 847.8    Output (ml) 550    Net (ml) 297.8    Last Weight 111 lb (50.4 kg)        Vital Sign Min/Max for last 24 hours  Temp  Min: 97.8 °F (36.6 °C)  Max: 98.7 °F (37.1 °C)   BP  Min: 79/50  Max: 143/88   Pulse  Min: 50  Max: 89   Resp  Min: 16  Max: 18   SpO2  Min: 92 %  Max: 100 %   No Data Recorded        Physical Exam:   GENERAL: Awake, no distress   HEENT: No adenopathy or thyromegaly   LUNGS: Clear without wheezes or crackles   HEART: Regular rate and rhythm without murmurs   ABDOMEN: Soft.  Nontender.   EXTREMITIES: Palpable pulses.  No edema or cyanosis   NEURO/PSYCH: Awake and alert.  Follows commands.  Neurologically intact.  NIH score 1      Results from last 7 days  Lab Units 08/06/17  0557 08/05/17  1749 08/05/17  1733   WBC 10*3/mm3 4.72 5.11  --    HEMOGLOBIN g/dL 9.8* 10.5*  --    HEMOGLOBIN, POC g/dL  --   --  10.9*   PLATELETS 10*3/mm3 198 202  --        Results from last 7 days  Lab Units 08/06/17  0557 08/05/17  1749 08/05/17  1733   SODIUM mmol/L 135 134   --    POTASSIUM mmol/L 4.0 4.0  --    CO2 mmol/L 28.0 24.0  --    BUN mg/dL 26* 27*  --    CREATININE mg/dL 1.00 1.30 1.40*   MAGNESIUM mg/dL  --  2.0  --    GLUCOSE mg/dL 76 100  --      Estimated Creatinine Clearance: 36.9 mL/min (by C-G formula based on Cr of 1).      Results from last 7 days  Lab Units 08/06/17  0557   HEMOGLOBIN A1C % 5.60           No results found for: LACTATE       Images: CXR x-ray reveals no consolidation or effusions.    MRI of the brain unremarkable  Repeat CT scan of the head scheduled for early this afternoon    I reviewed the patient's results and images.     Impression      Hospital Problem List     * (Principal)Stroke (S/P tPA)    Paroxysmal atrial fibrillation (Prior ablation)    AL amyloidosis               Plan        Continue to monitor in the intensive care unit  Follow-up CT scan of the head later on this afternoon  Possible long-term anticoagulation  Preliminary transthoracic echocardiogram report appears unremarkable    Addendum: keeps going into AFIB with RVR (HR ~160-170). Will ask cardiology to re-evaluate.  Start Amioderone in meantime.  Probably needs anticoagulation if OK with Dr. Pantoja.     Plan of care and goals reviewed with mulitdisciplinary team at daily rounds   I discussed the patient's findings and my recommendations with patient and nursing staff       ALLISON Arriola MD  Pulmonary and Critical Care Medicine

## 2017-08-06 NOTE — THERAPY EVALUATION
Acute Care - Physical Therapy Initial Evaluation  [facility]     Patient Name: Sowmya Beckett  : 1940  MRN: [id number]  Today's Date: 2017   Onset of Illness/Injury or Date of Surgery Date: 17  Date of Referral to PT: 17  Referring Physician: DO Jose Alfredo      Admit Date: 2017     Visit Dx:    ICD-10-CM ICD-9-CM   1. Cerebrovascular accident (CVA), unspecified mechanism I63.9 434.91   2. Paroxysmal atrial fibrillation I48.0 427.31   3. 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 Renal insufficiency N28.9 593.9   5. Impaired functional mobility, balance, gait, and endurance Z74.09 V49.89   6. Impaired mobility and ADLs Z74.09 799.89     Patient Active Problem List   Diagnosis   • Atrial fibrillation and flutter   • History of melanoma   • Failure to thrive in adult   • Dehydration   • Hyperglycemia   • Peripheral autonomic neuropathy due to underlying disease   • AL amyloidosis   • Skin lesion of right ear   • Stroke   • Paroxysmal atrial fibrillation   • AL amyloidosis     Past Medical History:   Diagnosis Date   • Arrhythmia    • Atrial fibrillation and flutter 2016   • Cancer     melanoma in 1971   • CHF (congestive heart failure)      Past Surgical History:   Procedure Laterality Date   • APPENDECTOMY     • CHOLECYSTECTOMY     • COLON SURGERY     • HEMORRHOIDECTOMY     • HYSTERECTOMY            PT ASSESSMENT (last 72 hours)      PT Evaluation       17 0745 17 0743    Rehab Evaluation    Document Type evaluation  -CL evaluation  -LS    Subjective Information agree to therapy;no complaints  -CL agree to therapy;no complaints  -LS    Patient Effort, Rehab Treatment good  -CL good  -LS    Symptoms Noted During/After Treatment none  -CL none  -LS    General Information    Patient Profile Review yes  -CL yes  -LS    Onset of Illness/Injury or Date of Surgery Date 17  -CL 17  -LS    Referring Physician Case, DO  -CL Case, DO  -LS    Pertinent History Of Current Problem Pt  presented to ED w/ confusion, R sided weakness, and facial droop. CT and MRI (-) for acute changes. Pt s/p tPA.   -CL To [facility] with confusion and R-sided weakness, facial droop; s/p TPA. Hx paroxysmal afib, amyloidosis. Imaging (-) for acute findings.   -LS    Precautions/Limitations fall precautions  -CL fall precautions  -LS    Prior Level of Function independent:;all household mobility;transfer;ADL's;dressing;bathing;dependent:;driving  -CL independent:;all household mobility;ADL's;bathing;dressing  -LS    Equipment Currently Used at Home cane, quad;commode;shower chair;walker, rolling  -CL cane, quad;commode;shower chair;walker, rolling  -LS    Plans/Goals Discussed With patient;agreed upon  -CL patient;agreed upon  -LS    Risks Reviewed patient:;LOB;nausea/vomiting;dizziness;increased discomfort;change in vital signs;lines disloged  -CL patient:;LOB;dizziness;increased discomfort;change in vital signs  -LS    Benefits Reviewed patient:;improve function;increase independence;increase strength;increase balance;decrease pain;increase knowledge  -CL patient:;improve function;increase independence;increase strength;increase balance;increase knowledge  -LS    Barriers to Rehab none identified  -CL none identified  -LS    Living Environment    Lives With significant other  -CL significant other  -LS    Living Arrangements house  -CL house  -LS    Home Accessibility stairs to enter home  -CL stairs to enter home  -LS    Number of Stairs to Enter Home 5  -CL 5   with HR  -LS    Stair Railings at Home --   HR outside  -CL     Living Environment Comment Pt reports her S.O. is available for 24/7 assist, however pt questionnable historian. Pt could not report whether she had tub shower vs a walk-in shower.   -CL Pt reports her S.O. is available 24/7 for assist; however, pt poor historian today. TBA further.   -LS    Clinical Impression    Date of Referral to PT  08/05/17  -LS    PT Diagnosis  impaired functional mobility,  balance, gait  -LS    Patient/Family Goals Statement  return to PLOF  -LS    Criteria for Skilled Therapeutic Interventions Met  yes;treatment indicated  -LS    Rehab Potential  good, to achieve stated therapy goals  -LS    Vital Signs    Pre Systolic BP Rehab 106  -  -LS    Pre Treatment Diastolic BP 64  -CL 64  -LS    Post Systolic BP Rehab 115  -  -LS    Post Treatment Diastolic BP 77  -CL 77  -LS    Pretreatment Heart Rate (beats/min) 62  -CL 61  -LS    Posttreatment Heart Rate (beats/min) 58  -CL 59  -LS    Pre SpO2 (%) 99  -CL 99  -LS    O2 Delivery Pre Treatment room air  -CL room air  -LS    Post SpO2 (%) 98  -CL 98  -LS    O2 Delivery Post Treatment room air  -CL room air  -LS    Pre Patient Position Supine  -CL Supine  -LS    Intra Patient Position Standing  -CL Standing  -LS    Post Patient Position Supine  -CL Supine  -LS    Pain Assessment    Pain Assessment No/denies pain  -CL 0-10  -LS    Pain Score  0  -LS    Post Pain Score  0  -LS    Vision Assessment/Intervention    Visual Tracking Comment Pt tracks appropriately R-L.   -CL     Cognitive Assessment/Intervention    Current Cognitive/Communication Assessment impaired  -CL impaired  -LS    Orientation Status oriented to;person;place;required verbal cueing (specifiy in comments);time  -CL oriented to;person;place;required verbal cueing (specifiy in comments)   cues for month  -LS    Follows Commands/Answers Questions 75% of the time;100% of the time;needs cueing;needs increased time;needs repetition  -CL able to follow single-step instructions;75% of the time;100% of the time;needs cueing;needs increased time;needs repetition  -LS    Personal Safety mild impairment;decreased awareness, need for assist;decreased awareness, need for safety  -CL mild impairment;decreased awareness, need for assist;decreased awareness, need for safety;decreased insight to deficits  -LS    Personal Safety Interventions fall prevention program maintained;gait  belt;nonskid shoes/slippers when out of bed  -CL fall prevention program maintained;gait belt;nonskid shoes/slippers when out of bed  -LS    ROM (Range of Motion)    General ROM no range of motion deficits identified  -CL no range of motion deficits identified   BLEs  -LS    MMT (Manual Muscle Testing)    General MMT Assessment  lower extremity strength deficits identified  -LS    General MMT Assessment Detail BUE grossly 4-/5.   -CL     Lower Extremity    Lower Ext Manual Muscle Testing Detail  BLEs grossly 4-/5  -LS    Bed Mobility, Assessment/Treatment    Bed Mobility, Assistive Device head of bed elevated;bed rails  -CL head of bed elevated;bed rails;draw sheet  -LS    Bed Mob, Supine to Sit, [place] minimum assist (75% patient effort);2 person assist required;verbal cues required  -CL minimum assist (75% patient effort);2 person assist required;verbal cues required  -LS    Bed Mob, Sit to Supine, [place] minimum assist (75% patient effort);verbal cues required  -CL minimum assist (75% patient effort);verbal cues required  -LS    Bed Mobility, Impairments  strength decreased;impaired balance  -LS    Bed Mobility, Comment VCs for sequencing.   -CL VC's for sequencing.   -LS    Transfer Assessment/Treatment    Transfers, Sit-Stand [place] contact guard assist;2 person assist required;verbal cues required  -CL contact guard assist;2 person assist required;verbal cues required  -LS    Transfers, Stand-Sit [place] contact guard assist;2 person assist required;verbal cues required  -CL contact guard assist;2 person assist required;verbal cues required  -LS    Transfers, Sit-Stand-Sit, Assist Device rolling walker  -CL rolling walker  -LS    Transfer, Comment VCs for HP.   -CL VC's for hand placement.   -LS    Gait Assessment/Treatment    Gait, [place] Level  minimum assist (75% patient effort);1 person + 1 person to manage equipment;verbal cues required  -LS    Gait, Assistive Device  rolling  walker  -LS    Gait, Distance (Feet)  210  -LS    Gait, Gait Deviations  vince decreased;step length decreased  -LS    Gait, Impairments  strength decreased;impaired balance  -LS    Gait, Comment  Noted veering to L; vc's for posture and maintaining straight path.   -LS    Motor Skills/Interventions    Additional Documentation Balance Skills Training (Group)  -CL Balance Skills Training (Group)  -LS    Balance Skills Training    Sitting-Level of Assistance Contact guard  -CL Contact guard  -LS    Sitting-Balance Support Right upper extremity supported;Left upper extremity supported;Feet supported  -CL Feet supported  -LS    Sitting-Balance Activities Forward lean;Reaching for objects;Trunk control activities  -CL     Standing-Level of Assistance Contact guard  -CL Contact guard  -LS    Static Standing Balance Support assistive device  -CL assistive device  -LS    Standing-Balance Activities Weight Shift A-P;Weight Shift R-L  -CL     Gait Balance-Level of Assistance Minimum assistance  -CL Minimum assistance  -LS    Gait Balance Support assistive device  -CL assistive device  -LS    Gait Balance Activities scanning environment R/L;side-stepping  -CL scanning environment R/L  -LS    Sensory Assessment/Intervention    Light Touch LUE;RUE  -CL RLE;LLE  -LS    LUE Light Touch WNL  -CL     RUE Light Touch WNL  -CL     LLE Light Touch  WNL  -LS    RLE Light Touch  WNL  -LS    Positioning and Restraints    Pre-Treatment Position in bed  -CL in bed  -LS    Post Treatment Position bed  -CL bed  -LS    In Bed notified nsg;fowlers;call light within reach;encouraged to call for assist;exit alarm on;with other staff   Pt undergoing ECHO  -CL notified nsg;supine;call light within reach;encouraged to call for assist;exit alarm on;with other staff   Pt to undergo echo  -LS      08/05/17 1900       General Information    Equipment Currently Used at Home walker, standard  -HT     Living Environment    Lives With significant other   -HT     Living Arrangements house  -HT     Home Accessibility other (see comments)  -HT     Stair Railings at Home outside, present at both sides  -HT     Type of Financial/Environmental Concern none  -HT     Transportation Available car;family or friend will provide  -HT     Living Environment Comment na  -HT       User Key  (r) = Recorded By, (t) = Taken By, (c) = Cosigned By    Initials Name Provider Type     Keira Granda, PT Physical Therapist    CL Mary Swenson, OT Occupational Therapist     Roseline Rodriguez, RN Registered Nurse          Physical Therapy Education     Title: PT OT SLP Therapies (Active)     Topic: Physical Therapy (Active)     Point: Mobility training (Active)    Learning Progress Summary    Learner Readiness Method Response Comment Documented by Status   Patient Acceptance E,D NR   08/06/17 0829 Active    Acceptance E VU   08/06/17 0745 Done    Acceptance E VU,NR   08/06/17 0040 Done               Point: Body mechanics (Active)    Learning Progress Summary    Learner Readiness Method Response Comment Documented by Status   Patient Acceptance E,D NR   08/06/17 0829 Active               Point: Precautions (Active)    Learning Progress Summary    Learner Readiness Method Response Comment Documented by Status   Patient Acceptance E,D NR   08/06/17 0829 Active    Acceptance E VU   08/06/17 0745 Done    Acceptance E VU,NR   08/06/17 0040 Done                      User Key     Initials Effective Dates Name Provider Type Discipline     06/19/15 -  Keira Granda, PT Physical Therapist PT     02/14/17 -  Allison Ruvalcaba, RN Registered Nurse Nurse     06/10/16 -  Roseline Rodriguez, RN Registered Nurse Nurse                PT Recommendation and Plan  Anticipated Discharge Disposition: home with assist, home with home health  PT Frequency: daily  Plan of Care Review  Plan Of Care Reviewed With: patient  Outcome Summary/Follow up Plan: PT evaluation complete. Pt demonstrates  generalized weakness, balance deficits, and decreased indep re: functional mobility with evolving signs and symptoms, warranting further skilled PT services to promote PLOF. Recommend d/c home with assist and HHPT.           IP PT Goals       08/06/17 0817          Bed Mobility PT LTG    Bed Mobility PT LTG, Date Established 08/06/17  -LS      Bed Mobility PT LTG, Time to Achieve 2 wks  -LS      Bed Mobility PT LTG, Activity Type supine to sit/sit to supine  -LS      Bed Mobility PT LTG, [place] Level independent  -LS      Transfer Training PT LTG    Transfer Training PT LTG, Date Established 08/06/17  -LS      Transfer Training PT LTG, Time to Achieve 2 wks  -LS      Transfer Training PT LTG, Activity Type sit to stand/stand to sit  -LS      Transfer Training PT LTG, [place] Level conditional independence  -LS      Transfer Training PT LTG, Assist Device walker, rolling  -LS      Gait Training PT LTG    Gait Training Goal PT LTG, Date Established 08/06/17  -LS      Gait Training Goal PT LTG, Time to Achieve 2 wks  -LS      Gait Training Goal PT LTG, [place] Level supervision required  -LS      Gait Training Goal PT LTG, Assist Device walker, rolling  -LS      Gait Training Goal PT LTG, Distance to Achieve 300  -LS      Stair Training PT LTG    Stair Training Goal PT LTG, Date Established 08/06/17  -LS      Stair Training Goal PT LTG, Time to Achieve 2 wks  -LS      Stair Training Goal PT LTG, Number of Steps 5  -LS      Stair Training Goal PT LTG, [place] Level supervision required  -LS      Stair Training Goal PT LTG, Assist Device 1 handrail  -LS        User Key  (r) = Recorded By, (t) = Taken By, (c) = Cosigned By    Initials Name Provider Type    LS Keira Granda, PT Physical Therapist                Outcome Measures       08/06/17 0745 08/06/17 0743       How much help from another person do you currently need...    Turning from your back to your side while in flat bed without using  bedrails?  3  -LS     Moving from lying on back to sitting on the side of a flat bed without bedrails?  3  -LS     Moving to and from a bed to a chair (including a wheelchair)?  3  -LS     Standing up from a chair using your arms (e.g., wheelchair, bedside chair)?  3  -LS     Climbing 3-5 steps with a railing?  2  -LS     To walk in hospital room?  3  -LS     AM-PAC 6 Clicks Score  17  -LS     How much help from another is currently needed...    Putting on and taking off regular lower body clothing? 2  -CL      Bathing (including washing, rinsing, and drying) 2  -CL      Toileting (which includes using toilet bed pan or urinal) 2  -CL      Putting on and taking off regular upper body clothing 3  -CL      Taking care of personal grooming (such as brushing teeth) 3  -CL      Eating meals 4  -CL      Score 16  -CL      Modified Jennifer Scale    Modified [place] Scale 3 - Moderate disability.  Requiring some help, but able to walk without assistance.  -CL 3 - Moderate disability.  Requiring some help, but able to walk without assistance.  -LS     Functional Assessment    Outcome Measure Options AM-PAC 6 Clicks Daily Activity (OT)  -CL AM-PAC 6 Clicks Basic Mobility (PT);Modified Finley  -LS       User Key  (r) = Recorded By, (t) = Taken By, (c) = Cosigned By    Initials Name Provider Type    SHEA Granda, PT Physical Therapist    CL Mary Swenson, OT Occupational Therapist           Time Calculation:         PT Charges       08/06/17 0830          Time Calculation    Start Time 0743  -LS      PT Received On 08/06/17  -      PT Goal Re-Cert Due Date 08/16/17  -        User Key  (r) = Recorded By, (t) = Taken By, (c) = Cosigned By    Initials Name Provider Type    SHEA Granda PT Physical Therapist          Therapy Charges for Today     Code Description Service Date Service Provider Modifiers Qty    83126898196 HC PT EVAL MOD COMPLEXITY 4 8/6/2017 Keira Granda, PT GP 1          PT G-Codes  Outcome Measure  Options: AM-PAC 6 Clicks Daily Activity (OT)      Keira Granda, PT  8/6/2017

## 2017-08-06 NOTE — PLAN OF CARE
Problem: Patient Care Overview (Adult)  Goal: Plan of Care Review  Outcome: Ongoing (interventions implemented as appropriate)    08/05/17 2000   Coping/Psychosocial Response Interventions   Plan Of Care Reviewed With patient       Goal: Adult Individualization and Mutuality  Outcome: Ongoing (interventions implemented as appropriate)    Problem: Stroke (Ischemic) (Adult)  Goal: Signs and Symptoms of Listed Potential Problems Will be Absent or Manageable (Stroke)  Outcome: Ongoing (interventions implemented as appropriate)    Problem: Fall Risk (Adult)  Goal: Identify Related Risk Factors and Signs and Symptoms  Outcome: Ongoing (interventions implemented as appropriate)  Goal: Absence of Falls  Outcome: Ongoing (interventions implemented as appropriate)    Problem: Pressure Ulcer Risk (Norberto Scale) (Adult,Obstetrics,Pediatric)  Goal: Identify Related Risk Factors and Signs and Symptoms  Outcome: Ongoing (interventions implemented as appropriate)  Goal: Skin Integrity  Outcome: Ongoing (interventions implemented as appropriate)

## 2017-08-06 NOTE — PROGRESS NOTES
Chief complaint right side weakness      Subjective .     History:  Feeling fine this morning, no further weakness, difficulty speaking. No headache. No trouble swallowing    ROS: No fever, chest pain, palpitations, sob    Objective     Vital Signs   Blood pressure 121/79, pulse 79, temperature 97.3 °F (36.3 °C), temperature source Core, resp. rate (!) 31, weight 187 lb 3.2 oz (84.9 kg), SpO2 100 %.    Physical Exam:              Neuro: wd elderly ww in nad, eating breakfast. Fully oriented, no dysarthria, normal language  VFF EOMI face symm TML  Motor: no focal/lat weakness  Coordination intact    Results Review:              MRI pers reviewed: no ischemia  Labs revieiwed, A1C pending;     Assessment/Plan     Transient aphasia and R side weakness (clear hx from sister who is cardiology nurse) -- resolved post tPA. Had improved some prior to tx, but had residual RUE weakness that has resolved. ?cardioembolic event, in pt with h/o afib s/p ablation, reported irregular rhythm on tele in ER. Off anticoagulation prior to this event with recent sinus rhythm on asa only. Also having falls at home. If confirmed arrhythmia, prolonged anticoagulation may still be unsafe. Can d/w Dr Pantoja tomorrow.     I discussed the patients findings and my recommendations with patient    Geetha Irwin MD  08/06/17  9:07 AM

## 2017-08-06 NOTE — PLAN OF CARE
Problem: Patient Care Overview (Adult)  Goal: Plan of Care Review  Outcome: Ongoing (interventions implemented as appropriate)    08/06/17 0817   Coping/Psychosocial Response Interventions   Plan Of Care Reviewed With patient   Outcome Evaluation   Outcome Summary/Follow up Plan PT evaluation complete. Pt demonstrates generalized weakness, balance deficits, and decreased indep re: functional mobility with evolving signs and symptoms, warranting further skilled PT services to promote PLOF. Recommend d/c home with assist and HHPT.          Problem: Stroke (Ischemic) (Adult)  Goal: Signs and Symptoms of Listed Potential Problems Will be Absent or Manageable (Stroke)  Outcome: Ongoing (interventions implemented as appropriate)    08/06/17 0817   Stroke (Ischemic)   Problems Assessed (Stroke (Ischemic)/TIA) motor/sensory impairment   Problems Present (Stroke (Ischemic)/TIA) motor/sensory impairment         Problem: Inpatient Physical Therapy  Goal: Bed Mobility Goal LTG- PT  Outcome: Ongoing (interventions implemented as appropriate)    08/06/17 0817   Bed Mobility PT LTG   Bed Mobility PT LTG, Date Established 08/06/17   Bed Mobility PT LTG, Time to Achieve 2 wks   Bed Mobility PT LTG, Activity Type supine to sit/sit to supine   Bed Mobility PT LTG, [place] Level independent       Goal: Transfer Training Goal 1 LTG- PT  Outcome: Ongoing (interventions implemented as appropriate)    08/06/17 0817   Transfer Training PT LTG   Transfer Training PT LTG, Date Established 08/06/17   Transfer Training PT LTG, Time to Achieve 2 wks   Transfer Training PT LTG, Activity Type sit to stand/stand to sit   Transfer Training PT LTG, [place] Level conditional independence   Transfer Training PT LTG, Assist Device walker, rolling       Goal: Gait Training Goal LTG- PT  Outcome: Ongoing (interventions implemented as appropriate)    08/06/17 0817   Gait Training PT LTG   Gait Training Goal PT LTG, Date Established 08/06/17    Gait Training Goal PT LTG, Time to Achieve 2 wks   Gait Training Goal PT LTG, [place] Level supervision required   Gait Training Goal PT LTG, Assist Device walker, rolling   Gait Training Goal PT LTG, Distance to Achieve 300       Goal: Stair Training Goal LTG- PT  Outcome: Ongoing (interventions implemented as appropriate)    08/06/17 0817   Stair Training PT LTG   Stair Training Goal PT LTG, Date Established 08/06/17   Stair Training Goal PT LTG, Time to Achieve 2 wks   Stair Training Goal PT LTG, Number of Steps 5   Stair Training Goal PT LTG, [place] Level supervision required   Stair Training Goal PT LTG, Assist Device 1 handrail

## 2017-08-06 NOTE — PLAN OF CARE
Problem: Patient Care Overview (Adult)  Goal: Plan of Care Review  Outcome: Ongoing (interventions implemented as appropriate)    08/06/17 0826   Coping/Psychosocial Response Interventions   Plan Of Care Reviewed With patient   Outcome Evaluation   Outcome Summary/Follow up Plan Pt presents w/ decreased balance and functional independence from baseline. Pt Min Ax2 to ambulate 210 ft w/ RW, VCs for safety throughout. Recommend cont skilled IPOT intervention. Recommend pt DC home w/ assist and HH OT/PT services.    Patient Care Overview   Progress progress toward functional goals as expected         Problem: Inpatient Occupational Therapy  Goal: Transfer Training Goal 1 LTG- OT  Outcome: Ongoing (interventions implemented as appropriate)    08/06/17 0826   Transfer Training OT LTG   Transfer Training OT LTG, Date Established 08/06/17   Transfer Training OT LTG, Time to Achieve by discharge   Transfer Training OT LTG, Activity Type bed to chair /chair to bed;sit to stand/stand to sit;toilet   Transfer Training OT LTG, [place] Level supervision required   Transfer Training OT LTG, Additional Goal AAD       Goal: Dynamic Standing Balance Goal LTG-OT  Outcome: Ongoing (interventions implemented as appropriate)    08/06/17 0826   Dynamic Standing Balance OT LTG   Dynamic Standing Balance OT LTG, Date Established 08/06/17   Dynamic Standing Balance OT LTG, Time to Achieve by discharge   Dynamic Standing Balance OT LTG, [place] Level supervision required   Dynamic Standing Balance OT LTG, Additional Goal AAD       Goal: Orientation Goal LTG- OT  Outcome: Ongoing (interventions implemented as appropriate)    08/06/17 0826   Orientation OT LTG   Orientation OT LTG, Date Established 08/06/17   Orientation OT LTG, Time to Achieve by discharge   Orientation OT LTG, Activity Type person;place;time;100% treatment session       Goal: LB Dressing Goal LTG- OT  Outcome: Ongoing (interventions implemented as appropriate)     08/06/17 0826   LB Dressing OT LTG   LB Dressing Goal OT LTG, Date Established 08/06/17   LB Dressing Goal OT LTG, Time to Achieve by discharge   LB Dressing Goal OT LTG, Activity Type Don socks/pants   LB Dressing Goal OT LTG, [place] Level contact guard assist   LB Dressing Goal OT LTG, Additional Goal AAD

## 2017-08-07 LAB
C DIFF TOX GENS STL QL NAA+PROBE: NOT DETECTED
TROPONIN I SERPL-MCNC: 0.01 NG/ML

## 2017-08-07 PROCEDURE — 25010000002 ONDANSETRON PER 1 MG: Performed by: INTERNAL MEDICINE

## 2017-08-07 PROCEDURE — P9041 ALBUMIN (HUMAN),5%, 50ML: HCPCS | Performed by: NURSE PRACTITIONER

## 2017-08-07 PROCEDURE — 97110 THERAPEUTIC EXERCISES: CPT

## 2017-08-07 PROCEDURE — 97116 GAIT TRAINING THERAPY: CPT

## 2017-08-07 PROCEDURE — 92523 SPEECH SOUND LANG COMPREHEN: CPT

## 2017-08-07 PROCEDURE — 25010000002 ALBUMIN HUMAN 5% PER 50 ML: Performed by: NURSE PRACTITIONER

## 2017-08-07 PROCEDURE — 99222 1ST HOSP IP/OBS MODERATE 55: CPT | Performed by: INTERNAL MEDICINE

## 2017-08-07 PROCEDURE — 99291 CRITICAL CARE FIRST HOUR: CPT | Performed by: INTERNAL MEDICINE

## 2017-08-07 PROCEDURE — 99232 SBSQ HOSP IP/OBS MODERATE 35: CPT | Performed by: PSYCHIATRY & NEUROLOGY

## 2017-08-07 PROCEDURE — 87493 C DIFF AMPLIFIED PROBE: CPT | Performed by: NURSE PRACTITIONER

## 2017-08-07 PROCEDURE — 84484 ASSAY OF TROPONIN QUANT: CPT | Performed by: NURSE PRACTITIONER

## 2017-08-07 RX ORDER — ALBUMIN, HUMAN INJ 5% 5 %
25 SOLUTION INTRAVENOUS ONCE
Status: COMPLETED | OUTPATIENT
Start: 2017-08-07 | End: 2017-08-07

## 2017-08-07 RX ORDER — DOCUSATE SODIUM 100 MG/1
100 CAPSULE, LIQUID FILLED ORAL 2 TIMES DAILY PRN
Status: DISCONTINUED | OUTPATIENT
Start: 2017-08-07 | End: 2017-08-10 | Stop reason: HOSPADM

## 2017-08-07 RX ORDER — QUETIAPINE FUMARATE 25 MG/1
25 TABLET, FILM COATED ORAL NIGHTLY
Status: DISCONTINUED | OUTPATIENT
Start: 2017-08-07 | End: 2017-08-10 | Stop reason: HOSPADM

## 2017-08-07 RX ORDER — GABAPENTIN 300 MG/1
300 CAPSULE ORAL NIGHTLY PRN
Status: DISCONTINUED | OUTPATIENT
Start: 2017-08-07 | End: 2017-08-08

## 2017-08-07 RX ORDER — MORPHINE 10 MG/ML
6 TINCTURE ORAL EVERY 6 HOURS PRN
Status: DISCONTINUED | OUTPATIENT
Start: 2017-08-07 | End: 2017-08-10 | Stop reason: HOSPADM

## 2017-08-07 RX ORDER — POLYETHYLENE GLYCOL 3350 17 G/17G
17 POWDER, FOR SOLUTION ORAL DAILY PRN
Status: DISCONTINUED | OUTPATIENT
Start: 2017-08-07 | End: 2017-08-10 | Stop reason: HOSPADM

## 2017-08-07 RX ORDER — QUETIAPINE FUMARATE 25 MG/1
25 TABLET, FILM COATED ORAL ONCE
Status: COMPLETED | OUTPATIENT
Start: 2017-08-07 | End: 2017-08-07

## 2017-08-07 RX ADMIN — SODIUM CHLORIDE 100 ML/HR: 9 INJECTION, SOLUTION INTRAVENOUS at 01:30

## 2017-08-07 RX ADMIN — ATORVASTATIN CALCIUM 80 MG: 40 TABLET, FILM COATED ORAL at 20:04

## 2017-08-07 RX ADMIN — ACETAMINOPHEN 650 MG: 325 TABLET, FILM COATED ORAL at 02:28

## 2017-08-07 RX ADMIN — ALBUMIN HUMAN 25 G: 0.05 INJECTION, SOLUTION INTRAVENOUS at 22:48

## 2017-08-07 RX ADMIN — SULFAMETHOXAZOLE AND TRIMETHOPRIM 160 MG: 800; 160 TABLET ORAL at 08:08

## 2017-08-07 RX ADMIN — ONDANSETRON 4 MG: 2 INJECTION INTRAMUSCULAR; INTRAVENOUS at 02:28

## 2017-08-07 RX ADMIN — QUETIAPINE FUMARATE 25 MG: 25 TABLET, FILM COATED ORAL at 22:14

## 2017-08-07 RX ADMIN — ACYCLOVIR 400 MG: 200 CAPSULE ORAL at 08:09

## 2017-08-07 RX ADMIN — ACETAMINOPHEN 650 MG: 325 TABLET, FILM COATED ORAL at 08:09

## 2017-08-07 RX ADMIN — ONDANSETRON 4 MG: 2 INJECTION INTRAMUSCULAR; INTRAVENOUS at 08:09

## 2017-08-07 RX ADMIN — ACYCLOVIR 400 MG: 200 CAPSULE ORAL at 18:37

## 2017-08-07 RX ADMIN — DILTIAZEM HCL-SODIUM CHLORIDE IV SOLN 125 MG/125ML-0.9% 10 MG/HR: 125-0.9/125 SOLUTION at 18:37

## 2017-08-07 RX ADMIN — MORPHINE 6 MG: 10 TINCTURE ORAL at 18:41

## 2017-08-07 RX ADMIN — GABAPENTIN 300 MG: 300 CAPSULE ORAL at 02:49

## 2017-08-07 RX ADMIN — DILTIAZEM HCL-SODIUM CHLORIDE IV SOLN 125 MG/125ML-0.9% 10 MG/HR: 125-0.9/125 SOLUTION at 05:45

## 2017-08-07 RX ADMIN — QUETIAPINE FUMARATE 25 MG: 25 TABLET, FILM COATED ORAL at 20:04

## 2017-08-07 RX ADMIN — ASPIRIN 325 MG ORAL TABLET 325 MG: 325 PILL ORAL at 08:08

## 2017-08-07 RX ADMIN — PANTOPRAZOLE SODIUM 40 MG: 40 TABLET, DELAYED RELEASE ORAL at 05:45

## 2017-08-07 RX ADMIN — ALUMINUM HYDROXIDE, MAGNESIUM HYDROXIDE, AND DIMETHICONE 7.5 ML: 400; 400; 40 SUSPENSION ORAL at 11:52

## 2017-08-07 NOTE — PLAN OF CARE
Problem: Patient Care Overview (Adult)  Goal: Plan of Care Review  Outcome: Ongoing (interventions implemented as appropriate)    08/07/17 1705   Coping/Psychosocial Response Interventions   Plan Of Care Reviewed With patient;family   Outcome Evaluation   Outcome Summary/Follow up Plan Cognitive-communication eval completed per CV[facility] Pathway. Pt presented w/ moderate cognitive-communication disorder c/b impairments in orientation, auditory comprehension, reading comprehension, and short-term memory. Family reported pt's cog/comm skills are not at baseline. Pt would benefit from cog/comm tx and will likely require services after d/c.   Patient Care Overview   Progress (initial eval)         Problem: Stroke (Ischemic) (Adult)  Goal: Signs and Symptoms of Listed Potential Problems Will be Absent or Manageable (Stroke)  Outcome: Ongoing (interventions implemented as appropriate)    08/07/17 1705   Stroke (Ischemic)   Problems Assessed (Stroke (Ischemic)/TIA) cognitive impairment;communication impairment   Problems Present (Stroke (Ischemic)/TIA) cognitive impairment;communication impairment         Problem: Inpatient SLP  Goal: Cognitive-linguistic-Patient will improve Cognitive-linguistic skills to allow optimal participation in care  Outcome: Ongoing (interventions implemented as appropriate)    08/07/17 1705   Cognitive Linguistic- Optimal Participation in Care   Cognitive Linguistic Optimal Participation in Care- SLP, Date Established 08/07/17   Cognitive Linguistic Optimal Participation in Care- SLP, Time to Achieve by discharge       Goal: Receptive Language-Patient will improve receptive language skills to allow optimal participation in care  Outcome: Ongoing (interventions implemented as appropriate)    08/07/17 1705   Receptive Language- Optimal Participation in Care   Receptive Language Optimal Participation in Care- SLP, Date Established 08/07/17   Receptive Language Optimal Participation in Care- SLP, Time to  Achieve by discharge

## 2017-08-07 NOTE — PLAN OF CARE
Problem: Patient Care Overview (Adult)  Goal: Plan of Care Review  Outcome: Ongoing (interventions implemented as appropriate)  Goal: Adult Individualization and Mutuality  Outcome: Ongoing (interventions implemented as appropriate)  Goal: Discharge Needs Assessment  Outcome: Ongoing (interventions implemented as appropriate)    Problem: Stroke (Ischemic) (Adult)  Goal: Signs and Symptoms of Listed Potential Problems Will be Absent or Manageable (Stroke)  Outcome: Ongoing (interventions implemented as appropriate)    Problem: Fall Risk (Adult)  Goal: Identify Related Risk Factors and Signs and Symptoms  Outcome: Ongoing (interventions implemented as appropriate)  Goal: Absence of Falls  Outcome: Ongoing (interventions implemented as appropriate)    Problem: Pressure Ulcer Risk (Norberto Scale) (Adult,Obstetrics,Pediatric)  Goal: Identify Related Risk Factors and Signs and Symptoms  Outcome: Ongoing (interventions implemented as appropriate)  Goal: Skin Integrity  Outcome: Ongoing (interventions implemented as appropriate)

## 2017-08-07 NOTE — CONSULTS
HEMATOLOGY/ONCOLOGY INPATIENT CONSULT    REASON FOR CONSULT: Stroke in a patient with amyloidosis    HISTORY OF PRESENT ILLNESS; I am asked to see this 77 y.o.  female, referred for new CVA and a patient with amyloidosis.  Has been on melphalan and prednisone to try to hold this in check but the melphalan has been difficult to say the least.  Nonetheless it should not have incited a clotting event.  Status post thrombolytics her mentation is near baseline but still has a little word searching and some clumsiness of her right hand.      Past Medical History:   Diagnosis Date   • Arrhythmia    • Atrial fibrillation and flutter 8/8/2016   • Cancer     melanoma in 1971   • CHF (congestive heart failure)      Past Surgical History:   Procedure Laterality Date   • APPENDECTOMY     • CHOLECYSTECTOMY     • COLON SURGERY     • HEMORRHOIDECTOMY     • HYSTERECTOMY         No current facility-administered medications on file prior to encounter.      Current Outpatient Prescriptions on File Prior to Encounter   Medication Sig Dispense Refill   • dexamethasone (DECADRON) 4 MG tablet Take 5 tablets by mouth 1 (One) Time Per Week. (Patient taking differently: Take 20 mg by mouth 1 (One) Time Per Week. On Fridays) 20 tablet 5   • acyclovir (ZOVIRAX) 400 MG tablet Take 1 tablet by mouth 2 (Two) Times a Day. 60 tablet 11   • aspirin 325 MG tablet Take 325 mg by mouth daily.     • BIOTIN PO Take 1 capsule by mouth Daily.     • Budesonide (ENTOCORT EC) 3 MG 24 hr capsule Take 3 mg by mouth Every Morning.     • cholecalciferol (VITAMIN D3) 1000 UNITS tablet Take 2,000 Units by mouth daily.     • DULoxetine (CYMBALTA) 60 MG capsule Take 60 mg by mouth Daily.     • gabapentin (NEURONTIN) 300 MG capsule 300 mg 3 (Three) Times a Day.     • melphalan (ALKERAN) 2 MG chemo tablet Take 5 tablets by mouth Daily. Take on an empty stomach, 2 hours before or 2 hours after a meal, on days 1-4 of 28 day cycle 20 tablet 3   • metoprolol tartrate  "(LOPRESSOR) 25 MG tablet TAKE ONE-HALF TABLET BY MOUTH ONCE DAILY 45 tablet 3   • midodrine (PROAMATINE) 2.5 MG tablet Take 2 tablets by mouth Every 8 (Eight) Hours. 90 tablet 0   • mirtazapine (REMERON) 15 MG tablet 15 mg Every Night.     • Omega-3 Fatty Acids (FISH OIL) 1000 MG capsule capsule Take  by mouth daily with breakfast.     • ondansetron (ZOFRAN) 8 MG tablet Take 1 tablet by mouth 3 (Three) Times a Day As Needed for Nausea or Vomiting. 30 tablet 5   • opium 10 MG/ML (1%) tincture Take 6 mg by mouth Every 6 (Six) Hours As Needed (diarrhea). 500 mL 0   • polyethylene glycol (MIRALAX) packet Take 17 g by mouth Daily.     • Red Yeast Rice 600 MG capsule Take 600 mg by mouth Daily.     • sulfamethoxazole-trimethoprim (BACTRIM DS,SEPTRA DS) 800-160 MG per tablet Take 1 tablet by mouth 3 (Three) Times a Week. Take 1 tablet on Mondays, Wednesdays and Fridays. 12 tablet 11   • Zinc 50 MG capsule Take  by mouth daily.     • [DISCONTINUED] opium 10 MG/ML (1%) tincture TAKE 0.6 MILLILITERS BY MOUTH FOUR TIMES DAILY AS DIRECTED  0       Allergies   Allergen Reactions   • Ambien [Zolpidem Tartrate]    • Morphine And Related        Social History     Social History   • Marital status:      Spouse name: N/A   • Number of children: N/A   • Years of education: N/A     Social History Main Topics   • Smoking status: Former Smoker     Types: Cigarettes     Quit date: 1970   • Smokeless tobacco: None   • Alcohol use 3.0 oz/week     5 Shots of liquor per week      Comment: bourbon   • Drug use: No   • Sexual activity: Defer     Other Topics Concern   • None     Social History Narrative       Family History   Problem Relation Age of Onset   • Cancer Mother    • Cancer Father    • Cancer Brother          REVIEW OF SYSTEMS:  A 14 point review of systems was performed and is negative except as noted above.      PHYSICAL EXAM:    BP (!) 73/47  Pulse 100  Temp 97.9 °F (36.6 °C) (Axillary)   Resp 18  Ht 62\" (157.5 cm)  Wt " 111 lb (50.3 kg)  SpO2 100%  BMI 20.3 kg/m2    ECOG: (3) Capable of limited self-care, confined to bed or chair > 50% of waking hours  General: well appearing female in no acute distress  HEENT: sclera anicteric, oropharynx clear  Lymphatics: no cervical, supraclavicular, inguinal, or axillary adenopathy  Neck: Supple. No thyromegaly.  Cardiovascular: regular rate and rhythm, no murmurs  Lungs: clear to auscultation bilaterally. No respiratory distress  Abdomen: soft, nontender, nondistended.  No palpable organomegaly  Extremities: no lower extremity edema, cyanosis, or clubbing  Skin: no rashes, lesions, bruising, or petechiae  Neuro: Alert and oriented to person but not place or time. Moves all extremities.  Decreased ex therapy of the digits in the right hand        Results from last 7 days  Lab Units 08/06/17  0557 08/05/17  1749 08/05/17  1733   WBC 10*3/mm3 4.72 5.11  --    HEMOGLOBIN g/dL 9.8* 10.5*  --    HEMOGLOBIN, POC g/dL  --   --  10.9*   PLATELETS 10*3/mm3 198 202  --        Results from last 7 days  Lab Units 08/06/17  0557 08/05/17  1749 08/05/17  1733   SODIUM mmol/L 135 134  --    POTASSIUM mmol/L 4.0 4.0  --    CO2 mmol/L 28.0 24.0  --    BUN mg/dL 26* 27*  --    CREATININE mg/dL 1.00 1.30 1.40*   MAGNESIUM mg/dL  --  2.0  --    GLUCOSE mg/dL 76 100  --    AST (SGOT) U/L 19 24  24  --    ALT (SGPT) U/L 12 12  12  --    BILIRUBIN mg/dL 1.0 0.9  --    ALK PHOS U/L 92 100  --      Estimated Creatinine Clearance: 36.8 mL/min (by C-G formula based on Cr of 1).      Imaging Results (last 7 days)     Procedure Component Value Units Date/Time    XR Chest 1 View [[id number]]  (Abnormal) Collected:  08/05/17 1725     Updated:  08/05/17 1901    Narrative:       EXAM:    XR Chest, 1 View    CLINICAL HISTORY:    77 years old, female; Signs and symptoms; Other: Stroke protocol; Additional   info: Acute stroke protocol (onset < 12 hrs)    TECHNIQUE:    Frontal view of the chest.    EXAM DATE/TIME:    8/5/2017  5:25 PM    COMPARISON:    CR - XR CHEST 2 VW 3/21/2017 1:05:24 PM    FINDINGS:    No focal pulmonary consolidation is demonstrated.      No significant obscuration of the lateral costophrenic angles is demonstrated.      No significant vascular congestion is demonstrated.      There is scattered pulmonary scarring bilaterally.      Visualized cardiac silhouette size appears within normal limits.        There are atherosclerotic calcifications in the thoracic aorta.      Impression:         No acute pulmonary process is demonstrated.    THIS DOCUMENT HAS BEEN ELECTRONICALLY SIGNED BY JOIE MONROE MD    MRI Brain Without Contrast [[id number]]  (Abnormal) Collected:  08/05/17 1935     Updated:  08/05/17 2336    Narrative:       EXAM:    MR Head Without Intravenous Contrast    CLINICAL HISTORY:    77 years old, female; Other amyloidosis; Paroxysmal atrial fibrillation;   Cerebral infarction, unspecified; Disorder of kidney and ureter, unspecified;   Signs and symptoms; Hemiplegia and hemiparesis and speech disturbance; Aphasia;   Right side, dominance unknown; Patient HX: Right side weakness, transient   aphasia woman with amyloidosis recent increased weakness, getting chemotherapy   for her amyloidosis, was in her usual health's this afternoon when about 4: 30   while standing in the kitchen she suddenly became less responsive and leaned to   the right. Family notes her speech was garbled and she could not follow   commands. She has a flaccid right arm but moved her left side well. After about   10 minutes her speech improved but she remained weak on the right and in the   emergency room here was noted to have both right upper extremity weakness and   neglect. Patient was not really aware of these problems and reports she   remembers some of what happened. She has had some falls recently. No surgeries   on head multiple myeloma    TECHNIQUE:    Magnetic resonance images of the head/brain without intravenous contrast in    multiple planes.    EXAM DATE/TIME:    8/5/2017 7:35 PM    COMPARISON:    MRI brain 12/27/16    FINDINGS:    There is prominent amount of high signal in the bilateral white matter on   FLAIR imaging which may represent chronic small vessel ischemic disease in the   appropriate clinical setting.      Probable small old infarction in LEFT cerebellum.      Evaluation of the brain demonstrates no other convincing areas of abnormal   signal intensity.      There is moderate cerebral cortical atrophy.  There is mild fullness of   posterior aspect of lateral ventricles.        Visualized paranasal sinuses and mastoid air cells demonstrate no significant   opacification.      Impression:         No definite acute intracranial process is demonstrated.  Probable prominent   chronic ischemic changes as discussed above.    THIS DOCUMENT HAS BEEN ELECTRONICALLY SIGNED BY JOIE MONROE MD    CT Head Without Contrast Stroke Protocol [[id number]] Collected:  08/05/17 1823     Updated:  08/06/17 0917    Narrative:       EXAMINATION: CT HEAD WO CONTRAST  - 08/05/2017     INDICATION: Stroke protocol.     TECHNIQUE: CT scan of the head was performed at 5 mm intervals.     The radiation dose reduction device was turned on for each scan per the  ALARA (As Low as Reasonably Achievable) protocol.     COMPARISON: 03/21/2017.      FINDINGS: There is no intracranial mass. There is no hemorrhage. There  is no midline shift or extra-axial fluid collection. There is central or  cortical atrophy.       Impression:       Central and cortical atrophy without acute finding.     Time of the exam:  1728 hours  Report sent to ER: 1736 hours     DICTATED:     08/05/2017  EDITED:         08/05/2017     This report was finalized on 8/6/2017 9:15 AM by Dr. Johnnie Sen MD.       CT Cerebral Perfusion With & Without Contrast [[id number]] Collected:  08/05/17 1817     Updated:  08/06/17 0917    Narrative:       EXAMINATION: CT CEREBRAL PERFUSION W/WO CONTRAST  - 08/05/2017     INDICATION: Evaluate for stroke.      TECHNIQUE: CT cerebral perfusion imaging was performed with data  acquisition regarding blood volume, blood flow, mean transit time, and  time to drain.      The radiation dose reduction device was turned on for each scan per the  ALARA (As Low as Reasonably Achievable) protocol.     COMPARISON: CT scan of the same day.     FINDINGS: There is no reduction in cerebral flow or asymmetry. There is  some increased blood volume in the left posterior parietal region but  there is no evidence of areas of diminished perfusion with normal blood  volume. Therefore there are no areas of penumbra.       Impression:       There is no evidence of reversible neural ischemia.     DICTATED:     08/05/2017  EDITED:         08/05/2017        This report was finalized on 8/6/2017 9:15 AM by Dr. Johnnie Sen MD.       CT Head Without Contrast [[id number]]  (Abnormal) Collected:  08/06/17 2201     Updated:  08/06/17 2248    Narrative:       EXAM:  CT Head Without Intravenous Contrast    CLINICAL HISTORY:  77 years old, female; Other amyloidosis; Paroxysmal atrial fibrillation;   Cerebral infarction, unspecified; Disorder of kidney and ureter, unspecified;   Other reduced mobility; Other reduced mobility; Signs and symptoms; Other: See   notes; Patient HX: Most recent prior CT hasn't been finalized and cannot be   faxed at this time-images are available; Additional info: Nausea and vomiting    TECHNIQUE:  Axial computed tomography images of the head/brain without intravenous   contrast.  This CT exam was performed using one or more of the following dose   reduction techniques:  automated exposure control, adjustment of the mA and/or   kV according to patient size, and/or use of iterative reconstruction technique.    COMPARISON:  CT HEAD WO CONTRAST 8/6/2017 7:08:07 PM    FINDINGS:  Brain:  Scattered areas of hypoattenuation, likely chronic small vessel   ischemic change, demyelination,  or gliosis.  There is parenchymal atrophy.  Ventricles:  Normal.  Bones/joints:  Normal.  No acute fracture.  Soft tissues:  Normal.  Vasculature:  Atherosclerotic vascular calcifications.  Sinuses:  Minimal ethmoid sinus disease.  Mastoid air cells:  Normal as visualized.  No mastoid effusion.      Impression:         1. No acute findings.    2. Non-acute findings are described above.    THIS DOCUMENT HAS BEEN ELECTRONICALLY SIGNED BY EDDIE HAZEL MD    CT Head Without Contrast [[id number]] Collected:  08/07/17 0842     Updated:  08/07/17 0903    Narrative:       EXAMINATION: CT HEAD WO CONTRAST-08/06/2017:      INDICATION: Stroke Post tPA Administration - Perform 24 Hours After TPA  Infusion; I63.9-Cerebral infarction, unspecified; I48.0-Paroxysmal  atrial fibrillation; E85.8-Other amyloidosis; N28.9-Disorder of kidney  and ureter, unspecified; Z74.09-Other reduced mobility; Z74.09-Other  reduced mobility, post TPA.     TECHNIQUE: Multiple axial CT imaging was obtained of the head from the  skull base to the skull vertex without the administration of intravenous  contrast.     The radiation dose reduction device was turned on for each scan per the  ALARA (As Low as Reasonably Achievable) protocol.     COMPARISON: NONE.     FINDINGS: Atrophy is seen of the brain with extensive chronic small  vessel ischemic changes seen of the periventricular and subcortical  white matter. No hemorrhage or hydrocephalus. No mass, mass effect, or  midline shift. The bony structures are unremarkable. The visualized  paranasal sinuses are clear.       Impression:       Chronic changes identified within the brain with no evidence  of acute intracranial abnormality status post TPA.     D:  08/07/2017  E:  08/07/2017           This report was finalized on 8/7/2017 9:01 AM by Dr. Alena Lam MD.                ASSESSMENT & PLAN:    1. Amyloidosis  2. 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 Hypotension due to autonomic  dysfunction  4. Recurrent falls before stroke  5. Now with stroke that appears to be thrombotic and nonhemorrhagic  6. Refractory diarrhea worse since she has not been getting her tincture of opium as an inpatient    Discussion:  I had a long discussion with the patient and her family regarding this terminal amyloidosis process in light of her poor tolerance of treatment and now with a stroke on top of everything else complicated by refractory diarrhea that's worsened she's been admitted since she cannot get to her tincture of opium that was the only thing that was helping her diarrhea.  Her pressures are running low as a result.  In the final analysis they wish to be a DNR, go home with hospice, no further cancer therapies, and I would lean her towards Coumadin or, if we could get it covered, 1.5 mg/kg per day Lovenox.  I would not use Eliquis or Xarelto in this patient prone towards falls given the lack of antidote for these medications.  Per their request we will get palliative care on board and she's already a DNR and we will get hospice consulted.  I will defer to intensivist and subsequently hospitalist a neurologist the decision on Coumadin versus Lovenox.  If were going to do Lovenox and like to make sure hospice is going to cover this for her.      Messi Pantoja MD    8/7/2017

## 2017-08-07 NOTE — PROGRESS NOTES
Discharge Planning Assessment  [facility]     Patient Name: Sowmya Beckett  MRN: [id number]  Today's Date: 8/7/2017    Admit Date: 8/5/2017          Discharge Needs Assessment       08/07/17 1310    Living Environment    Lives With significant other    Living Arrangements house    Home Accessibility no concerns    Type of Financial/Environmental Concern none    Transportation Available family or friend will provide    Living Environment Comment Resides in private residence with SO    Living Environment    Provides Primary Care For no one    Primary Care Provided By spouse/significant other    Quality Of Family Relationships supportive    Able to Return to Prior Living Arrangements yes    Discharge Needs Assessment    Concerns To Be Addressed home safety concerns    Concerns Comments Frequent Falls    Readmission Within The Last 30 Days no previous admission in last 30 days    Outpatient/Agency/Support Group Needs homecare agency (specify level of care)    Community Agency Name[facility]S) [facility]    Anticipated Changes Related to Illness inability to care for self    Equipment Currently Used at Home walker, standard    Equipment Needed After Discharge none    Discharge Facility/Level Of Care Needs home with home health    Current Discharge Risk physical impairment    Discharge Disposition home healthcare service;home or self-care    Discharge Contact Information if Applicable SO:  Alvarado Jauregui, [phone]            Discharge Plan       08/07/17 1313    Case Management/Social Work Plan    Plan Home with Home Health    Additional Comments S/P 2BICU:  admit 8/5/17 s/p CVA; planning Cardiology & Dr. Pantoja consults today; transfer to floor soon.  Home with SO and Home Health once medically stable.  Peg Florez, Ext. [phone]        Discharge Placement     No information found                Demographic Summary       08/07/17 1309    Referral Information    Admission Type inpatient    Arrived  From admitted as an inpatient    Referral Source admission list;case finding;interdisciplinary rounds    Reason For Consult discharge planning    Record Reviewed clinical discipline documentation;history and physical;medical record;patient profile;plan of care    Contact Information    Permission Granted to Share Information With     Behavorial Health Release Obtained no    Primary Care Physician Information    Name Lela Gunn MD    Phone [phone]            Functional Status     None            Psychosocial     None            Abuse/Neglect     None            Legal     None            Substance Abuse     None            Patient Forms     None          RITA Valdes

## 2017-08-07 NOTE — THERAPY TREATMENT NOTE
"Acute Care - Physical Therapy Treatment Note  [facility]     Patient Name: Sowmya Beckett  : 1940  MRN: [id number]  Today's Date: 2017  Onset of Illness/Injury or Date of Surgery Date: 17  Date of Referral to PT: 17  Referring Physician: DO Jose Alfredo    Admit Date: 2017    Visit Dx:    ICD-10-CM ICD-9-CM   1. Cerebrovascular accident (CVA), unspecified mechanism I63.9 434.91   2. Paroxysmal atrial fibrillation I48.0 427.31   3. 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 Renal insufficiency N28.9 593.9   5. Impaired functional mobility, balance, gait, and endurance Z74.09 V49.89   6. Impaired mobility and ADLs Z74.09 799.89   7. Cognitive communication disorder R41.841 315.32     Patient Active Problem List   Diagnosis   • Atrial fibrillation and flutter   • History of melanoma   • Failure to thrive in adult   • Dehydration   • Hyperglycemia   • Peripheral autonomic neuropathy due to underlying disease   • AL amyloidosis   • Skin lesion of right ear   • Stroke (S/P tPA). 17   • PAF (Prior ablation) with RVR this admission. Off anticoagulation   • AL amyloidosis with cardiac ,renal, colon involvement with autonomic neuropathy               Adult Rehabilitation Note       17 1223          Rehab Assessment/Intervention    Discipline physical therapist  -DM      Document Type therapy note (daily note)  -DM      Subjective Information agree to therapy;complains of;pain;nausea/vomiting   had Zofran;still nauseated;wants to amb \"as far as possible\"  -DM      Patient Effort, Rehab Treatment good  -DM      Symptoms Noted During/After Treatment increased pain   incr. nausea & abdom discomfort  -DM      Precautions/Limitations fall precautions;other (see comments)   incont.(use Depends); adult FTT  -DM      Recorded by [DM] Giselle Thornton, PT      Vital Signs    Pre Systolic BP Rehab 111  -DM      Pre Treatment Diastolic BP 75  -DM      Post Systolic BP Rehab 89  -DM      Post Treatment Diastolic BP " 57  -DM      Pretreatment Heart Rate (beats/min) 61  -DM      Posttreatment Heart Rate (beats/min) 85  -DM      Pre SpO2 (%) 100  -DM      O2 Delivery Pre Treatment room air  -DM      Intra SpO2 (%) 92  -DM      O2 Delivery Intra Treatment room air  -DM      Post SpO2 (%) 100  -DM      O2 Delivery Post Treatment room air  -DM      Pre Patient Position Supine  -DM      Intra Patient Position Standing  -DM      Post Patient Position Sitting  -DM      Recorded by [DM] Giselle Thornton, PT      Pain Assessment    Pain Assessment 0-10  -DM      Pain Score 7  -DM      Post Pain Score 8  -DM      Pain Type Acute pain  -DM      Pain Location Abdomen  -DM      Pain Descriptors Aching  -DM      Pain Frequency Constant/continuous  -DM      Patient's Stated Pain Goal No pain  -DM      Pain Intervention(s) Repositioned;Ambulation/increased activity  -DM      Response to Interventions Tolerated  -DM      Recorded by [DM] Giselle Thornton, PT      Cognitive Assessment/Intervention    Current Cognitive/Communication Assessment impaired  -DM      Orientation Status oriented to;person;place   slow response time;stated mo.,but not yr.or location  -DM      Follows Commands/Answers Questions 75% of the time;100% of the time;able to follow single-step instructions;needs cueing;needs increased time;needs repetition  -DM      Personal Safety mild impairment;decreased awareness, need for assist;decreased awareness, need for safety;decreased insight to deficits  -DM      Personal Safety Interventions fall prevention program maintained;gait belt;nonskid shoes/slippers when out of bed  -DM      Recorded by [DM] Giselle Thornton, PT      Bed Mobility, Assessment/Treatment    Bed Mobility, Assistive Device head of bed elevated;bed rails  -DM      Bed Mobility, Scoot/Bridge, Coleman minimum assist (75% patient effort);verbal cues required  -DM      Bed Mob, Supine to Sit, Coleman minimum assist (75% patient effort);verbal cues required   -DM      Bed Mobility, Safety Issues decreased use of arms for pushing/pulling  -DM      Bed Mobility, Impairments strength decreased;pain  -DM      Bed Mobility, Comment cues for hand placement,utilizing bedrail  -DM      Recorded by [DM] Giselle Thornton, PT      Transfer Assessment/Treatment    Transfers, Sit-Stand Hood minimum assist (75% patient effort);verbal cues required   pt incont.BM w/stand;toBR (2ndBM);NEW depends placed  -DM      Transfers, Stand-Sit [place] minimum assist (75% patient effort);verbal cues required  -DM      Transfers, Sit-Stand-Sit, Assist Device rolling walker  -DM      Toilet Transfer, Hood moderate assist (50% patient effort);verbal cues required   low commode  -DM      Toilet Transfer, Assistive Device other (see comments);rolling walker   grab bar on R  -DM      Transfer, Safety Issues sequencing ability decreased;weight-shifting ability decreased;loses balance backward  -DM      Transfer, Impairments strength decreased;impaired balance;pain  -DM      Transfer, Comment cues for hand placement, seq. for hygiene(req.>8 min.self-hygiene  -DM      Recorded by [DM] Giselle Thornton, PT      Gait Assessment/Treatment    Gait, Hood Level minimum assist (75% patient effort);verbal cues required   has IV;toBR (incont.on seat;cleaned),then friedman  -DM      Gait, Assistive Device rolling walker  -DM      Gait, Distance (Feet) 180   c/o fat. from toileting & no sleep last night  -DM      Gait, Gait Pattern Analysis swing-through gait  -DM      Gait, Gait Deviations vince decreased;decreased heel strike;narrow base;step length decreased;weight-shifting ability decreased;ataxia   drifts L  -DM      Gait, Safety Issues sequencing ability decreased;step length decreased  -DM      Gait, Impairments strength decreased;impaired balance;pain  -DM      Gait, Comment incr. nausea,abdom pain,fatigue;cues for trunk ext,focusing ahead,PLB, INCR. STEP length  -DM      Recorded by  "[DM] Giselle Thornton, PT      Motor Skills/Interventions    Additional Documentation Balance Skills Training (Group)  -DM      Recorded by [DM] Giselle Thornton PT      Balance Skills Training    Sitting-Level of Assistance Close supervision  -DM      Sitting-Balance Support Feet supported  -DM      Standing-Level of Assistance Contact guard  -DM      Static Standing Balance Support assistive device  -DM      Standing-Balance Activities Weight Shift A-P;Weight Shift R-L  -DM      Standing Balance # of Minutes 4   for hygiene  -DM      Gait Balance-Level of Assistance Minimum assistance   has IV,TELE  -DM      Gait Balance Support assistive device  -DM      Gait Balance Activities scanning environment R/L;side-stepping  -DM      Recorded by [DM] Giselle Thornton, PT      Therapy Exercises    Bilateral Lower Extremities AROM:;10 reps;sitting;standing;ankle pumps/circles;glut sets;heel slides;hip abduction/adduction;hip ER;hip flexion;hip IR;LAQ;quad sets   HS sets; MIP X 10  -DM      Bilateral Upper Extremity AROM:;10 reps;sitting;elbow flexion/extension;shoulder abduction/adduction;shoulder extension/flexion  -DM      Recorded by [DM] Giselle Thornton, PT      Positioning and Restraints    Pre-Treatment Position in bed  -DM      Post Treatment Position chair  -DM      In Chair notified nsg;reclined;call light within reach;encouraged to call for assist;exit alarm on;with other staff;waffle cushion;legs elevated   'FREEZING\";Req.warm blanket (done)  -DM      Recorded by [DM] Giselle Thornton, PT        User Key  (r) = Recorded By, (t) = Taken By, (c) = Cosigned By    Initials Name Effective Dates    VINEET Thornton, PT 06/19/15 -                 IP PT Goals       08/07/17 1340 08/06/17 0817       Bed Mobility PT LTG    Bed Mobility PT LTG, Date Established  08/06/17  -LS     Bed Mobility PT LTG, Time to Achieve  2 wks  -LS     Bed Mobility PT LTG, Activity Type  supine to sit/sit to supine  -LS     Bed Mobility PT LTG, " [place] Level  independent  -LS     Bed Mobility PT LTG, Outcome goal ongoing  -DM      Transfer Training PT LTG    Transfer Training PT LTG, Date Established  08/06/17  -LS     Transfer Training PT LTG, Time to Achieve  2 wks  -LS     Transfer Training PT LTG, Activity Type  sit to stand/stand to sit  -LS     Transfer Training PT LTG, [place] Level  conditional independence  -LS     Transfer Training PT LTG, Assist Device  walker, rolling  -LS     Transfer Training PT LTG, Outcome goal ongoing  -DM      Gait Training PT LTG    Gait Training Goal PT LTG, Date Established  08/06/17  -LS     Gait Training Goal PT LTG, Time to Achieve  2 wks  -LS     Gait Training Goal PT LTG, [place] Level  supervision required  -LS     Gait Training Goal PT LTG, Assist Device  walker, rolling  -LS     Gait Training Goal PT LTG, Distance to Achieve  300  -LS     Gait Training Goal PT LTG, Outcome goal ongoing  -DM      Stair Training PT LTG    Stair Training Goal PT LTG, Date Established  08/06/17  -LS     Stair Training Goal PT LTG, Time to Achieve  2 wks  -LS     Stair Training Goal PT LTG, Number of Steps  5  -LS     Stair Training Goal PT LTG, [place] Level  supervision required  -LS     Stair Training Goal PT LTG, Assist Device  1 handrail  -LS     Stair Training Goal PT LTG, Outcome goal ongoing  -DM        User Key  (r) = Recorded By, (t) = Taken By, (c) = Cosigned By    Initials Name Provider Type    VINEET Thornton, PT Physical Therapist    LS Keira Granda, PT Physical Therapist          Physical Therapy Education     Title: PT OT SLP Therapies (Active)     Topic: Physical Therapy (Active)     Point: Mobility training (Active)    Learning Progress Summary    Learner Readiness Method Response Comment Documented by Status   Patient Eager E,D NR  DM 08/07/17 1340 Active    Acceptance E VU  JW 08/07/17 0717 Done    Acceptance E,D NR  LS 08/06/17 0829 Active    Acceptance E VU  JW 08/06/17 0745 Done     Acceptance E VU,NR   08/06/17 0040 Done               Point: Home exercise program (Active)    Learning Progress Summary    Learner Readiness Method Response Comment Documented by Status   Patient Eager E,D NR   08/07/17 1340 Active    Acceptance E VU   08/07/17 0717 Done               Point: Body mechanics (Active)    Learning Progress Summary    Learner Readiness Method Response Comment Documented by Status   Patient Eager E,D NR  DM 08/07/17 1340 Active    Acceptance E VU  JW 08/07/17 0717 Done    Acceptance E,D NR   08/06/17 0829 Active               Point: Precautions (Active)    Learning Progress Summary    Learner Readiness Method Response Comment Documented by Status   Patient Eager E,D NR  DM 08/07/17 1340 Active    Acceptance E VU   08/07/17 0717 Done    Acceptance E,D NR   08/06/17 0829 Active    Acceptance E VU   08/06/17 0745 Done    Acceptance E VU,NR   08/06/17 0040 Done                      User Key     Initials Effective Dates Name Provider Type Discipline     06/19/15 -  Giselle Thornton, PT Physical Therapist PT     06/19/15 -  Keira Granda, PT Physical Therapist PT     02/14/17 -  Allison Ruvalcaba, RN Registered Nurse Nurse     06/10/16 -  Roseline Rodriguez, RN Registered Nurse Nurse                    PT Recommendation and Plan  Anticipated Discharge Disposition: home with assist, home with home health  PT Frequency: daily  Plan of Care Review  Plan Of Care Reviewed With: patient  Progress: progress towards functional goals is fair  Outcome Summary/Follow up Plan: Pt. c/o incr. nausea/fatigue w/ gt.  x 180 ft,despite Zofran; limited by decr. HGB (from 10.5 on 8-5 to 9.8 on 8-6), impaired motor planning/seq., & incont. issues           Outcome Measures       08/07/17 1223 08/06/17 0745 08/06/17 0743    How much help from another person do you currently need...    Turning from your back to your side while in flat bed without using bedrails? 3  -DM  3  -LS    Moving from  lying on back to sitting on the side of a flat bed without bedrails? 3  -DM  3  -LS    Moving to and from a bed to a chair (including a wheelchair)? 3  -DM  3  -LS    Standing up from a chair using your arms (e.g., wheelchair, bedside chair)? 3  -DM  3  -LS    Climbing 3-5 steps with a railing? 2  -DM  2  -LS    To walk in hospital room? 3  -DM  3  -LS    AM-PAC 6 Clicks Score 17  -DM  17  -LS    How much help from another is currently needed...    Putting on and taking off regular lower body clothing?  2  -CL     Bathing (including washing, rinsing, and drying)  2  -CL     Toileting (which includes using toilet bed pan or urinal)  2  -CL     Putting on and taking off regular upper body clothing  3  -CL     Taking care of personal grooming (such as brushing teeth)  3  -CL     Eating meals  4  -CL     Score  16  -CL     Modified Ehrhardt Scale    Modified Ehrhardt Scale 3 - Moderate disability.  Requiring some help, but able to walk without assistance.  -DM 3 - Moderate disability.  Requiring some help, but able to walk without assistance.  -CL 3 - Moderate disability.  Requiring some help, but able to walk without assistance.  -LS    Functional Assessment    Outcome Measure Options AM-PAC 6 Clicks Basic Mobility (PT);Modified Ehrhardt  -DM AM-PAC 6 Clicks Daily Activity (OT)  -CL AM-PAC 6 Clicks Basic Mobility (PT);Modified Ehrhardt  -LS      User Key  (r) = Recorded By, (t) = Taken By, (c) = Cosigned By    Initials Name Provider Type    DM Giselle Thornton, PT Physical Therapist    LS Keira Granda, PT Physical Therapist    CL Mary Swenson, OT Occupational Therapist           Time Calculation:         PT Charges       08/07/17 1340          Time Calculation    Start Time 1223  -DM      PT Received On 08/07/17  -DM      PT Goal Re-Cert Due Date 08/16/17  -DM      Time Calculation- PT    Total Timed Code Minutes- PT 40 minute(s)  -DM        User Key  (r) = Recorded By, (t) = Taken By, (c) = Cosigned By    Initials Name  Provider Type    DM Giselle Thornton, PT Physical Therapist          Therapy Charges for Today     Code Description Service Date Service Provider Modifiers Qty    60227888135 HC PT THER PROC EA 15 MIN 8/7/2017 Giselle Thornton, PT GP 2    90017447080 HC GAIT TRAINING EA 15 MIN 8/7/2017 Giselle Tohrnton, PT GP 1          PT G-Codes  Outcome Measure Options: AM-PAC 6 Clicks Basic Mobility (PT), Modified Jennifer Thornton, PT  8/7/2017

## 2017-08-07 NOTE — PROGRESS NOTES
"Neurology       Patient Care Team:  Velia Vogel MD as PCP - General  Velia Vogel MD as PCP - Family Medicine  Dave Swenson MD, FAAN as Consulting Physician (Sleep Medicine)  Ricki Vieyra MD as Consulting Physician (Cardiology)    Chief complaint cerebral ischemia      Subjective .     History:  Some abdominal pain. No headache. Has not slept for two nights per sister, and thinks people are saying bad things about her. Pt states a man was here with her, would like to leave just for tonight.  On aspirin, high dose statin. Has afib    ROS: No fever, chest pain, sob    Objective     Vital Signs   Blood pressure (!) 89/57, pulse 76, temperature 97.8 °F (36.6 °C), temperature source Axillary, resp. rate 16, height 62\" (157.5 cm), weight 111 lb (50.3 kg), SpO2 100 %, not currently breastfeeding.    Physical Exam:              Neuro: wd elderly ww in nad, mildy agitated, some delusional comments, but fluent, not aphasic, answers questions, fc, no dysarthria  eomi face symm  Moves all ext well, normal coordination    Results Review:              F/u head ct neg for bleed  Labs reviewed    Assessment/Plan     1 Left hemisphere ischemia, s/p tpa with no clinical residual or ischemia on mri. ?anticoag -- per dr farah.  2. Delirium a/w sleep deprivation, illness; check UA, b12 seroquel for sleep    I discussed the patients findings and my recommendations with patient, family and nursing staff    Geetha Irwin MD  08/07/17  4:43 PM    "

## 2017-08-07 NOTE — PROGRESS NOTES
Adult Nutrition  Assessment/PES    Patient Name:  Sowmya Beckett  YOB: 1940  MRN: [id number]  Admit Date:  8/5/2017    Assessment Date:  8/7/2017        Reason for Assessment       08/07/17 1557    Reason for Assessment    Reason For Assessment/Visit follow up protocol;multidisciplinary rounds    Identified At Risk By Screening Criteria unintentional loss of 10 lbs or more in the past 2 mos    Time Spent (min) 30    Diagnosis --   per notes of this adm              Nutrition/Diet History       08/07/17 1601    Nutrition/Diet History    Reported/Observed By Patient;RN    Other Reports almost no diet intake , drinking supplements - Pt reports eating only a few bites of food at mealtime  d/t no appetite from chemo; drinks supplements would like 2 meal, willing to try Breeze as well              Labs/Tests/Procedures/Meds       08/07/17 1604    Labs/Tests/Procedures/Meds    Labs/Tests Review Reviewed    Medication Review Reviewed, pertinent            Physical Findings       08/07/17 1604    Physical Findings/Assessment    Additional Documentation Physical Appearance (Group)    Physical Appearance    Overall Physical Appearance cachetic              Nutrition Prescription Ordered       08/07/17 1606    Nutrition Prescription PO    Current PO Diet Regular    Supplement Boost Plus    Supplement Frequency 2 times a day    Common Modifiers Cardiac            Evaluation of Received Nutrient/Fluid Intake       08/07/17 1606    PO Evaluation    Number of Days PO Intake Evaluated 2 days    Number of Meals 5    % PO Intake 25              Problem/Interventions:          Problem 2       08/07/17 1609    Nutrition Diagnoses Problem 2    Problem 2 Inadequate Intake/Infusion    Inadequate Intake Type Oral    Macronutrient Kcal;Protein    Etiology (related to) Factors Affecting Nutrition    Appetite Poor    Signs/Symptoms (evidenced by) Report of Minimal PO Intake;PO Intake    Percent (%) intake recorded 25 %     Over number of meals 5                  Intervention Goal       08/07/17 1610    Intervention Goal    General Nutrition support treatment    PO Increase intake            Nutrition Intervention       08/07/17 1610    Nutrition Intervention    RD/Tech Action Adjusted portion;Adjusted supplement;Recommend/ordered    Recommended/Ordered Supplement            Nutrition Prescription       08/07/17 1610    Nutrition Prescription PO    PO Prescription Begin/change supplement    Supplement Boost Plus;Boost Breeze    Supplement Frequency 3 times a day   Pt requesting 2 supplements per tray. Eating only a few bites at meal time.    New PO Prescription Ordered? Yes            Education/Evaluation       08/07/17 1612    Monitor/Evaluation    Monitor Per protocol;PO intake;Supplement intake;Symptoms        Comments:  Additional nutrition supplements provided; will send only small food portions.    Electronically signed by:  Denisse Driver RD  08/07/17 4:12 PM

## 2017-08-07 NOTE — PROGRESS NOTES
"Intensivist Note     8/7/2017  Hospital Day: 2      Ms. Sowmya Beckett, 77 y.o. female is followed for:  Principal Problem:    Stroke (S/P tPA). 8/5/17  Active Problems:    PAF (Prior ablation) with RVR this admission. Off anticoagulation    AL amyloidosis with cardiac ,renal, colon involvement with autonomic neuropathy       SUBJECTIVE     Subjective    77-year-old white female with history of amyloidosis with cardiac, renal, and colon involvement with autonomic neuropathy. Is treated with melphalan by Dr. Messi Pantoja. Was admitted 8/5/17 with right-sided weakness and expressive aphasia/dysarthria. Was felt to have had a CVA and underwent TPA infusion. Has known history of paroxysmal atrial fibrillation and in fact has had an ablation in 2012. Was not on anticoagulation at admission because of the history of falls related to orthostasis and syncope. She improved post TPA although noncontrast CT scans of the chest never showed anything but evidence of chronic small vessel ischemic changes with atrophy.    The patient has had documented recurrent PAF with rapid ventricular rate this admission. Rate did come under control with a diltiazem drip Cardiology has been asked to evaluate as they have seen her in the past, but has not yet seen the patient. Dr. Pantoja has also been consulted as he is following her for her amyloid therapy. The question at this time to both cardiology and Dr. Pantoja is whether or not she is safe for long-term anticoagulant therapy. She has been unstable in the past with some syncopal episodes bringing about our concern.    Sleeping well and having some paranoid ideation.    The patient's relevant past medical, surgical and social history were reviewed and updated in Epic as appropriate.    OBJECTIVE     BP (!) 89/57  Pulse 76  Temp 97.8 °F (36.6 °C) (Axillary)   Resp 16  Ht 62\" (157.5 cm)  Wt 111 lb (50.3 kg)  SpO2 100%  BMI 20.3 kg/m2          Flowsheet Rows         First Filed Value    " "Admission Height  62\" (157.5 cm) Documented at 08/05/2017 1746    Admission Weight  111 lb (50.4 kg) Documented at 08/05/2017 1735        Intake & Output (last day)       08/06 0701 - 08/07 0700 08/07 0701 - 08/08 0700    I.V. (mL/kg) 4230.4 (84) 694.3 (13.8)    Total Intake(mL/kg) 4230.4 (84) 694.3 (13.8)    Urine (mL/kg/hr) 375 (0.3)     Stool      Total Output 375      Net +3855.4 +694.3          Unmeasured Urine Occurrence 4 x 1 x    Unmeasured Stool Occurrence 5 x 6 x          Objective    Exam:  General Exam:  Elderly white female in NAD  HEENT: Pupils equal and reactive. Nose and throat clear.  Neck:                          Supple, no JVD, thyromegaly, or adenopathy  Lungs: Clear anteriorly and laterally.  Cardiovascular: Irregularly irregular rhythm with a variable S1 and normal S2. Rate 67  Abdomen: Soft nontender without organomegaly or masses.   and rectal: Deferred.  Extremities: No cyanosis clubbing edema.  Neurologic:                 Nonfocal exam    Chest X-Ray 8/5/17: Heart sounds at the upper limits of normal. Clear lung fields except for a few linear atelectatic changes in the left base.    INFUSIONS    diltiaZEM 5-15 mg/hr Last Rate: 10 mg/hr (08/07/17 0800)   sodium chloride 50 mL/hr Last Rate: 50 mL/hr (08/07/17 1042)         Results from last 7 days  Lab Units 08/06/17  0557 08/05/17  1749 08/05/17  1733   WBC 10*3/mm3 4.72 5.11  --    HEMOGLOBIN g/dL 9.8* 10.5*  --    HEMOGLOBIN, POC g/dL  --   --  10.9*   HEMATOCRIT % 28.8* 30.8*  --    HEMATOCRIT POC %  --   --  32*   PLATELETS 10*3/mm3 198 202  --        Results from last 7 days  Lab Units 08/06/17  0557 08/05/17  1749   SODIUM mmol/L 135 134   POTASSIUM mmol/L 4.0 4.0   CHLORIDE mmol/L 101 99   CO2 mmol/L 28.0 24.0   BUN mg/dL 26* 27*   CREATININE mg/dL 1.00 1.30   GLUCOSE mg/dL 76 100   CALCIUM mg/dL 8.9 9.5       Results from last 7 days  Lab Units 08/05/17  1749   MAGNESIUM mg/dL 2.0       Lab Results   Component Value Date    " SEDRATE 40 (H) 07/20/2017     No results found for: BNP  Lab Results   Component Value Date    TROPONINI 0.014 08/07/2017     Lab Results   Component Value Date    TSH 2.828 03/24/2017     No results found for: LACTATE  No results found for: CORTISOL        I reviewed the patient's results, images and medication.    Assessment/Plan   ASSESSMENT      Principal Problem:    Stroke (S/P tPA). 8/5/17  Active Problems:    PAF (Prior ablation) with RVR this admission. Off anticoagulation    AL amyloidosis with cardiac ,renal, colon involvement with autonomic neuropathy      DISCUSSION: Appears to have responded well to tPA with no evidence of focal deficits. Is however becoming confused possibly due to sleep deprivation. Neurology has ordered some Seroquel and we will see how she responds. Atrial fibrillation rate appears controlled on diltiazem, but if blood pressure drops will have to find another alternative    PLAN     1. Await cardiology and hematology input  2. If blood pressure drops on diltiazem would digitalize patient for rate control  3. Need to make a decision on long-term anticoagulation but will defer to cardiology and her hematologist  4. Appears stable for transfer to the floor and will ask the hospitalist to assume care    Plan of care and goals reviewed with mulitdisciplinary team at daily rounds.    I discussed the patient's findings and my recommendations with patient, family and nursing staff    Time spent Critical care 35 min (It does not include procedure time).    Kel Oliver MD  Intensive Care Medicine  08/07/17 5:55 PM

## 2017-08-07 NOTE — CONSULTS
Patient does not meet diabetes education order criteria, therefore patient was not seen for diabetes education at this time. A1C 5.6.  Please re consult as needed.

## 2017-08-07 NOTE — PLAN OF CARE
Problem: Patient Care Overview (Adult)  Goal: Plan of Care Review  Outcome: Ongoing (interventions implemented as appropriate)    08/07/17 1340   Coping/Psychosocial Response Interventions   Plan Of Care Reviewed With patient   Outcome Evaluation   Outcome Summary/Follow up Plan Pt. c/o incr. nausea/fatigue w/ gt. x 180 ft,despite Zofran; limited by decr. HGB (from 10.5 on 8-5 to 9.8 on 8-6), impaired motor planning/seq., & incont. issues    Patient Care Overview   Progress progress towards functional goals is fair         Problem: Stroke (Ischemic) (Adult)  Goal: Signs and Symptoms of Listed Potential Problems Will be Absent or Manageable (Stroke)  Outcome: Ongoing (interventions implemented as appropriate)    08/07/17 1340   Stroke (Ischemic)   Problems Assessed (Stroke (Ischemic)/TIA) motor/sensory impairment   Problems Present (Stroke (Ischemic)/TIA) motor/sensory impairment         Problem: Inpatient Physical Therapy  Goal: Bed Mobility Goal LTG- PT  Outcome: Ongoing (interventions implemented as appropriate)    08/06/17 0817 08/07/17 1340   Bed Mobility PT LTG   Bed Mobility PT LTG, Date Established 08/06/17 --    Bed Mobility PT LTG, Time to Achieve 2 wks --    Bed Mobility PT LTG, Activity Type supine to sit/sit to supine --    Bed Mobility PT LTG, [place] Level independent --    Bed Mobility PT LTG, Outcome --  goal ongoing       Goal: Transfer Training Goal 1 LTG- PT  Outcome: Ongoing (interventions implemented as appropriate)    08/06/17 0817 08/07/17 1340   Transfer Training PT LTG   Transfer Training PT LTG, Date Established 08/06/17 --    Transfer Training PT LTG, Time to Achieve 2 wks --    Transfer Training PT LTG, Activity Type sit to stand/stand to sit --    Transfer Training PT LTG, [place] Level conditional independence --    Transfer Training PT LTG, Assist Device walker, rolling --    Transfer Training PT LTG, Outcome --  goal ongoing       Goal: Gait Training Goal LTG- PT  Outcome:  Ongoing (interventions implemented as appropriate)    08/06/17 0817 08/07/17 1340   Gait Training PT LTG   Gait Training Goal PT LTG, Date Established 08/06/17 --    Gait Training Goal PT LTG, Time to Achieve 2 wks --    Gait Training Goal PT LTG, [place] Level supervision required --    Gait Training Goal PT LTG, Assist Device walker, rolling --    Gait Training Goal PT LTG, Distance to Achieve 300 --    Gait Training Goal PT LTG, Outcome --  goal ongoing       Goal: Stair Training Goal LTG- PT  Outcome: Ongoing (interventions implemented as appropriate)    08/06/17 0817 08/07/17 1340   Stair Training PT LTG   Stair Training Goal PT LTG, Date Established 08/06/17 --    Stair Training Goal PT LTG, Time to Achieve 2 wks --    Stair Training Goal PT LTG, Number of Steps 5 --    Stair Training Goal PT LTG, [place] Level supervision required --    Stair Training Goal PT LTG, Assist Device 1 handrail --    Stair Training Goal PT LTG, Outcome --  goal ongoing

## 2017-08-07 NOTE — CONSULTS
[facility] Consult Note      Referring Provider: No ref. provider found  Primary Provider:  Velia Vogel MD  Reason for Consultation: atrial fibrillation/ CVA    Patient Care Team:  Velia Vogel MD as PCP - General  Velia Vogel MD as PCP - Family Medicine  Dave Swenson MD, FAAN as Consulting Physician (Sleep Medicine)  Ricki Vieyra MD as Consulting Physician (Cardiology)    Chief complaint:  Atrial fibrillation/ CVA    Identification:  77 year old  female    Problem list:    1.  Atrial arrhythmias:   A.  Status post AFib, A-flutter, and SVT ablations, per Dr. Alejandra, last 2012.   B.  October 2014, echocardiogram within normal limits with trace AI, mild MR, trace TR.  2.  Unknown lipid status.  3.  Historical congestive heart failure, data deficient.    A.  No echocardiogram per Dr. Alejandra’s records or evaluation of LV function that can be found.  4.  Questionable QT prolongation with amiodarone.  6.  Weight Loss 30 lbs July 2015 to current  7.  Peripheral Neuropathy  8.  AL Amyloidosis  9.  Surgeries:   A.  Colon surgery   B.  Hysterectomy   C.  Appendectomy   D.  Hemorrhoid surgery   E.  cholecystectomy    Allergies:  Ambien [zolpidem tartrate] and Morphine and related    Home/Current Medications:    Scheduled Meds:  acyclovir 400 mg Oral BID   aspirin 325 mg Oral Daily   Or      aspirin 300 mg Rectal Daily   atorvastatin 80 mg Oral Nightly   metoprolol tartrate 25 mg Oral Q12H   pantoprazole 40 mg Oral Q AM   sulfamethoxazole-trimethoprim 1 tablet Oral Once per day on Mon Wed Fri     Continuous Infusions:  diltiaZEM 5-15 mg/hr Last Rate: 10 mg/hr (08/07/17 0800)   sodium chloride 50 mL/hr Last Rate: 50 mL/hr (08/07/17 1042)     PRN Meds:.•  acetaminophen **OR** acetaminophen  •  aluminum-magnesium hydroxide-simethicone  •  docusate sodium  •  gabapentin  •  ondansetron  •  polyethylene glycol  •  promethazine **OR** promethazine  •  sodium chloride      History of present  illness:  Patient is a 77-year-old white female with the above-noted medical history who presented to [facility] via EMS for confusion and right-sided weakness.  Symptoms were present for one hour prior to arrival.  Per family her symptoms resolved after approximately 10 minutes with the exception of ongoing weakness.  She received TPA; CT scan of the head revealed chronic atrophy but nothing acute.  She does have a known history of paroxysmal atrial fibrillation status post ablations in 2012 at an outlying facility.  and is currently not on any anticoagulation for the past couple of years due to unsteady gait and falls.  History of a morning, she developed atrial fibrillation with RVR and was initiated on an amiodarone drip.  Patient did not tolerate this medication as it did not give her any added benefit to her rate and that caused her to become hypotensive.  She was then started on a Cardizem drip which has given her better benefit however rate still ranging anywhere from the upper 80s to 120.  Family is present in her great historians as 2 of them are in the medical field.  Patient seems somewhat disoriented and fatigued and states that she has not been sleeping well.  The patient had been on Coumadin in the past.  Her  states that she was falling 6-8 times a day per recently it's been more like every other day as he stays by herself.  She is chronically hypotensive.      Cardiac Risk Factors:  Advanced age female, sedentary lifestyle, former smoker    Social History:  Social History     Social History   • Marital status:      Spouse name: N/A   • Number of children: N/A   • Years of education: N/A     Occupational History   • Not on file.     Social History Main Topics   • Smoking status: Former Smoker     Types: Cigarettes     Quit date: 1970   • Smokeless tobacco: Not on file   • Alcohol use 3.0 oz/week     5 Shots of liquor per week      Comment: seth   • Drug use: No   •  "Sexual activity: Defer     Other Topics Concern   • Not on file     Social History Narrative     Family History:  Family History   Problem Relation Age of Onset   • Cancer Mother    • Cancer Father    • Cancer Brother      Review of Systems  Pertinent positives are listed in the HPI.  All other systems reviewed are negative.         Objective     Vital Sign Min/Max for last 24 hours  Temp  Min: 97.7 °F (36.5 °C)  Max: 98.2 °F (36.8 °C)   BP  Min: 79/55  Max: 136/92   Pulse  Min: 60  Max: 95   Resp  Min: 14  Max: 18   SpO2  Min: 91 %  Max: 100 %   No Data Recorded   Weight  Min: 111 lb (50.3 kg)  Max: 111 lb (50.3 kg)     Flowsheet Rows         First Filed Value    Admission Height  62\" (157.5 cm) Documented at 08/05/2017 1746    Admission Weight  111 lb (50.4 kg) Documented at 08/05/2017 1735          Physical Exam:  GENERAL: well-developed, well-nourished; in no acute distress.   NECK:  There is no jugular venous distention at 30°.  Carotid upstrokes are 2+ and  symmetrical without bruits.   LUNGS: Clear to auscultation bilaterally without wheezing, rhonchi, or rales noted.   CARDIOVASCULAR: The heart has a irregularly irregular rate with a normal S1 and S2. There is no murmur, gallop, rub, or click appreciated. The PMI is nondisplaced.   ABDOMEN: Soft and nontender  NEUROLOGICAL: Nonfocal; Alert and oriented  PERIPHERAL VASCULAR:  Posterior tibial and dorsalis pedis pulses are 2+ and symmetrical. There is no peripheral edema.   MUSCULOSKELETAL:  Normal ROM  SKIN:  Warm and dry  PSYCHIATRIC: normal mood and affect; behavior appropriate    EKG:  Atrial fibrillation with a variable rate    Echocardiogram 8/6/17: with Bubble Study  · Mild aortic valve regurgitation is present.  · Left ventricular systolic function is normal. Estimated EF = 70%.  · Left ventricular diastolic dysfunction (grade I) consistent with impaired relaxation.  · Mild-to-moderate mitral valve regurgitation is present.  · Normal right ventricular " cavity size, wall thickness, systolic function and septal motion noted.  · Saline test results are negative.  · There is no evidence of pericardial effusion.  · No evidence of pulmonary hypertension is present.  · No significant structural valvular abnormality demonstrated    Labs:      Results from last 7 days  Lab Units 08/06/17  0557 08/05/17  1749 08/05/17  1733   SODIUM mmol/L 135 134  --    POTASSIUM mmol/L 4.0 4.0  --    CHLORIDE mmol/L 101 99  --    CO2 mmol/L 28.0 24.0  --    BUN mg/dL 26* 27*  --    CREATININE mg/dL 1.00 1.30 1.40*   CALCIUM mg/dL 8.9 9.5  --    BILIRUBIN mg/dL 1.0 0.9  --    ALK PHOS U/L 92 100  --    ALT (SGPT) U/L 12 12  12  --    AST (SGOT) U/L 19 24  24  --    GLUCOSE mg/dL 76 100  --          Lab Results   Component Value Date    TROPONINI 0.014 08/07/2017        Lab Results   Component Value Date    CHOL 186 08/06/2017     Lab Results   Component Value Date    HDL 57 08/06/2017       Lab Results   Component Value Date    TRIG 214 (H) 08/06/2017       Lab Results   Component Value Date    INR 1.04 08/06/2017    INR 0.98 12/27/2016    INR 1.70 04/23/2014    PROTIME 11.4 08/06/2017    PROTIME 10.7 12/27/2016    PROTIME 18.8 (H) 04/23/2014       Ejection Fraction:  70% per echo 8/6/17      Results Review:  I reviewed the patients new clinical results.      Assessment:  1.  CVA   -likely cortical ischemia per Neuro  2.  Paroxysmal atrial fibrillation   -CHADS VASC = 6   -no anticoagulation due to fall risk   -Echo- negative bubble study; EF 70%  3.  Hypotension  4.  Amyloidosis   -per Dr Pantoja      Plan:    I had a long discussion with the patient and her .  There is not a good answer for her current problem.  She is at risk for fall so Coumadin is somewhat of a risk.  She is also at risk for recurrent CVA and therefore not being on Coumadin is somewhat of a risk.    Dr. Pantoja had a talk with the patient and her family earlier today as she has terminal amyloidosis.  She has  refractory diarrhea and resulting hypotension.  The patient wishes to BE a DNR and to go home with hospice with no further cancer therapy.    I asked the patient and her  to consider the use of Coumadin overnight.  We can discuss their decision tomorrow.  Unfortunately there is not a right or wrong answer here.  I would prefer Coumadin over Xarelto, Eliquis and Lovenox as Lovenox is only partially reversible and the other 2 medications do not have an FDA approved antidote.      I discussed the patients findings and my recommendations with patientAnd her .    Scribed for Marline Galvez MD by ERIC Neville on August 7, 2017 at 5:14 PM     ERIC Hassan  08/07/17  4:49 PM    I Marline Galvez MD personally performed the services described in this documentation as scribed by the above individual in my presence, and it is both accurate and complete.    Marline Galvez MD, FACC

## 2017-08-07 NOTE — THERAPY EVALUATION
Acute Care - Speech Language Pathology Initial Evaluation  [facility]   Cognitive-Communication Evaluation     Patient Name: Sowmya Beckett  : 1940  MRN: [id number]  Today's Date: 2017  Onset of Illness/Injury or Date of Surgery Date: 17          Admit Date: 2017     Visit Dx:    ICD-10-CM ICD-9-CM   1. Cerebrovascular accident (CVA), unspecified mechanism I63.9 434.91   2. Paroxysmal atrial fibrillation I48.0 427.31   3. 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 Renal insufficiency N28.9 593.9   5. Impaired functional mobility, balance, gait, and endurance Z74.09 V49.89   6. Impaired mobility and ADLs Z74.09 799.89   7. Cognitive communication disorder R41.841 315.32     Patient Active Problem List   Diagnosis   • Atrial fibrillation and flutter   • History of melanoma   • Failure to thrive in adult   • Dehydration   • Hyperglycemia   • Peripheral autonomic neuropathy due to underlying disease   • AL amyloidosis   • Skin lesion of right ear   • Stroke (S/P tPA). 17   • PAF (Prior ablation) with RVR this admission. Off anticoagulation   • AL amyloidosis with cardiac ,renal, colon involvement with autonomic neuropathy     Past Medical History:   Diagnosis Date   • Arrhythmia    • Atrial fibrillation and flutter 2016   • Cancer     melanoma in 1971   • CHF (congestive heart failure)      Past Surgical History:   Procedure Laterality Date   • APPENDECTOMY     • CHOLECYSTECTOMY     • COLON SURGERY     • HEMORRHOIDECTOMY     • HYSTERECTOMY            SLP EVALUATION (last 72 hours)      SLP Evaluation       17 1500                Rehab Evaluation    Document Type evaluation  -AC        Subjective Information no complaints;agree to therapy  -AC        General Information    Patient Profile Review yes  -AC        Onset of Illness/Injury 17  -AC        Subjective Patient Observations Pt alert, cooperative, confused. Niece & sister @ bedside.   -AC        Pertinent History Of Current  Problem Pt adm w/ aphasia for stroke work-up. Received tPA. Passed RN CVA dysphagia re-screen & denied difficulty swallowing. 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 Pt/family reported baseline mild confusion since chemo, but acutely worsened.   -AC        Current Diet Limitations regular solid;thin liquids  -AC        Precautions/Limitations, Vision WFL;other (see comments)   for purposes of eval  -AC        Precautions/Limitations, Hearing WFL;other (see comments)   for purposes of eval  -AC        Prior Level of Function- Communication functional for ADL's with assistance;other (comment)   mild confusion post-chemo, per family  -AC        Prior Level of Function- Swallowing no diet consistency restrictions  -AC        Plans/Goals Discussed With patient and family;agreed upon  -AC        Barriers to Rehab none identified  -AC        Living Environment    Lives With significant other  -        Living Arrangements house  -        Clinical Impression    SLP Diagnosis Moderate cognitive-communication disorder c/b impairments in orientation, auditory comprehension, reading comprehension, and short-term memory. Family reported pt's cog/comm skills are not at baseline.   -AC        Functional Level At Time Of Evaluation impaired  -AC        Patient's Goals For Discharge patient did not state  -AC        Family Goals For Discharge family did not state  -AC        Criteria for Skilled Therapeutic Interventions Met skilled criteria for cognitive linguistic intervention met;skilled criteria for speech language intervention met  -AC        Rehab Potential good, to achieve stated therapy goals  -AC        Therapy Frequency 5 times/wk  -AC        Anticipated Discharge Disposition other (see comments)   needs cont'd SLP services after d/c  -AC        Pain Assessment    Pain Assessment No/denies pain  -AC        Cognitive Assessment/Intervention    Current Cognitive/Communication Assessment impaired  -AC         Orientation Status oriented to;person;disoriented to;place;time;situation  -AC        Follows Commands/Answers Questions able to follow single-step instructions;100% of the time;able to follow multi-step instructions;75% of the time  -AC        Short/Long Term Memory moderate impairment, short term memory   immediate recall: 3/5 wrds, delayed recall: 1/5 wrds  -AC        Additional Documentation Cognitive Assessment Intervention (Group)  -AC        Cognitive Assessment Intervention    Behavior/Mood Observations alert;confused;cooperative  -AC        Problem Solving other (see comments)   fxnl for basic vrbl prob slvng tasks-further assess needed  -AC        Executive Function Skills lack of self correction;lack of self monitoring;insight/awareness issues;other (see comments)   further assessment needed  -AC        Reasoning other (see comments)   fxnl for basic inferencing tasks-further assess needed  -AC        Communication Assessment/Intervention    Additional Documentation Auditory Comprehen Assess/Intervention (Group);Reading Assessment/Intervention (Group);Verbal Expression Assess/Intervention (Group);Motor Speech Assessment/Intervention (Group)  -AC        Auditory Comprehen Assess/Intervention    Auditory Comprehension mild impairment;moderate impairment  -AC        Auditory Comprehen Assess/Intervention    Answers Yes/No Questions successful, basic questions;successful, personal questions;other (see comments)   complex y/n ?s: 70% acc  -AC        Able to Follow Commands success, 1 step commands;other (see comments)   2-step commands: 75% acc  -AC        Verbal Expression Assess/Intervention    Automatic Speech successful, spontaneous greeting  -AC        Speech Repetition successful, sentence  -AC        Speech Fluency fluent speech  -AC        Conversational Speech WNL/WFL  -AC        Conversational Speech Comment No deficits noted or reported by pt/family in conversational speech.  -AC        Initiation  of Phonation WNL/WFL  -AC        Reading Assessment/Intervention    Reading Skills mild impairment;moderate impairment  -AC        Oral Reading Ability successful, paragraphs  -AC        Reading Comprehension other (see comments)   short paragraph open ended comp ?s: 75% acc  -AC        Motor Speech Assess/Intervention    Motor Speech-Apraxia Observations no concerns  -AC        Motor Speech- Apraxia WNL/WFL  -AC        Motor Speech-Dysarthria Observations no concerns  -AC        Motor Speech- Dysarthria WNL/WFL  -AC        Motor Speech- Dysarthria Comment Pt 100% intelligible to unfamiliar listener & denied changes in motor speech.  -AC        Communication Treatment Objective and Progress    Receptive Language Treatment Objectives Improve ability to follow directions;Improve ability to comprehend questions;Improve ability to comprehend simple conversation;Other 1  -AC        Cognitive Linguistic Treatment Objectives Improve orientation;Improve memory skills  -AC        Improve ability to follow directions    Improve ability to follow directions: two-step commands;80%;with inconsistent cues  -AC        Status: Improve ability to follow directions New  -AC        Ability to Follow Directions Progress continue to address  -AC        Improve ability to comprehend questions    Improve ability to comprehend questions: complex general questions;complex yes/no questions;open ended questions about a short paragraph;80%;with inconsistent cues  -AC        Status: Improve ability to comprehend questions New  -AC        Ability to Comprehend Questions Progress continue to address  -AC        Improve ability to comprehend simple conversation    Improve ability to comprehend simple conversation respond appropriately to simple conversational questions;80%;with inconsistent cues  -AC        Status: Improve ability to comprehend simple conversation New  -AC        Ability to Comprehend Simple Conversation Progress continue to  address  -AC        Receptive Language Other 1    Receptive Other 1 Objective Patient will read paragraph and answer open ended comprehension questions w/ 80% accuracy w/ intermittent cues.  -AC        Status: Receptive Other 1 New  -AC        Receptive Other 1 Progress continue to address  -AC        Improve orientation    Improve orientation through: demonstrating orientation to year;demonstrating orientation to place;demonstrating orientation to month;demonstrating orientation to disease/impairment;use environmental aids to assist with orientation;80%;with inconsistent cues  -AC        Status: Improve orientation through New  -AC        Orientation Progress continue to address  -AC        Improve memory skills    Improve memory skills through: recalling related word lists immediately;recalling related word lists with an imposed delay;80%;with inconsistent cues  -AC        Status: Improve memory skills New  -AC        Memory Skills Progress continue to address  -AC          User Key  (r) = Recorded By, (t) = Taken By, (c) = Cosigned By    Initials Name Effective Dates    AC Sherri Mcmahon MS CCC-SLP 07/27/17 -            EDUCATION  The patient has been educated in the following areas:   Cognitive Impairment Communication Impairment.    SLP Recommendation and Plan  SLP Diagnosis: Moderate cognitive-communication disorder c/b impairments in orientation, auditory comprehension, reading comprehension, and short-term memory. Family reported pt's cog/comm skills are not at baseline.   Rehab Potential: good, to achieve stated therapy goals  Criteria for Skilled Therapeutic Interventions Met: skilled criteria for cognitive linguistic intervention met, skilled criteria for speech language intervention met  Anticipated Discharge Disposition: other (see comments) (needs cont'd SLP services after d/c)  Therapy Frequency: 5 times/wk    Plan of Care Review  Plan Of Care Reviewed With: patient, family  Progress:  (initial  eval)  Outcome Summary/Follow up Plan: Cognitive-communication eval completed per [facility]. Pt presented w/ moderate cognitive-communication disorder c/b impairments in orientation, auditory comprehension, reading comprehension, and short-term memory. Family reported pt's cog/comm skills are not at baseline. Pt would benefit from cog/comm tx and will likely require services after d/c.          IP SLP Goals       08/07/17 1705          Receptive Language- Optimal Participation in Care    Receptive Language Optimal Participation in Care- SLP, Date Established 08/07/17  -      Receptive Language Optimal Participation in Care- SLP, Time to Achieve by discharge  -      Cognitive Linguistic- Optimal Participation in Care    Cognitive Linguistic Optimal Participation in Care- SLP, Date Established 08/07/17  -      Cognitive Linguistic Optimal Participation in Care- SLP, Time to Achieve by discharge  -AC        User Key  (r) = Recorded By, (t) = Taken By, (c) = Cosigned By    Initials Name Provider Type    JASVIR Mcmahon MS CCC-SLP Speech and Language Pathologist        Time Calculation:         Time Calculation- SLP       08/07/17 1708          Time Calculation- SLP    SLP Start Time 1500  -AC      SLP Received On 08/07/17  -        User Key  (r) = Recorded By, (t) = Taken By, (c) = Cosigned By    Initials Name Provider Type    JASVIR Mcmahon MS CCC-SLP Speech and Language Pathologist          Therapy Charges for Today     Code Description Service Date Service Provider Modifiers Qty    [id number] HC ST EVAL SPEECH AND PROD W LANG  4 8/7/2017 Sherri Mcmahon MS CCC-SLP GN 1                     Sherri Mcmahon MS CCC-SLP  8/7/2017

## 2017-08-08 ENCOUNTER — APPOINTMENT (OUTPATIENT)
Dept: GENERAL RADIOLOGY | Facility: HOSPITAL | Age: 77
End: 2017-08-08

## 2017-08-08 LAB
ALBUMIN SERPL-MCNC: 3.2 G/DL (ref 3.2–4.8)
ALBUMIN/GLOB SERPL: 1.4 G/DL (ref 1.5–2.5)
ALP SERPL-CCNC: 80 U/L (ref 25–100)
ALT SERPL W P-5'-P-CCNC: 9 U/L (ref 7–40)
ANION GAP SERPL CALCULATED.3IONS-SCNC: 6 MMOL/L (ref 3–11)
AST SERPL-CCNC: 20 U/L (ref 0–33)
BASOPHILS # BLD AUTO: 0 10*3/MM3 (ref 0–0.2)
BASOPHILS NFR BLD AUTO: 0 % (ref 0–1)
BILIRUB SERPL-MCNC: 1 MG/DL (ref 0.3–1.2)
BNP SERPL-MCNC: 323 PG/ML (ref 0–100)
BUN BLD-MCNC: 14 MG/DL (ref 9–23)
BUN/CREAT SERPL: 15.6 (ref 7–25)
CALCIUM SPEC-SCNC: 7.8 MG/DL (ref 8.7–10.4)
CHLORIDE SERPL-SCNC: 113 MMOL/L (ref 99–109)
CO2 SERPL-SCNC: 19 MMOL/L (ref 20–31)
CREAT BLD-MCNC: 0.9 MG/DL (ref 0.6–1.3)
DEPRECATED RDW RBC AUTO: 69.4 FL (ref 37–54)
EOSINOPHIL # BLD AUTO: 0.01 10*3/MM3 (ref 0–0.3)
EOSINOPHIL NFR BLD AUTO: 0.2 % (ref 0–3)
ERYTHROCYTE [DISTWIDTH] IN BLOOD BY AUTOMATED COUNT: 18.5 % (ref 11.3–14.5)
ERYTHROCYTE [SEDIMENTATION RATE] IN BLOOD: 2 MM/HR (ref 0–30)
FOLATE SERPL-MCNC: 10.97 NG/ML (ref 3.2–20)
GFR SERPL CREATININE-BSD FRML MDRD: 61 ML/MIN/1.73
GLOBULIN UR ELPH-MCNC: 2.3 GM/DL
GLUCOSE BLD-MCNC: 91 MG/DL (ref 70–100)
HCT VFR BLD AUTO: 24.4 % (ref 34.5–44)
HGB BLD-MCNC: 8.2 G/DL (ref 11.5–15.5)
IMM GRANULOCYTES # BLD: 0.02 10*3/MM3 (ref 0–0.03)
IMM GRANULOCYTES NFR BLD: 0.4 % (ref 0–0.6)
INR PPP: 1.1
LYMPHOCYTES # BLD AUTO: 0.46 10*3/MM3 (ref 0.6–4.8)
LYMPHOCYTES NFR BLD AUTO: 10.1 % (ref 24–44)
MAGNESIUM SERPL-MCNC: 1.4 MG/DL (ref 1.3–2.7)
MCH RBC QN AUTO: 34.9 PG (ref 27–31)
MCHC RBC AUTO-ENTMCNC: 33.6 G/DL (ref 32–36)
MCV RBC AUTO: 103.8 FL (ref 80–99)
MONOCYTES # BLD AUTO: 0.24 10*3/MM3 (ref 0–1)
MONOCYTES NFR BLD AUTO: 5.3 % (ref 0–12)
NEUTROPHILS # BLD AUTO: 3.84 10*3/MM3 (ref 1.5–8.3)
NEUTROPHILS NFR BLD AUTO: 84 % (ref 41–71)
PHOSPHATE SERPL-MCNC: 2 MG/DL (ref 2.4–5.1)
PLATELET # BLD AUTO: 165 10*3/MM3 (ref 150–450)
PMV BLD AUTO: 9.2 FL (ref 6–12)
POTASSIUM BLD-SCNC: 3.1 MMOL/L (ref 3.5–5.5)
PROT SERPL-MCNC: 5.5 G/DL (ref 5.7–8.2)
PROTHROMBIN TIME: 12 SECONDS (ref 9.6–11.5)
RBC # BLD AUTO: 2.35 10*6/MM3 (ref 3.89–5.14)
SODIUM BLD-SCNC: 138 MMOL/L (ref 132–146)
TSH SERPL DL<=0.05 MIU/L-ACNC: 1.55 MIU/ML (ref 0.35–5.35)
VIT B12 BLD-MCNC: 584 PG/ML (ref 211–911)
WBC NRBC COR # BLD: 4.57 10*3/MM3 (ref 3.5–10.8)

## 2017-08-08 PROCEDURE — 84443 ASSAY THYROID STIM HORMONE: CPT | Performed by: INTERNAL MEDICINE

## 2017-08-08 PROCEDURE — 84100 ASSAY OF PHOSPHORUS: CPT | Performed by: INTERNAL MEDICINE

## 2017-08-08 PROCEDURE — 80053 COMPREHEN METABOLIC PANEL: CPT | Performed by: INTERNAL MEDICINE

## 2017-08-08 PROCEDURE — 25010000002 ALBUMIN HUMAN 25% PER 50 ML: Performed by: NURSE PRACTITIONER

## 2017-08-08 PROCEDURE — 85652 RBC SED RATE AUTOMATED: CPT | Performed by: INTERNAL MEDICINE

## 2017-08-08 PROCEDURE — 71010 HC CHEST PA OR AP: CPT

## 2017-08-08 PROCEDURE — 85610 PROTHROMBIN TIME: CPT | Performed by: INTERNAL MEDICINE

## 2017-08-08 PROCEDURE — 25010000002 PROMETHAZINE PER 50 MG: Performed by: PSYCHIATRY & NEUROLOGY

## 2017-08-08 PROCEDURE — P9046 ALBUMIN (HUMAN), 25%, 20 ML: HCPCS | Performed by: NURSE PRACTITIONER

## 2017-08-08 PROCEDURE — 82746 ASSAY OF FOLIC ACID SERUM: CPT | Performed by: PSYCHIATRY & NEUROLOGY

## 2017-08-08 PROCEDURE — 25010000002 DIGOXIN PER 500 MCG: Performed by: INTERNAL MEDICINE

## 2017-08-08 PROCEDURE — 82607 VITAMIN B-12: CPT | Performed by: PSYCHIATRY & NEUROLOGY

## 2017-08-08 PROCEDURE — 99232 SBSQ HOSP IP/OBS MODERATE 35: CPT | Performed by: PSYCHIATRY & NEUROLOGY

## 2017-08-08 PROCEDURE — 83880 ASSAY OF NATRIURETIC PEPTIDE: CPT | Performed by: INTERNAL MEDICINE

## 2017-08-08 PROCEDURE — 99231 SBSQ HOSP IP/OBS SF/LOW 25: CPT | Performed by: INTERNAL MEDICINE

## 2017-08-08 PROCEDURE — 97110 THERAPEUTIC EXERCISES: CPT

## 2017-08-08 PROCEDURE — 25010000002 DIGOXIN PER 500 MCG: Performed by: NURSE PRACTITIONER

## 2017-08-08 PROCEDURE — 85025 COMPLETE CBC W/AUTO DIFF WBC: CPT | Performed by: INTERNAL MEDICINE

## 2017-08-08 PROCEDURE — 99291 CRITICAL CARE FIRST HOUR: CPT | Performed by: INTERNAL MEDICINE

## 2017-08-08 PROCEDURE — 25010000002 HYDROMORPHONE PER 4 MG: Performed by: FAMILY MEDICINE

## 2017-08-08 PROCEDURE — 83735 ASSAY OF MAGNESIUM: CPT | Performed by: INTERNAL MEDICINE

## 2017-08-08 RX ORDER — DULOXETIN HYDROCHLORIDE 60 MG/1
60 CAPSULE, DELAYED RELEASE ORAL DAILY
Status: DISCONTINUED | OUTPATIENT
Start: 2017-08-08 | End: 2017-08-10 | Stop reason: HOSPADM

## 2017-08-08 RX ORDER — ALBUMIN, HUMAN INJ 5% 5 %
SOLUTION INTRAVENOUS
Status: DISPENSED
Start: 2017-08-08 | End: 2017-08-08

## 2017-08-08 RX ORDER — GABAPENTIN 300 MG/1
300 CAPSULE ORAL NIGHTLY
Status: DISCONTINUED | OUTPATIENT
Start: 2017-08-08 | End: 2017-08-10 | Stop reason: HOSPADM

## 2017-08-08 RX ORDER — WARFARIN SODIUM 2 MG/1
2 TABLET ORAL
Status: DISCONTINUED | OUTPATIENT
Start: 2017-08-08 | End: 2017-08-10 | Stop reason: HOSPADM

## 2017-08-08 RX ORDER — MAGNESIUM SULFATE HEPTAHYDRATE 40 MG/ML
4 INJECTION, SOLUTION INTRAVENOUS ONCE
Status: COMPLETED | OUTPATIENT
Start: 2017-08-08 | End: 2017-08-08

## 2017-08-08 RX ORDER — HYDROMORPHONE HYDROCHLORIDE 1 MG/ML
0.25 INJECTION, SOLUTION INTRAMUSCULAR; INTRAVENOUS; SUBCUTANEOUS
Status: DISCONTINUED | OUTPATIENT
Start: 2017-08-08 | End: 2017-08-10 | Stop reason: HOSPADM

## 2017-08-08 RX ORDER — MAGNESIUM SULFATE HEPTAHYDRATE 40 MG/ML
4 INJECTION, SOLUTION INTRAVENOUS AS NEEDED
Status: DISCONTINUED | OUTPATIENT
Start: 2017-08-08 | End: 2017-08-10 | Stop reason: HOSPADM

## 2017-08-08 RX ORDER — MAGNESIUM SULFATE HEPTAHYDRATE 40 MG/ML
2 INJECTION, SOLUTION INTRAVENOUS AS NEEDED
Status: DISCONTINUED | OUTPATIENT
Start: 2017-08-08 | End: 2017-08-10 | Stop reason: HOSPADM

## 2017-08-08 RX ORDER — DIGOXIN 0.25 MG/ML
250 INJECTION INTRAMUSCULAR; INTRAVENOUS ONCE
Status: COMPLETED | OUTPATIENT
Start: 2017-08-08 | End: 2017-08-08

## 2017-08-08 RX ORDER — ALBUMIN (HUMAN) 12.5 G/50ML
25 SOLUTION INTRAVENOUS ONCE
Status: COMPLETED | OUTPATIENT
Start: 2017-08-08 | End: 2017-08-08

## 2017-08-08 RX ORDER — GABAPENTIN 300 MG/1
300 CAPSULE ORAL EVERY 8 HOURS SCHEDULED
Status: DISCONTINUED | OUTPATIENT
Start: 2017-08-08 | End: 2017-08-08

## 2017-08-08 RX ORDER — POTASSIUM CHLORIDE 1.5 G/1.77G
40 POWDER, FOR SOLUTION ORAL AS NEEDED
Status: DISCONTINUED | OUTPATIENT
Start: 2017-08-08 | End: 2017-08-10 | Stop reason: HOSPADM

## 2017-08-08 RX ORDER — POTASSIUM CHLORIDE 750 MG/1
40 CAPSULE, EXTENDED RELEASE ORAL AS NEEDED
Status: DISCONTINUED | OUTPATIENT
Start: 2017-08-08 | End: 2017-08-10 | Stop reason: HOSPADM

## 2017-08-08 RX ORDER — POTASSIUM CHLORIDE 7.45 MG/ML
10 INJECTION INTRAVENOUS
Status: DISCONTINUED | OUTPATIENT
Start: 2017-08-08 | End: 2017-08-10 | Stop reason: HOSPADM

## 2017-08-08 RX ORDER — WARFARIN SODIUM 2 MG/1
2 TABLET ORAL
Status: DISCONTINUED | OUTPATIENT
Start: 2017-08-08 | End: 2017-08-08 | Stop reason: DRUGHIGH

## 2017-08-08 RX ORDER — MIDODRINE HYDROCHLORIDE 5 MG/1
5 TABLET ORAL
Status: DISCONTINUED | OUTPATIENT
Start: 2017-08-08 | End: 2017-08-10 | Stop reason: HOSPADM

## 2017-08-08 RX ADMIN — POTASSIUM CHLORIDE 40 MEQ: 1.5 POWDER, FOR SOLUTION ORAL at 06:50

## 2017-08-08 RX ADMIN — QUETIAPINE FUMARATE 25 MG: 25 TABLET, FILM COATED ORAL at 20:24

## 2017-08-08 RX ADMIN — ATORVASTATIN CALCIUM 80 MG: 40 TABLET, FILM COATED ORAL at 20:24

## 2017-08-08 RX ADMIN — MIDODRINE HYDROCHLORIDE 5 MG: 5 TABLET ORAL at 17:20

## 2017-08-08 RX ADMIN — ALBUMIN HUMAN 25 G: 0.25 SOLUTION INTRAVENOUS at 04:00

## 2017-08-08 RX ADMIN — POTASSIUM CHLORIDE 40 MEQ: 750 CAPSULE, EXTENDED RELEASE ORAL at 14:58

## 2017-08-08 RX ADMIN — DULOXETINE HYDROCHLORIDE 60 MG: 60 CAPSULE, DELAYED RELEASE ORAL at 11:45

## 2017-08-08 RX ADMIN — ACYCLOVIR 400 MG: 200 CAPSULE ORAL at 08:22

## 2017-08-08 RX ADMIN — MAGNESIUM SULFATE HEPTAHYDRATE 4 G: 40 INJECTION, SOLUTION INTRAVENOUS at 08:22

## 2017-08-08 RX ADMIN — PANTOPRAZOLE SODIUM 40 MG: 40 TABLET, DELAYED RELEASE ORAL at 05:57

## 2017-08-08 RX ADMIN — ASPIRIN 325 MG ORAL TABLET 325 MG: 325 PILL ORAL at 08:22

## 2017-08-08 RX ADMIN — DIGOXIN 250 MCG: 0.25 INJECTION INTRAMUSCULAR; INTRAVENOUS at 08:22

## 2017-08-08 RX ADMIN — MORPHINE 6 MG: 10 TINCTURE ORAL at 14:10

## 2017-08-08 RX ADMIN — POTASSIUM CHLORIDE 40 MEQ: 1.5 POWDER, FOR SOLUTION ORAL at 20:24

## 2017-08-08 RX ADMIN — HYDROMORPHONE HYDROCHLORIDE 0.25 MG: 1 INJECTION, SOLUTION INTRAMUSCULAR; INTRAVENOUS; SUBCUTANEOUS at 17:20

## 2017-08-08 RX ADMIN — WARFARIN SODIUM 2 MG: 2 TABLET ORAL at 17:26

## 2017-08-08 RX ADMIN — DIGOXIN 250 MCG: 0.25 INJECTION INTRAMUSCULAR; INTRAVENOUS at 04:00

## 2017-08-08 RX ADMIN — GABAPENTIN 300 MG: 300 CAPSULE ORAL at 20:24

## 2017-08-08 RX ADMIN — PROMETHAZINE HYDROCHLORIDE 12.5 MG: 25 INJECTION INTRAMUSCULAR; INTRAVENOUS at 08:21

## 2017-08-08 RX ADMIN — MORPHINE 6 MG: 10 TINCTURE ORAL at 08:27

## 2017-08-08 RX ADMIN — MIDODRINE HYDROCHLORIDE 5 MG: 5 TABLET ORAL at 11:45

## 2017-08-08 NOTE — PLAN OF CARE
Problem: Patient Care Overview (Adult)  Goal: Plan of Care Review  Outcome: Ongoing (interventions implemented as appropriate)    08/08/17 1430   Coping/Psychosocial Response Interventions   Plan Of Care Reviewed With patient;spouse;sibling   Outcome Evaluation   Outcome Summary/Follow up Plan New consult for Palliative Care for Terminal Care. Dr. Gustafson saw and consulted hospice. Family want comfort care. Hospice CM Adrianna Agrawal saw patient and spoke with family and they are discussing homw with hospice vs CCC. Pallaitive will continue to support until discharge.   Patient Care Overview   Progress no change

## 2017-08-08 NOTE — PROGRESS NOTES
"Neurology       Patient Care Team:  Velia Vogel MD as PCP - General  Velia Vogel MD as PCP - Family Medicine  Dave Swenson MD, FAAN as Consulting Physician (Sleep Medicine)  Ricki Vieyra MD as Consulting Physician (Cardiology)    Chief complaint rue weakness and aphasia/stroke      Subjective .     History:  Pt slept after getting seroquel last night per partner. Nausea after getting potassium this morning, got dose of phenergan, now sleeping, pt too drowsy to answer questions    ROS: not obtainable    Objective     Vital Signs   Blood pressure 113/69, pulse 86, temperature 98.6 °F (37 °C), temperature source Oral, resp. rate 14, height 62\" (157.5 cm), weight 111 lb (50.3 kg), SpO2 91 %, not currently breastfeeding.    Physical Exam:              Neuro: wdelderly ww sleeping, barely briefly wakes to tactile and verbal stim  eomi face symm  Spontaneous normal movement of limbs    Results Review:              INR 1.1 normal b12, folate    Assessment/Plan     Left hemisphere ischemia s/p tPA with resolution of sx -- pt with terminal amyloidosis, Dr Pantoja' and Dr Peters' notes reviewed. Hospice seems appropriate in the circumstances.    I discussed the patients findings and my recommendations with family    Geetha Irwin MD  08/08/17  9:48 AM    "

## 2017-08-08 NOTE — PLAN OF CARE
Problem: Patient Care Overview (Adult)  Goal: Plan of Care Review  Outcome: Ongoing (interventions implemented as appropriate)    08/08/17 1832   Coping/Psychosocial Response Interventions   Plan Of Care Reviewed With patient;family   Outcome Evaluation   Outcome Summary/Follow up Plan Bedrest, Dilaudid and Opium as needed, Palliative and Hospice consult. Conditional Code.,   Patient Care Overview   Progress no change       Goal: Adult Individualization and Mutuality  Outcome: Ongoing (interventions implemented as appropriate)    08/06/17 4079   Individualization   Patient Specific Preferences Needs a lot of blankets, tends to be very cold         Problem: Stroke (Ischemic) (Adult)  Goal: Signs and Symptoms of Listed Potential Problems Will be Absent or Manageable (Stroke)  Outcome: Ongoing (interventions implemented as appropriate)    08/08/17 1832   Stroke (Ischemic)   Problems Assessed (Stroke (Ischemic)/TIA) all   Problems Present (Stroke (Ischemic)/TIA) cognitive impairment;situational response         Problem: Fall Risk (Adult)  Goal: Identify Related Risk Factors and Signs and Symptoms  Outcome: Outcome(s) achieved Date Met:  08/08/17 08/08/17 1832   Fall Risk   Fall Risk: Related Risk Factors age-related changes;environment unfamiliar;polypharmacy;neuro disease/injury;history of falls  (orthostasis)   Fall Risk: Signs and Symptoms presence of risk factors       Goal: Absence of Falls  Outcome: Ongoing (interventions implemented as appropriate)    08/08/17 1832   Fall Risk (Adult)   Absence of Falls unable to achieve outcome         Problem: Pressure Ulcer Risk (Norberto Scale) (Adult,Obstetrics,Pediatric)  Goal: Identify Related Risk Factors and Signs and Symptoms  Outcome: Outcome(s) achieved Date Met:  08/08/17  Goal: Skin Integrity  Outcome: Ongoing (interventions implemented as appropriate)

## 2017-08-08 NOTE — SIGNIFICANT NOTE
13:00   Palliative Team Member Meeting  Attendance:    Dr. Salvador Gustafson, DO Amber Leos, [facility]  Stella You,   Keira Haji, RN, PN, Director  Patria Napier, RN, PN  Shirley Barnes, [facility]  Concepción Ward, RN/ Hospice  Allison Paris, APRN, Hospice

## 2017-08-08 NOTE — SIGNIFICANT NOTE
08/08/17 1350   SLP Deferred Reason   SLP Deferred Reason Patient unavailable for treatment  (Pt just moved to the floor and palliative/hocspice consulted and meeting w/ pt at this time. )

## 2017-08-08 NOTE — PROGRESS NOTES
Adult Nutrition  Assessment/PES    Patient Name:  Sowmya Beckett  YOB: 1940  MRN: [id number]  Admit Date:  8/5/2017    Assessment Date:  8/8/2017        Reason for Assessment       08/08/17 1322    Reason for Assessment    Reason For Assessment/Visit multidisciplinary rounds    Time Spent (min) 20              Nutrition/Diet History       08/08/17 1322    Nutrition/Diet History    Reported/Observed By RN    Other only eating a few bites; drinking Boost supplements, has diarrhea but improves when tincture of opium used - Dr. Pantoja spoke w/ pt , hospice consulted for terminal amyloidosis.                    Nutrition Prescription Ordered       08/08/17 1328    Nutrition Prescription PO    Current PO Diet Regular    Supplement Boost Plus    Supplement Frequency Other (comment)   2 per meal    Common Modifiers Cardiac    Other Modifiers Small Feedings                Problem/Interventions:          Problem 2       08/08/17 1413    Nutrition Diagnoses Problem 2    Problem 2 Nutrition Appropriate for Condition at this Time    Etiology (related to) Goals of Care                  Intervention Goal       08/08/17 1413    Intervention Goal    General Hospice Care            Nutrition Intervention       08/08/17 1413    Nutrition Intervention    RD/Tech Action Care plan reviewd;Follow Tx progress              Education/Evaluation       08/08/17 1413    Monitor/Evaluation    Monitor Per protocol        Comments:      Electronically signed by:  Denisse Driver RD  08/08/17 2:13 PM

## 2017-08-08 NOTE — PROGRESS NOTES
HEMATOLOGY/ONCOLOGY PROGRESS NOTE     CC: CVA in the face of frequent falls with autonomic dysfunction    SUBJECTIVE: Weak and tired        Past Medical History, Past Surgical History, Social History, Family History have been reviewed and are without significant changes except as mentioned.      Medications:  The current medication list was reviewed in the EMR    ALLERGIES:   Allergies   Allergen Reactions   • Ambien [Zolpidem Tartrate]    • Morphine And Related        ROS:  A comprehensive 14 point review of systems was performed and was negative except as mentioned.      Objective      Vitals:    08/08/17 0900 08/08/17 1000 08/08/17 1100 08/08/17 1208   BP: 121/81 125/62 120/77 (P) 116/77   BP Location:       Patient Position:       Pulse: 98 82 (!) 147 (!) (P) 136   Resp:  16  (P) 16   Temp:    (P) 98.8 °F (37.1 °C)   TempSrc:    (P) Oral   SpO2: 97% 94% 95% (P) 94%   Weight:       Height:            ECOG: (3) Capable of limited self-care, confined to bed or chair > 50% of waking hours    General: well appearing, in no acute distress  HEENT: sclera anicteric, oropharynx clear  Lymphatics: no cervical, supraclavicular, inguinal, or axillary adenopathy  Cardiovascular: regular rate and rhythm, no murmurs  Lungs: clear to auscultation bilaterally  Abdomen: soft, nontender, nondistended.  No palpable organomegaly  ExtremIties: no lower extremity edema  Skin: no rashes, lesions, bruising, or petechiae  Neuro: Alert and oriented x 3. Moves all extremities.    RECENT LABS:        Results from last 7 days  Lab Units 08/08/17  0406 08/06/17  0557 08/05/17  1749   WBC 10*3/mm3 4.57 4.72 5.11   HEMOGLOBIN g/dL 8.2* 9.8* 10.5*   PLATELETS 10*3/mm3 165 198 202       Results from last 7 days  Lab Units 08/08/17  0406 08/06/17  0557 08/05/17  1749   SODIUM mmol/L 138 135 134   POTASSIUM mmol/L 3.1* 4.0 4.0   CO2 mmol/L 19.0* 28.0 24.0   BUN mg/dL 14 26* 27*   CREATININE mg/dL 0.90 1.00 1.30   MAGNESIUM mg/dL 1.4  --  2.0    PHOSPHORUS mg/dL 2.0*  --   --    GLUCOSE mg/dL 91 76 100   AST (SGOT) U/L 20 19 24  24   ALT (SGPT) U/L 9 12 12  12   BILIRUBIN mg/dL 1.0 1.0 0.9   ALK PHOS U/L 80 92 100     Estimated Creatinine Clearance: 40.9 mL/min (by C-G formula based on Cr of 0.9).      Imaging Results (last 72 hours)     Procedure Component Value Units Date/Time    XR Chest 1 View [[id number]]  (Abnormal) Collected:  08/05/17 1725     Updated:  08/05/17 1901    Narrative:       EXAM:    XR Chest, 1 View    CLINICAL HISTORY:    77 years old, female; Signs and symptoms; Other: Stroke protocol; Additional   info: Acute stroke protocol (onset < 12 hrs)    TECHNIQUE:    Frontal view of the chest.    EXAM DATE/TIME:    8/5/2017 5:25 PM    COMPARISON:    CR - XR CHEST 2 VW 3/21/2017 1:05:24 PM    FINDINGS:    No focal pulmonary consolidation is demonstrated.      No significant obscuration of the lateral costophrenic angles is demonstrated.      No significant vascular congestion is demonstrated.      There is scattered pulmonary scarring bilaterally.      Visualized cardiac silhouette size appears within normal limits.        There are atherosclerotic calcifications in the thoracic aorta.      Impression:         No acute pulmonary process is demonstrated.    THIS DOCUMENT HAS BEEN ELECTRONICALLY SIGNED BY JOIE MONROE MD    MRI Brain Without Contrast [[id number]]  (Abnormal) Collected:  08/05/17 1935     Updated:  08/05/17 2016    Narrative:       EXAM:    MR Head Without Intravenous Contrast    CLINICAL HISTORY:    77 years old, female; Other amyloidosis; Paroxysmal atrial fibrillation;   Cerebral infarction, unspecified; Disorder of kidney and ureter, unspecified;   Signs and symptoms; Hemiplegia and hemiparesis and speech disturbance; Aphasia;   Right side, dominance unknown; Patient HX: Right side weakness, transient   aphasia woman with amyloidosis recent increased weakness, getting chemotherapy   for her amyloidosis, was in her usual  health's this afternoon when about 4: 30   while standing in the kitchen she suddenly became less responsive and leaned to   the right. Family notes her speech was garbled and she could not follow   commands. She has a flaccid right arm but moved her left side well. After about   10 minutes her speech improved but she remained weak on the right and in the   emergency room here was noted to have both right upper extremity weakness and   neglect. Patient was not really aware of these problems and reports she   remembers some of what happened. She has had some falls recently. No surgeries   on head multiple myeloma    TECHNIQUE:    Magnetic resonance images of the head/brain without intravenous contrast in   multiple planes.    EXAM DATE/TIME:    8/5/2017 7:35 PM    COMPARISON:    MRI brain 12/27/16    FINDINGS:    There is prominent amount of high signal in the bilateral white matter on   FLAIR imaging which may represent chronic small vessel ischemic disease in the   appropriate clinical setting.      Probable small old infarction in LEFT cerebellum.      Evaluation of the brain demonstrates no other convincing areas of abnormal   signal intensity.      There is moderate cerebral cortical atrophy.  There is mild fullness of   posterior aspect of lateral ventricles.        Visualized paranasal sinuses and mastoid air cells demonstrate no significant   opacification.      Impression:         No definite acute intracranial process is demonstrated.  Probable prominent   chronic ischemic changes as discussed above.    THIS DOCUMENT HAS BEEN ELECTRONICALLY SIGNED BY JOIE MONROE MD    CT Head Without Contrast Stroke Protocol [[id number]] Collected:  08/05/17 1823     Updated:  08/06/17 0917    Narrative:       EXAMINATION: CT HEAD WO CONTRAST  - 08/05/2017     INDICATION: Stroke protocol.     TECHNIQUE: CT scan of the head was performed at 5 mm intervals.     The radiation dose reduction device was turned on for each scan  per the  ALARA (As Low as Reasonably Achievable) protocol.     COMPARISON: 03/21/2017.      FINDINGS: There is no intracranial mass. There is no hemorrhage. There  is no midline shift or extra-axial fluid collection. There is central or  cortical atrophy.       Impression:       Central and cortical atrophy without acute finding.     Time of the exam:  1728 hours  Report sent to ER: 1736 hours     DICTATED:     08/05/2017  EDITED:         08/05/2017     This report was finalized on 8/6/2017 9:15 AM by Dr. Johnnie Sen MD.       CT Cerebral Perfusion With & Without Contrast [[id number]] Collected:  08/05/17 1817     Updated:  08/06/17 0917    Narrative:       EXAMINATION: CT CEREBRAL PERFUSION W/WO CONTRAST - 08/05/2017     INDICATION: Evaluate for stroke.      TECHNIQUE: CT cerebral perfusion imaging was performed with data  acquisition regarding blood volume, blood flow, mean transit time, and  time to drain.      The radiation dose reduction device was turned on for each scan per the  ALARA (As Low as Reasonably Achievable) protocol.     COMPARISON: CT scan of the same day.     FINDINGS: There is no reduction in cerebral flow or asymmetry. There is  some increased blood volume in the left posterior parietal region but  there is no evidence of areas of diminished perfusion with normal blood  volume. Therefore there are no areas of penumbra.       Impression:       There is no evidence of reversible neural ischemia.     DICTATED:     08/05/2017  EDITED:         08/05/2017        This report was finalized on 8/6/2017 9:15 AM by Dr. Johnnie Sen MD.       CT Head Without Contrast [[id number]]  (Abnormal) Collected:  08/06/17 2201     Updated:  08/06/17 2248    Narrative:       EXAM:  CT Head Without Intravenous Contrast    CLINICAL HISTORY:  77 years old, female; Other amyloidosis; Paroxysmal atrial fibrillation;   Cerebral infarction, unspecified; Disorder of kidney and ureter, unspecified;   Other reduced mobility;  Other reduced mobility; Signs and symptoms; Other: See   notes; Patient HX: Most recent prior CT hasn't been finalized and cannot be   faxed at this time-images are available; Additional info: Nausea and vomiting    TECHNIQUE:  Axial computed tomography images of the head/brain without intravenous   contrast.  This CT exam was performed using one or more of the following dose   reduction techniques:  automated exposure control, adjustment of the mA and/or   kV according to patient size, and/or use of iterative reconstruction technique.    COMPARISON:  CT HEAD WO CONTRAST 8/6/2017 7:08:07 PM    FINDINGS:  Brain:  Scattered areas of hypoattenuation, likely chronic small vessel   ischemic change, demyelination, or gliosis.  There is parenchymal atrophy.  Ventricles:  Normal.  Bones/joints:  Normal.  No acute fracture.  Soft tissues:  Normal.  Vasculature:  Atherosclerotic vascular calcifications.  Sinuses:  Minimal ethmoid sinus disease.  Mastoid air cells:  Normal as visualized.  No mastoid effusion.      Impression:         1. No acute findings.    2. Non-acute findings are described above.    THIS DOCUMENT HAS BEEN ELECTRONICALLY SIGNED BY EDDIE HAZEL MD    CT Head Without Contrast [[id number]] Collected:  08/07/17 0842     Updated:  08/07/17 0903    Narrative:       EXAMINATION: CT HEAD WO CONTRAST-08/06/2017:      INDICATION: Stroke Post tPA Administration - Perform 24 Hours After TPA  Infusion; I63.9-Cerebral infarction, unspecified; I48.0-Paroxysmal  atrial fibrillation; E85.8-Other amyloidosis; N28.9-Disorder of kidney  and ureter, unspecified; Z74.09-Other reduced mobility; Z74.09-Other  reduced mobility, post TPA.     TECHNIQUE: Multiple axial CT imaging was obtained of the head from the  skull base to the skull vertex without the administration of intravenous  contrast.     The radiation dose reduction device was turned on for each scan per the  ALARA (As Low as Reasonably Achievable) protocol.      COMPARISON: NONE.     FINDINGS: Atrophy is seen of the brain with extensive chronic small  vessel ischemic changes seen of the periventricular and subcortical  white matter. No hemorrhage or hydrocephalus. No mass, mass effect, or  midline shift. The bony structures are unremarkable. The visualized  paranasal sinuses are clear.       Impression:       Chronic changes identified within the brain with no evidence  of acute intracranial abnormality status post TPA.     D:  08/07/2017  E:  08/07/2017           This report was finalized on 8/7/2017 9:01 AM by Dr. Alena Lam MD.       XR Chest 1 View [[id number]] Collected:  08/08/17 0945     Updated:  08/08/17 0945    Narrative:       EXAMINATION: XR CHEST 1 VW-      INDICATION: I63.9-Cerebral infarction, unspecified; I48.0-Paroxysmal  atrial fibrillation; E85.8-Other amyloidosis; N28.9-Disorder of kidney  and ureter, unspecified; Z74.09-Other reduced mobility;  R41.841-Cognitive communication deficit.      COMPARISON: 08/05/2017 portable chest.     FINDINGS: There is mild new patchy disease in left lung base. Lungs  elsewhere appear clear. No edema, effusion or pneumothorax is seen.           Impression:       Mild new left basilar pulmonary interstitial disease.     D:  08/08/2017  E:  08/08/2017             ASSESSMENT & PLAN:    1. Amyloidosis  2. Autonomic dysfunction  3. Cerebrovascular accident    Discussion: I concur with Dr. Oliver's assessment that there is significant risk from anticoagulating her.  However, after lengthy discussion with the family and the patient yesterday, the greatest fear is that she would have a debilitating stroke that nonetheless keeps her alive more so then a more traumatic bleed that is more likely to be lethal.  This is a very difficult Catch-22 to say the least and I would be dylan with her dosing.  Pharmacy is going to start this but I would start her at a low dose 2 mg which she had been on in the past.  I also concur  with Dr. Oliver that resumption of middle drain might be helpful with the autonomic dysfunction and orthostatic hypotension.  On the other hand, she is very weak and I'm not sure she's been a be getting up very much from this point on and hence the risk of falls should be less if she is more bedbound.  The family understands the risk benefit ratio his very narrow in which ever direction we go and is thoroughly at peace with best supportive care with palliative care and hospice.  I will check back Friday.                  Messi Pantoja MD    8/8/2017

## 2017-08-08 NOTE — PROGRESS NOTES
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 fib RVR    Subjective: The patient is complaining of a little nausea this morning she denies shortness of breath or chest pain.  The patient's family is decided to put her on warfarin.  They understand the risks with her following problems    Past medical, surgical, social and family history reviewed in the patient's electronic medical record.         Vital Sign Min/Max for last 24 hours  Temp  Min: 97 °F (36.1 °C)  Max: 98.6 °F (37 °C)   BP  Min: 59/40  Max: 115/68   Pulse  Min: 35  Max: 152   Resp  Min: 14  Max: 20   SpO2  Min: 84 %  Max: 100 %   No Data Recorded      Intake/Output Summary (Last 24 hours) at 08/08/17 0708  Last data filed at 08/08/17 0600   Gross per 24 hour   Intake          1799.28 ml   Output               50 ml   Net          1749.28 ml             Current Facility-Administered Medications:   •  acetaminophen (TYLENOL) tablet 650 mg, 650 mg, Oral, Q4H PRN, 650 mg at 08/07/17 0809 **OR** acetaminophen (TYLENOL) suppository 650 mg, 650 mg, Rectal, Q4H PRN, Maryuri V. Case, DO  •  acyclovir (ZOVIRAX) capsule 400 mg, 400 mg, Oral, BID, Maryuri V. Case, DO, 400 mg at 08/07/17 1837  •  albumin human 5 % bottle  - ADS Override Pull, , , ,   •  aluminum-magnesium hydroxide-simethicone (MAALOX MAX) 400-400-40 MG/5ML suspension 7.5 mL, 7.5 mL, Oral, Q4H PRN, Maryuri V. Case, DO, 7.5 mL at 08/07/17 1152  •  aspirin tablet 325 mg, 325 mg, Oral, Daily, 325 mg at 08/07/17 0808 **OR** aspirin suppository 300 mg, 300 mg, Rectal, Daily, Maryuri V. Case, DO  •  atorvastatin (LIPITOR) tablet 80 mg, 80 mg, Oral, Nightly, Maryuri V. Case, DO, 80 mg at 08/07/17 2004  •  diltiaZEM (CARDIZEM) 125mg/125 mL infusion, 5-15 mg/hr, Intravenous, Titrated, ERIC Harris, Last Rate: 2 mL/hr at 08/08/17 0430, 2 mg/hr at 08/08/17 0430  •  docusate sodium (COLACE) capsule 100  mg, 100 mg, Oral, BID PRN, Kel Oliver MD  •  gabapentin (NEURONTIN) capsule 300 mg, 300 mg, Oral, Nightly PRN, Paula Comer APRN, 300 mg at 08/07/17 0249  •  magnesium sulfate 4g/100mL (PREMIX) infusion, 4 g, Intravenous, Once, Paula oCmer APRN  •  metoprolol tartrate (LOPRESSOR) tablet 25 mg, 25 mg, Oral, Q12H, Maryuri V. Case, DO, Stopped at 08/07/17 0900  •  ondansetron (ZOFRAN) injection 4 mg, 4 mg, Intravenous, Q6H PRN, Maryuri V. Case, DO, 4 mg at 08/07/17 0809  •  opium 10 MG/ML (1%) tincture 6 mg, 6 mg, Oral, Q6H PRN, Messi Pantoja MD, 6 mg at 08/07/17 1841  •  pantoprazole (PROTONIX) EC tablet 40 mg, 40 mg, Oral, Q AM, Geetha Godinez, APRN, 40 mg at 08/08/17 0557  •  polyethylene glycol (MIRALAX) powder 17 g, 17 g, Oral, Daily PRN, Kel Oliver MD  •  potassium chloride (MICRO-K) CR capsule 40 mEq, 40 mEq, Oral, PRN **OR** potassium chloride (KLOR-CON) packet 40 mEq, 40 mEq, Oral, PRN, 40 mEq at 08/08/17 0650 **OR** potassium chloride 10 mEq in 100 mL IVPB, 10 mEq, Intravenous, Q1H PRN, ERIC Katz  •  promethazine (PHENERGAN) tablet 12.5 mg, 12.5 mg, Oral, Q6H PRN **OR** promethazine (PHENERGAN) injection 12.5 mg, 12.5 mg, Intravenous, Q6H PRN, David Garcia MD, 12.5 mg at 08/06/17 7619  •  QUEtiapine (SEROquel) tablet 25 mg, 25 mg, Oral, Nightly, Geetha Irwin MD, 25 mg at 08/07/17 2214  •  sodium chloride 0.9 % flush 1-10 mL, 1-10 mL, Intravenous, PRN, Maryuri VAntonia Veliz DO  •  sodium chloride 0.9 % infusion, 50 mL/hr, Intravenous, Continuous, Kel Oliver MD, Last Rate: 50 mL/hr at 08/07/17 1837, 50 mL/hr at 08/07/17 1837  •  sulfamethoxazole-trimethoprim (BACTRIM DS,SEPTRA DS) 800-160 MG per tablet 160 mg, 1 tablet, Oral, Once per day on Mon Wed Fri, Maryuri Veliz DO, 160 mg at 08/07/17 0808    Physical Exam: General  thin white female setting at 75° angle does not look dyspneic        HEENT: No JVP       Respiratory:  Equal bilateral symmetrical expansion  decreased breath sounds in the left lung base       Cardiovascular: Tachycardic and irregular without murmur       GI:        Lower Extremities:        Neuro: Cranial nerves II through XII appear grossly normal       Skin:  Warm and dry with no edema to palpation       Psych: Pleasant affect    Results Review: I reviewed the patient's telemetry and it looks like the patient's having intermittent episodes of sinus rhythm .  According to the nurse the patient's diltiazem had to be reduced because even low-dose drop her pressure and she was given another dose of IV dig    Radiology Results:  Imaging Results (last 72 hours)     Procedure Component Value Units Date/Time    XR Chest 1 View [[id number]]  (Abnormal) Collected:  08/05/17 1725     Updated:  08/05/17 1901    Narrative:       EXAM:    XR Chest, 1 View    CLINICAL HISTORY:    77 years old, female; Signs and symptoms; Other: Stroke protocol; Additional   info: Acute stroke protocol (onset < 12 hrs)    TECHNIQUE:    Frontal view of the chest.    EXAM DATE/TIME:    8/5/2017 5:25 PM    COMPARISON:    CR - XR CHEST 2 VW 3/21/2017 1:05:24 PM    FINDINGS:    No focal pulmonary consolidation is demonstrated.      No significant obscuration of the lateral costophrenic angles is demonstrated.      No significant vascular congestion is demonstrated.      There is scattered pulmonary scarring bilaterally.      Visualized cardiac silhouette size appears within normal limits.        There are atherosclerotic calcifications in the thoracic aorta.      Impression:         No acute pulmonary process is demonstrated.    THIS DOCUMENT HAS BEEN ELECTRONICALLY SIGNED BY JOIE MONROE MD    MRI Brain Without Contrast [[id number]]  (Abnormal) Collected:  08/05/17 1935     Updated:  08/05/17 6306    Narrative:       EXAM:    MR Head Without Intravenous Contrast    CLINICAL HISTORY:    77 years old, female; Other amyloidosis; Paroxysmal atrial fibrillation;   Cerebral infarction,  unspecified; Disorder of kidney and ureter, unspecified;   Signs and symptoms; Hemiplegia and hemiparesis and speech disturbance; Aphasia;   Right side, dominance unknown; Patient HX: Right side weakness, transient   aphasia woman with amyloidosis recent increased weakness, getting chemotherapy   for her amyloidosis, was in her usual health's this afternoon when about 4: 30   while standing in the kitchen she suddenly became less responsive and leaned to   the right. Family notes her speech was garbled and she could not follow   commands. She has a flaccid right arm but moved her left side well. After about   10 minutes her speech improved but she remained weak on the right and in the   emergency room here was noted to have both right upper extremity weakness and   neglect. Patient was not really aware of these problems and reports she   remembers some of what happened. She has had some falls recently. No surgeries   on head multiple myeloma    TECHNIQUE:    Magnetic resonance images of the head/brain without intravenous contrast in   multiple planes.    EXAM DATE/TIME:    8/5/2017 7:35 PM    COMPARISON:    MRI brain 12/27/16    FINDINGS:    There is prominent amount of high signal in the bilateral white matter on   FLAIR imaging which may represent chronic small vessel ischemic disease in the   appropriate clinical setting.      Probable small old infarction in LEFT cerebellum.      Evaluation of the brain demonstrates no other convincing areas of abnormal   signal intensity.      There is moderate cerebral cortical atrophy.  There is mild fullness of   posterior aspect of lateral ventricles.        Visualized paranasal sinuses and mastoid air cells demonstrate no significant   opacification.      Impression:         No definite acute intracranial process is demonstrated.  Probable prominent   chronic ischemic changes as discussed above.    THIS DOCUMENT HAS BEEN ELECTRONICALLY SIGNED BY JOIE MONROE MD    CT Head  Without Contrast Stroke Protocol [[id number]] Collected:  08/05/17 1823     Updated:  08/06/17 0917    Narrative:       EXAMINATION: CT HEAD WO CONTRAST  - 08/05/2017     INDICATION: Stroke protocol.     TECHNIQUE: CT scan of the head was performed at 5 mm intervals.     The radiation dose reduction device was turned on for each scan per the  ALARA (As Low as Reasonably Achievable) protocol.     COMPARISON: 03/21/2017.      FINDINGS: There is no intracranial mass. There is no hemorrhage. There  is no midline shift or extra-axial fluid collection. There is central or  cortical atrophy.       Impression:       Central and cortical atrophy without acute finding.     Time of the exam:  1728 hours  Report sent to ER: 1736 hours     DICTATED:     08/05/2017  EDITED:         08/05/2017     This report was finalized on 8/6/2017 9:15 AM by Dr. Johnnie Sen MD.       CT Cerebral Perfusion With & Without Contrast [[id number]] Collected:  08/05/17 1817     Updated:  08/06/17 0917    Narrative:       EXAMINATION: CT CEREBRAL PERFUSION W/WO CONTRAST - 08/05/2017     INDICATION: Evaluate for stroke.      TECHNIQUE: CT cerebral perfusion imaging was performed with data  acquisition regarding blood volume, blood flow, mean transit time, and  time to drain.      The radiation dose reduction device was turned on for each scan per the  ALARA (As Low as Reasonably Achievable) protocol.     COMPARISON: CT scan of the same day.     FINDINGS: There is no reduction in cerebral flow or asymmetry. There is  some increased blood volume in the left posterior parietal region but  there is no evidence of areas of diminished perfusion with normal blood  volume. Therefore there are no areas of penumbra.       Impression:       There is no evidence of reversible neural ischemia.     DICTATED:     08/05/2017  EDITED:         08/05/2017        This report was finalized on 8/6/2017 9:15 AM by Dr. Johnnie Sen MD.       CT Head Without Contrast  [[id number]]  (Abnormal) Collected:  08/06/17 2201     Updated:  08/06/17 2248    Narrative:       EXAM:  CT Head Without Intravenous Contrast    CLINICAL HISTORY:  77 years old, female; Other amyloidosis; Paroxysmal atrial fibrillation;   Cerebral infarction, unspecified; Disorder of kidney and ureter, unspecified;   Other reduced mobility; Other reduced mobility; Signs and symptoms; Other: See   notes; Patient HX: Most recent prior CT hasn't been finalized and cannot be   faxed at this time-images are available; Additional info: Nausea and vomiting    TECHNIQUE:  Axial computed tomography images of the head/brain without intravenous   contrast.  This CT exam was performed using one or more of the following dose   reduction techniques:  automated exposure control, adjustment of the mA and/or   kV according to patient size, and/or use of iterative reconstruction technique.    COMPARISON:  CT HEAD WO CONTRAST 8/6/2017 7:08:07 PM    FINDINGS:  Brain:  Scattered areas of hypoattenuation, likely chronic small vessel   ischemic change, demyelination, or gliosis.  There is parenchymal atrophy.  Ventricles:  Normal.  Bones/joints:  Normal.  No acute fracture.  Soft tissues:  Normal.  Vasculature:  Atherosclerotic vascular calcifications.  Sinuses:  Minimal ethmoid sinus disease.  Mastoid air cells:  Normal as visualized.  No mastoid effusion.      Impression:         1. No acute findings.    2. Non-acute findings are described above.    THIS DOCUMENT HAS BEEN ELECTRONICALLY SIGNED BY EDDIE HAZEL MD    CT Head Without Contrast [[id number]] Collected:  08/07/17 0842     Updated:  08/07/17 0903    Narrative:       EXAMINATION: CT HEAD WO CONTRAST-08/06/2017:      INDICATION: Stroke Post tPA Administration - Perform 24 Hours After TPA  Infusion; I63.9-Cerebral infarction, unspecified; I48.0-Paroxysmal  atrial fibrillation; E85.8-Other amyloidosis; N28.9-Disorder of kidney  and ureter, unspecified; Z74.09-Other reduced mobility;  Z74.09-Other  reduced mobility, post TPA.     TECHNIQUE: Multiple axial CT imaging was obtained of the head from the  skull base to the skull vertex without the administration of intravenous  contrast.     The radiation dose reduction device was turned on for each scan per the  ALARA (As Low as Reasonably Achievable) protocol.     COMPARISON: NONE.     FINDINGS: Atrophy is seen of the brain with extensive chronic small  vessel ischemic changes seen of the periventricular and subcortical  white matter. No hemorrhage or hydrocephalus. No mass, mass effect, or  midline shift. The bony structures are unremarkable. The visualized  paranasal sinuses are clear.       Impression:       Chronic changes identified within the brain with no evidence  of acute intracranial abnormality status post TPA.     D:  08/07/2017  E:  08/07/2017           This report was finalized on 8/7/2017 9:01 AM by Dr. Alena Lam MD.       XR Chest 1 View [[id number]] Updated:  08/08/17 0433          EKG:     ECHO:     Tele:  Patient having A. fib RVR but also what looks like some intermittent sinus rhythm    Assessment/Plan: Patient had a CVA and had A. fib RVR-her blood pressure with her autonomic dysfunction is not tolerating diltiazem.  I will put her on daily digoxin.  Family has agreed to warfarin therapy.  EKG to verify with the patient's having sinus rhythm    Blane Peters MD  08/08/17  7:08 AM

## 2017-08-08 NOTE — THERAPY TREATMENT NOTE
"Acute Care - Physical Therapy Treatment Note  [facility]     Patient Name: Sowmya Beckett  : 1940  MRN: [id number]  Today's Date: 2017  Onset of Illness/Injury or Date of Surgery Date: 17  Date of Referral to PT: 17  Referring Physician: DO Jose Alfredo    Admit Date: 2017    Visit Dx:    ICD-10-CM ICD-9-CM   1. Cerebrovascular accident (CVA), unspecified mechanism I63.9 434.91   2. Paroxysmal atrial fibrillation I48.0 427.31   3. 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 Renal insufficiency N28.9 593.9   5. Impaired functional mobility, balance, gait, and endurance Z74.09 V49.89   6. Impaired mobility and ADLs Z74.09 799.89   7. Cognitive communication disorder R41.841 315.32     Patient Active Problem List   Diagnosis   • Atrial fibrillation and flutter   • History of melanoma   • Failure to thrive in adult   • Dehydration   • Hyperglycemia   • Peripheral autonomic neuropathy due to underlying disease   • AL amyloidosis   • Skin lesion of right ear   • Stroke (S/P tPA). 17   • PAF (Prior ablation) with RVR this admission. Off anticoagulation   • AL amyloidosis with cardiac ,renal, colon involvement with autonomic neuropathy               Adult Rehabilitation Note       17 1309 17 1223       Rehab Assessment/Intervention    Discipline (P)  physical therapist  -KR physical therapist  -DM     Document Type (P)  therapy note (daily note)  -KR therapy note (daily note)  -DM     Subjective Information (P)  agree to therapy;no complaints  -KR agree to therapy;complains of;pain;nausea/vomiting   had Zofran;still nauseated;wants to amb \"as far as possible\"  -DM     Patient Effort, Rehab Treatment (P)  good  -KR good  -DM     Symptoms Noted During/After Treatment (P)  fatigue  -KR increased pain   incr. nausea & abdom discomfort  -DM     Precautions/Limitations (P)  fall precautions  -KR fall precautions;other (see comments)   incont.(use Depends); adult FTT  -DM     Recorded by [CYNDI] Kathy " LEONORA Rob, PT Student [DM] Giselle Thornton, PT     Vital Signs    Pre Systolic BP Rehab (P)  115  -  -DM     Pre Treatment Diastolic BP (P)  76  -KR 75  -DM     Post Systolic BP Rehab (P)  108  -KR 89  -DM     Post Treatment Diastolic BP (P)  57  -KR 57  -DM     Pretreatment Heart Rate (beats/min) (P)  53  -KR 61  -DM     Posttreatment Heart Rate (beats/min) (P)  51  -KR 85  -DM     Pre SpO2 (%) (P)  98  -  -DM     O2 Delivery Pre Treatment (P)  room air  -KR room air  -DM     Intra SpO2 (%)  92  -DM     O2 Delivery Intra Treatment  room air  -DM     Post SpO2 (%) (P)  94  -  -DM     O2 Delivery Post Treatment (P)  room air  -KR room air  -DM     Pre Patient Position (P)  Supine  -KR Supine  -DM     Intra Patient Position (P)  Supine  -KR Standing  -DM     Post Patient Position (P)  Supine  -KR Sitting  -DM     Recorded by [KR] Kathy Rob, PT Student [DM] Giselle Thornton, PT     Pain Assessment    Pain Assessment (P)  0-10  -KR 0-10  -DM     Pain Score (P)  0  -KR 7  -DM     Post Pain Score (P)  0  -KR 8  -DM     Pain Type  Acute pain  -DM     Pain Location  Abdomen  -DM     Pain Descriptors  Aching  -DM     Pain Frequency  Constant/continuous  -DM     Patient's Stated Pain Goal  No pain  -DM     Pain Intervention(s)  Repositioned;Ambulation/increased activity  -DM     Response to Interventions  Tolerated  -DM     Recorded by [KR] Kathy Rob, PT Student [DM] Giselle Thornton, PT     Cognitive Assessment/Intervention    Current Cognitive/Communication Assessment (P)  impaired  -KR impaired  -DM     Orientation Status (P)  oriented to;person  -KR oriented to;person;place   slow response time;stated mo.,but not yr.or location  -DM     Follows Commands/Answers Questions (P)  able to follow single-step instructions;75% of the time;needs cueing;needs increased time  -KR 75% of the time;100% of the time;able to follow single-step instructions;needs cueing;needs increased time;needs repetition  -DM      Personal Safety (P)  mild impairment;decreased awareness, need for assist;decreased awareness, need for safety  -KR mild impairment;decreased awareness, need for assist;decreased awareness, need for safety;decreased insight to deficits  -DM     Personal Safety Interventions (P)  supervised activity  -KR fall prevention program maintained;gait belt;nonskid shoes/slippers when out of bed  -DM     Recorded by [KR] Kathy Rob, PT Student [DM] Giselle Thornton, PT     Bed Mobility, Assessment/Treatment    Bed Mobility, Assistive Device  head of bed elevated;bed rails  -DM     Bed Mobility, Scoot/Bridge, [place]  minimum assist (75% patient effort);verbal cues required  -DM     Bed Mob, Supine to Sit, [place] (P)  not tested  -KR minimum assist (75% patient effort);verbal cues required  -DM     Bed Mob, Sit to Supine, [place] (P)  not tested  -KR      Bed Mobility, Safety Issues  decreased use of arms for pushing/pulling  -DM     Bed Mobility, Impairments  strength decreased;pain  -DM     Bed Mobility, Comment (P)  RN deferred OOB activity d/t pt's orthostatic hypotension  -KR cues for hand placement,utilizing bedrail  -DM     Recorded by [KR] Kathy Rob, PT Student [DM] Giselle Thornton, PT     Transfer Assessment/Treatment    Transfers, Sit-Stand [place] (P)  not appropriate to assess;not tested  -KR minimum assist (75% patient effort);verbal cues required   pt incont.BM w/stand;toBR (2ndBM);NEW depends placed  -DM     Transfers, Stand-Sit [place] (P)  not appropriate to assess;not tested  -KR minimum assist (75% patient effort);verbal cues required  -DM     Transfers, Sit-Stand-Sit, Assist Device  rolling walker  -DM     Toilet Transfer, [place]  moderate assist (50% patient effort);verbal cues required   low commode  -DM     Toilet Transfer, Assistive Device  other (see comments);rolling walker   grab bar on R  -DM     Transfer, Safety Issues  sequencing ability  decreased;weight-shifting ability decreased;loses balance backward  -DM     Transfer, Impairments  strength decreased;impaired balance;pain  -DM     Transfer, Comment  cues for hand placement, seq. for hygiene(req.>8 min.self-hygiene  -DM     Recorded by [KR] Kathy Rob, PT Student [DM] Giselle Thornton, PT     Gait Assessment/Treatment    Gait, [place] Level (P)  not appropriate to assess;not tested  -KR minimum assist (75% patient effort);verbal cues required   has IV;toBR (incont.on seat;cleaned),then friedman  -DM     Gait, Assistive Device  rolling walker  -DM     Gait, Distance (Feet)  180   c/o fat. from toileting & no sleep last night  -DM     Gait, Gait Pattern Analysis  swing-through gait  -DM     Gait, Gait Deviations  vince decreased;decreased heel strike;narrow base;step length decreased;weight-shifting ability decreased;ataxia   drifts L  -DM     Gait, Safety Issues  sequencing ability decreased;step length decreased  -DM     Gait, Impairments  strength decreased;impaired balance;pain  -DM     Gait, Comment  incr. nausea,abdom pain,fatigue;cues for trunk ext,focusing ahead,PLB, INCR. STEP length  -DM     Recorded by [KR] Kathy Rob, PT Student [DM] Giselle Thornton, PT     Motor Skills/Interventions    Additional Documentation  Balance Skills Training (Group)  -DM     Recorded by  [VINEET] Giselle Thornton, PT     Balance Skills Training    Sitting-Level of Assistance  Close supervision  -DM     Sitting-Balance Support  Feet supported  -DM     Standing-Level of Assistance  Contact guard  -DM     Static Standing Balance Support  assistive device  -DM     Standing-Balance Activities  Weight Shift A-P;Weight Shift R-L  -DM     Standing Balance # of Minutes  4   for hygiene  -DM     Gait Balance-Level of Assistance  Minimum assistance   has IV,TELE  -DM     Gait Balance Support  assistive device  -DM     Gait Balance Activities  scanning environment R/L;side-stepping  -DM     Recorded by  [VINEET] Giselle LEWIS  "Norberto PT     Therapy Exercises    Bilateral Lower Extremities (P)  AAROM:;supine;ankle pumps/circles;hip abduction/adduction;heel slides;SAQ;SLR;10 reps  -KR AROM:;10 reps;sitting;standing;ankle pumps/circles;glut sets;heel slides;hip abduction/adduction;hip ER;hip flexion;hip IR;LAQ;quad sets   HS sets; MIP X 10  -DM     Bilateral Upper Extremity (P)  AROM:;elbow flexion/extension;shoulder abduction/adduction;shoulder extension/flexion;hand pumps;10 reps  -KR AROM:;10 reps;sitting;elbow flexion/extension;shoulder abduction/adduction;shoulder extension/flexion  -DM     Recorded by [KR] Kathy Rob, PT Student [DM] Giselle Thornton, PT     Positioning and Restraints    Pre-Treatment Position (P)  in bed  -KR in bed  -DM     Post Treatment Position (P)  bed  -KR chair  -DM     In Bed (P)  supine;call light within reach;encouraged to call for assist;with family/caregiver;side rails up x2;RUE elevated;LUE elevated  -KR      In Chair  notified nsg;reclined;call light within reach;encouraged to call for assist;exit alarm on;with other staff;waffle cushion;legs elevated   'FREEZING\";Req.warm blanket (done)  -DM     Recorded by [KR] Kathy Rob, PT Student [DM] Giselle Thornton, PT       User Key  (r) = Recorded By, (t) = Taken By, (c) = Cosigned By    Initials Name Effective Dates    VINEET Thornton, PT 06/19/15 -     CYNDI Rob, PT Student 05/30/17 -                 IP PT Goals       08/08/17 1508 08/07/17 1340 08/06/17 0817    Bed Mobility PT LTG    Bed Mobility PT LTG, Date Established   08/06/17  -LS    Bed Mobility PT LTG, Time to Achieve   2 wks  -LS    Bed Mobility PT LTG, Activity Type   supine to sit/sit to supine  -LS    Bed Mobility PT LTG, [place] Level   independent  -LS    Bed Mobility PT LTG, Outcome (P)  goal ongoing  -KR goal ongoing  -DM     Transfer Training PT LTG    Transfer Training PT LTG, Date Established   08/06/17  -LS    Transfer Training PT LTG, Time to Achieve   2 wks  -LS "    Transfer Training PT LTG, Activity Type   sit to stand/stand to sit  -LS    Transfer Training PT LTG, [place] Level   conditional independence  -LS    Transfer Training PT LTG, Assist Device   walker, rolling  -LS    Transfer Training PT LTG, Outcome (P)  goal ongoing  -KR goal ongoing  -DM     Gait Training PT LTG    Gait Training Goal PT LTG, Date Established   08/06/17  -LS    Gait Training Goal PT LTG, Time to Achieve   2 wks  -LS    Gait Training Goal PT LTG, [place] Level   supervision required  -LS    Gait Training Goal PT LTG, Assist Device   walker, rolling  -LS    Gait Training Goal PT LTG, Distance to Achieve   300  -LS    Gait Training Goal PT LTG, Outcome (P)  goal ongoing  -KR goal ongoing  -DM     Stair Training PT LTG    Stair Training Goal PT LTG, Date Established   08/06/17  -LS    Stair Training Goal PT LTG, Time to Achieve   2 wks  -LS    Stair Training Goal PT LTG, Number of Steps   5  -LS    Stair Training Goal PT LTG, [place] Level   supervision required  -LS    Stair Training Goal PT LTG, Assist Device   1 handrail  -LS    Stair Training Goal PT LTG, Outcome (P)  goal ongoing  -KR goal ongoing  -DM       User Key  (r) = Recorded By, (t) = Taken By, (c) = Cosigned By    Initials Name Provider Type    VINEET Thornton, PT Physical Therapist    SHEA Granda, PT Physical Therapist    CYNDI Rob, PT Student PT Student          Physical Therapy Education     Title: PT OT SLP Therapies (Done)     Topic: Physical Therapy (Done)     Point: Mobility training (Done)    Learning Progress Summary    Learner Readiness Method Response Comment Documented by Status   Patient Acceptance E VU  KR 08/08/17 1508 Done    Acceptance E DU  AP 08/08/17 0541 Done    Eager E,D NR  DM 08/07/17 1340 Active    Acceptance E VU  JW 08/07/17 0717 Done    Acceptance E,D NR  LS 08/06/17 0829 Active    Acceptance E MELISSA  JW 08/06/17 0745 Done    Acceptance E MICHELLE TORRES 08/06/17 0040 Done   Family  Acceptance E DU  AP 08/08/17 0541 Done               Point: Home exercise program (Done)    Learning Progress Summary    Learner Readiness Method Response Comment Documented by Status   Patient Acceptance E VU  KR 08/08/17 1508 Done    Acceptance E DU  AP 08/08/17 0541 Done    Eager E,D NR   08/07/17 1340 Active    Acceptance E VU  JW 08/07/17 0717 Done   Family Acceptance E DU  AP 08/08/17 0541 Done               Point: Body mechanics (Done)    Learning Progress Summary    Learner Readiness Method Response Comment Documented by Status   Patient Acceptance E VU  KR 08/08/17 1508 Done    Acceptance E DU  AP 08/08/17 0541 Done    Eager E,D NR   08/07/17 1340 Active    Acceptance E VU  JW 08/07/17 0717 Done    Acceptance E,D NR   08/06/17 0829 Active   Family Acceptance E DU  AP 08/08/17 0541 Done               Point: Precautions (Done)    Learning Progress Summary    Learner Readiness Method Response Comment Documented by Status   Patient Acceptance E VU  KR 08/08/17 1508 Done    Acceptance E DU  AP 08/08/17 0541 Done    Eager E,D NR   08/07/17 1340 Active    Acceptance E VU  JW 08/07/17 0717 Done    Acceptance E,D NR   08/06/17 0829 Active    Acceptance E VU  JW 08/06/17 0745 Done    Acceptance E VU,NR   08/06/17 0040 Done   Family Acceptance E DU  AP 08/08/17 0541 Done                      User Key     Initials Effective Dates Name Provider Type Discipline     06/19/15 -  Giselle Thornton, PT Physical Therapist PT     06/19/15 -  Keira Granda, PT Physical Therapist PT     02/14/17 -  Allison Ruvalcaba, RN Registered Nurse Nurse     06/16/16 -  Pebbles Hamm, RN Registered Nurse Nurse     06/10/16 -  Roseline Rodriguez, RN Registered Nurse Nurse     05/30/17 -  Kathy Rob, PT Student PT Student PT                    PT Recommendation and Plan  Anticipated Discharge Disposition: home with assist, home with home health  PT Frequency: daily  Plan of Care Review  Plan Of Care Reviewed  With: (P) patient  Progress: (P) no change  Outcome Summary/Follow up Plan: (P) Pt participated in BLE and BUE exercises in bed. Pt unable to participate in OOB activities due to orthostatic hypotension. Pt limited by increased fatigue.            Outcome Measures       08/08/17 1309 08/07/17 1223 08/06/17 0745    How much help from another person do you currently need...    Turning from your back to your side while in flat bed without using bedrails? (P)  3  -KR 3  -DM     Moving from lying on back to sitting on the side of a flat bed without bedrails? (P)  3  -KR 3  -DM     Moving to and from a bed to a chair (including a wheelchair)? (P)  3  -KR 3  -DM     Standing up from a chair using your arms (e.g., wheelchair, bedside chair)? (P)  3  -KR 3  -DM     Climbing 3-5 steps with a railing? (P)  2  -KR 2  -DM     To walk in hospital room? (P)  2  -KR 3  -DM     AM-PAC 6 Clicks Score (P)  16  -KR 17  -DM     How much help from another is currently needed...    Putting on and taking off regular lower body clothing?   2  -CL    Bathing (including washing, rinsing, and drying)   2  -CL    Toileting (which includes using toilet bed pan or urinal)   2  -CL    Putting on and taking off regular upper body clothing   3  -CL    Taking care of personal grooming (such as brushing teeth)   3  -CL    Eating meals   4  -CL    Score   16  -CL    Modified [place] Scale    Modified [place] Scale  3 - Moderate disability.  Requiring some help, but able to walk without assistance.  -DM 3 - Moderate disability.  Requiring some help, but able to walk without assistance.  -CL    Functional Assessment    Outcome Measure Options (P)  AM-PAC 6 Clicks Basic Mobility (PT)  -KR AM-PAC 6 Clicks Basic Mobility (PT);Modified [place]  -DM AM-PAC 6 Clicks Daily Activity (OT)  -CL      08/06/17 0743          How much help from another person do you currently need...    Turning from your back to your side while in flat bed without using bedrails? 3  -LS       Moving from lying on back to sitting on the side of a flat bed without bedrails? 3  -LS      Moving to and from a bed to a chair (including a wheelchair)? 3  -LS      Standing up from a chair using your arms (e.g., wheelchair, bedside chair)? 3  -LS      Climbing 3-5 steps with a railing? 2  -LS      To walk in hospital room? 3  -LS      AM-PAC 6 Clicks Score 17  -LS      Modified [place] Scale    Modified Jennifer Scale 3 - Moderate disability.  Requiring some help, but able to walk without assistance.  -LS      Functional Assessment    Outcome Measure Options AM-PAC 6 Clicks Basic Mobility (PT);Modified [place]  -LS        User Key  (r) = Recorded By, (t) = Taken By, (c) = Cosigned By    Initials Name Provider Type    VINEET Thornton, PT Physical Therapist    LS Keira Granda, PT Physical Therapist    SHAHBAZ Swenson, OT Occupational Therapist    CYNDI Rob, PT Student PT Student           Time Calculation:         PT Charges       08/08/17 1510          Time Calculation    Start Time (P)  1309  -KR      PT Received On (P)  08/08/17  -KR      PT Goal Re-Cert Due Date (P)  08/16/17  -KR      Time Calculation- PT    Total Timed Code Minutes- PT (P)  18 minute(s)  -KR        User Key  (r) = Recorded By, (t) = Taken By, (c) = Cosigned By    Initials Name Provider Type    CYNDI Rob, PT Student PT Student          Therapy Charges for Today     Code Description Service Date Service Provider Modifiers Qty    47051593152 HC PT THER PROC EA 15 MIN 8/8/2017 Kathy Rob, PT Student GP 1          PT G-Codes  Outcome Measure Options: (P) AM-PAC 6 Clicks Basic Mobility (PT)    Delia Rob PT Student  8/8/2017

## 2017-08-08 NOTE — CONSULTS
"Pharmacy Consult  -  Warfarin    Sowmya Beckett is a  77 y.o. female   Height - 62\" (157.5 cm)  Weight - 111 lb (50.3 kg)    Consulting Provider: - Blane Peters MD  Indication: - Atrial Fibrillation, Embolic Stroke  Goal INR: -  2-3      Bridge Therapy: No         Drug-Drug Interactions with current regimen:   Aspirin - increased bleed risk   Sulfamethoxazole/trimethoprim - may increase INR    Warfarin Dosing During Admission:    Date  8/8           INR  1.1           Dose  2 mg                Labs:    Results from last 7 days   Lab Units 08/08/17  0856 08/08/17  0406 08/06/17  0557 08/05/17  1749 08/05/17  1733   INR  1.10 --  1.04 --  --    HEMOGLOBIN g/dL --  8.2* 9.8* 10.5* --    HEMOGLOBIN, POC g/dL --  --  --  --  10.9*   HEMATOCRIT % --  24.4* 28.8* 30.8* --    HEMATOCRIT POC % --  --  --  --  32*   PLATELETS 10*3/mm3 --  165 198 202 --      Results from last 7 days   Lab Units 08/08/17  0406 08/06/17  0557 08/05/17  1749   SODIUM mmol/L 138 135 134   POTASSIUM mmol/L 3.1* 4.0 4.0   CHLORIDE mmol/L 113* 101 99   CO2 mmol/L 19.0* 28.0 24.0   BUN mg/dL 14 26* 27*   CREATININE mg/dL 0.90 1.00 1.30   CALCIUM mg/dL 7.8* 8.9 9.5   BILIRUBIN mg/dL 1.0 1.0 0.9   ALK PHOS U/L 80 92 100   ALT (SGPT) U/L 9 12 12  12   AST (SGOT) U/L 20 19 24  24   GLUCOSE mg/dL 91 76 100     Diet Order   Procedures   • Diet Regular; Cardiac     Poor intake    Assessment/Plan:   Highly complicated and risky patient to anticoagulate given recent stroke as well as debility and falls. She has a history of being on 2 mg warfarin daily. Will start cautiously with this dose.  Pharmacy will monitor daily PT/INR closely for influences of sulfamethoxazole/trimethoprim and adjust dosing accordingly.     Thank you for this consult.  Brandyn Ruvalcaba IV, PharmD, BCPS  8/8/2017  1:40 PM          "

## 2017-08-08 NOTE — PLAN OF CARE
Problem: Patient Care Overview (Adult)  Goal: Plan of Care Review  Outcome: Ongoing (interventions implemented as appropriate)    08/08/17 1508   Coping/Psychosocial Response Interventions   Plan Of Care Reviewed With patient   Outcome Evaluation   Outcome Summary/Follow up Plan Pt participated in BLE and BUE exercises in bed. Pt unable to participate in OOB activities due to orthostatic hypotension. Pt limited by increased fatigue.    Patient Care Overview   Progress no change         Problem: Inpatient Physical Therapy  Goal: Bed Mobility Goal LTG- PT  Outcome: Ongoing (interventions implemented as appropriate)    08/06/17 0817 08/08/17 1508   Bed Mobility PT LTG   Bed Mobility PT LTG, Date Established 08/06/17 --    Bed Mobility PT LTG, Time to Achieve 2 wks --    Bed Mobility PT LTG, Activity Type supine to sit/sit to supine --    Bed Mobility PT LTG, [place] Level independent --    Bed Mobility PT LTG, Outcome --  goal ongoing       Goal: Transfer Training Goal 1 LTG- PT  Outcome: Ongoing (interventions implemented as appropriate)    08/06/17 0817 08/08/17 1508   Transfer Training PT LTG   Transfer Training PT LTG, Date Established 08/06/17 --    Transfer Training PT LTG, Time to Achieve 2 wks --    Transfer Training PT LTG, Activity Type sit to stand/stand to sit --    Transfer Training PT LTG, [place] Level conditional independence --    Transfer Training PT LTG, Assist Device walker, rolling --    Transfer Training PT LTG, Outcome --  goal ongoing       Goal: Gait Training Goal LTG- PT  Outcome: Ongoing (interventions implemented as appropriate)    08/06/17 0817 08/08/17 1508   Gait Training PT LTG   Gait Training Goal PT LTG, Date Established 08/06/17 --    Gait Training Goal PT LTG, Time to Achieve 2 wks --    Gait Training Goal PT LTG, [place] Level supervision required --    Gait Training Goal PT LTG, Assist Device walker, rolling --    Gait Training Goal PT LTG, Distance to Achieve 300 --     Gait Training Goal PT LTG, Outcome --  goal ongoing       Goal: Stair Training Goal LTG- PT  Outcome: Ongoing (interventions implemented as appropriate)    08/06/17 0817 08/08/17 1508   Stair Training PT LTG   Stair Training Goal PT LTG, Date Established 08/06/17 --    Stair Training Goal PT LTG, Time to Achieve 2 wks --    Stair Training Goal PT LTG, Number of Steps 5 --    Stair Training Goal PT LTG, [place] Level supervision required --    Stair Training Goal PT LTG, Assist Device 1 handrail --    Stair Training Goal PT LTG, Outcome --  goal ongoing

## 2017-08-08 NOTE — PROGRESS NOTES
Intensivist Note     8/8/2017  Hospital Day: 3      Ms. Sowmya Beckett, 77 y.o. female is followed for:  Principal Problem:    Stroke (S/P tPA). 8/5/17  Active Problems:    PAF (Prior ablation) with RVR this admission. Off anticoagulation    AL amyloidosis with cardiac ,renal, colon involvement with autonomic neuropathy       SUBJECTIVE     Subjective    77-year-old white female with history of amyloidosis with cardiac, renal, and colon involvement with autonomic neuropathy. Is treated with melphalan by Dr. Messi Pantoja. Was admitted 8/5/17 with right-sided weakness and expressive aphasia/dysarthria. Was felt to have had a CVA and underwent TPA infusion with resolution of most of her symptoms except for diffuse weakness. Has known history of paroxysmal atrial fibrillation and in fact has had an ablation in 2012. Was not on anticoagulation at admission because of the history of falls related to orthostasis and syncope. She improved post TPA although noncontrast CT scans of the chest never showed anything but evidence of chronic small vessel ischemic changes with atrophy.     The patient has had documented recurrent PAF with rapid ventricular rate this admission. Rate did however come under control with a diltiazem drip. Cardiology was asked to evaluate as they have seen her in the past. At the time of their evaluation she was no longer tolerating diltiazem because of her orthostatic hypotension and autonomic dysfunction. Diltiazem was therefore discontinued and she was placed on digoxin. Dr. Pantoja has also been consulted as he follows her for her amyloid therapy (melphalan). Dr. Pantoja feels that her amyloid is a terminal process, and after discussing it with the patient's family, they wish her to be a full DNR and go home with hospice without any further therapy of her amyloid.    The primary question in view of her recent stroke and PAF is whether or not to anticoagulate. Dr. Pantoja said he leans toward Coumadin or  "twice daily full dose Lovenox. I still remain concerned with any anticoagulant therapy because of her history of recurrent falls. She has been unstable in the past with orthostasis and syncopal episodes with recurrent falls bringing about my concern.       The patient's relevant past medical, surgical and social history were reviewed and updated in Epic as appropriate.    OBJECTIVE     BP 94/77  Pulse (!) 147  Temp 98.8 °F (37.1 °C) (Oral)   Resp 16  Ht 62\" (157.5 cm)  Wt 111 lb (50.3 kg)  SpO2 95%  BMI 20.3 kg/m2          Flowsheet Rows         First Filed Value    Admission Height  62\" (157.5 cm) Documented at 08/05/2017 1746    Admission Weight  111 lb (50.4 kg) Documented at 08/05/2017 1735        Intake & Output (last day)       08/07 0701 - 08/08 0700 08/08 0701 - 08/09 0700    P.O. 120     I.V. (mL/kg) 1679.3 (33.4) 131.6 (2.6)    Total Intake(mL/kg) 1799.3 (35.7) 131.6 (2.6)    Urine (mL/kg/hr) 50 (0)     Total Output 50      Net +1749.3 +131.6          Unmeasured Urine Occurrence 2 x     Unmeasured Stool Occurrence 6 x           Objective    Exam:  General Exam:  Frail elderly white female in NAD  HEENT: Pupils equal and reactive. Nose and throat clear.  Neck:                          Supple, no JVD, thyromegaly, or adenopathy  Lungs: Clear anteriorly and laterally.  Cardiovascular: Irregularly irregular rhythm with rates up to 144  Abdomen: Soft nontender without organomegaly or masses.   and rectal: Deferred.  Extremities: No cyanosis clubbing edema.  Neurologic:                 Diffusely weak    Chest X-Ray 8/8/17: Cardiomegaly, some normal. Right lung clear. Mild left lower lobe infiltrate/atelectasis more prominent than 8/5/17    INFUSIONS    Pharmacy to dose warfarin           Results from last 7 days  Lab Units 08/08/17  0406 08/06/17  0557 08/05/17  1749   WBC 10*3/mm3 4.57 4.72 5.11   HEMOGLOBIN g/dL 8.2* 9.8* 10.5*   HEMATOCRIT % 24.4* 28.8* 30.8*   PLATELETS 10*3/mm3 165 198 202 "       Results from last 7 days  Lab Units 08/08/17  0406 08/06/17  0557   SODIUM mmol/L 138 135   POTASSIUM mmol/L 3.1* 4.0   CHLORIDE mmol/L 113* 101   CO2 mmol/L 19.0* 28.0   BUN mg/dL 14 26*   CREATININE mg/dL 0.90 1.00   GLUCOSE mg/dL 91 76   CALCIUM mg/dL 7.8* 8.9       Results from last 7 days  Lab Units 08/08/17  0406 08/05/17  1749   MAGNESIUM mg/dL 1.4 2.0   PHOSPHORUS mg/dL 2.0*  --        Lab Results   Component Value Date    SEDRATE 2 08/08/2017     Lab Results   Component Value Date    .0 (H) 08/08/2017     Lab Results   Component Value Date    TROPONINI 0.014 08/07/2017     Lab Results   Component Value Date    TSH 1.548 08/08/2017     No results found for: LACTATE  No results found for: CORTISOL        I reviewed the patient's results, images and medication.    Assessment/Plan   ASSESSMENT      Principal Problem:    Stroke (S/P tPA). 8/5/17  Active Problems:    PAF (Prior ablation) with RVR this admission. Off anticoagulation    AL amyloidosis with cardiac ,renal, colon involvement with autonomic neuropathy      DISCUSSION: Digoxin has been added although there is a lot of variability in her atrial fibrillation rate. I am told she is very hypotensive when standing so she has not been getting out of bed. Order has been written for pharmacy to adjust Coumadin. This has been recommended by both cardiology and her oncologist Dr. Pantoja. Obviously there is a significant risk if she is allowed out of bed without being supported. (In view of her orthostasis and previous problems). I note that her home midodrine was never restarted so will start this today and it should help to some degree.    PLAN     1. Palliative care and hospice to see  2. Transfer the floor. We will asked the hospitalist to assume primary care  3. Pharmacy is adjusting Coumadin. If however she is going to mobilize by herself without support I would not anticoagulate and instead just accept the risk of a recurrent stroke. She was  having frequent falls prior to this hospitalization and from her risk-benefit ratio in a patient being sent home on hospice, I am not sure that the benefit is worth the risk.    Plan of care and goals reviewed with mulitdisciplinary team at daily rounds.    I discussed the patient's findings and my recommendations with patient, family and nursing staff    Time spent Critical care 30 min (It does not include procedure time).    Kel Oliver MD  Intensive Care Medicine  08/08/17 12:33 PM

## 2017-08-08 NOTE — SIGNIFICANT NOTE
13:00   Palliative Team Member Meeting  Attendance:    Dr. Salvador Gustafson, DO Amber Leos, ACHPN  Stella You,   Keira Haji, RN, PN, Director  Patria Napier, RN, PN  Shirley Barnes, [facility]  Concepción Ward, RN/ Hospice  Allison Paris, APRN, Hospice

## 2017-08-08 NOTE — PROGRESS NOTES
Continued Stay Note  [facility]     Patient Name: Sowmya Beckett  MRN: [id number]  Today's Date: 8/8/2017    Admit Date: 8/5/2017          Discharge Plan       08/08/17 5180    Case Management/Social Work Plan    Plan ?[facility] vs [facility]?    Additional Comments Hospice consult received.  Chart reviewed.  Admitted with CVA- Lt hemispheric ischemia.  Known AL Amyloidosis.  Refractory diarrhea.  Other co-morbidities.  Contacted Clarisse Agrawal RN CHPN, [facility] Liaison.  Given report on arrival to [facility].  [facility]iaison met with 3 sisters/spouse.  Discussed diagnosis, prognosis, current status and goals of care.  Responded to many questions.   /pt's family to discuss options and make decision re: disposition.  [facility]iaison and [facility]aison will follow to assist/facilitate access to hospice services.  If can be of further assist please contact.....ext [phone].              Discharge Codes     None            Concepción Ward RN

## 2017-08-09 ENCOUNTER — TELEPHONE (OUTPATIENT)
Dept: ONCOLOGY | Facility: CLINIC | Age: 77
End: 2017-08-09

## 2017-08-09 LAB
ALBUMIN SERPL-MCNC: 3.4 G/DL (ref 3.2–4.8)
ANION GAP SERPL CALCULATED.3IONS-SCNC: 4 MMOL/L (ref 3–11)
BASOPHILS # BLD AUTO: 0 10*3/MM3 (ref 0–0.2)
BASOPHILS NFR BLD AUTO: 0 % (ref 0–1)
BUN BLD-MCNC: 14 MG/DL (ref 9–23)
BUN/CREAT SERPL: 15.6 (ref 7–25)
CALCIUM SPEC-SCNC: 8.5 MG/DL (ref 8.7–10.4)
CHLORIDE SERPL-SCNC: 109 MMOL/L (ref 99–109)
CO2 SERPL-SCNC: 23 MMOL/L (ref 20–31)
CREAT BLD-MCNC: 0.9 MG/DL (ref 0.6–1.3)
DEPRECATED RDW RBC AUTO: 70.7 FL (ref 37–54)
EOSINOPHIL # BLD AUTO: 0.03 10*3/MM3 (ref 0–0.3)
EOSINOPHIL NFR BLD AUTO: 0.5 % (ref 0–3)
ERYTHROCYTE [DISTWIDTH] IN BLOOD BY AUTOMATED COUNT: 18.6 % (ref 11.3–14.5)
GFR SERPL CREATININE-BSD FRML MDRD: 61 ML/MIN/1.73
GLUCOSE BLD-MCNC: 106 MG/DL (ref 70–100)
HCT VFR BLD AUTO: 26.2 % (ref 34.5–44)
HGB BLD-MCNC: 9 G/DL (ref 11.5–15.5)
IMM GRANULOCYTES # BLD: 0.01 10*3/MM3 (ref 0–0.03)
IMM GRANULOCYTES NFR BLD: 0.2 % (ref 0–0.6)
INR PPP: 1.04
LYMPHOCYTES # BLD AUTO: 0.52 10*3/MM3 (ref 0.6–4.8)
LYMPHOCYTES NFR BLD AUTO: 9.2 % (ref 24–44)
MAGNESIUM SERPL-MCNC: 2.6 MG/DL (ref 1.3–2.7)
MCH RBC QN AUTO: 36.4 PG (ref 27–31)
MCHC RBC AUTO-ENTMCNC: 34.4 G/DL (ref 32–36)
MCV RBC AUTO: 106.1 FL (ref 80–99)
MONOCYTES # BLD AUTO: 0.27 10*3/MM3 (ref 0–1)
MONOCYTES NFR BLD AUTO: 4.8 % (ref 0–12)
NEUTROPHILS # BLD AUTO: 4.85 10*3/MM3 (ref 1.5–8.3)
NEUTROPHILS NFR BLD AUTO: 85.3 % (ref 41–71)
PHOSPHATE SERPL-MCNC: 1.5 MG/DL (ref 2.4–5.1)
PLATELET # BLD AUTO: 175 10*3/MM3 (ref 150–450)
PMV BLD AUTO: 10 FL (ref 6–12)
POTASSIUM BLD-SCNC: 4.3 MMOL/L (ref 3.5–5.5)
PROTHROMBIN TIME: 11.4 SECONDS (ref 9.6–11.5)
RBC # BLD AUTO: 2.47 10*6/MM3 (ref 3.89–5.14)
SODIUM BLD-SCNC: 136 MMOL/L (ref 132–146)
WBC NRBC COR # BLD: 5.68 10*3/MM3 (ref 3.5–10.8)

## 2017-08-09 PROCEDURE — 99232 SBSQ HOSP IP/OBS MODERATE 35: CPT | Performed by: INTERNAL MEDICINE

## 2017-08-09 PROCEDURE — 85610 PROTHROMBIN TIME: CPT | Performed by: INTERNAL MEDICINE

## 2017-08-09 PROCEDURE — 83735 ASSAY OF MAGNESIUM: CPT | Performed by: NURSE PRACTITIONER

## 2017-08-09 PROCEDURE — 99233 SBSQ HOSP IP/OBS HIGH 50: CPT | Performed by: FAMILY MEDICINE

## 2017-08-09 PROCEDURE — 80069 RENAL FUNCTION PANEL: CPT | Performed by: INTERNAL MEDICINE

## 2017-08-09 PROCEDURE — 85025 COMPLETE CBC W/AUTO DIFF WBC: CPT | Performed by: INTERNAL MEDICINE

## 2017-08-09 PROCEDURE — 97530 THERAPEUTIC ACTIVITIES: CPT

## 2017-08-09 RX ORDER — WARFARIN SODIUM 2 MG/1
2 TABLET ORAL ONCE
Status: DISCONTINUED | OUTPATIENT
Start: 2017-08-09 | End: 2017-08-09

## 2017-08-09 RX ADMIN — QUETIAPINE FUMARATE 25 MG: 25 TABLET, FILM COATED ORAL at 21:47

## 2017-08-09 RX ADMIN — MIDODRINE HYDROCHLORIDE 5 MG: 5 TABLET ORAL at 11:07

## 2017-08-09 RX ADMIN — WARFARIN SODIUM 2 MG: 2 TABLET ORAL at 17:47

## 2017-08-09 RX ADMIN — MIDODRINE HYDROCHLORIDE 5 MG: 5 TABLET ORAL at 17:47

## 2017-08-09 RX ADMIN — PANTOPRAZOLE SODIUM 40 MG: 40 TABLET, DELAYED RELEASE ORAL at 11:07

## 2017-08-09 RX ADMIN — ASPIRIN 325 MG ORAL TABLET 325 MG: 325 PILL ORAL at 11:07

## 2017-08-09 RX ADMIN — ATORVASTATIN CALCIUM 80 MG: 40 TABLET, FILM COATED ORAL at 21:47

## 2017-08-09 RX ADMIN — DULOXETINE HYDROCHLORIDE 60 MG: 60 CAPSULE, DELAYED RELEASE ORAL at 11:07

## 2017-08-09 RX ADMIN — GABAPENTIN 300 MG: 300 CAPSULE ORAL at 21:47

## 2017-08-09 NOTE — PROGRESS NOTES
[facility] at [facility]  IP Progress Note      Chief Complaint/Reason for service:  JOLANTA foster RVR    Subjective: Edwin is lying in bed comfortably not complaining of any dizziness shortness of breath or chest pain.  She states at home she stands up her vision began to change she feels lightheaded and then passes out.  She does note palpitations at home.  She states she's been on Toprol for many years    Past medical, surgical, social and family history reviewed in the patient's electronic medical record.         Vital Sign Min/Max for last 24 hours  Temp  Min: 98.4 °F (36.9 °C)  Max: 99.3 °F (37.4 °C)   BP  Min: 82/51  Max: 125/62   Pulse  Min: 50  Max: 147   Resp  Min: 16  Max: 16   SpO2  Min: 94 %  Max: 100 %   No Data Recorded      Intake/Output Summary (Last 24 hours) at 08/09/17 0835  Last data filed at 08/09/17 0824   Gross per 24 hour   Intake              210 ml   Output             1200 ml   Net             -990 ml             Current Facility-Administered Medications:   •  acetaminophen (TYLENOL) tablet 650 mg, 650 mg, Oral, Q4H PRN, 650 mg at 08/07/17 0809 **OR** acetaminophen (TYLENOL) suppository 650 mg, 650 mg, Rectal, Q4H PRN, Maryuri V. Case, DO  •  acyclovir (ZOVIRAX) capsule 400 mg, 400 mg, Oral, BID, Maryuri V. Case, DO, 400 mg at 08/08/17 0822  •  aluminum-magnesium hydroxide-simethicone (MAALOX MAX) 400-400-40 MG/5ML suspension 7.5 mL, 7.5 mL, Oral, Q4H PRN, Maryuri V. Case, DO, 7.5 mL at 08/07/17 1152  •  aspirin tablet 325 mg, 325 mg, Oral, Daily, 325 mg at 08/08/17 0822 **OR** aspirin suppository 300 mg, 300 mg, Rectal, Daily, Maryuri V. Case, DO  •  atorvastatin (LIPITOR) tablet 80 mg, 80 mg, Oral, Nightly, Maryuri V. Case, DO, 80 mg at 08/08/17 2024  •  docusate sodium (COLACE) capsule 100 mg, 100 mg, Oral, BID PRN, Kel Oliver MD  •  DULoxetine (CYMBALTA) DR capsule 60 mg, 60 mg, Oral, Daily, Kel Oliver MD, 60 mg at 08/08/17 1145  •  gabapentin (NEURONTIN)  capsule 300 mg, 300 mg, Oral, Nightly, Kel Oliver MD, 300 mg at 08/08/17 2024  •  HYDROmorphone (DILAUDID) injection 0.25 mg, 0.25 mg, Intravenous, Q2H PRN, Salvador Gustafson, DO, 0.25 mg at 08/08/17 1720  •  Magnesium Sulfate 2 gram Bolus, followed by 8 gram infusion (total Mg dose 10 grams)- Mg less than or equal to 1mg/dL, 2 g, Intravenous, PRN **OR** Magnesium Sulfate 6 gram Infusion (2 gm x 3) -Mg 1.1 -1.5 mg/dL, 2 g, Intravenous, PRN **OR** magnesium sulfate 4 gram infusion- Mg 1.6-1.9 mg/dL, 4 g, Intravenous, PRN, Kel Oliver MD  •  midodrine (PROAMATINE) tablet 5 mg, 5 mg, Oral, TID AC, Kel Oliver MD, 5 mg at 08/08/17 1720  •  ondansetron (ZOFRAN) injection 4 mg, 4 mg, Intravenous, Q6H PRN, Maryuri Veliz DO, 4 mg at 08/07/17 0809  •  opium 10 MG/ML (1%) tincture 6 mg, 6 mg, Oral, Q6H PRN, Messi Pantoja MD, 6 mg at 08/08/17 1410  •  pantoprazole (PROTONIX) EC tablet 40 mg, 40 mg, Oral, Q AM, Geetha Godinez, APRN, 40 mg at 08/08/17 0557  •  Pharmacy to dose warfarin, , Does not apply, Continuous PRN, Blane Peters MD  •  polyethylene glycol (MIRALAX) powder 17 g, 17 g, Oral, Daily PRN, Kel Oliver MD  •  potassium chloride (MICRO-K) CR capsule 40 mEq, 40 mEq, Oral, PRN, 40 mEq at 08/08/17 1458 **OR** potassium chloride (KLOR-CON) packet 40 mEq, 40 mEq, Oral, PRN, 40 mEq at 08/08/17 2024 **OR** potassium chloride 10 mEq in 100 mL IVPB, 10 mEq, Intravenous, Q1H PRN, ERIC Katz  •  promethazine (PHENERGAN) tablet 12.5 mg, 12.5 mg, Oral, Q6H PRN **OR** promethazine (PHENERGAN) injection 12.5 mg, 12.5 mg, Intravenous, Q6H PRN, David Garcia MD, 12.5 mg at 08/08/17 0821  •  QUEtiapine (SEROquel) tablet 25 mg, 25 mg, Oral, Nightly, Geetha Irwin MD, 25 mg at 08/08/17 2024  •  sodium chloride 0.9 % flush 1-10 mL, 1-10 mL, Intravenous, PRN, Maryuri V. Case, DO  •  sulfamethoxazole-trimethoprim (BACTRIM DS,SEPTRA DS) 800-160 MG per tablet 160 mg, 1 tablet, Oral,  Once per day on Mon Wed Fri, Maryuri Veliz, DO, 160 mg at 08/07/17 0808  •  warfarin (COUMADIN) tablet 2 mg, 2 mg, Oral, Daily, Brandyn Ruvalcaba IV, RIAZH, 2 mg at 08/08/17 1726    Physical Exam: General  thin white female no acute distress pleasant and cooperative        HEENT: No JVP tongue is midline       Respiratory:  Quit bilateral symmetrical expansion and overall clear to auscultation       Cardiovascular: Regular rate and rhythm with a grade 2/6 systolic ejection murmur and no edema       GI:        Lower Extremities: No edema no peripheral lesions       Neuro: Cranial nerves II through XII are grossly normal she moves all 4 extremities       Skin:  Warm and dry with no edema to palpation       Psych: Pleasant affect and oriented ×3    Results Review: I reviewed the patient's data on clinical access and this morning she is in sinus rhythm but she's having long pauses up to 3.3 seconds since the addition of metoprolol, just with severe orthostatic hypotension issues and she's been started on ProAmatine therapy    Radiology Results:  Imaging Results (last 72 hours)     Procedure Component Value Units Date/Time    CT Head Without Contrast Stroke Protocol [[id number]] Collected:  08/05/17 1823     Updated:  08/06/17 0917    Narrative:       EXAMINATION: CT HEAD WO CONTRAST  - 08/05/2017     INDICATION: Stroke protocol.     TECHNIQUE: CT scan of the head was performed at 5 mm intervals.     The radiation dose reduction device was turned on for each scan per the  ALARA (As Low as Reasonably Achievable) protocol.     COMPARISON: 03/21/2017.      FINDINGS: There is no intracranial mass. There is no hemorrhage. There  is no midline shift or extra-axial fluid collection. There is central or  cortical atrophy.       Impression:       Central and cortical atrophy without acute finding.     Time of the exam:  1728 hours  Report sent to ER: 1736 hours     DICTATED:     08/05/2017  EDITED:         08/05/2017     This  report was finalized on 8/6/2017 9:15 AM by Dr. Johnnie Sen MD.       CT Cerebral Perfusion With & Without Contrast [[id number]] Collected:  08/05/17 1817     Updated:  08/06/17 0917    Narrative:       EXAMINATION: CT CEREBRAL PERFUSION W/WO CONTRAST - 08/05/2017     INDICATION: Evaluate for stroke.      TECHNIQUE: CT cerebral perfusion imaging was performed with data  acquisition regarding blood volume, blood flow, mean transit time, and  time to drain.      The radiation dose reduction device was turned on for each scan per the  ALARA (As Low as Reasonably Achievable) protocol.     COMPARISON: CT scan of the same day.     FINDINGS: There is no reduction in cerebral flow or asymmetry. There is  some increased blood volume in the left posterior parietal region but  there is no evidence of areas of diminished perfusion with normal blood  volume. Therefore there are no areas of penumbra.       Impression:       There is no evidence of reversible neural ischemia.     DICTATED:     08/05/2017  EDITED:         08/05/2017        Thisport was finalized on 8/6/2017 9:15 AM by Dr. Johnnie Sen MD.       CT Head Without Contrast [[id number]]  (Abnormal) Collected:  08/06/17 2201     Updated:  08/06/17 2248    Narrative:       EXAM:  CT Head Without Intravenous Contrast    CLINICAL HISTORY:  77 years old, female; Other amyloidosis; Paroxysmal atrial fibrillation;   Cerebral infarction, unspecified; Disorder of kidney and ureter, unspecified;   Other reduced mobility; Other reduced mobility; Signs and symptoms; Other: See   notes; Patient HX: Most recent prior CT hasn't been finalized and cannot be   faxed at this time-images are available; Additional info: Nausea and vomiting    TECHNIQUE:  Axial computed tomography images of the head/brain without intravenous   contrast.  This CT exam was performed using one or more of the following dose   reduction techniques:  automated exposure control, adjustment of the mA and/or   kV  according to patient size, and/or use of iterative reconstruction technique.    COMPARISON:  CT HEAD WO CONTRAST 8/6/2017 7:08:07 PM    FINDINGS:  Brain:  Scattered areas of hypoattenuation, likely chronic small vessel   ischemic change, demyelination, or gliosis.  There is parenchymal atrophy.  Ventricles:  Normal.  Bones/joints:  Normal.  No acute fracture.  Soft tissues:  Normal.  Vasculature:  Atherosclerotic vascular calcifications.  Sinuses:  Minimal ethmoid sinus disease.  Mastoid air cells:  Normal as visualized.  No mastoid effusion.      Impression:         1. No acute findings.    2. Non-acute findings are described above.    THIS DOCUMENT HAS BEEN ELECTRONICALLY SIGNED BY EDDIE HAZEL MD    CT Head Without Contrast [[id number]] Collected:  08/07/17 0842     Updated:  08/07/17 0903    Narrative:       EXAMINATION: CT HEAD WO CONTRAST-08/06/2017:      INDICATION: Stroke Post tPA Administration - Perform 24 Hours After TPA  Infusion; I63.9-Cerebral infarction, unspecified; I48.0-Paroxysmal  atrial fibrillation; E85.8-Other amyloidosis; N28.9-Disorder of kidney  and ureter, unspecified; Z74.09-Other reduced mobility; Z74.09-Other  reduced mobility, post TPA.     TECHNIQUE: Multiple axial CT imaging was obtained of the head from the  skull base to the skull vertex without the administration of intravenous  contrast.     The radiation dose reduction device was turned on for each scan per the  ALARA (As Low as Reasonably Achievable) protocol.     COMPARISON: NONE.     FINDINGS: Atrophy is seen of the brain with extensive chronic small  vessel ischemic changes seen of the periventricular and subcortical  white matter. No hemorrhage or hydrocephalus. No mass, mass effect, or  midline shift. The bony structures are unremarkable. The visualized  paranasal sinuses are clear.       Impression:       Chronic changes identified within the brain with no evidence  of acute intracranial abnormality status post TPA.     D:   08/07/2017  E:  08/07/2017           This report was finalized on 8/7/2017 9:01 AM by Dr. Alena Lam MD.       XR Chest 1 View [[id number]] Collected:  08/08/17 0945     Updated:  08/08/17 2234    Narrative:       EXAMINATION: XR CHEST 1 VW-      INDICATION: I63.9-Cerebral infarction, unspecified; I48.0-Paroxysmal  atrial fibrillation; E85.8-Other amyloidosis; N28.9-Disorder of kidney  and ureter, unspecified; Z74.09-Other reduced mobility;  R41.841-Cognitive communication deficit.      COMPARISON: 08/05/2017 portable chest.     FINDINGS: There is mild new patchy disease in left lung base. Lungs  elsewhere appear clear. No edema, effusion or pneumothorax is seen.           Impression:       Mild new left basilar pulmonary interstitial disease.     D:  08/08/2017  E:  08/08/2017     This report was finalized on 8/8/2017 10:32 PM by DR. Jovan Perez MD.             EKG: A. fib RVR    ECHO:     Tele:  Episodes of sinus rhythm but also profound bradycardia with pauses of 3.3 seconds    Assessment/Plan: 1 the patient is having recurrent atrial fibrillation and she's been started on warfarin therapy.  Pharmacy is managing.However she is now in sinus rhythm but medications to maintain this will be difficult to use because of the profound bradycardic episodes that she's having.  I will discuss the case with our EP service  2 the patient is now having long pauses so she will not be able to tolerate the beta blocker  3 she is severe autonomic dysfunction due to her amyloidosis.  She has been started on ProAmatine therapy    Blane Peters MD  08/09/17  8:35 AM

## 2017-08-09 NOTE — PLAN OF CARE
Problem: Patient Care Overview (Adult)  Goal: Plan of Care Review  Outcome: Ongoing (interventions implemented as appropriate)    08/09/17 1033   Coping/Psychosocial Response Interventions   Plan Of Care Reviewed With patient;sibling   Outcome Evaluation   Outcome Summary/Follow up Plan Patient is awake, alert, asking to go home. She is eating and drinking, denies pain. Hospice Nurse notified. Plan is home tomorrow if medically ready and Hospice has everything arranged   Patient Care Overview   Progress improving

## 2017-08-09 NOTE — SIGNIFICANT NOTE
13:00   Palliative Team Member Meeting  Attendance:    Dr. Salvador Gustafson, DO Amber Leos, ACHPN  Stella You,   Keira Haji, RN, CHPN, Director  Cinthia Newby, BI, PN  Shirley Barnes, [facility]  Concepción aWrd, RN/ Hospice  Allison Paris, APRN, Hospice

## 2017-08-09 NOTE — PROGRESS NOTES
"Pharmacy Consult  -  Warfarin    Sowmya Beckett is a  77 y.o. female   Height - 62\" (157.5 cm)  Weight - 111 lb (50.3 kg)    Consulting Provider: - Blane Peters MD  Indication: - Atrial Fibrillation, Embolic Stroke  Goal INR: -  2-3      Bridge Therapy: No         Drug-Drug Interactions with current regimen:   Aspirin - increased bleed risk   Sulfamethoxazole/trimethoprim - may increase INR    Warfarin Dosing During Admission:    Date  8/8 8/9          INR  1.1 1.04          Dose  2 mg (2 mg)               Labs:    Results from last 7 days   Lab Units 08/09/17  0419 08/08/17  0856 08/08/17  0406 08/06/17  0557 08/05/17  1749 08/05/17  1733   INR  1.04 1.10 --  1.04 --  --    HEMOGLOBIN g/dL 9.0* --  8.2* 9.8* 10.5* --    HEMOGLOBIN, POC g/dL --  --  --  --  --  10.9*   HEMATOCRIT % 26.2* --  24.4* 28.8* 30.8* --    HEMATOCRIT POC % --  --  --  --  --  32*   PLATELETS 10*3/mm3 175 --  165 198 202 --      Results from last 7 days   Lab Units 08/09/17 0419 08/08/17  0406 08/06/17  0557 08/05/17  1749   SODIUM mmol/L 136 138 135 134   POTASSIUM mmol/L 4.3 3.1* 4.0 4.0   CHLORIDE mmol/L 109 113* 101 99   CO2 mmol/L 23.0 19.0* 28.0 24.0   BUN mg/dL 14 14 26* 27*   CREATININE mg/dL 0.90 0.90 1.00 1.30   CALCIUM mg/dL 8.5* 7.8* 8.9 9.5   BILIRUBIN mg/dL --  1.0 1.0 0.9   ALK PHOS U/L --  80 92 100   ALT (SGPT) U/L --  9 12 12  12   AST (SGOT) U/L --  20 19 24  24   GLUCOSE mg/dL 106* 91 76 100     Diet Order   Procedures   • Diet Regular; Cardiac     Patient is not eating    Assessment/Plan:   Highly complicated and risky patient to anticoagulate given recent stroke as well as debility and falls. Patient has a history of being on 2 mg warfarin daily at home; she has received 2 mg x 1 dose (8/8) while inpatient. INR remains subtherapeutic. Will continue cautiously with this dose.  Pharmacy will monitor daily PT/INR closely for influences of sulfamethoxazole/trimethoprim and adjust dosing accordingly.     Thank " you for this consult,    Pratibha Banerjee, PharmD  Pharmacy Resident  8/9/2017  10:24 AM

## 2017-08-09 NOTE — THERAPY TREATMENT NOTE
"Acute Care - Occupational Therapy Treatment Note  [facility]     Patient Name: Sowmya Beckett  : 1940  MRN: [id number]  Today's Date: 2017  Onset of Illness/Injury or Date of Surgery Date: 17  Date of Referral to OT: 17  Referring Physician: Jose AlfredoDO      Admit Date: 2017    Visit Dx:     ICD-10-CM ICD-9-CM   1. Cerebrovascular accident (CVA), unspecified mechanism I63.9 434.91   2. Paroxysmal atrial fibrillation I48.0 427.31   3. 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 Renal insufficiency N28.9 593.9   5. Impaired functional mobility, balance, gait, and endurance Z74.09 V49.89   6. Impaired mobility and ADLs Z74.09 799.89   7. Cognitive communication disorder R41.841 315.32     Patient Active Problem List   Diagnosis   • Atrial fibrillation and flutter   • History of melanoma   • Failure to thrive in adult   • Dehydration   • Hyperglycemia   • Peripheral autonomic neuropathy due to underlying disease   • AL amyloidosis   • Skin lesion of right ear   • Stroke (S/P tPA). 17   • PAF (Prior ablation) with RVR this admission. Off anticoagulation   • AL amyloidosis with cardiac ,renal, colon involvement with autonomic neuropathy             Adult Rehabilitation Note       17 1130 17 1309 17 1223    Rehab Assessment/Intervention    Discipline occupational therapist  -TB physical therapist  -CYNDI VALDIVIA LS2 physical therapist  -DM    Document Type therapy note (daily note)  -TB therapy note (daily note)  -CYNDI VALDIVIA LS2 therapy note (daily note)  -DM    Subjective Information no complaints;agree to therapy  -TB agree to therapy;no complaints  -CYNDI VALDIVIA LS2 agree to therapy;complains of;pain;nausea/vomiting   had Zofran;still nauseated;wants to amb \"as far as possible\"  -DM    Patient Effort, Rehab Treatment good  -TB good  -CYNDI VALDIVIA LS2 good  -DM    Symptoms Noted During/After Treatment none  -TB fatigue  -CYNDI VALDIVIA LS2 increased pain   incr. nausea & abdom discomfort  -DM    " Precautions/Limitations fall precautions  -TB fall precautions  -LS,KR,LS2 fall precautions;other (see comments)   incont.(use Depends); adult FTT  -DM    Specific Treatment Considerations Ortho static BPs completed - no concerns this session  -TB      Recorded by [TB] Priya Simon, OT [LS,KR,LS2] Keira Granda, PT (r) Kathy Rob, PT Student (t) Keira Granda, PT (c) [DM] Giselle Thornton, PT    Vital Signs    Pre Systolic BP Rehab 117  -  -LS,KR,LS2 111  -DM    Pre Treatment Diastolic BP 70  -TB 76  -LS,KR,LS2 75  -DM    Intra Systolic BP Rehab 116  -TB      Intra Treatment Diastolic BP 81  -TB      Post Systolic BP Rehab 126  -  -LS,KR,LS2 89  -DM    Post Treatment Diastolic BP 78  -TB 57  -LS,KR,LS2 57  -DM    Pretreatment Heart Rate (beats/min)  53  -LS,KR,LS2 61  -DM    Posttreatment Heart Rate (beats/min)  51  -LS,KR,LS2 85  -DM    Pre SpO2 (%)  98  -LS,KR,LS2 100  -DM    O2 Delivery Pre Treatment  room air  -LS,KR,LS2 room air  -DM    Intra SpO2 (%)   92  -DM    O2 Delivery Intra Treatment   room air  -DM    Post SpO2 (%)  94  -LS,KR,LS2 100  -DM    O2 Delivery Post Treatment room air  -TB room air  -LS,KR,LS2 room air  -DM    Pre Patient Position Supine  -TB Supine  -LS,KR,LS2 Supine  -DM    Intra Patient Position Standing  -TB Supine  -LS,KR,LS2 Standing  -DM    Post Patient Position Sitting  -TB Supine  -LS,KR,LS2 Sitting  -DM    Recorded by [TB] Priya Simon, OT [LS,KR,LS2] Keira Granda, PT (r) Kathy Rob, PT Student (t) Keira Granda, PT (c) [DM] Giselle Thornton, PT    Pain Assessment    Pain Assessment No/denies pain  -TB 0-10  -LS,KR,LS2 0-10  -DM    Pain Score  0  -LS,KR,LS2 7  -DM    Post Pain Score  0  -LS,KR,LS2 8  -DM    Pain Type   Acute pain  -DM    Pain Location   Abdomen  -DM    Pain Descriptors   Aching  -DM    Pain Frequency   Constant/continuous  -DM    Patient's Stated Pain Goal   No pain  -DM    Pain Intervention(s)   Repositioned;Ambulation/increased  activity  -DM    Response to Interventions   Tolerated  -DM    Recorded by [TB] Priya Simon, OT [LS,KR,LS2] Keira Granda, PT (r) Kathy Rob, PT Student (t) Keira Granda, PT (c) [DM] Giselle Thornton, PT    Cognitive Assessment/Intervention    Current Cognitive/Communication Assessment impaired  -TB impaired  -LS,KR,LS2 impaired  -DM    Orientation Status oriented to;person;place  -TB oriented to;person  -LS,KR,LS2 oriented to;person;place   slow response time;stated mo.,but not yr.or location  -DM    Follows Commands/Answers Questions able to follow single-step instructions;needs cueing;needs repetition;75% of the time;needs increased time  -TB able to follow single-step instructions;75% of the time;needs cueing;needs increased time  -LS,KR,LS2 75% of the time;100% of the time;able to follow single-step instructions;needs cueing;needs increased time;needs repetition  -DM    Personal Safety decreased awareness, need for assist;decreased awareness, need for safety;decreased insight to deficits  -TB mild impairment;decreased awareness, need for assist;decreased awareness, need for safety  -LS,KR,LS2 mild impairment;decreased awareness, need for assist;decreased awareness, need for safety;decreased insight to deficits  -DM    Personal Safety Interventions fall prevention program maintained;gait belt;nonskid shoes/slippers when out of bed  -TB supervised activity  -LS,KR,LS2 fall prevention program maintained;gait belt;nonskid shoes/slippers when out of bed  -DM    Recorded by [TB] Priya Simon, OT [LS,KR,LS2] Keira Granda, PT (r) Kathy Rob, PT Student (t) Keira Granda, PT (c) [DM] Giselle Thornton, PT    Bed Mobility, Assessment/Treatment    Bed Mobility, Assistive Device   head of bed elevated;bed rails  -DM    Bed Mobility, Roll Left, [place] contact guard assist;verbal cues required  -TB      Bed Mobility, Roll Right, [place] contact guard assist;verbal cues required  -TB       Bed Mobility, Scoot/Bridge, [place]   minimum assist (75% patient effort);verbal cues required  -DM    Bed Mob, Supine to Sit, [place] minimum assist (75% patient effort);verbal cues required  -TB not tested  -LS,KR,LS2 minimum assist (75% patient effort);verbal cues required  -DM    Bed Mob, Sit to Supine, [place] not tested  -TB not tested  -LS,KR,LS2     Bed Mobility, Safety Issues decreased use of arms for pushing/pulling;decreased use of legs for bridging/pushing  -TB  decreased use of arms for pushing/pulling  -DM    Bed Mobility, Impairments strength decreased  -TB  strength decreased;pain  -DM    Bed Mobility, Comment cues to sequence all mobility  -TB RN deferred OOB activity d/t pt's orthostatic hypotension  -LS,KR,LS2 cues for hand placement,utilizing bedrail  -DM    Recorded by [TB] Priya Simon, OT [LS,KR,LS2] Keira Granda, PT (r) Kathy Rob PT Student (t) Keira Granda, PT (c) [DM] Giselle Thonrton, PT    Transfer Assessment/Treatment    Transfers, Sit-Stand [place] minimum assist (75% patient effort);verbal cues required  -TB not appropriate to assess;not tested  -LS,KR,LS2 minimum assist (75% patient effort);verbal cues required   pt incont.BM w/stand;toBR (2ndBM);NEW depends placed  -DM    Transfers, Stand-Sit [place] minimum assist (75% patient effort);verbal cues required  -TB not appropriate to assess;not tested  -LS,KR,LS2 minimum assist (75% patient effort);verbal cues required  -DM    Transfers, Sit-Stand-Sit, Assist Device --   gait belt   -TB  rolling walker  -DM    Toilet Transfer, [place]   moderate assist (50% patient effort);verbal cues required   low commode  -DM    Toilet Transfer, Assistive Device   other (see comments);rolling walker   grab bar on R  -DM    Transfer, Safety Issues sequencing ability decreased;balance decreased during turns  -TB  sequencing ability decreased;weight-shifting ability decreased;loses balance backward  -DM     Transfer, Impairments strength decreased;impaired balance  -TB  strength decreased;impaired balance;pain  -DM    Transfer, Comment   cues for hand placement, seq. for hygiene(req.>8 min.self-hygiene  -DM    Recorded by [TB] Priya Simon, OT [LS,KR,LS2] Keira Granda, PT (r) Kathy Rob, PT Student (t) Keira Granda, PT (c) [DM] Giselle Thornton, PT    Gait Assessment/Treatment    Gait, [place] Level  not appropriate to assess;not tested  -LS,KR,LS2 minimum assist (75% patient effort);verbal cues required   has IV;toBR (incont.on seat;cleaned),then friedman  -DM    Gait, Assistive Device   rolling walker  -DM    Gait, Distance (Feet)   180   c/o fat. from toileting & no sleep last night  -DM    Gait, Gait Pattern Analysis   swing-through gait  -DM    Gait, Gait Deviations   vince decreased;decreased heel strike;narrow base;step length decreased;weight-shifting ability decreased;ataxia   drifts L  -DM    Gait, Safety Issues   sequencing ability decreased;step length decreased  -DM    Gait, Impairments   strength decreased;impaired balance;pain  -DM    Gait, Comment   incr. nausea,abdom pain,fatigue;cues for trunk ext,focusing ahead,PLB, INCR. STEP length  -DM    Recorded by  [LS,KR,LS2] Keira Granda, PT (r) Kathy Rob, PT Student (t) Keira Granda, PT (c) [DM] Giselle Thornton, PT    Functional Mobility    Functional Mobility- Ind. Level minimum assist (75% patient effort);verbal cues required  -TB      Functional Mobility- Device --   gait belt   -TB      Functional Mobility-Distance (Feet) 5  -TB      Functional Mobility- Comment EOB to chair  -TB      Recorded by [TB] Priya Simon, OT      Lower Body Dressing Assessment/Training    LB Dressing Assess/Train, Clothing Type donning:;pants   brief  -TB      LB Dressing Assess/Train, Position supine  -TB      LB Dressing Assess/Train, [place] moderate assist (50% patient effort)  -TB      LB Dressing Assess/Train, Comment Pt sister  completing care  -TB      Recorded by [TB] Priya Simon OT      Toileting Assessment/Training    Toileting Assess/Train, Indepen Level maximum assist (25% patient effort)  -TB      Toileting Assess/Train, Comment incontinent  -TB      Recorded by [TB] Priya Simon OT      Grooming Assessment/Training    Grooming Assess/Train, Comment Pt's sister completing  -TB      Recorded by [TB] Priya Simon OT      Motor Skills/Interventions    Additional Documentation   Balance Skills Training (Group)  -DM    Recorded by   [DM] Giselle Thornton, PT    Balance Skills Training    Sitting-Level of Assistance Close supervision  -TB  Close supervision  -DM    Sitting-Balance Support Feet supported  -TB  Feet supported  -DM    Sitting-Balance Activities --   ther ex  -TB      Standing-Level of Assistance Minimum assistance  -TB  Contact guard  -DM    Static Standing Balance Support Right upper extremity supported;Left upper extremity supported   gait belt  -TB  assistive device  -DM    Standing-Balance Activities Weight Shift R-L  -TB  Weight Shift A-P;Weight Shift R-L  -DM    Standing Balance # of Minutes 4  -TB  4   for hygiene  -DM    Gait Balance-Level of Assistance   Minimum assistance   has IV,TELE  -DM    Gait Balance Support   assistive device  -DM    Gait Balance Activities   scanning environment R/L;side-stepping  -DM    Recorded by [TB] Priya Simon OT  [DM] Giselle Thornton, PT    Therapy Exercises    Bilateral Lower Extremities AROM:;10 reps;ankle pumps/circles  -TB AAROM:;supine;ankle pumps/circles;hip abduction/adduction;heel slides;SAQ;SLR;10 reps  -LS,KR,LS2 AROM:;10 reps;sitting;standing;ankle pumps/circles;glut sets;heel slides;hip abduction/adduction;hip ER;hip flexion;hip IR;LAQ;quad sets   HS sets; MIP X 10  -DM    Bilateral Upper Extremity AROM:;10 reps;shoulder extension/flexion;shoulder abduction/adduction;shoulder horizontal abd/add;elbow flexion/extension;hand pumps   "-TB AROM:;elbow flexion/extension;shoulder abduction/adduction;shoulder extension/flexion;hand pumps;10 reps  -LS,KR,LS2 AROM:;10 reps;sitting;elbow flexion/extension;shoulder abduction/adduction;shoulder extension/flexion  -DM    Recorded by [TB] Priya Simon, OT [LS,KR,LS2] Keira Granda, PT (r) Kathy Rob, PT Student (t) Keira Granda, PT (c) [DM] Giselle Thornton, PT    Positioning and Restraints    Pre-Treatment Position in bed  -TB in bed  -LS,KR,LS2 in bed  -DM    Post Treatment Position chair  -TB bed  -LS,KR,LS2 chair  -DM    In Bed  supine;call light within reach;encouraged to call for assist;with family/caregiver;side rails up x2;RUE elevated;LUE elevated  -LS,KR,LS2     In Chair notified nsg;sitting;call light within reach;encouraged to call for assist;with family/caregiver   eating lunch meal  -TB  notified nsg;reclined;call light within reach;encouraged to call for assist;exit alarm on;with other staff;waffle cushion;legs elevated   'FREEZING\";Req.warm blanket (done)  -DM    Recorded by [TB] Priya Simon, OT [LS,KR,LS2] Keira Granda, PT (r) Kathy Rob, PT Student (t) Keira Granda, PT (c) [DM] Giselle Thornton, PT      User Key  (r) = Recorded By, (t) = Taken By, (c) = Cosigned By    Initials Name Effective Dates    TB Priya Simon, OT 06/22/15 -     DM Giselle Thornton, PT 06/19/15 -     LS Keira Granda, PT 06/19/15 -     KR Kathy Rob, PT Student 05/30/17 -                 OT Goals       08/09/17 1315 08/06/17 0826       Transfer Training OT LTG    Transfer Training OT LTG, Date Established  08/06/17  -CL     Transfer Training OT LTG, Time to Achieve  by discharge  -CL     Transfer Training OT LTG, Activity Type  bed to chair /chair to bed;sit to stand/stand to sit;toilet  -CL     Transfer Training OT LTG, [place] Level  supervision required  -CL     Transfer Training OT LTG, Additional Goal  AAD  -CL     Transfer Training OT LTG, Outcome goal ongoing   Min " A  -TB      Dynamic Standing Balance OT LTG    Dynamic Standing Balance OT LTG, Date Established  08/06/17  -CL     Dynamic Standing Balance OT LTG, Time to Achieve  by discharge  -CL     Dynamic Standing Balance OT LTG, [place] Level  supervision required  -CL     Dynamic Standing Balance OT LTG, Additional Goal  AAD  -CL     Dynamic Standing Balance OT LTG, Outcome goal ongoing   Min A  -TB      Orientation OT LTG    Orientation OT LTG, Date Established  08/06/17  -CL     Orientation OT LTG, Time to Achieve  by discharge  -CL     Orientation OT LTG, Activity Type  person;place;time;100% treatment session  -CL     Orientation OT LTG, Outcome goal partially met   for person and place  -TB      LB Dressing OT LTG    LB Dressing Goal OT LTG, Date Established  08/06/17  -CL     LB Dressing Goal OT LTG, Time to Achieve  by discharge  -CL     LB Dressing Goal OT LTG, Activity Type  Don socks/pants  -CL     LB Dressing Goal OT LTG, [place] Level  contact guard assist  -CL     LB Dressing Goal OT LTG, Additional Goal  AAD  -CL     LB Dressing Goal OT LTG, Outcome goal ongoing   Mod A, sister assisting  -TB        User Key  (r) = Recorded By, (t) = Taken By, (c) = Cosigned By    Initials Name Provider Type    TB Priya Simon, OT Occupational Therapist    CL Mary Swenson, OT Occupational Therapist          Occupational Therapy Education     Title: PT OT SLP Therapies (Done)     Topic: Occupational Therapy (Done)     Point: ADL training (Done)    Description: Instruct learner(s) on proper safety adaptation and remediation techniques during self care or transfers.   Instruct in proper use of assistive devices.    Learning Progress Summary    Learner Readiness Method Response Comment Documented by Status   Patient Acceptance E,D MELISSA,MICHELLE Education reinforced for benefits of OOB activity/therapy, role of OT and HEP TB 08/09/17 1314 Done    Acceptance E MELISSA  KR 08/08/17 1508 Done    Acceptance E ELIECER  AP 08/08/17 0588  Done    Eager ED NR   08/07/17 1340 Active    Acceptance E VU  JW 08/07/17 0717 Done    Acceptance E,D NR Pt educated on appropriate safety precautions, t/f techniques, and benefits of therapy. CL 08/06/17 0825 Active    Acceptance E VU  JW 08/06/17 0745 Done    Acceptance E VU,NR   08/06/17 0040 Done   Family Acceptance E,D VU,NR Education reinforced for benefits of OOB activity/therapy, role of OT and HEP TB 08/09/17 1314 Done    Acceptance E DU  AP 08/08/17 0541 Done               Point: Home exercise program (Done)    Description: Instruct learner(s) on appropriate technique for monitoring, assisting and/or progressing therapeutic exercises/activities.    Learning Progress Summary    Learner Readiness Method Response Comment Documented by Status   Patient Acceptance E VU  KR 08/08/17 1508 Done    Acceptance E DU  AP 08/08/17 0541 Done    ENA Domínguez NR   08/07/17 1340 Active    Acceptance E VU  JW 08/07/17 0717 Done    Acceptance E VU  JW 08/06/17 0745 Done    Acceptance E VU,NR   08/06/17 0040 Done   Family Acceptance E DU  AP 08/08/17 0541 Done               Point: Precautions (Done)    Description: Instruct learner(s) on prescribed precautions during self-care and functional transfers.    Learning Progress Summary    Learner Readiness Method Response Comment Documented by Status   Patient Acceptance E VU  KR 08/08/17 1508 Done    Acceptance E DU  AP 08/08/17 0541 Done    Eager EENA NR   08/07/17 1340 Active    Acceptance E VU  JW 08/07/17 0717 Done    Acceptance E,D NR Pt educated on appropriate safety precautions, t/f techniques, and benefits of therapy. CL 08/06/17 0825 Active    Acceptance E VU  JW 08/06/17 0745 Done    Acceptance E VU,NR   08/06/17 0040 Done   Family Acceptance E DU  AP 08/08/17 0541 Done               Point: Body mechanics (Done)    Description: Instruct learner(s) on proper positioning and spine alignment during self-care, functional mobility activities and/or exercises.     Learning Progress Summary    Learner Readiness Method Response Comment Documented by Status   Patient Acceptance E VU  KR 08/08/17 1508 Done    Acceptance E DU  AP 08/08/17 0541 Done    Eager E,D NR  DM 08/07/17 1340 Active    Acceptance E VU  JW 08/07/17 0717 Done    Acceptance E,D NR Pt educated on appropriate safety precautions, t/f techniques, and benefits of therapy. CL 08/06/17 0825 Active    Acceptance E VU  JW 08/06/17 0745 Done    Acceptance E VU,NR  HT 08/06/17 0040 Done   Family Acceptance E DU  AP 08/08/17 0541 Done                      User Key     Initials Effective Dates Name Provider Type Discipline    TB 06/22/15 -  Priya Simon, OT Occupational Therapist OT    DM 06/19/15 -  Giselle Thornton, PT Physical Therapist PT     02/14/17 -  Allison Ruvalcaba, RN Registered Nurse Nurse     06/16/16 -  Pebbles Hamm RN Registered Nurse Nurse     06/08/16 -  Mary Swenson, OT Occupational Therapist OT     06/10/16 -  Roseline Rodriguez, RN Registered Nurse Nurse     05/30/17 -  Kathy Rob, PT Student PT Student PT                  OT Recommendation and Plan  Anticipated Equipment Needs At Discharge:  (TBA further)  Anticipated Discharge Disposition: home with assist, home with home health  Planned Therapy Interventions: adaptive equipment training, ADL retraining, bed mobility training, energy conservation, home exercise program, transfer training  Therapy Frequency: daily  Plan of Care Review  Plan Of Care Reviewed With: patient, sibling  Outcome Summary/Follow up Plan: Orthostatic BPs completed - and stable. Pt OOB with Min A x1; Mod A for ADLs. OT to follow.        Outcome Measures       08/09/17 1130 08/08/17 1309 08/07/17 1223    How much help from another person do you currently need...    Turning from your back to your side while in flat bed without using bedrails?  3  -LS (r) KR (t) LS (c) 3  -DM    Moving from lying on back to sitting on the side of a flat bed without  bedrails?  3  -LS (r) KR (t) LS (c) 3  -DM    Moving to and from a bed to a chair (including a wheelchair)?  3  -LS (r) KR (t) LS (c) 3  -DM    Standing up from a chair using your arms (e.g., wheelchair, bedside chair)?  3  -LS (r) KR (t) LS (c) 3  -DM    Climbing 3-5 steps with a railing?  2  -LS (r) KR (t) LS (c) 2  -DM    To walk in hospital room?  2  -LS (r) KR (t) LS (c) 3  -DM    AM-PAC 6 Clicks Score  16  -LS (r) KR (t) 17  -DM    How much help from another is currently needed...    Putting on and taking off regular lower body clothing? 2  -TB      Bathing (including washing, rinsing, and drying) 2  -TB      Toileting (which includes using toilet bed pan or urinal) 2  -TB      Putting on and taking off regular upper body clothing 3  -TB      Taking care of personal grooming (such as brushing teeth) 3  -TB      Eating meals 3  -TB      Score 15  -TB      Modified Clifford Scale    Modified Jennifer Scale 3 - Moderate disability.  Requiring some help, but able to walk without assistance.  -TB  3 - Moderate disability.  Requiring some help, but able to walk without assistance.  -DM    Functional Assessment    Outcome Measure Options AM-PAC 6 Clicks Daily Activity (OT)  -TB AM-PAC 6 Clicks Basic Mobility (PT)  -LS (r) KR (t) LS (c) AM-PAC 6 Clicks Basic Mobility (PT);Modified Clifford  -DM      User Key  (r) = Recorded By, (t) = Taken By, (c) = Cosigned By    Initials Name Provider Type    TB Priya Simon, OT Occupational Therapist    VINEET Thornton, PT Physical Therapist    SHEA Granda, PT Physical Therapist    CYNDI Rob, PT Student PT Student           Time Calculation:         Time Calculation- OT       08/09/17 1318          Time Calculation- OT    OT Start Time 1130  -TB      Total Timed Code Minutes- OT 26 minute(s)  -TB      OT Received On 08/09/17  -TB      OT Goal Re-Cert Due Date 08/16/17  -TB        User Key  (r) = Recorded By, (t) = Taken By, (c) = Cosigned By    Initials Name  Provider Type    TB Priya Simon OT Occupational Therapist           Therapy Charges for Today     Code Description Service Date Service Provider Modifiers Qty    [id number] HC OT THERAPEUTIC ACT EA 15 MIN 8/9/2017 Priya Simon OT GO 2               Priya Simon OT  8/9/2017

## 2017-08-09 NOTE — PROGRESS NOTES
[facility] Medicine Services    ASSESSMENT / PLAN          77-year-old white female with history of amyloidosis with cardiac, renal, and colon involvement with autonomic neuropathy. Is treated with melphalan by Dr. Messi Pantoja. Was admitted 8/5/17 with right-sided weakness and expressive aphasia/dysarthria. Was felt to have had a CVA and underwent TPA infusion with resolution of most of her symptoms except for diffuse weakness. Has known history of paroxysmal atrial fibrillation and in fact has had an ablation in 2012. Was not on anticoagulation at admission because of the history of falls related to orthostasis and syncope. She improved post TPA although noncontrast CT scans of the chest never showed anything but evidence of chronic small vessel ischemic changes with atrophy.      The patient has had documented recurrent PAF with rapid ventricular rate this admission. Rate did however come under control with a diltiazem drip. Cardiology was asked to evaluate as they have seen her in the past. At the time of their evaluation she was no longer tolerating diltiazem because of her orthostatic hypotension and autonomic dysfunction. Diltiazem was therefore discontinued and she was placed on digoxin. Dr. Pantoja has also been consulted as he follows her for her amyloid therapy (melphalan). Dr. Pantoja feels that her amyloid is a terminal process, and after discussing it with the patient's family, they wish her to be a full DNR and go home with hospice without any further therapy of her amyloid.  1.Acute CVA, post TPA, symptoms much improved  2. Amyloidosis , per Dr. Pantoja no further chemotherapy, now going to home with hospice over next 1-2 days.  Per my discussion with patient as well as sister present at bedside  3.  Cardiac pauses with atrial fibrillation, no beta blocker secondary to pauses, DC telemetry monitor as per my discussion with sister, no pacemaker is desired, patient is DNR and probably  discharge over next 1-2 days to home with hospice  4.  Anticoagulation patient has been started on Coumadin low-dose pharmacy to dose at the same time does not need to be in the hospital until the INR is therapeutic and with the goals of care as above patient and recheck INR after discharge in 3-4 days with continuation of low-dose of Coumadin rather than aggressive adjustment of medications  5.  Hypokalemia resolved  6.  Moderate anemia, probably worsened with the hemodilution  Discussed with the patient sister at the bedside as well as Dr. Peters no pacemaker is desired, DC telemetry monitor, INR check in the morning but her labs will not be checked   Length of Stay 4 Days   Code- dnr     Expected Discharge disposition in     1-2       Days to home with hospice     SUBJECTIVE--HPI/ Events overnight / CC- Hospital Follow up visit/ ROS-not detailed ,  as performed below    Feel better  Gen -no fevers, chills  CV-no chest pain, palpitations  Resp-no cough, dyspnea  GI-no N/V/D, abd pain  Weakness right better    OBJECTIVE        Vital Sign Min/Max for last 24 hours  Temp  Min: 98.4 °F (36.9 °C)  Max: 99.3 °F (37.4 °C)   BP  Min: 82/51  Max: 117/63   Pulse  Min: 51  Max: 138   Resp  Min: 16  Max: 16   SpO2  Min: 96 %  Max: 100 %   No Data Recorded      Intake/Output Summary (Last 24 hours) at 08/09/17 1337  Last data filed at 08/09/17 1226   Gross per 24 hour   Intake              410 ml   Output              550 ml   Net             -140 ml           Gen/Constitutional-no acute distress, frail   CV-IRIR , S1 S2 normal, no m/r/g  Resp-CTAB, no wheezes  Abd-soft, NT, ND, +BS  Ext-no edema  Neuro-A&Ox3, 4+ strength all extremities, some expressive aphasia   Psych-appropriate mood  Skin, no new rashes over last 24 hours    Medications    acyclovir 400 mg Oral BID   aspirin 325 mg Oral Daily   Or      aspirin 300 mg Rectal Daily   atorvastatin 80 mg Oral Nightly   DULoxetine 60 mg Oral Daily   gabapentin 300 mg Oral  Nightly   midodrine 5 mg Oral TID AC   pantoprazole 40 mg Oral Q AM   QUEtiapine 25 mg Oral Nightly   sulfamethoxazole-trimethoprim 1 tablet Oral Once per day on Mon Wed Fri   warfarin 2 mg Oral Daily     Meds Prn  •  acetaminophen **OR** acetaminophen  •  aluminum-magnesium hydroxide-simethicone  •  docusate sodium  •  HYDROmorphone  •  magnesium sulfate **OR** magnesium sulfate **OR** magnesium sulfate  •  ondansetron  •  opium  •  Pharmacy to dose warfarin  •  polyethylene glycol  •  potassium chloride **OR** potassium chloride **OR** potassium chloride  •  promethazine **OR** promethazine  •  sodium chloride    I have reviewed the labs, culture data, radiology results, and diagnostic studies.    Results from last 7 days  Lab Units 08/09/17 0419 08/08/17 0406 08/06/17  0557 08/05/17  1749   SODIUM mmol/L 136 138 135 134   POTASSIUM mmol/L 4.3 3.1* 4.0 4.0   CHLORIDE mmol/L 109 113* 101 99   CO2 mmol/L 23.0 19.0* 28.0 24.0   BUN mg/dL 14 14 26* 27*   CREATININE mg/dL 0.90 0.90 1.00 1.30   CALCIUM mg/dL 8.5* 7.8* 8.9 9.5   BILIRUBIN mg/dL  --  1.0 1.0 0.9   ALK PHOS U/L  --  80 92 100   ALT (SGPT) U/L  --  9 12 12  12   AST (SGOT) U/L  --  20 19 24  24   GLUCOSE mg/dL 106* 91 76 100       Results from last 7 days  Lab Units 08/09/17 0419 08/08/17 0406 08/06/17  0557   WBC 10*3/mm3 5.68 4.57 4.72   HEMOGLOBIN g/dL 9.0* 8.2* 9.8*   HEMATOCRIT % 26.2* 24.4* 28.8*   PLATELETS 10*3/mm3 175 165 198           Culture Data:    Radiology Results:  Imaging Results (last 24 hours)     Procedure Component Value Units Date/Time    XR Chest 1 View [[id number]] Collected:  08/08/17 0945     Updated:  08/08/17 2234    Narrative:       EXAMINATION: XR CHEST 1 VW-      INDICATION: I63.9-Cerebral infarction, unspecified; I48.0-Paroxysmal  atrial fibrillation; E85.8-Other amyloidosis; N28.9-Disorder of kidney  and ureter, unspecified; Z74.09-Other reduced mobility;  R41.841-Cognitive communication deficit.      COMPARISON:  08/05/2017 portable chest.     FINDINGS: There is mild new patchy disease in left lung base. Lungs  elsewhere appear clear. No edema, effusion or pneumothorax is seen.           Impression:       Mild new left basilar pulmonary interstitial disease.     D:  08/08/2017  E:  08/08/2017     This report was finalized on 8/8/2017 10:32 PM by DR. Jovan Perez MD.           *. Please note that portions of this note were completed with a voice recognition program. Efforts were made to edit the dictations, but occasionally words are mistranscribed.  Melvin Tobin MD08/09/171:37 PM

## 2017-08-09 NOTE — PLAN OF CARE
Problem: Patient Care Overview (Adult)  Goal: Plan of Care Review  Outcome: Ongoing (interventions implemented as appropriate)    08/09/17 3679   Coping/Psychosocial Response Interventions   Plan Of Care Reviewed With patient;sibling   Patient Care Overview   Progress unable to show any progress toward functional goals

## 2017-08-09 NOTE — PLAN OF CARE
Problem: Patient Care Overview (Adult)  Goal: Plan of Care Review  Outcome: Ongoing (interventions implemented as appropriate)    08/09/17 1315   Coping/Psychosocial Response Interventions   Plan Of Care Reviewed With patient;sibling   Outcome Evaluation   Outcome Summary/Follow up Plan Orthostatic BPs completed - and stable. Pt OOB with Min A x1; Mod A for ADLs. OT to follow.         Problem: Inpatient Occupational Therapy  Goal: Transfer Training Goal 1 LTG- OT  Outcome: Ongoing (interventions implemented as appropriate)    08/06/17 0826 08/09/17 1315   Transfer Training OT LTG   Transfer Training OT LTG, Date Established 08/06/17 --    Transfer Training OT LTG, Time to Achieve by discharge --    Transfer Training OT LTG, Activity Type bed to chair /chair to bed;sit to stand/stand to sit;toilet --    Transfer Training OT LTG, [place] Level supervision required --    Transfer Training OT LTG, Additional Goal AAD --    Transfer Training OT LTG, Outcome --  goal ongoing  (Min A)       Goal: Dynamic Standing Balance Goal LTG-OT  Outcome: Ongoing (interventions implemented as appropriate)    08/06/17 0826 08/09/17 1315   Dynamic Standing Balance OT LTG   Dynamic Standing Balance OT LTG, Date Established 08/06/17 --    Dynamic Standing Balance OT LTG, Time to Achieve by discharge --    Dynamic Standing Balance OT LTG, [place] Level supervision required --    Dynamic Standing Balance OT LTG, Additional Goal AAD --    Dynamic Standing Balance OT LTG, Outcome --  goal ongoing  (Min A)       Goal: Orientation Goal LTG- OT  Outcome: Ongoing (interventions implemented as appropriate)    08/06/17 0826 08/09/17 1315   Orientation OT LTG   Orientation OT LTG, Date Established 08/06/17 --    Orientation OT LTG, Time to Achieve by discharge --    Orientation OT LTG, Activity Type person;place;time;100% treatment session --    Orientation OT LTG, Outcome --  goal partially met  (for person and place)       Goal: LB  Dressing Goal LTG- OT  Outcome: Ongoing (interventions implemented as appropriate)    08/06/17 0826 08/09/17 1315   LB Dressing OT LTG   LB Dressing Goal OT LTG, Date Established 08/06/17 --    LB Dressing Goal OT LTG, Time to Achieve by discharge --    LB Dressing Goal OT LTG, Activity Type Don socks/pants --    LB Dressing Goal OT LTG, [place] Level contact guard assist --    LB Dressing Goal OT LTG, Additional Goal AAD --    LB Dressing Goal OT LTG, Outcome --  goal ongoing  (Mod A, sister assisting)

## 2017-08-09 NOTE — SIGNIFICANT NOTE
08/09/17 1606   SLP Deferred Reason   SLP Deferred Reason Patient unavailable for treatment  (RN reports pt going Hospice and able to communicate wants/needs. RN declined cog/com tx and to follow up with MD to determine if any further SLP needs at this time. )

## 2017-08-09 NOTE — PROGRESS NOTES
"Adult Nutrition  Assessment/PES    Patient Name:  Sowmya Beckett  YOB: 1940  MRN: [id number]  Admit Date:  8/5/2017    Assessment Date:  8/9/2017        Reason for Assessment       08/09/17 1419    Reason for Assessment    Reason For Assessment/Visit follow up protocol    Time Spent (min) 20    Diagnosis Diagnosis   Per notes this admission              Nutrition/Diet History       08/09/17 1420    Nutrition/Diet History    Reported/Observed By Patient;Family    Other Family present at time of visit and reports that pt isn't eating much at all.  Family stated that pt doesn't have much of an appetite and hasn't for years.  Pt reports liking strawberry and vanilla Boost Plus and stated that she hasn't really tried the Breeze.  Family stated that pt was going to try to eat cereal after a bath.  Observed 9 unopened Boosts in room.            Anthropometrics       08/09/17 1423    Anthropometrics    Height 157.5 cm (62\")    Weight 50.3 kg (110 lb 14.3 oz)    Ideal Body Weight (IBW)    Ideal Body Weight (IBW), Female 50.83    % Ideal Body Weight 99.16    Body Mass Index (BMI)    BMI (kg/m2) 20.32            Labs/Tests/Procedures/Meds       08/09/17 1423    Labs/Tests/Procedures/Meds    Labs/Tests Review Reviewed                Nutrition Prescription Ordered       08/09/17 1423    Nutrition Prescription PO    Current PO Diet Regular    Supplement Boost Plus;Boost Breeze    Supplement Frequency Other (comment)   1 strawberry and 1 vanilla Boost Plus and 1 breeze per tray.    Common Modifiers Cardiac            Evaluation of Received Nutrient/Fluid Intake       08/09/17 1424    PO Evaluation    Number of Meals 3    % PO Intake 25              Problem/Interventions:          Problem 2       08/09/17 1449    Nutrition Diagnoses Problem 2    Problem 2 Nutrition Appropriate for Condition at this Time    Etiology (related to) Goals of Care                  Intervention Goal       08/09/17 1449    Intervention " Goal    General Palliative Care            Nutrition Intervention       08/09/17 1450    Nutrition Intervention    RD/Tech Action Care plan reviewd;Follow Tx progress;Encourage intake              Education/Evaluation       08/09/17 1450    Monitor/Evaluation    Monitor Per protocol        Comments:      Electronically signed by:  Tammy Cleveland CNA  08/09/17 2:50 PM

## 2017-08-09 NOTE — TELEPHONE ENCOUNTER
----- Message from Sherrell Gresham sent at 8/9/2017 11:55 AM EDT -----  Regarding: [facility] - HOSPICE   Contact: [phone]  [place], [facility] CALLED LOOKING TO GO HOME WITH HOSPICE SERVICES, WOULD DR SCHULTE BE WILLING TO FOLLOW AND WRITE FOR SYMPTOM AND PAIN MANAGEMENT     PLEASE CALL

## 2017-08-10 ENCOUNTER — TELEPHONE (OUTPATIENT)
Dept: ONCOLOGY | Facility: CLINIC | Age: 77
End: 2017-08-10

## 2017-08-10 VITALS
OXYGEN SATURATION: 98 % | WEIGHT: 110.89 LBS | HEART RATE: 90 BPM | DIASTOLIC BLOOD PRESSURE: 70 MMHG | RESPIRATION RATE: 16 BRPM | SYSTOLIC BLOOD PRESSURE: 112 MMHG | BODY MASS INDEX: 20.41 KG/M2 | TEMPERATURE: 98.6 F | HEIGHT: 62 IN

## 2017-08-10 LAB
INR PPP: 1.08
PROTHROMBIN TIME: 11.8 SECONDS (ref 9.6–11.5)

## 2017-08-10 PROCEDURE — 85610 PROTHROMBIN TIME: CPT | Performed by: INTERNAL MEDICINE

## 2017-08-10 PROCEDURE — 99239 HOSP IP/OBS DSCHRG MGMT >30: CPT | Performed by: NURSE PRACTITIONER

## 2017-08-10 PROCEDURE — 97530 THERAPEUTIC ACTIVITIES: CPT

## 2017-08-10 RX ORDER — PANTOPRAZOLE SODIUM 40 MG/1
40 TABLET, DELAYED RELEASE ORAL DAILY
Qty: 30 TABLET | Refills: 1 | Status: SHIPPED | OUTPATIENT
Start: 2017-08-10

## 2017-08-10 RX ORDER — MORPHINE 10 MG/ML
6 TINCTURE ORAL EVERY 6 HOURS PRN
Qty: 500 ML | Refills: 0 | Status: SHIPPED | OUTPATIENT
Start: 2017-08-10

## 2017-08-10 RX ORDER — ATORVASTATIN CALCIUM 80 MG/1
80 TABLET, FILM COATED ORAL NIGHTLY
Qty: 30 TABLET | Refills: 1 | Status: SHIPPED | OUTPATIENT
Start: 2017-08-10

## 2017-08-10 RX ORDER — WARFARIN SODIUM 2 MG/1
TABLET ORAL
Qty: 30 TABLET | Refills: 0 | Status: SHIPPED | OUTPATIENT
Start: 2017-08-10

## 2017-08-10 RX ORDER — GABAPENTIN 300 MG/1
300 CAPSULE ORAL NIGHTLY
Start: 2017-08-10

## 2017-08-10 RX ORDER — QUETIAPINE FUMARATE 25 MG/1
25 TABLET, FILM COATED ORAL NIGHTLY
Qty: 30 TABLET | Refills: 1 | Status: SHIPPED | OUTPATIENT
Start: 2017-08-10

## 2017-08-10 RX ORDER — MIDODRINE HYDROCHLORIDE 5 MG/1
5 TABLET ORAL
Qty: 90 TABLET | Refills: 0 | Status: SHIPPED | OUTPATIENT
Start: 2017-08-10

## 2017-08-10 RX ADMIN — ASPIRIN 325 MG ORAL TABLET 325 MG: 325 PILL ORAL at 09:09

## 2017-08-10 RX ADMIN — PANTOPRAZOLE SODIUM 40 MG: 40 TABLET, DELAYED RELEASE ORAL at 06:10

## 2017-08-10 RX ADMIN — ACYCLOVIR 400 MG: 200 CAPSULE ORAL at 09:09

## 2017-08-10 RX ADMIN — MIDODRINE HYDROCHLORIDE 5 MG: 5 TABLET ORAL at 13:11

## 2017-08-10 RX ADMIN — MIDODRINE HYDROCHLORIDE 5 MG: 5 TABLET ORAL at 09:09

## 2017-08-10 RX ADMIN — MORPHINE 6 MG: 10 TINCTURE ORAL at 09:20

## 2017-08-10 RX ADMIN — DULOXETINE HYDROCHLORIDE 60 MG: 60 CAPSULE, DELAYED RELEASE ORAL at 09:09

## 2017-08-10 NOTE — PROGRESS NOTES
Continued Stay Note  [facility]     Patient Name: Sowmya Beckett  MRN: [id number]  Today's Date: 8/10/2017    Admit Date: 8/5/2017          Discharge Plan       08/10/17 1801    Case Management/Social Work Plan    Plan [facility]    Patient/Family In Agreement With Plan yes    Final Note    Final Note D/C'd home per private car with family to [facility].              Discharge Codes     None        Expected Discharge Date and Time     Expected Discharge Date Expected Discharge Time    Aug 10, 2017             Concepción Ward RN

## 2017-08-10 NOTE — PROGRESS NOTES
"Pharmacy Consult  -  Warfarin    Sowmya Beckett is a  77 y.o. female   Height - 62\" (157.5 cm)  Weight - 111 lb (50.3 kg)    Consulting Provider: - Blane Peters MD  Indication: - Atrial Fibrillation, Embolic Stroke  Goal INR: -  2-3      Bridge Therapy: No         Drug-Drug Interactions with current regimen:   Aspirin - increased bleed risk   Sulfamethoxazole/trimethoprim - may increase INR    Warfarin Dosing During Admission:    Date  8/8 8/9 8/10         INR  1.1 1.04 1.08         Dose  2 mg 2 mg (2 mg)              Labs:    Results from last 7 days   Lab Units 08/10/17  0410 08/09/17  0419 08/08/17  0856 08/08/17  0406 08/06/17  0557 08/05/17  1749 08/05/17  1733   INR  1.08 1.04 1.10 --  1.04 --  --    HEMOGLOBIN g/dL --  9.0* --  8.2* 9.8* 10.5* --    HEMOGLOBIN, POC g/dL --  --  --  --  --  --  10.9*   HEMATOCRIT % --  26.2* --  24.4* 28.8* 30.8* --    HEMATOCRIT POC % --  --  --  --  --  --  32*   PLATELETS 10*3/mm3 --  175 --  165 198 202 --      Results from last 7 days   Lab Units 08/09/17  0419 08/08/17  0406 08/06/17  0557 08/05/17  1749   SODIUM mmol/L 136 138 135 134   POTASSIUM mmol/L 4.3 3.1* 4.0 4.0   CHLORIDE mmol/L 109 113* 101 99   CO2 mmol/L 23.0 19.0* 28.0 24.0   BUN mg/dL 14 14 26* 27*   CREATININE mg/dL 0.90 0.90 1.00 1.30   CALCIUM mg/dL 8.5* 7.8* 8.9 9.5   BILIRUBIN mg/dL --  1.0 1.0 0.9   ALK PHOS U/L --  80 92 100   ALT (SGPT) U/L --  9 12 12  12   AST (SGOT) U/L --  20 19 24  24   GLUCOSE mg/dL 106* 91 76 100     Diet Order   Procedures   • Diet Regular; Cardiac     Diet: eating 25-75% meals (8/9)    Assessment/Plan:   Highly complicated and risky patient to anticoagulate given recent stroke as well as debility and falls. Patient has a history of being on 2 mg warfarin daily at home; she has received 2 mg x 2 doses inpatient.    INR remains subtherapeutic. Patient is planned for D/C today but will continue cautiously with this dose in case she remains inpatient.    Since " patient is no longer receiving melphalan, prophylactic bactrim is no longer necessary per oncology. As it interacts with her warfarin, will D/C per Dr. Tobin. Of note, patient has also been refusing bactrim.      Thank you for this consult,    Pratibha Banerjee, PharmD  Pharmacy Resident  8/10/2017  11:30 AM

## 2017-08-10 NOTE — DISCHARGE SUMMARY
[facility] Medicine Services  DISCHARGE SUMMARY       Date of Admission: 8/5/2017  Date of Discharge:  8/10/2017  Primary Care Physician: No primary care provider on file.  Consulting Physician(s)     Provider Relationship    Alvarado Willoughby MD Consulting Physician    ERIC Harris Consulting Physician    Messi Pantoja MD Consulting Physician    Salvador Gustafson DO Consulting Physician          Discharge Diagnoses:  Active Hospital Problems (** Indicates Principal Problem)    Diagnosis Date Noted   • **Stroke (S/P tPA). 8/5/17 [I63.9] 08/05/2017   • PAF (Prior ablation) with RVR this admission. Off anticoagulation [I48.0] 08/05/2017   • AL amyloidosis with cardiac ,renal, colon involvement with autonomic neuropathy [E85.8] 08/05/2017      Resolved Hospital Problems    Diagnosis Date Noted Date Resolved   No resolved problems to display.       Presenting Problem/History of Present Illness  Stroke [I63.9]     Chief Complaint on Day of Discharge: ready to go home    History of Present Illness on Day of Discharge: Laying in bed, no complaints, ready to go home, denies any chest pain, soa, abdominal pain, n/v/d.     Hospital Course  Patient is a 77 y.o. female presented with A history of amyloidosis on Melphalan who presented with right-sided weakness and expressive dysphagia/dysarthria.  He was felt patient has had a CVA and underwent TPA infusion with resolution of her symptoms except for diffuse weakness.  She does have history of PAF but was not on anticoagulation due to history of recent falls.    Patient has had recurrent PAF with RVR.  She was started on diltiazem however experienced orthostatic hypotension and autonomic dysfunction and diltiazem was therefore discontinued.  Dr. Pantoja was consulted as he follows her for her chemotherapy.  Dr. Pantoja feels that her amylase is a terminal process and patient and family wished for her to be a full DNR and be discharged home with hospice  without any further therapy for her amyloid.  Concerning anticoagulation family wishes to proceed with low-dose Coumadin.  She will need a repeat INR check in 3-4 days of which hospice nurse can draw.      She will be sent home today with hospice.          Consults:   Consults     Date and Time Order Name Status Description    8/7/2017 1823 Inpatient Consult to Palliative Care MD Completed     8/7/2017 1041 Inpatient Consult to Hematology & Oncology Completed     8/6/2017 1511 Inpatient Consult to Cardiology Completed     8/5/2017 1900 Inpatient Consult to Neurology      8/5/2017 1724 Consult Interventional Neurologist and/or Stroke Team            Pertinent Test Results:  Results for SUSU AZUL (MRN [id number]) as of 8/10/2017 14:57   Ref. Range 8/10/2017 04:10   Protime Latest Ref Range: 9.6 - 11.5 Seconds 11.8 (H)   INR Unknown 1.08   Results for SUSU AZUL (MRN [id number]) as of 8/10/2017 14:57   Ref. Range 8/9/2017 04:19   WBC Latest Ref Range: 3.50 - 10.80 10*3/mm3 5.68   RBC Latest Ref Range: 3.89 - 5.14 10*6/mm3 2.47 (L)   Hemoglobin Latest Ref Range: 11.5 - 15.5 g/dL 9.0 (L)   Hematocrit Latest Ref Range: 34.5 - 44.0 % 26.2 (L)   RDW Latest Ref Range: 11.3 - 14.5 % 18.6 (H)   MCV Latest Ref Range: 80.0 - 99.0 fL 106.1 (H)   MCH Latest Ref Range: 27.0 - 31.0 pg 36.4 (H)   MCHC Latest Ref Range: 32.0 - 36.0 g/dL 34.4   MPV Latest Ref Range: 6.0 - 12.0 fL 10.0   Platelets Latest Ref Range: 150 - 450 10*3/mm3 175   RDW-SD Latest Ref Range: 37.0 - 54.0 fl 70.7 (H)   Neutrophil % Latest Ref Range: 41.0 - 71.0 % 85.3 (H)   Lymphocyte % Latest Ref Range: 24.0 - 44.0 % 9.2 (L)   Monocyte % Latest Ref Range: 0.0 - 12.0 % 4.8   Eosinophil % Latest Ref Range: 0.0 - 3.0 % 0.5   Basophil % Latest Ref Range: 0.0 - 1.0 % 0.0   Immature Grans % Latest Ref Range: 0.0 - 0.6 % 0.2   Neutrophils, Absolute Latest Ref Range: 1.50 - 8.30 10*3/mm3 4.85   Lymphocytes, Absolute Latest Ref Range: 0.60 - 4.80 10*3/mm3  0.52 (L)   Monocytes, Absolute Latest Ref Range: 0.00 - 1.00 10*3/mm3 0.27   Eosinophils, Absolute Latest Ref Range: 0.00 - 0.30 10*3/mm3 0.03   Basophils, Absolute Latest Ref Range: 0.00 - 0.20 10*3/mm3 0.00   Immature Grans, Absolute Latest Ref Range: 0.00 - 0.03 10*3/mm3 0.01   Results for SUSU AZUL (MRN [id number]) as of 8/10/2017 14:57   Ref. Range 8/9/2017 04:19   Glucose Latest Ref Range: 70 - 100 mg/dL 106 (H)   Sodium Latest Ref Range: 132 - 146 mmol/L 136   Potassium Latest Ref Range: 3.5 - 5.5 mmol/L 4.3   CO2 Latest Ref Range: 20.0 - 31.0 mmol/L 23.0   Chloride Latest Ref Range: 99 - 109 mmol/L 109   Anion Gap Latest Ref Range: 3.0 - 11.0 mmol/L 4.0   Creatinine Latest Ref Range: 0.60 - 1.30 mg/dL 0.90   BUN Latest Ref Range: 9 - 23 mg/dL 14   BUN/Creatinine Ratio Latest Ref Range: 7.0 - 25.0  15.6   Calcium Latest Ref Range: 8.7 - 10.4 mg/dL 8.5 (L)   eGFR Non African Amer Latest Ref Range: >60 mL/min/1.73 61   Albumin Latest Ref Range: 3.20 - 4.80 g/dL 3.40   Phosphorus Latest Ref Range: 2.4 - 5.1 mg/dL 1.5 (L)   Results for SUSU AZUL (MRN [id number]) as of 8/10/2017 14:57   Ref. Range 8/8/2017 04:06   BNP Latest Ref Range: 0.0 - 100.0 pg/mL 323.0 (H)     Results for SUSU AZUL (MRN [id number]) as of 8/10/2017 14:57   Ref. Range 8/8/2017 04:06   TSH Baseline Latest Ref Range: 0.350 - 5.350 mIU/mL 1.548   Folate Latest Ref Range: 3.20 - 20.00 ng/mL 10.97   Results for SUSU AZUL (MRN [id number]) as of 8/10/2017 14:57   Ref. Range 8/8/2017 04:06   Vitamin B-12 Latest Ref Range: 211 - 911 pg/mL 584   Results for SUSU AZUL (MRN [id number]) as of 8/10/2017 14:57   Ref. Range 8/7/2017 14:28   C.difficile toxin A/B Latest Ref Range: Negative  Not Detected   Results for SUSU AZUL (MRN [id number]) as of 8/10/2017 14:57   Ref. Range 8/6/2017 05:57   Hemoglobin A1C Latest Ref Range: 4.80 - 5.60 % 5.60   Results for SUSU AZUL (MRN [id number]) as of 8/10/2017  14:57   Ref. Range 8/6/2017 05:57   Total Cholesterol Latest Ref Range: 0 - 200 mg/dL 186   HDL Cholesterol Latest Ref Range: 40 - 60 mg/dL 57   LDL Cholesterol  Latest Ref Range: 0 - 130 mg/dL 106   Triglycerides Latest Ref Range: 0 - 150 mg/dL 214 (H)     Imaging Results (all)     Procedure Component Value Units Date/Time    XR Chest 1 View [[id number]]  (Abnormal) Collected:  08/05/17 1725     Updated:  08/05/17 1901    Narrative:       EXAM:    XR Chest, 1 View    CLINICAL HISTORY:    77 years old, female; Signs and symptoms; Other: Stroke protocol; Additional   info: Acute stroke protocol (onset < 12 hrs)    TECHNIQUE:    Frontal view of the chest.    EXAM DATE/TIME:    8/5/2017 5:25 PM    COMPARISON:    CR - XR CHEST 2 VW 3/21/2017 1:05:24 PM    FINDINGS:    No focal pulmonary consolidation is demonstrated.      No significant obscuration of the lateral costophrenic angles is demonstrated.      No significant vascular congestion is demonstrated.      There is scattered pulmonary scarring bilaterally.      Visualized cardiac silhouette size appears within normal limits.        There are atherosclerotic calcifications in the thoracic aorta.      Impression:         No acute pulmonary process is demonstrated.    THIS DOCUMENT HAS BEEN ELECTRONICALLY SIGNED BY JOIE MONROE MD    MRI Brain Without Contrast [[id number]]  (Abnormal) Collected:  08/05/17 1935     Updated:  08/05/17 2336    Narrative:       EXAM:    MR Head Without Intravenous Contrast    CLINICAL HISTORY:    77 years old, female; Other amyloidosis; Paroxysmal atrial fibrillation;   Cerebral infarction, unspecified; Disorder of kidney and ureter, unspecified;   Signs and symptoms; Hemiplegia and hemiparesis and speech disturbance; Aphasia;   Right side, dominance unknown; Patient HX: Right side weakness, transient   aphasia woman with amyloidosis recent increased weakness, getting chemotherapy   for her amyloidosis, was in her usual health's this  afternoon when about 4: 30   while standing in the kitchen she suddenly became less responsive and leaned to   the right. Family notes her speech was garbled and she could not follow   commands. She has a flaccid right arm but moved her left side well. After about   10 minutes her speech improved but she remained weak on the right and in the   emergency room here was noted to have both right upper extremity weakness and   neglect. Patient was not really aware of these problems and reports she   remembers some of what happened. She has had some falls recently. No surgeries   on head multiple myeloma    TECHNIQUE:    Magnetic resonance images of the head/brain without intravenous contrast in   multiple planes.    EXAM DATE/TIME:    8/5/2017 7:35 PM    COMPARISON:    MRI brain 12/27/16    FINDINGS:    There is prominent amount of high signal in the bilateral white matter on   FLAIR imaging which may represent chronic small vessel ischemic disease in the   appropriate clinical setting.      Probable small old infarction in LEFT cerebellum.      Evaluation of the brain demonstrates no other convincing areas of abnormal   signal intensity.      There is moderate cerebral cortical atrophy.  There is mild fullness of   posterior aspect of lateral ventricles.        Visualized paranasal sinuses and mastoid air cells demonstrate no significant   opacification.      Impression:         No definite acute intracranial process is demonstrated.  Probable prominent   chronic ischemic changes as discussed above.    THIS DOCUMENT HAS BEEN ELECTRONICALLY SIGNED BY JOIE MONROE MD    CT Head Without Contrast Stroke Protocol [[id number]] Collected:  08/05/17 1823     Updated:  08/06/17 0917    Narrative:       EXAMINATION: CT HEAD WO CONTRAST  - 08/05/2017     INDICATION: Stroke protocol.     TECHNIQUE: CT scan of the head was performed at 5 mm intervals.     The radiation dose reduction device was turned on for each scan per the  ADAMA  (As Low as Reasonably Achievable) protocol.     COMPARISON: 03/21/2017.      FINDINGS: There is no intracranial mass. There is no hemorrhage. There  is no midline shift or extra-axial fluid collection. There is central or  cortical atrophy.       Impression:       Central and cortical atrophy without acute finding.     Time of the exam:  1728 hours  Report sent to ER: 1736 hours     DICTATED:     08/05/2017  EDITED:         08/05/2017     This report was finalized on 8/6/2017 9:15 AM by Dr. Johnnie Sen MD.       CT Cerebral Perfusion With & Without Contrast [[id number]] Collected:  08/05/17 1817     Updated:  08/06/17 0917    Narrative:       EXAMINATION: CT CEREBRAL PERFUSION W/WO CONTRAST - 08/05/2017     INDICATION: Evaluate for stroke.      TECHNIQUE: CT cerebral perfusion imaging was performed with data  acquisition regarding blood volume, blood flow, mean transit time, and  time to drain.      The radiation dose reduction device was turned on for each scan per the  ALARA (As Low as Reasonably Achievable) protocol.     COMPARISON: CT scan of the same day.     FINDINGS: There is no reduction in cerebral flow or asymmetry. There is  some increased blood volume in the left posterior parietal region but  there is no evidence of areas of diminished perfusion with normal blood  volume. Therefore there are no areas of penumbra.       Impression:       There is no evidence of reversible neural ischemia.     DICTATED:     08/05/2017  EDITED:         08/05/2017        This report was finalized on 8/6/2017 9:15 AM by Dr. Johnnie Sen MD.       CT Head Without Contrast [[id number]]  (Abnormal) Collected:  08/06/17 2201     Updated:  08/06/17 2248    Narrative:       EXAM:  CT Head Without Intravenous Contrast    CLINICAL HISTORY:  77 years old, female; Other amyloidosis; Paroxysmal atrial fibrillation;   Cerebral infarction, unspecified; Disorder of kidney and ureter, unspecified;   Other reduced mobility; Other reduced  mobility; Signs and symptoms; Other: See   notes; Patient HX: Most recent prior CT hasn't been finalized and cannot be   faxed at this time-images are available; Additional info: Nausea and vomiting    TECHNIQUE:  Axial computed tomography images of the head/brain without intravenous   contrast.  This CT exam was performed using one or more of the following dose   reduction techniques:  automated exposure control, adjustment of the mA and/or   kV according to patient size, and/or use of iterative reconstruction technique.    COMPARISON:  CT HEAD WO CONTRAST 8/6/2017 7:08:07 PM    FINDINGS:  Brain:  Scattered areas of hypoattenuation, likely chronic small vessel   ischemic change, demyelination, or gliosis.  There is parenchymal atrophy.  Ventricles:  Normal.  Bones/joints:  Normal.  No acute fracture.  Soft tissues:  Normal.  Vasculature:  Atherosclerotic vascular calcifications.  Sinuses:  Minimal ethmoid sinus disease.  Mastoid air cells:  Normal as visualized.  No mastoid effusion.      Impression:         1. No acute findings.    2. Non-acute findings are described above.    THIS DOCUMENT HAS BEEN ELECTRONICALLY SIGNED BY EDDIE HAZEL MD    CT Head Without Contrast [[id number]] Collected:  08/07/17 0842     Updated:  08/07/17 0903    Narrative:       EXAMINATION: CT HEAD WO CONTRAST-08/06/2017:      INDICATION: Stroke Post tPA Administration - Perform 24 Hours After TPA  Infusion; I63.9-Cerebral infarction, unspecified; I48.0-Paroxysmal  atrial fibrillation; E85.8-Other amyloidosis; N28.9-Disorder of kidney  and ureter, unspecified; Z74.09-Other reduced mobility; Z74.09-Other  reduced mobility, post TPA.     TECHNIQUE: Multiple axial CT imaging was obtained of the head from the  skull base to the skull vertex without the administration of intravenous  contrast.     The radiation dose reduction device was turned on for each scan per the  ALARA (As Low as Reasonably Achievable) protocol.     COMPARISON: NONE.      FINDINGS: Atrophy is seen of the brain with extensive chronic small  vessel ischemic changes seen of the periventricular and subcortical  white matter. No hemorrhage or hydrocephalus. No mass, mass effect, or  midline shift. The bony structures are unremarkable. The visualized  paranasal sinuses are clear.       Impression:       Chronic changes identified within the brain with no evidence  of acute intracranial abnormality status post TPA.     D:  08/07/2017  E:  08/07/2017           This report was finalized on 8/7/2017 9:01 AM by Dr. Alena Lam MD.       XR Chest 1 View [[id number]] Collected:  08/08/17 0945     Updated:  08/08/17 2234    Narrative:       EXAMINATION: XR CHEST 1 VW-      INDICATION: I63.9-Cerebral infarction, unspecified; I48.0-Paroxysmal  atrial fibrillation; E85.8-Other amyloidosis; N28.9-Disorder of kidney  and ureter, unspecified; Z74.09-Other reduced mobility;  R41.841-Cognitive communication deficit.      COMPARISON: 08/05/2017 portable chest.     FINDINGS: There is mild new patchy disease in left lung base. Lungs  elsewhere appear clear. No edema, effusion or pneumothorax is seen.           Impression:       Mild new left basilar pulmonary interstitial disease.     D:  08/08/2017  E:  08/08/2017     This report was finalized on 8/8/2017 10:32 PM by DR. oJvan Perez MD.           ECHO:   · Mild aortic valve regurgitation is present.  · Left ventricular systolic function is normal. Estimated EF = 70%.  · Left ventricular diastolic dysfunction (grade I) consistent with impaired relaxation.  · Mild-to-moderate mitral valve regurgitation is present.  · Normal right ventricular cavity size, wall thickness, systolic function and septal motion noted.  · Saline test results are negative.  · There is no evidence of pericardial effusion.  · No evidence of pulmonary hypertension is present.  · No significant structural valvular abnormality demonstrated.    Condition on Discharge:  "stable    Physical Exam on Discharge:/70  Pulse 90  Temp 98.6 °F (37 °C) (Oral)   Resp 16  Ht 62\" (157.5 cm)  Wt 110 lb 14.3 oz (50.3 kg)  SpO2 98%  BMI 20.28 kg/m2  Physical Exam   Constitutional: She is oriented to person, place, and time. No distress.   Chronically ill appearing   HENT:   Head: Normocephalic and atraumatic.   Eyes: Pupils are equal, round, and reactive to light. No scleral icterus.   Neck: Neck supple. No tracheal deviation present.   Cardiovascular: Normal rate, normal heart sounds and intact distal pulses.    Irregular rhythm   Pulmonary/Chest: Effort normal and breath sounds normal.   Abdominal: Soft. Bowel sounds are normal.   Musculoskeletal: She exhibits no edema.   Neurological: She is alert and oriented to person, place, and time.   Skin: Skin is warm and dry.   Psychiatric: She has a normal mood and affect. Her behavior is normal.         Discharge Disposition  Home with Hospice    Discharge Medications   Sowmya Beckett   Home Medication Instructions SHAUNA:[id number]    Printed on:08/10/17 8610   Medication Information                      aspirin 325 MG tablet  Take 325 mg by mouth daily.             atorvastatin (LIPITOR) 80 MG tablet  Take 1 tablet by mouth Every Night.             DULoxetine (CYMBALTA) 60 MG capsule  Take 60 mg by mouth Daily.             gabapentin (NEURONTIN) 300 MG capsule  Take 1 capsule by mouth Every Night.             loperamide (IMODIUM) 2 MG capsule  Take 2 mg by mouth As Needed for Diarrhea.             midodrine (PROAMATINE) 5 MG tablet  Take 1 tablet by mouth 3 (Three) Times a Day Before Meals.             ondansetron (ZOFRAN) 8 MG tablet  Take 1 tablet by mouth 3 (Three) Times a Day As Needed for Nausea or Vomiting.             opium 10 MG/ML (1%) tincture  Take 6 mg by mouth Every 6 (Six) Hours As Needed (diarrhea).             pantoprazole (PROTONIX) 40 MG EC tablet  Take 1 tablet by mouth Daily.             polyethylene glycol " (MIRALAX) packet  Take 17 g by mouth Daily.             QUEtiapine (SEROquel) 25 MG tablet  Take 1 tablet by mouth Every Night.             warfarin (COUMADIN) 2 MG tablet  1 po every evening                 Discharge Diet: as tolerated    Discharge Care Plan / Instructions: home with hospice, coumadin 2mg daily, repeat INR in 3-4 days    Activity at Discharge: as tolerated    Follow-up Appointments  Future Appointments  Date Time Provider Department Center   8/24/2017 11:15 AM Messi Pantoja MD [facility]   9/11/2017 10:15 AM Alvarado Willoughby MD [facility] None     Additional Instructions for the Follow-ups that You Need to Schedule     Additional Discharge Follow-Up (Specify Provider)    As directed    To:  repeat INR in 3-4 days, hospice nurse should obtain   Has the patient’s follow-up appointment been scheduled and documented in the discharge navigator?:  Yes, documented in the appointment section                   ERIC Gilmore 08/10/17 2:47 PM    Time: 45 minutes    Please note that portions of this note may have been completed with a voice recognition program. Efforts were made to edit the dictations, but occasionally words are mistranscribed.

## 2017-08-10 NOTE — PLAN OF CARE
Problem: Patient Care Overview (Adult)  Goal: Plan of Care Review  Outcome: Ongoing (interventions implemented as appropriate)    08/10/17 0419   Coping/Psychosocial Response Interventions   Plan Of Care Reviewed With patient;significant other   Patient Care Overview   Progress progress toward functional goals is gradual

## 2017-08-10 NOTE — THERAPY TREATMENT NOTE
Acute Care - Occupational Therapy Treatment Note  [facility]     Patient Name: Sowmya Beckett  : 1940  MRN: [id number]  Today's Date: 8/10/2017  Onset of Illness/Injury or Date of Surgery Date: 17  Date of Referral to OT: 17  Referring Physician: DO Jose Alfredo      Admit Date: 2017    Visit Dx:     ICD-10-CM ICD-9-CM   1. Cerebrovascular accident (CVA), unspecified mechanism I63.9 434.91   2. Paroxysmal atrial fibrillation I48.0 427.31   3. 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 Renal insufficiency N28.9 593.9   5. Impaired functional mobility, balance, gait, and endurance Z74.09 V49.89   6. Impaired mobility and ADLs Z74.09 799.89   7. Cognitive communication disorder R41.841 315.32     Patient Active Problem List   Diagnosis   • Atrial fibrillation and flutter   • History of melanoma   • Failure to thrive in adult   • Dehydration   • Hyperglycemia   • Peripheral autonomic neuropathy due to underlying disease   • AL amyloidosis   • Skin lesion of right ear   • Stroke (S/P tPA). 17   • PAF (Prior ablation) with RVR this admission. Off anticoagulation   • AL amyloidosis with cardiac ,renal, colon involvement with autonomic neuropathy             Adult Rehabilitation Note       08/10/17 1120 17 1130 17 1309    Rehab Assessment/Intervention    Discipline occupational therapist  -AN occupational therapist  -TB physical therapist  -SHEA,CYNDI,LS2    Document Type therapy note (daily note)  -AN therapy note (daily note)  -TB therapy note (daily note)  -CYNDI VALDIVIA,LS2    Subjective Information no complaints;agree to therapy  -AN no complaints;agree to therapy  -TB agree to therapy;no complaints  -LS,KR,LS2    Patient Effort, Rehab Treatment good  -AN good  -TB good  -LS,KR,LS2    Symptoms Noted During/After Treatment none  -AN none  -TB fatigue  -LS,KR,LS2    Precautions/Limitations fall precautions   hx syncope  -AN fall precautions  -TB fall precautions  -LS,KR,LS2    Specific Treatment  Considerations hx syncope  -AN Ortho static BPs completed - no concerns this session  -TB     Recorded by [AN] Delores Acharya, OT [TB] Priya Simon, OT [LS,KR,LS2] Keira Granda, PT (r) Kathy Rob, PT Student (t) Keira Granda, PT (c)    Vital Signs    Pre Systolic BP Rehab  117  -  -LS,KR,LS2    Pre Treatment Diastolic BP  70  -TB 76  -LS,KR,LS2    Intra Systolic BP Rehab  116  -TB     Intra Treatment Diastolic BP  81  -TB     Post Systolic BP Rehab  126  -  -LS,KR,LS2    Post Treatment Diastolic BP  78  -TB 57  -LS,KR,LS2    Pretreatment Heart Rate (beats/min)   53  -LS,KR,LS2    Posttreatment Heart Rate (beats/min)   51  -LS,KR,LS2    Pre SpO2 (%)   98  -LS,KR,LS2    O2 Delivery Pre Treatment   room air  -LS,KR,LS2    Post SpO2 (%)   94  -LS,KR,LS2    O2 Delivery Post Treatment  room air  -TB room air  -LS,KR,LS2    Pre Patient Position  Supine  -TB Supine  -LS,KR,LS2    Intra Patient Position  Standing  -TB Supine  -LS,KR,LS2    Post Patient Position  Sitting  -TB Supine  -LS,KR,LS2    Recorded by  [TB] Priya Simon, OT [LS,KR,LS2] Keira Granda, PT (r) Kathy Rob, PT Student (t) Keira Granda, PT (c)    Pain Assessment    Pain Assessment No/denies pain  -AN No/denies pain  -TB 0-10  -LS,KR,LS2    Pain Score   0  -LS,KR,LS2    Post Pain Score   0  -LS,KR,LS2    Recorded by [AN] Delores Acharya, OT [TB] Priya Simon, OT [LS,KR,LS2] Keira Granda, PT (r) Kathy Rob, PT Student (t) Keira Granda, PT (c)    Cognitive Assessment/Intervention    Current Cognitive/Communication Assessment impaired  -AN impaired  -TB impaired  -LS,KR,LS2    Orientation Status oriented to;person;place  -AN oriented to;person;place  -TB oriented to;person  -LS,KR,LS2    Follows Commands/Answers Questions needs cueing;75% of the time;needs repetition  -AN able to follow single-step instructions;needs cueing;needs repetition;75% of the time;needs increased time  -TB able to follow single-step  instructions;75% of the time;needs cueing;needs increased time  -LS,KR,LS2    Personal Safety decreased insight to deficits  -AN decreased awareness, need for assist;decreased awareness, need for safety;decreased insight to deficits  -TB mild impairment;decreased awareness, need for assist;decreased awareness, need for safety  -LS,KR,LS2    Personal Safety Interventions fall prevention program maintained;supervised activity  -AN fall prevention program maintained;gait belt;nonskid shoes/slippers when out of bed  -TB supervised activity  -LS,KR,LS2    Recorded by [AN] Delores Acharya, OT [TB] Priya Simon, OT [LS,KR,LS2] Keira Granda, PT (r) Kathy Rob, PT Student (t) Keira Granda, PT (c)    Bed Mobility, Assessment/Treatment    Bed Mobility, Roll Left, Green  contact guard assist;verbal cues required  -TB     Bed Mobility, Roll Right, Green  contact guard assist;verbal cues required  -TB     Bed Mob, Supine to Sit, Green set up required;contact guard assist  -AN minimum assist (75% patient effort);verbal cues required  -TB not tested  -LS,KR,LS2    Bed Mob, Sit to Supine, Green  not tested  -TB not tested  -LS,KR,LS2    Bed Mobility, Safety Issues  decreased use of arms for pushing/pulling;decreased use of legs for bridging/pushing  -TB     Bed Mobility, Impairments  strength decreased  -TB     Bed Mobility, Comment  cues to sequence all mobility  -TB RN deferred OOB activity d/t pt's orthostatic hypotension  -LS,KR,LS2    Recorded by [AN] Delores cAharya, OT [TB] Priya Simon, OT [LS,KR,LS2] Keira Granda, PT (r) Kathy Rob, PT Student (t) Keira Granda, PT (c)    Transfer Assessment/Treatment    Transfers, Sit-Stand Green minimum assist (75% patient effort);2 person assist required  -AN minimum assist (75% patient effort);verbal cues required  -TB not appropriate to assess;not tested  -LS,KR,LS2    Transfers, Stand-Sit Green minimum assist (75%  patient effort);2 person assist required  -AN minimum assist (75% patient effort);verbal cues required  -TB not appropriate to assess;not tested  -LS,KR,LS2    Transfers, Sit-Stand-Sit, Assist Device  --   gait belt   -TB     Toilet Transfer, [place] minimum assist (75% patient effort);2 person assist required  -AN      Transfer, Safety Issues  sequencing ability decreased;balance decreased during turns  -TB     Transfer, Impairments  strength decreased;impaired balance  -TB     Recorded by [AN] Delores Acharya OT [TB] Priya Simon OT [LS,KR,LS2] Keira Granda, PT (r) Kathy Rob, PT Student (t) Keira Granda, PT (c)    Gait Assessment/Treatment    Gait, [place] Level   not appropriate to assess;not tested  -LS,KR,LS2    Recorded by   [LS,KR,LS2] Keira Granda, PT (r) Kathy Rob, PT Student (t) Keira Granda, PT (c)    Functional Mobility    Functional Mobility- Ind. Level minimum assist (75% patient effort)  -AN minimum assist (75% patient effort);verbal cues required  -TB     Functional Mobility- Device  --   gait belt   -TB     Functional Mobility-Distance (Feet) 20  -AN 5  -TB     Functional Mobility- Comment to chair 1/2 to bathroom, 6-8 ft out of bathroom. Educated on cueing pt to take bigger steps in order to conserve energy and increase safety  -AN EOB to chair  -TB     Recorded by [AN] Delores Acharya, OT [TB] Priya Simon OT     Lower Body Dressing Assessment/Training    LB Dressing Assess/Train, Clothing Type  donning:;pants   brief  -TB     LB Dressing Assess/Train, Position  supine  -TB     LB Dressing Assess/Train, [place]  moderate assist (50% patient effort)  -TB     LB Dressing Assess/Train, Comment  Pt sister completing care  -TB     Recorded by  [TB] Priya Simon OT     Toileting Assessment/Training    Toileting Assess/Train, Indep[place] Level  maximum assist (25% patient effort)  -TB     Toileting Assess/Train, Comment pt able to complete hygeine  sitting supv; mod assist for brief  -AN incontinent  -TB     Recorded by [AN] Delores Acharya OT [TB] Priya Simon OT     Grooming Assessment/Training    Grooming Assess/Train, Comment  Pt's sister completing  -TB     Recorded by  [TB] Priya Simon OT     Balance Skills Training    Sitting-Level of Assistance Close supervision  -AN Close supervision  -TB     Sitting-Balance Support Right upper extremity supported;Left upper extremity supported  -AN Feet supported  -TB     Sitting-Balance Activities  --   ther ex  -TB     Standing-Level of Assistance Contact guard  -AN Minimum assistance  -TB     Static Standing Balance Support Right upper extremity supported  -AN Right upper extremity supported;Left upper extremity supported   gait belt  -TB     Standing-Balance Activities Weight Shift R-L  -AN Weight Shift R-L  -TB     Standing Balance # of Minutes 1  -AN 4  -TB     Recorded by [AN] Delores Acharya OT [TB] Priya Simon OT     Therapy Exercises    Bilateral Lower Extremities  AROM:;10 reps;ankle pumps/circles  -TB AAROM:;supine;ankle pumps/circles;hip abduction/adduction;heel slides;SAQ;SLR;10 reps  -LS,KR,LS2    Bilateral Upper Extremity  AROM:;10 reps;shoulder extension/flexion;shoulder abduction/adduction;shoulder horizontal abd/add;elbow flexion/extension;hand pumps  -TB AROM:;elbow flexion/extension;shoulder abduction/adduction;shoulder extension/flexion;hand pumps;10 reps  -LS,KR,LS2    Recorded by  [TB] Priya Simon OT [LS,KR,LS2] Keira Granda, PT (r) Kathy Rob PT Student (t) Keiar Granda, PT (c)    Positioning and Restraints    Pre-Treatment Position in bed  -AN in bed  -TB in bed  -LS,KR,LS2    Post Treatment Position chair  -AN chair  -TB bed  -LS,KR,LS2    In Bed   supine;call light within reach;encouraged to call for assist;with family/caregiver;side rails up x2;RUE elevated;LUE elevated  -LS,KR,LS2    In Chair sitting;with family/caregiver   with family at  "sink for grooming  -AN notified nsg;sitting;call light within reach;encouraged to call for assist;with family/caregiver   eating lunch meal  -TB     Recorded by [AN] Delores Acharya, OT [TB] Priya Simon OT [LS,KR,LS2] Keira Granda, PT (r) Kathy Rob, ARIELLE Student (t) Keira Granda, PT (c)      08/07/17 1223          Rehab Assessment/Intervention    Discipline physical therapist  -DM      Document Type therapy note (daily note)  -DM      Subjective Information agree to therapy;complains of;pain;nausea/vomiting   had Zofran;still nauseated;wants to amb \"as far as possible\"  -DM      Patient Effort, Rehab Treatment good  -DM      Symptoms Noted During/After Treatment increased pain   incr. nausea & abdom discomfort  -DM      Precautions/Limitations fall precautions;other (see comments)   incont.(use Depends); adult FTT  -DM      Recorded by [DM] Giselle Thornton, PT      Vital Signs    Pre Systolic BP Rehab 111  -DM      Pre Treatment Diastolic BP 75  -DM      Post Systolic BP Rehab 89  -DM      Post Treatment Diastolic BP 57  -DM      Pretreatment Heart Rate (beats/min) 61  -DM      Posttreatment Heart Rate (beats/min) 85  -DM      Pre SpO2 (%) 100  -DM      O2 Delivery Pre Treatment room air  -DM      Intra SpO2 (%) 92  -DM      O2 Delivery Intra Treatment room air  -DM      Post SpO2 (%) 100  -DM      O2 Delivery Post Treatment room air  -DM      Pre Patient Position Supine  -DM      Intra Patient Position Standing  -DM      Post Patient Position Sitting  -DM      Recorded by [DM] Giselle Thornton, PT      Pain Assessment    Pain Assessment 0-10  -DM      Pain Score 7  -DM      Post Pain Score 8  -DM      Pain Type Acute pain  -DM      Pain Location Abdomen  -DM      Pain Descriptors Aching  -DM      Pain Frequency Constant/continuous  -DM      Patient's Stated Pain Goal No pain  -DM      Pain Intervention(s) Repositioned;Ambulation/increased activity  -DM      Response to Interventions Tolerated  -DM      " Recorded by [DM] Giselle Thornton, PT      Cognitive Assessment/Intervention    Current Cognitive/Communication Assessment impaired  -DM      Orientation Status oriented to;person;place   slow response time;stated mo.,but not yr.or location  -DM      Follows Commands/Answers Questions 75% of the time;100% of the time;able to follow single-step instructions;needs cueing;needs increased time;needs repetition  -DM      Personal Safety mild impairment;decreased awareness, need for assist;decreased awareness, need for safety;decreased insight to deficits  -DM      Personal Safety Interventions fall prevention program maintained;gait belt;nonskid shoes/slippers when out of bed  -DM      Recorded by [DM] Giselle Thornton, PT      Bed Mobility, Assessment/Treatment    Bed Mobility, Assistive Device head of bed elevated;bed rails  -DM      Bed Mobility, Scoot/Bridge, [place] minimum assist (75% patient effort);verbal cues required  -DM      Bed Mob, Supine to Sit, [place] minimum assist (75% patient effort);verbal cues required  -DM      Bed Mobility, Safety Issues decreased use of arms for pushing/pulling  -DM      Bed Mobility, Impairments strength decreased;pain  -DM      Bed Mobility, Comment cues for hand placement,utilizing bedrail  -DM      Recorded by [DM] Giselle Thornton, PT      Transfer Assessment/Treatment    Transfers, Sit-Stand [place] minimum assist (75% patient effort);verbal cues required   pt incont.BM w/stand;toBR (2ndBM);NEW depends placed  -DM      Transfers, Stand-Sit [place] minimum assist (75% patient effort);verbal cues required  -DM      Transfers, Sit-Stand-Sit, Assist Device rolling walker  -DM      Toilet Transfer, [place] moderate assist (50% patient effort);verbal cues required   low commode  -DM      Toilet Transfer, Assistive Device other (see comments);rolling walker   grab bar on R  -DM      Transfer, Safety Issues sequencing ability decreased;weight-shifting ability  decreased;loses balance backward  -DM      Transfer, Impairments strength decreased;impaired balance;pain  -DM      Transfer, Comment cues for hand placement, seq. for hygiene(req.>8 min.self-hygiene  -DM      Recorded by [DM] Giselle Thornton, PT      Gait Assessment/Treatment    Gait, [place] Level minimum assist (75% patient effort);verbal cues required   has IV;toBR (incont.on seat;cleaned),then friedman  -DM      Gait, Assistive Device rolling walker  -DM      Gait, Distance (Feet) 180   c/o fat. from toileting & no sleep last night  -DM      Gait, Gait Pattern Analysis swing-through gait  -DM      Gait, Gait Deviations vince decreased;decreased heel strike;narrow base;step length decreased;weight-shifting ability decreased;ataxia   drifts L  -DM      Gait, Safety Issues sequencing ability decreased;step length decreased  -DM      Gait, Impairments strength decreased;impaired balance;pain  -DM      Gait, Comment incr. nausea,abdom pain,fatigue;cues for trunk ext,focusing ahead,PLB, INCR. STEP length  -DM      Recorded by [DM] Giselle Thornton, PT      Motor Skills/Interventions    Additional Documentation Balance Skills Training (Group)  -DM      Recorded by [DM] Giselle Thornton, PT      Balance Skills Training    Sitting-Level of Assistance Close supervision  -DM      Sitting-Balance Support Feet supported  -DM      Standing-Level of Assistance Contact guard  -DM      Static Standing Balance Support assistive device  -DM      Standing-Balance Activities Weight Shift A-P;Weight Shift R-L  -DM      Standing Balance # of Minutes 4   for hygiene  -DM      Gait Balance-Level of Assistance Minimum assistance   has IV,TELE  -DM      Gait Balance Support assistive device  -DM      Gait Balance Activities scanning environment R/L;side-stepping  -DM      Recorded by [DM] Giselle Thornton, PT      Therapy Exercises    Bilateral Lower Extremities AROM:;10 reps;sitting;standing;ankle pumps/circles;glut sets;heel slides;hip  "abduction/adduction;hip ER;hip flexion;hip IR;LAQ;quad sets   HS sets; MIP X 10  -DM      Bilateral Upper Extremity AROM:;10 reps;sitting;elbow flexion/extension;shoulder abduction/adduction;shoulder extension/flexion  -DM      Recorded by [DM] Giselle Thornton, PT      Positioning and Restraints    Pre-Treatment Position in bed  -DM      Post Treatment Position chair  -DM      In Chair notified nsg;reclined;call light within reach;encouraged to call for assist;exit alarm on;with other staff;waffle cushion;legs elevated   'FREEZING\";Req.warm blanket (done)  -DM      Recorded by [DM] Giselle Thornton, PT        User Key  (r) = Recorded By, (t) = Taken By, (c) = Cosigned By    Initials Name Effective Dates    TB Priyachen Rileygulshan Simon, OT 06/22/15 -     DM Giselle Thornton, PT 06/19/15 -     AN eDlores Acharya, OT 06/22/15 -     LS Keira Granda, PT 06/19/15 -     CYNDI Rob, PT Student 05/30/17 -                 OT Goals       08/09/17 1315 08/06/17 0826       Transfer Training OT LTG    Transfer Training OT LTG, Date Established  08/06/17  -CL     Transfer Training OT LTG, Time to Achieve  by discharge  -CL     Transfer Training OT LTG, Activity Type  bed to chair /chair to bed;sit to stand/stand to sit;toilet  -CL     Transfer Training OT LTG, [place] Level  supervision required  -CL     Transfer Training OT LTG, Additional Goal  AAD  -CL     Transfer Training OT LTG, Outcome goal ongoing   Min A  -TB      Dynamic Standing Balance OT LTG    Dynamic Standing Balance OT LTG, Date Established  08/06/17  -CL     Dynamic Standing Balance OT LTG, Time to Achieve  by discharge  -CL     Dynamic Standing Balance OT LTG, [place] Level  supervision required  -CL     Dynamic Standing Balance OT LTG, Additional Goal  AAD  -CL     Dynamic Standing Balance OT LTG, Outcome goal ongoing   Min A  -TB      Orientation OT LTG    Orientation OT LTG, Date Established  08/06/17  -CL     Orientation OT LTG, Time to Achieve  by " discharge  -CL     Orientation OT LTG, Activity Type  person;place;time;100% treatment session  -CL     Orientation OT LTG, Outcome goal partially met   for person and place  -TB      LB Dressing OT LTG    LB Dressing Goal OT LTG, Date Established  08/06/17  -CL     LB Dressing Goal OT LTG, Time to Achieve  by discharge  -CL     LB Dressing Goal OT LTG, Activity Type  Don socks/pants  -CL     LB Dressing Goal OT LTG, [place] Level  contact guard assist  -CL     LB Dressing Goal OT LTG, Additional Goal  AAD  -CL     LB Dressing Goal OT LTG, Outcome goal ongoing   Mod A, sister assisting  -TB        User Key  (r) = Recorded By, (t) = Taken By, (c) = Cosigned By    Initials Name Provider Type    TB Priya Simon, OT Occupational Therapist    CL Mary Swenson, OT Occupational Therapist          Occupational Therapy Education     Title: PT OT SLP Therapies (Done)     Topic: Occupational Therapy (Done)     Point: ADL training (Done)    Description: Instruct learner(s) on proper safety adaptation and remediation techniques during self care or transfers.   Instruct in proper use of assistive devices.    Learning Progress Summary    Learner Readiness Method Response Comment Documented by Status   Patient Acceptance E,D VU,NR Discussed DME ordered for home and use for safety and EC/WS. AN 08/10/17 1155 Done    Acceptance ED MELISSA,NR Education reinforced for benefits of OOB activity/therapy, role of OT and HEP TB 08/09/17 1314 Done    Acceptance E VU  KR 08/08/17 1508 Done    Acceptance E DU  AP 08/08/17 0541 Done    Eager EENA  DM 08/07/17 1340 Active    Acceptance E VU  JW 08/07/17 0717 Done    Acceptance E,D NR Pt educated on appropriate safety precautions, t/f techniques, and benefits of therapy. CL 08/06/17 0825 Active    Acceptance E VU  JW 08/06/17 0745 Done    Acceptance E MICHELLE TORRES  HT 08/06/17 0040 Done   Family Acceptance E,D MELISSANR Discussed DME ordered for home and use for safety and EC/WS. AN 08/10/17 1155  Done    Acceptance E,D VU,NR Education reinforced for benefits of OOB activity/therapy, role of OT and HEP TB 08/09/17 1314 Done    Acceptance E DU  AP 08/08/17 0541 Done               Point: Home exercise program (Done)    Description: Instruct learner(s) on appropriate technique for monitoring, assisting and/or progressing therapeutic exercises/activities.    Learning Progress Summary    Learner Readiness Method Response Comment Documented by Status   Patient Acceptance E VU  KR 08/08/17 1508 Done    Acceptance E DU  AP 08/08/17 0541 Done    Eager ED NR  DM 08/07/17 1340 Active    Acceptance E VU  JW 08/07/17 0717 Done    Acceptance E VU  JW 08/06/17 0745 Done    Acceptance E VU,NR   08/06/17 0040 Done   Family Acceptance E DU  AP 08/08/17 0541 Done               Point: Precautions (Done)    Description: Instruct learner(s) on prescribed precautions during self-care and functional transfers.    Learning Progress Summary    Learner Readiness Method Response Comment Documented by Status   Patient Acceptance E VU  KR 08/08/17 1508 Done    Acceptance E DU  AP 08/08/17 0541 Done    Marielaer EENA NR   08/07/17 1340 Active    Acceptance E VU  JW 08/07/17 0717 Done    Acceptance E,D NR Pt educated on appropriate safety precautions, t/f techniques, and benefits of therapy. CL 08/06/17 0825 Active    Acceptance E VU  JW 08/06/17 0745 Done    Acceptance E MELISSANR   08/06/17 0040 Done   Family Acceptance E DU  AP 08/08/17 0541 Done               Point: Body mechanics (Done)    Description: Instruct learner(s) on proper positioning and spine alignment during self-care, functional mobility activities and/or exercises.    Learning Progress Summary    Learner Readiness Method Response Comment Documented by Status   Patient Acceptance E VU  KR 08/08/17 1508 Done    Acceptance E DU  AP 08/08/17 0541 Done    Marielaer EENA NR   08/07/17 1340 Active    Acceptance E VU  JW 08/07/17 0717 Done    Acceptance E,D NR Pt educated on  appropriate safety precautions, t/f techniques, and benefits of therapy. CL 08/06/17 0825 Active    Acceptance E VU  JW 08/06/17 0745 Done    Acceptance E VU,NR  HT 08/06/17 0040 Done   Family Acceptance E DU  AP 08/08/17 0541 Done                      User Key     Initials Effective Dates Name Provider Type Discipline    TB 06/22/15 -  Priya Simon, OT Occupational Therapist OT    DM 06/19/15 -  Giselle Thornton, PT Physical Therapist PT    AN 06/22/15 -  Delores Acharya, OT Occupational Therapist OT    JW 02/14/17 -  Allison Ruvalcaba, RN Registered Nurse Nurse    AP 06/16/16 -  Pebbles Hamm RN Registered Nurse Nurse    CL 06/08/16 -  Mary Swenson, OT Occupational Therapist OT     06/10/16 -  Roseline Rodriguez, RN Registered Nurse Nurse    KR 05/30/17 -  Kathy Rob, PT Student PT Student PT                  OT Recommendation and Plan  Anticipated Equipment Needs At Discharge:  (TBA further)  Anticipated Discharge Disposition: home with assist, home with home health  Planned Therapy Interventions: adaptive equipment training, ADL retraining, bed mobility training, energy conservation, home exercise program, transfer training  Therapy Frequency: daily  Plan of Care Review  Plan Of Care Reviewed With: patient, family  Outcome Summary/Follow up Plan: Pt motivated to attempt more mobility and increase in ADL I this date. Pt has family support, hospice for discharge,        Outcome Measures       08/10/17 1120 08/09/17 1130 08/08/17 1309    How much help from another person do you currently need...    Turning from your back to your side while in flat bed without using bedrails?   3  -LS (r) KR (t) LS (c)    Moving from lying on back to sitting on the side of a flat bed without bedrails?   3  -LS (r) KR (t) LS (c)    Moving to and from a bed to a chair (including a wheelchair)?   3  -LS (r) KR (t) LS (c)    Standing up from a chair using your arms (e.g., wheelchair, bedside chair)?   3  -LS (r) KR (t)  LS (c)    Climbing 3-5 steps with a railing?   2  -LS (r) KR (t) LS (c)    To walk in hospital room?   2  -LS (r) KR (t) LS (c)    AM-PAC 6 Clicks Score   16  -LS (r) KR (t)    How much help from another is currently needed...    Putting on and taking off regular lower body clothing? 2  -AN 2  -TB     Bathing (including washing, rinsing, and drying) 2  -AN 2  -TB     Toileting (which includes using toilet bed pan or urinal) 3  -AN 2  -TB     Putting on and taking off regular upper body clothing 3  -AN 3  -TB     Taking care of personal grooming (such as brushing teeth) 3  -AN 3  -TB     Eating meals 3  -AN 3  -TB     Score 16  -AN 15  -TB     Modified [place] Scale    Modified Jennifer Scale 4 - Moderately severe disability.  Unable to walk without assistance, and unable to attend to own bodily needs without assistance.  -AN 3 - Moderate disability.  Requiring some help, but able to walk without assistance.  -TB     Functional Assessment    Outcome Measure Options  AM-PAC 6 Clicks Daily Activity (OT)  -TB AM-PAC 6 Clicks Basic Mobility (PT)  -LS (r) KR (t) LS (c)      08/07/17 1223          How much help from another person do you currently need...    Turning from your back to your side while in flat bed without using bedrails? 3  -DM      Moving from lying on back to sitting on the side of a flat bed without bedrails? 3  -DM      Moving to and from a bed to a chair (including a wheelchair)? 3  -DM      Standing up from a chair using your arms (e.g., wheelchair, bedside chair)? 3  -DM      Climbing 3-5 steps with a railing? 2  -DM      To walk in hospital room? 3  -DM      AM-PAC 6 Clicks Score 17  -DM      Modified Jennifer Scale    Modified [place] Scale 3 - Moderate disability.  Requiring some help, but able to walk without assistance.  -DM      Functional Assessment    Outcome Measure Options AM-PAC 6 Clicks Basic Mobility (PT);Modified Jennifer  -DM        User Key  (r) = Recorded By, (t) = Taken By, (c) = Cosigned By     Initials Name Provider Type    TB Priya Simon, OT Occupational Therapist    VINEET Thornton, PT Physical Therapist    ENRICO Acharya, OT Occupational Therapist    SHEA Granda, PT Physical Therapist    CYNDI Rob, PT Student PT Student           Time Calculation:         Time Calculation- OT       08/10/17 1158          Time Calculation- OT    OT Start Time 1120  -AN      Total Timed Code Minutes- OT 25 minute(s)  -AN      OT Received On 08/10/17  -AN      OT Goal Re-Cert Due Date 08/16/17  -AN        User Key  (r) = Recorded By, (t) = Taken By, (c) = Cosigned By    Initials Name Provider Type    AN Delores Acharya, OT Occupational Therapist           Therapy Charges for Today     Code Description Service Date Service Provider Modifiers Qty    01727394438 HC OT THERAPEUTIC ACT EA 15 MIN 8/10/2017 Delores Acharya OT GO 2               Delores Acharya OT  8/10/2017

## 2017-08-10 NOTE — PLAN OF CARE
Problem: Patient Care Overview (Adult)  Goal: Plan of Care Review  Outcome: Ongoing (interventions implemented as appropriate)    08/10/17 1156   Coping/Psychosocial Response Interventions   Plan Of Care Reviewed With patient;family   Outcome Evaluation   Outcome Summary/Follow up Plan Pt motivated to attempt more mobility and increase in ADL I this date. Pt has family support, hospice for discharge,         Problem: Stroke (Ischemic) (Adult)  Goal: Signs and Symptoms of Listed Potential Problems Will be Absent or Manageable (Stroke)  Outcome: Ongoing (interventions implemented as appropriate)    08/08/17 1832   Stroke (Ischemic)   Problems Assessed (Stroke (Ischemic)/TIA) all   Problems Present (Stroke (Ischemic)/TIA) cognitive impairment;situational response         Problem: Inpatient Occupational Therapy  Goal: Transfer Training Goal 1 LTG- OT  Outcome: Ongoing (interventions implemented as appropriate)    08/06/17 0826 08/09/17 1315   Transfer Training OT LTG   Transfer Training OT LTG, Date Established 08/06/17 --    Transfer Training OT LTG, Time to Achieve by discharge --    Transfer Training OT LTG, Activity Type bed to chair /chair to bed;sit to stand/stand to sit;toilet --    Transfer Training OT LTG, [place] Level supervision required --    Transfer Training OT LTG, Additional Goal AAD --    Transfer Training OT LTG, Outcome --  goal ongoing  (Min A)       Goal: Dynamic Standing Balance Goal LTG-OT  Outcome: Ongoing (interventions implemented as appropriate)    08/06/17 0826 08/09/17 1315   Dynamic Standing Balance OT LTG   Dynamic Standing Balance OT LTG, Date Established 08/06/17 --    Dynamic Standing Balance OT LTG, Time to Achieve by discharge --    Dynamic Standing Balance OT LTG, [place] Level supervision required --    Dynamic Standing Balance OT LTG, Additional Goal AAD --    Dynamic Standing Balance OT LTG, Outcome --  goal ongoing  (Min A)       Goal: Orientation Goal LTG-  OT  Outcome: Ongoing (interventions implemented as appropriate)    08/06/17 0826 08/09/17 1315   Orientation OT LTG   Orientation OT LTG, Date Established 08/06/17 --    Orientation OT LTG, Time to Achieve by discharge --    Orientation OT LTG, Activity Type person;place;time;100% treatment session --    Orientation OT LTG, Outcome --  goal partially met  (for person and place)       Goal: LB Dressing Goal LTG- OT  Outcome: Ongoing (interventions implemented as appropriate)    08/06/17 0826 08/09/17 1315   LB Dressing OT LTG   LB Dressing Goal OT LTG, Date Established 08/06/17 --    LB Dressing Goal OT LTG, Time to Achieve by discharge --    LB Dressing Goal OT LTG, Activity Type Don socks/pants --    LB Dressing Goal OT LTG, [place] Level contact guard assist --    LB Dressing Goal OT LTG, Additional Goal AAD --    LB Dressing Goal OT LTG, Outcome --  goal ongoing  (Mod A, sister assisting)

## 2017-08-10 NOTE — TELEPHONE ENCOUNTER
----- Message from Paluina Kumar sent at 8/10/2017  3:11 PM EDT -----  Regarding: farah  refill  Clarisse  from hospice called and this patient needs a   Refill.    The drug is a schedule 2 per clarisse  Tincture Opium 10mg per ml.  60ml qty  0.6 ml by mouth every 6 hours as needed for diarrhea.    Fax to hospice  and also mail  Script to hospice [phone] [place] [phone].

## 2017-08-14 ENCOUNTER — LAB REQUISITION (OUTPATIENT)
Dept: LAB | Facility: HOSPITAL | Age: 77
End: 2017-08-14

## 2017-08-14 DIAGNOSIS — I48.91 ATRIAL FIBRILLATION (HCC): ICD-10-CM

## 2017-08-14 LAB
INR PPP: 3.26 (ref 0.9–1.1)
PROTHROMBIN TIME: 35.7 SECONDS (ref 9.3–12.1)

## 2017-08-14 PROCEDURE — 85610 PROTHROMBIN TIME: CPT | Performed by: INTERNAL MEDICINE

## 2017-08-14 NOTE — THERAPY DISCHARGE NOTE
Acute Care - Physical Therapy Discharge Summary  [facility]       Patient Name: Sowmya Beckett  : 1940  MRN: [id number]    Today's Date: 2017  Onset of Illness/Injury or Date of Surgery Date: 17    Date of Referral to PT: 17  Referring Physician: Jose AlfredoDO      Admit Date: 2017      PT Recommendation and Plan    Visit Dx:    ICD-10-CM ICD-9-CM   1. Cerebrovascular accident (CVA), unspecified mechanism I63.9 434.91   2. Paroxysmal atrial fibrillation I48.0 427.31   3. 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 Renal insufficiency N28.9 593.9   5. Impaired functional mobility, balance, gait, and endurance Z74.09 V49.89   6. Impaired mobility and ADLs Z74.09 799.89   7. 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  Requiring some help, but able to walk without assistance.  -ES        User Key  (r) = Recorded By, (t) = Taken By, (c) = Cosigned By    Initials Name Provider Type    ES Stacey Quintana, PT Physical Therapist                      IP PT Goals       17 1508 17 1340 17 0817    Bed Mobility PT LTG    Bed Mobility PT LTG, Date Established   17  -LS    Bed Mobility PT LTG, Time to Achieve   2 wks  -LS    Bed Mobility PT LTG, Activity Type   supine to sit/sit to supine  -LS    Bed Mobility PT LTG, [place] Level   independent  -LS    Bed Mobility PT LTG, Outcome goal ongoing  -LS (r) KR (t) LS (c) goal ongoing  -DM     Transfer Training PT LTG    Transfer Training PT LTG, Date Established   17  -LS    Transfer Training PT LTG, Time to Achieve   2 wks  -LS    Transfer Training PT LTG, Activity Type   sit to stand/stand to sit  -LS    Transfer Training PT LTG, [place] Level   conditional independence  -LS    Transfer Training PT LTG, Assist Device   walker, rolling  -LS    Transfer Training PT LTG, Outcome goal ongoing  -LS (r)  KR (t) LS (c) goal ongoing  -DM     Gait Training PT LTG    Gait Training Goal PT LTG, Date Established   08/06/17  -LS    Gait Training Goal PT LTG, Time to Achieve   2 wks  -LS    Gait Training Goal PT LTG, [employer] Level   supervision required  -LS    Gait Training Goal PT LTG, Assist Device   walker, rolling  -LS    Gait Training Goal PT LTG, Distance to Achieve   300  -LS    Gait Training Goal PT LTG, Outcome goal ongoing  -LS (r) KR (t) LS (c) goal ongoing  -DM     Stair Training PT LTG    Stair Training Goal PT LTG, Date Established   08/06/17  -LS    Stair Training Goal PT LTG, Time to Achieve   2 wks  -LS    Stair Training Goal PT LTG, Number of Steps   5  -LS    Stair Training Goal PT LTG, [employer] Level   supervision required  -LS    Stair Training Goal PT LTG, Assist Device   1 handrail  -LS    Stair Training Goal PT LTG, Outcome goal ongoing  -LS (r) KR (t) LS (c) goal ongoing  -DM       User Key  (r) = Recorded By, (t) = Taken By, (c) = Cosigned By    Initials Name Provider Type    DM Giselle Thornton, PT Physical Therapist    LS Keira Granda, PT Physical Therapist    CYNDI Rob, PT Student PT Student           Goals Status: Treatment plan discontinued secondary to discharge from acute facility.      PT Discharge Summary  Reason for Discharge: Discharge from facility  Outcomes Achieved: Refer to plan of care for updates on goals achieved  Discharge Destination: Home with assist      Stacey Quintana, PT   8/13/2017

## 2023-06-13 NOTE — PROGRESS NOTES
Continued Stay Note  [facility]     Patient Name: Sowmya Beckett  MRN: [id number]  Today's Date: 8/9/2017    Admit Date: 8/5/2017          Discharge Plan       08/09/17 1633    Case Management/Social Work Plan    Plan [facility] home services    Additional Comments Chart reviewed.  Awake.  Alert.  No obvious distress.  Seen and assessed by Clarisse Agrawal RN CHPN, HCP Hospital Liaison.  Pt/family discharge plan is home with [facility] home care services.  DME ordered to be delivered to the home in AM.  Will follow for hospice support and to assist/facilitate access to hospice services.  If can be of further assist please contact.....ext....[phone].              Discharge Codes     None        Expected Discharge Date and Time     Expected Discharge Date Expected Discharge Time    Aug 11, 2017             Concepción Ward RN     Mirvaso Counseling: Mirvaso is a topical medication which can decrease superficial blood flow where applied. Side effects are uncommon and include stinging, redness and allergic reactions.
